# Patient Record
Sex: FEMALE | Race: BLACK OR AFRICAN AMERICAN | NOT HISPANIC OR LATINO | Employment: OTHER | URBAN - METROPOLITAN AREA
[De-identification: names, ages, dates, MRNs, and addresses within clinical notes are randomized per-mention and may not be internally consistent; named-entity substitution may affect disease eponyms.]

---

## 2017-02-07 ENCOUNTER — PATIENT MESSAGE (OUTPATIENT)
Dept: NEPHROLOGY | Facility: CLINIC | Age: 62
End: 2017-02-07

## 2017-02-16 ENCOUNTER — LAB VISIT (OUTPATIENT)
Dept: LAB | Facility: HOSPITAL | Age: 62
End: 2017-02-16
Attending: INTERNAL MEDICINE
Payer: MEDICARE

## 2017-02-16 DIAGNOSIS — Z94.0 S/P KIDNEY TRANSPLANT: ICD-10-CM

## 2017-02-16 LAB
ALBUMIN SERPL BCP-MCNC: 4 G/DL
ANION GAP SERPL CALC-SCNC: 6 MMOL/L
ANISOCYTOSIS BLD QL SMEAR: SLIGHT
BASOPHILS # BLD AUTO: 0.01 K/UL
BASOPHILS NFR BLD: 0.2 %
BUN SERPL-MCNC: 16 MG/DL
CALCIUM SERPL-MCNC: 10 MG/DL
CHLORIDE SERPL-SCNC: 104 MMOL/L
CO2 SERPL-SCNC: 22 MMOL/L
CREAT SERPL-MCNC: 1.1 MG/DL
DIFFERENTIAL METHOD: ABNORMAL
EOSINOPHIL # BLD AUTO: 0.3 K/UL
EOSINOPHIL NFR BLD: 5.1 %
ERYTHROCYTE [DISTWIDTH] IN BLOOD BY AUTOMATED COUNT: 16.1 %
EST. GFR  (AFRICAN AMERICAN): >60 ML/MIN/1.73 M^2
EST. GFR  (NON AFRICAN AMERICAN): 54.3 ML/MIN/1.73 M^2
GLUCOSE SERPL-MCNC: 87 MG/DL
HCT VFR BLD AUTO: 36.7 %
HGB BLD-MCNC: 11.9 G/DL
LYMPHOCYTES # BLD AUTO: 2 K/UL
LYMPHOCYTES NFR BLD: 33.4 %
MCH RBC QN AUTO: 24.9 PG
MCHC RBC AUTO-ENTMCNC: 32.4 %
MCV RBC AUTO: 77 FL
MONOCYTES # BLD AUTO: 0.5 K/UL
MONOCYTES NFR BLD: 8.3 %
NEUTROPHILS # BLD AUTO: 3.1 K/UL
NEUTROPHILS NFR BLD: 53 %
OVALOCYTES BLD QL SMEAR: ABNORMAL
PHOSPHATE SERPL-MCNC: 2.5 MG/DL
PLATELET # BLD AUTO: 128 K/UL
PLATELET BLD QL SMEAR: ABNORMAL
PMV BLD AUTO: ABNORMAL FL
POIKILOCYTOSIS BLD QL SMEAR: SLIGHT
POTASSIUM SERPL-SCNC: 5.9 MMOL/L
PTH-INTACT SERPL-MCNC: 142 PG/ML
RBC # BLD AUTO: 4.78 M/UL
SODIUM SERPL-SCNC: 132 MMOL/L
URATE SERPL-MCNC: 5.4 MG/DL
WBC # BLD AUTO: 5.87 K/UL

## 2017-02-16 PROCEDURE — 83970 ASSAY OF PARATHORMONE: CPT

## 2017-02-16 PROCEDURE — 85025 COMPLETE CBC W/AUTO DIFF WBC: CPT

## 2017-02-16 PROCEDURE — 80069 RENAL FUNCTION PANEL: CPT

## 2017-02-16 PROCEDURE — 84550 ASSAY OF BLOOD/URIC ACID: CPT

## 2017-02-16 PROCEDURE — 80197 ASSAY OF TACROLIMUS: CPT

## 2017-02-16 PROCEDURE — 36415 COLL VENOUS BLD VENIPUNCTURE: CPT | Mod: PO

## 2017-02-17 LAB — TACROLIMUS BLD-MCNC: 14.7 NG/ML

## 2017-02-20 ENCOUNTER — TELEPHONE (OUTPATIENT)
Dept: NEPHROLOGY | Facility: CLINIC | Age: 62
End: 2017-02-20

## 2017-02-20 DIAGNOSIS — E87.5 HYPERKALEMIA: Primary | ICD-10-CM

## 2017-02-20 DIAGNOSIS — Z94.0 S/P KIDNEY TRANSPLANT: Primary | ICD-10-CM

## 2017-02-20 NOTE — PROGRESS NOTES
Lab Results   Component Value Date    CREATININE 1.1 02/16/2017    BUN 16 02/16/2017     (L) 02/16/2017    K 5.9 (H) 02/16/2017     02/16/2017    CO2 22 (L) 02/16/2017       Discussed recent labs with the patient, Prograf level is elevated at 14, also her serum potassium is elevated at 5.9, she says she has been eating a lot of potatoes in the recent past, now she is careful with the diet, will repeat labs in 2 days including Prograf level.  She is currently on Prograf 1 mg twice a day.    Олег Joseph MD

## 2017-02-22 ENCOUNTER — OFFICE VISIT (OUTPATIENT)
Dept: NEPHROLOGY | Facility: CLINIC | Age: 62
End: 2017-02-22
Payer: COMMERCIAL

## 2017-02-22 ENCOUNTER — LAB VISIT (OUTPATIENT)
Dept: LAB | Facility: HOSPITAL | Age: 62
End: 2017-02-22
Attending: INTERNAL MEDICINE
Payer: MEDICARE

## 2017-02-22 VITALS
SYSTOLIC BLOOD PRESSURE: 114 MMHG | DIASTOLIC BLOOD PRESSURE: 60 MMHG | BODY MASS INDEX: 22.5 KG/M2 | WEIGHT: 127 LBS | HEART RATE: 80 BPM | HEIGHT: 63 IN

## 2017-02-22 DIAGNOSIS — E83.42 HYPOMAGNESEMIA: ICD-10-CM

## 2017-02-22 DIAGNOSIS — Z94.0 S/P KIDNEY TRANSPLANT: ICD-10-CM

## 2017-02-22 DIAGNOSIS — Z94.0 S/P KIDNEY TRANSPLANT: Primary | ICD-10-CM

## 2017-02-22 DIAGNOSIS — E87.5 HYPERKALEMIA: ICD-10-CM

## 2017-02-22 LAB
ALBUMIN SERPL BCP-MCNC: 4 G/DL
ANION GAP SERPL CALC-SCNC: 7 MMOL/L
BUN SERPL-MCNC: 17 MG/DL
CALCIUM SERPL-MCNC: 10.1 MG/DL
CHLORIDE SERPL-SCNC: 108 MMOL/L
CO2 SERPL-SCNC: 21 MMOL/L
CREAT SERPL-MCNC: 1 MG/DL
EST. GFR  (AFRICAN AMERICAN): >60 ML/MIN/1.73 M^2
EST. GFR  (NON AFRICAN AMERICAN): >60 ML/MIN/1.73 M^2
GLUCOSE SERPL-MCNC: 90 MG/DL
PHOSPHATE SERPL-MCNC: 3 MG/DL
POTASSIUM SERPL-SCNC: 5.1 MMOL/L
SODIUM SERPL-SCNC: 136 MMOL/L

## 2017-02-22 PROCEDURE — 99214 OFFICE O/P EST MOD 30 MIN: CPT | Mod: S$GLB,,, | Performed by: INTERNAL MEDICINE

## 2017-02-22 PROCEDURE — 1160F RVW MEDS BY RX/DR IN RCRD: CPT | Mod: S$GLB,,, | Performed by: INTERNAL MEDICINE

## 2017-02-22 PROCEDURE — 80069 RENAL FUNCTION PANEL: CPT | Mod: PO

## 2017-02-22 PROCEDURE — 99999 PR PBB SHADOW E&M-EST. PATIENT-LVL III: CPT | Mod: PBBFAC,,, | Performed by: INTERNAL MEDICINE

## 2017-02-22 PROCEDURE — 36415 COLL VENOUS BLD VENIPUNCTURE: CPT | Mod: PO

## 2017-02-22 PROCEDURE — 80197 ASSAY OF TACROLIMUS: CPT

## 2017-02-22 RX ORDER — ONDANSETRON 4 MG/1
4 TABLET, FILM COATED ORAL
COMMUNITY
Start: 2016-08-22 | End: 2017-08-22

## 2017-02-22 RX ORDER — ZOLEDRONIC ACID 4 MG/100ML
SOLUTION INTRAVENOUS
COMMUNITY
End: 2018-07-02

## 2017-02-22 RX ORDER — DOCUSATE SODIUM 100 MG/1
100 CAPSULE, LIQUID FILLED ORAL
COMMUNITY
End: 2017-04-28 | Stop reason: ALTCHOICE

## 2017-02-22 RX ORDER — NORTRIPTYLINE HYDROCHLORIDE 25 MG/1
CAPSULE ORAL
COMMUNITY
Start: 2016-11-23 | End: 2017-04-28 | Stop reason: ALTCHOICE

## 2017-02-22 RX ORDER — LANOLIN ALCOHOL/MO/W.PET/CERES
400 CREAM (GRAM) TOPICAL DAILY
Qty: 30 TABLET | Refills: 9 | COMMUNITY
Start: 2017-02-22

## 2017-02-22 NOTE — LETTER
February 22, 2017        Gabriel Wyman MD  [address] [facility] [address]             [facility] - Nephrology  [address] [facility] [address]  Phone: [phone]  Fax: [phone]   Patient: Chanel Charles   MR Number: [id number]   YOB: 1955   Date of Visit: 2/22/2017       Dear Dr. Wyman:    Thank you for referring Chanel Charles to me for evaluation. Attached you will find relevant portions of my assessment and plan of care.    If you have questions, please do not hesitate to call me. I look forward to following Chanel Charles along with you.    Sincerely,      Олег Joseph MD            CC  No Recipients    Enclosure

## 2017-02-22 NOTE — PROGRESS NOTES
Subjective:       Patient ID: Chanel Charles is a 61 y.o.   female who presents for follow-up evaluation of Living Unrelated RT ( 4/6/2015 ) , HTN, SHPT ,       ORGAN: RIGHT KIDNEY    Donor Type: living    PHS Increased Risk: no    Cold Ischemia: 58 mins    Induction Medications: campath - alemtuzumab (anti-cd52)          Gabriel Wyman MD      HPI:  Chanel Charles is a pleasant 61-year-old -American woman with history of Living unrelated ( friend ) renal transplant in 4/6/2015, excellent allograft function on Prograf 1 mg BID , CellCept 500 mg twice a day. She has history of lupus nephritis, cause of end-stage kidney disease. She was on hemodialysis for about 4-1/2 years at [facility] HD unit under [facility], recent office notes were reviewed. She follows with rheumatology Department in [place] and  endocrine department for hypercalcemia.  recent lab results were discussed, she had high serum potassium secondary to increased potatoes, low potassium diet again discussed, repeat labs show a potassium of 5.1, she has been nauseated for the last few weeks from viral  gastroenteritis.              Past Medical History   Diagnosis Date    Abnormal stress echo - with no blockage on LHC - 1/16/14 1/7/2014    Acid reflux     Anemia of chronic kidney failure     Anxiety     Arthritis     Awaiting kidney transplant 12/12/2013    CHF (congestive heart failure)     Coronary artery disease      Bare metal stent in 2007    ESRD (end stage renal disease)      Secondary to Lupus Nephritis, HD MWF    Hypercalcemia 7/15/2015    Hyperlipidemia 12/12/2013    Hyperparathyroidism 7/15/2015    Hyperparathyroidism, tertiary 8/7/2015    Immunocompromised state 5/5/2015    ITP (idiopathic thrombocytopenic purpura) 12/12/2013    Living-donor kidney transplant for SLE 4/6/15 4/6/2015     Induced with Thymoglobulin.     Lupus nephritis     Lupus nephritis 5/18/2015    Menopause  7/15/2015    Osteoporosis 12/24/2015    Pericardial effusion s/p pericardiocentesis 12/12/2013    Pleural effusion s/p Thoracentesis and VATS 12/12/2013    Prophylactic immunotherapy 6/29/2015    S/p Bilateral Nephrectomy (Severe proteinuria) - 2012 3/19/2015    Secondary hyperparathyroidism, renal     Shingles 4/2012    SLE (systemic lupus erythematosus) 1997         Current Outpatient Prescriptions on File Prior to Visit   Medication Sig Dispense Refill    alprazolam (XANAX) 0.5 MG tablet Take 0.5 mg by mouth. 1 Tablet Oral Every day.  or as needed.      aspirin (ECOTRIN) 81 MG EC tablet Take 81 mg by mouth once daily.       atorvastatin (LIPITOR) 80 MG tablet Take 80 mg by mouth every evening. 1 Tablet Oral Every day      benzonatate (TESSALON) 200 MG capsule Take 200 mg by mouth once daily.      BIOTIN ORAL Take by mouth.      BUDESONIDE/FORMOTEROL FUMARATE (SYMBICORT INHL) Inhale into the lungs.      eszopiclone (LUNESTA) 2 MG Tab Take 2 mg by mouth. 1 Tablet Oral As directed.  1 tablet 1 time as needed at bedtime.      famotidine (PEPCID) 20 MG tablet Take 1 tablet (20 mg total) by mouth every evening. 30 tablet 6    fluticasone (FLONASE) 50 mcg/actuation nasal spray 1 spray by Each Nare route once daily.  0    hydroxychloroquine (PLAQUENIL) 200 mg tablet Take 1 tablet (200 mg total) by mouth once daily. 90 tablet 2    loratadine (CLARITIN) 10 mg tablet Take 1 tablet (10 mg total) by mouth daily as needed for Allergies.  0    magnesium chloride (MAG DELAY) 64 mg TbSR Take 1 tablet (64 mg total) by mouth once daily. 30 tablet 6    multivitamin (THERAGRAN) tablet Take 1 tablet by mouth once daily.      mycophenolate (CELLCEPT) 250 mg Cap Take 2 capsules (500 mg total) by mouth 2 (two) times daily. 120 capsule 11    tacrolimus (PROGRAF) 1 MG Cap Take 1 capsule (1 mg total) by mouth every 12 (twelve) hours. Take 1 mg in the AM and 1 mg in the PM. Kidney Transplant on 4/6/15. 60 capsule 11     valacyclovir (VALTREX) 1000 MG tablet Take 1 tablet by mouth once daily.       No current facility-administered medications on file prior to visit.        SH : She is a retired teacher, principal. She is not a smoker or alcohol drinker. She has a son who is 32 years old. She is currently living at home.       FH : Several family members with hypertension. Lupus in a distant uncle               Review of Systems  :      Constitutional: Negative for activity change, appetite change, fatigue and fever.   HENT:  Negative for congestion, facial swelling, sore throat, trouble swallowing and voice change.   Eyes: Negative for redness and visual disturbance.   Respiratory: Negative for apnea, cough, chest tightness, shortness of breath and wheezing.   Cardiovascular: Negative for chest pain, palpitations and leg swelling.   Gastrointestinal: Negative for abdominal distention, abdominal pain, blood in stool, constipation, diarrhea, nausea and vomiting.   Genitourinary: Negative for decreased urine volume, difficulty urinating, dysuria, flank pain, frequency, hematuria, pelvic pain and urgency.   Musculoskeletal: Positive for arthralgias. Negative for back pain, gait problem and joint swelling.   Skin: Negative for color change and rash.   Neurological: Negative for dizziness, syncope, weakness and headaches.   Hematological: Does not bruise/bleed easily.   Psychiatric/Behavioral: Negative for agitation, behavioral problems and confusion. The patient is not nervous/anxious.          Objective:         Vitals:    02/22/17 1134   BP: 114/60   Pulse: 80       Respiratory rate 20, afebrile, weight 126 pounds, previous weight was 130 pounds    Physical Exam  :      Constitutional: She is oriented to person, place, and time. She appears well-developed and well-nourished. No distress.    HENT: Head: Normocephalic and atraumatic. Mouth/Throat: Oropharynx is clear and moist. No oropharyngeal exudate.    Eyes: Conjunctivae and EOM  are normal. Pupils are equal, round, and reactive to light. No scleral icterus.    Neck: Normal range of motion. Neck supple. No JVD present. Carotid bruit is not present. No tracheal deviation present. No thyroid mass and no thyromegaly present.    Cardiovascular: Normal rate, regular rhythm, normal heart sounds and intact distal pulses. Exam reveals no gallop and no friction rub.    No murmur heard.    Pulmonary/Chest: Effort normal and breath sounds normal. No respiratory distress. She has no wheezes. She has no rales. She exhibits no tenderness.    Abdominal: Soft. Bowel sounds are normal. She exhibits no distension, no abdominal bruit, no ascites and no mass. There is no hepatosplenomegaly. There is no allgraft ( RLQ ) tenderness. There is no rebound, no guarding and no CVA tenderness.   Musculoskeletal: Normal range of motion. She exhibits no edema or tenderness.   Lymphadenopathy: She has no cervical adenopathy.   Neurological: She is alert and oriented to person, place, and time. No cranial nerve deficit. She exhibits normal muscle tone. Coordination normal.    Skin: Skin is warm and intact. No rash noted. No erythema. No pallor.   Psychiatric: She has a normal mood and affect. Her behavior is normal. Judgment normal.        Labs:      Lab Results   Component Value Date    CREATININE 1.0 02/22/2017    BUN 17 02/22/2017     02/22/2017    K 5.1 02/22/2017     02/22/2017    CO2 21 (L) 02/22/2017       Lab Results   Component Value Date    WBC 5.87 02/16/2017    HGB 11.9 (L) 02/16/2017    HCT 36.7 (L) 02/16/2017    MCV 77 (L) 02/16/2017     (L) 02/16/2017       Lab Results   Component Value Date    .0 (H) 02/16/2017    CALCIUM 10.1 02/22/2017    PHOS 3.0 02/22/2017       Lab Results   Component Value Date    ALBUMIN 4.0 02/22/2017       Lab Results   Component Value Date    URICACID 5.4 02/16/2017       Impression and Plan : 61-year-old -American woman seen in office today in  follow-up for following medical problems ,       1. Status post Living Unrelated kidney transplant ( 4/6/2015 ) : Stable renal function with a serum creatinine of 1 mg/dL.        Serum Prograf levels is 14, repeat pending ,  ( range 4-7 ) , She is currently on Prograf  1 mg twice daily.  CellCept 500 mg twice a day.       2. Hypertension - blood pressure is controlled,      3. Secondary hyperparathyroidism - follow PTH ,       4. Anemia of chronic kidney disease - most recent hemoglobin is about 11.1 g. will continue to monitor.        5. Recent urinalysis shows normal range proteinuria.        6. Lupus nephritis - currently under remission. On Plaquenil, sees Dr Jj in [place] ,       7. Lytes -  on Mag supplements , check magnesium in 2 weeks, changing from mag delay to  Mag-Ox 400 once a day.    Ca acceptable at 10 , follow       8. She is euvolemic on exam.       9. Leukopenia : resolved, she is following with hematologist Karsten ()        10. Met acidosis : bicarb level stable       I'll see her in follow-up in about 4 months. More than 25 minutes of face-to-face time discussing labs and plan of care.       Sincerely,        Олег Joseph MD

## 2017-02-22 NOTE — MR AVS SNAPSHOT
[facility] Nephrology  [address] [facility] [place] [address]  Phone: [phone]  Fax: [phone]                  Chanel Charles   2017 11:30 AM   Office Visit    Description:  Female : 1955   Provider:  Олег Joseph MD   Department:  [facility] - Nephrology           Reason for Visit     S/P kidney transplant           Diagnoses this Visit        Comments    S/P kidney transplant    -  Primary     Hypomagnesemia                To Do List           Future Appointments        Provider Department Dept Phone    3/8/2017 10:55 AM LABORATORY, [facility] [phone]    4/3/2017 8:35 AM LABORATORY, [facility] [phone]    4/3/2017 8:40 AM SPECIMEN, [facility] [phone]    2017 11:00 AM Lexii Nation MD [facility] - Rheumatology [phone]    2017 10:20 AM LABORATORY, [facility] [phone]      Goals (5 Years of Data)     None      PURCHASE these Medications (No prescription required)        Start End    magnesium oxide (MAG-OX) 400 mg tablet 2017     Sig - Route: Take 1 tablet (400 mg total) by mouth once daily. - Oral    Class: OTC      [facility] On Call     [facility] On Call Nurse Care Line -  Assistance  Registered nurses in the [facility] On Call Center provide clinical advisement, health education, appointment booking, and other advisory services.  Call for this free service at [phone].             Medications           Message regarding Medications     Verify the changes and/or additions to your medication regime listed below are the same as discussed with your clinician today.  If any of these changes or additions are incorrect, please notify your healthcare provider.        START taking these NEW medications        Refills    magnesium oxide (MAG-OX) 400 mg tablet 9    Sig: Take 1 tablet (400 mg total) by mouth once daily.    Class: OTC    Route: Oral       STOP taking these medications     magnesium chloride (MAG DELAY) 64 mg TbSR Take 1 tablet (64 mg total) by mouth once daily.           Verify that the below list of medications is an accurate representation of the medications you are currently taking.  If none reported, the list may be blank. If incorrect, please contact your healthcare provider. Carry this list with you in case of emergency.           Current Medications     alprazolam (XANAX) 0.5 MG tablet Take 0.5 mg by mouth. 1 Tablet Oral Every day.  or as needed.    aspirin (ECOTRIN) 81 MG EC tablet Take 81 mg by mouth once daily.     atorvastatin (LIPITOR) 80 MG tablet Take 80 mg by mouth every evening. 1 Tablet Oral Every day    benzonatate (TESSALON) 200 MG capsule Take 200 mg by mouth once daily.    BIOTIN ORAL Take by mouth.    BUDESONIDE/FORMOTEROL FUMARATE (SYMBICORT INHL) Inhale into the lungs.    docusate sodium (COLACE) 100 MG capsule Take 100 mg by mouth.    eszopiclone (LUNESTA) 2 MG Tab Take 2 mg by mouth. 1 Tablet Oral As directed.  1 tablet 1 time as needed at bedtime.    famotidine (PEPCID) 20 MG tablet Take 1 tablet (20 mg total) by mouth every evening.    fluticasone (FLONASE) 50 mcg/actuation nasal spray 1 spray by Each Nare route once daily.    hydroxychloroquine (PLAQUENIL) 200 mg tablet Take 1 tablet (200 mg total) by mouth once daily.    loratadine (CLARITIN) 10 mg tablet Take 1 tablet (10 mg total) by mouth daily as needed for Allergies.    multivitamin (THERAGRAN) tablet Take 1 tablet by mouth once daily.    mycophenolate (CELLCEPT) 250 mg Cap Take 2 capsules (500 mg total) by mouth 2 (two) times daily.    nortriptyline (PAMELOR) 25 MG capsule     ondansetron (ZOFRAN) 4 MG tablet Take 4 mg by mouth.    tacrolimus (PROGRAF) 1 MG Cap Take 1 capsule (1 mg total) by mouth every 12 (twelve) hours. Take 1 mg in the AM and 1 mg in the PM. Kidney Transplant on 4/6/15.    valacyclovir (VALTREX) 1000 MG tablet Take 1 tablet by mouth once  "daily.    zoledronic acid-mannitol-water (RECLAST) 4 mg/100 mL Soln Inject into the vein.    magnesium oxide (MAG-OX) 400 mg tablet Take 1 tablet (400 mg total) by mouth once daily.           Clinical Reference Information           Your Vitals Were     BP Pulse Height Weight BMI    114/60 (BP Location: Right arm, Patient Position: Sitting, BP Method: Manual) 80 5' 3" (1.6 m) 57.6 kg (126 lb 15.8 oz) 22.49 kg/m2      Blood Pressure          Most Recent Value    BP  114/60      Allergies as of 2/22/2017     Heparin Analogues    Escitalopram Oxalate    Gabapentin      Immunizations Administered on Date of Encounter - 2/22/2017     None      Orders Placed During Today's Visit     Future Labs/Procedures Expected by Expires    CBC auto differential  2/22/2017 4/23/2018    Magnesium  2/22/2017 4/23/2018    Magnesium  2/22/2017 4/23/2018    PTH, intact  2/22/2017 4/23/2018    Renal function panel  2/22/2017 4/23/2018    Tacrolimus level  2/22/2017 4/23/2018    Uric acid  2/22/2017 4/23/2018    Urinalysis  2/22/2017 2/22/2018    Vitamin D  2/22/2017 4/23/2018      Maintenance Dialysis History     Start End Type Comments Center    11/15/2010 4/6/2015 [facility]            Current Dialysis Center Information     No center information to display.            Transplant Information        Txp Date Organ Coordinator Care Team    4/6/2015 Kidney [facility] RN Referring Physician:  Shukri Reeves MD   Current Nephrologist:  Олег Joseph MD   Surgeon:  Eamon Pina MD   Assisting Surgeon:  Rancho Taylor MD         Instructions    Avoid NSAID pain medications such as advil, aleve, motrin, ibuprofen, naprosyn, meloxicam, diclofenac, mobic.                Language Assistance Services     ATTENTION: Language assistance services are available, free of charge. Please call [phone].      ATENCIÓN: Si habla español, tiene a jc disposición servicios gratuitos de asistencia " lingüística. Otis al [phone].     ELBERT Ý: N?u b?n nói Ti?ng Vi?t, có các d?ch v? h? tr? ngôn ng? mi?n phí dành cho b?n. G?i s? [phone].         [facility] - [facility]ph[facility] complies with applicable Federal civil rights laws and does not discriminate on the basis of race, color, national origin, age, disability, or sex.

## 2017-02-23 LAB — TACROLIMUS BLD-MCNC: 11.7 NG/ML

## 2017-03-08 ENCOUNTER — LAB VISIT (OUTPATIENT)
Dept: LAB | Facility: HOSPITAL | Age: 62
End: 2017-03-08
Attending: INTERNAL MEDICINE
Payer: COMMERCIAL

## 2017-03-08 DIAGNOSIS — E83.42 HYPOMAGNESEMIA: ICD-10-CM

## 2017-03-08 LAB — MAGNESIUM SERPL-MCNC: 1.4 MG/DL

## 2017-03-08 PROCEDURE — 83735 ASSAY OF MAGNESIUM: CPT

## 2017-03-08 PROCEDURE — 36415 COLL VENOUS BLD VENIPUNCTURE: CPT | Mod: PO

## 2017-03-27 DIAGNOSIS — Z94.0 S/P KIDNEY TRANSPLANT: Primary | ICD-10-CM

## 2017-03-30 RX ORDER — MYCOPHENOLATE MOFETIL 250 MG/1
CAPSULE ORAL
Qty: 120 CAPSULE | Refills: 11 | Status: SHIPPED | OUTPATIENT
Start: 2017-03-30 | End: 2018-03-28 | Stop reason: SDUPTHER

## 2017-04-03 ENCOUNTER — LAB VISIT (OUTPATIENT)
Dept: LAB | Facility: HOSPITAL | Age: 62
End: 2017-04-03
Attending: INTERNAL MEDICINE
Payer: COMMERCIAL

## 2017-04-03 DIAGNOSIS — M32.14 LUPUS NEPHRITIS: ICD-10-CM

## 2017-04-03 DIAGNOSIS — M32.9 SLE (SYSTEMIC LUPUS ERYTHEMATOSUS): ICD-10-CM

## 2017-04-03 DIAGNOSIS — Z94.0 KIDNEY REPLACED BY TRANSPLANT: ICD-10-CM

## 2017-04-03 DIAGNOSIS — Z79.899 ENCOUNTER FOR LONG-TERM (CURRENT) USE OF MEDICATIONS: ICD-10-CM

## 2017-04-03 LAB
ALBUMIN SERPL BCP-MCNC: 3.9 G/DL
ALP SERPL-CCNC: 75 U/L
ALP SERPL-CCNC: 75 U/L
ALT SERPL W/O P-5'-P-CCNC: 21 U/L
ALT SERPL W/O P-5'-P-CCNC: 21 U/L
ANION GAP SERPL CALC-SCNC: 6 MMOL/L
ANION GAP SERPL CALC-SCNC: 6 MMOL/L
AST SERPL-CCNC: 28 U/L
AST SERPL-CCNC: 28 U/L
BASOPHILS # BLD AUTO: 0.03 K/UL
BASOPHILS NFR BLD: 0.6 %
BILIRUB DIRECT SERPL-MCNC: 0.5 MG/DL
BILIRUB SERPL-MCNC: 1 MG/DL
BILIRUB SERPL-MCNC: 1 MG/DL
BUN SERPL-MCNC: 17 MG/DL
BUN SERPL-MCNC: 17 MG/DL
C3 SERPL-MCNC: 125 MG/DL
C4 SERPL-MCNC: 22 MG/DL
CALCIUM SERPL-MCNC: 10.2 MG/DL
CALCIUM SERPL-MCNC: 10.2 MG/DL
CHLORIDE SERPL-SCNC: 109 MMOL/L
CHLORIDE SERPL-SCNC: 109 MMOL/L
CO2 SERPL-SCNC: 26 MMOL/L
CO2 SERPL-SCNC: 26 MMOL/L
CREAT SERPL-MCNC: 0.9 MG/DL
CREAT SERPL-MCNC: 0.9 MG/DL
DIFFERENTIAL METHOD: ABNORMAL
EOSINOPHIL # BLD AUTO: 0.3 K/UL
EOSINOPHIL NFR BLD: 5.6 %
ERYTHROCYTE [DISTWIDTH] IN BLOOD BY AUTOMATED COUNT: 17.3 %
EST. GFR  (AFRICAN AMERICAN): >60 ML/MIN/1.73 M^2
EST. GFR  (AFRICAN AMERICAN): >60 ML/MIN/1.73 M^2
EST. GFR  (NON AFRICAN AMERICAN): >60 ML/MIN/1.73 M^2
EST. GFR  (NON AFRICAN AMERICAN): >60 ML/MIN/1.73 M^2
GLUCOSE SERPL-MCNC: 87 MG/DL
GLUCOSE SERPL-MCNC: 87 MG/DL
HCT VFR BLD AUTO: 33.4 %
HGB BLD-MCNC: 11.1 G/DL
LYMPHOCYTES # BLD AUTO: 1.5 K/UL
LYMPHOCYTES NFR BLD: 28 %
MAGNESIUM SERPL-MCNC: 1.5 MG/DL
MCH RBC QN AUTO: 24.9 PG
MCHC RBC AUTO-ENTMCNC: 33.2 %
MCV RBC AUTO: 75 FL
MONOCYTES # BLD AUTO: 0.6 K/UL
MONOCYTES NFR BLD: 12.4 %
NEUTROPHILS # BLD AUTO: 2.7 K/UL
NEUTROPHILS NFR BLD: 53.4 %
PHOSPHATE SERPL-MCNC: 2.7 MG/DL
PLATELET # BLD AUTO: 152 K/UL
PMV BLD AUTO: ABNORMAL FL
POTASSIUM SERPL-SCNC: 4.8 MMOL/L
POTASSIUM SERPL-SCNC: 4.8 MMOL/L
PROT SERPL-MCNC: 7.2 G/DL
PROT SERPL-MCNC: 7.2 G/DL
RBC # BLD AUTO: 4.45 M/UL
SODIUM SERPL-SCNC: 141 MMOL/L
SODIUM SERPL-SCNC: 141 MMOL/L
WBC # BLD AUTO: 5.17 K/UL

## 2017-04-03 PROCEDURE — 80053 COMPREHEN METABOLIC PANEL: CPT

## 2017-04-03 PROCEDURE — 80076 HEPATIC FUNCTION PANEL: CPT

## 2017-04-03 PROCEDURE — 84100 ASSAY OF PHOSPHORUS: CPT

## 2017-04-03 PROCEDURE — 86160 COMPLEMENT ANTIGEN: CPT

## 2017-04-03 PROCEDURE — 86225 DNA ANTIBODY NATIVE: CPT

## 2017-04-03 PROCEDURE — 85025 COMPLETE CBC W/AUTO DIFF WBC: CPT

## 2017-04-03 PROCEDURE — 86160 COMPLEMENT ANTIGEN: CPT | Mod: 59

## 2017-04-03 PROCEDURE — 87799 DETECT AGENT NOS DNA QUANT: CPT

## 2017-04-03 PROCEDURE — 80197 ASSAY OF TACROLIMUS: CPT

## 2017-04-03 PROCEDURE — 83735 ASSAY OF MAGNESIUM: CPT

## 2017-04-04 ENCOUNTER — PATIENT MESSAGE (OUTPATIENT)
Dept: RHEUMATOLOGY | Facility: CLINIC | Age: 62
End: 2017-04-04

## 2017-04-04 LAB
DSDNA AB SER-ACNC: NORMAL [IU]/ML
TACROLIMUS BLD-MCNC: 5.7 NG/ML

## 2017-04-06 ENCOUNTER — OFFICE VISIT (OUTPATIENT)
Dept: RHEUMATOLOGY | Facility: CLINIC | Age: 62
End: 2017-04-06
Payer: MEDICARE

## 2017-04-06 ENCOUNTER — TELEPHONE (OUTPATIENT)
Dept: TRANSPLANT | Facility: CLINIC | Age: 62
End: 2017-04-06

## 2017-04-06 ENCOUNTER — TELEPHONE (OUTPATIENT)
Dept: ENDOCRINOLOGY | Facility: CLINIC | Age: 62
End: 2017-04-06

## 2017-04-06 VITALS
HEIGHT: 63 IN | TEMPERATURE: 98 F | WEIGHT: 126.5 LBS | SYSTOLIC BLOOD PRESSURE: 101 MMHG | DIASTOLIC BLOOD PRESSURE: 61 MMHG | HEART RATE: 78 BPM | BODY MASS INDEX: 22.41 KG/M2

## 2017-04-06 DIAGNOSIS — Z94.0 IMMUNOSUPPRESSIVE MANAGEMENT ENCOUNTER FOLLOWING KIDNEY TRANSPLANT: ICD-10-CM

## 2017-04-06 DIAGNOSIS — D84.9 IMMUNOSUPPRESSION: ICD-10-CM

## 2017-04-06 DIAGNOSIS — M32.9 SYSTEMIC LUPUS ERYTHEMATOSUS, UNSPECIFIED SLE TYPE, UNSPECIFIED ORGAN INVOLVEMENT STATUS: Primary | ICD-10-CM

## 2017-04-06 DIAGNOSIS — Z79.899 IMMUNOSUPPRESSIVE MANAGEMENT ENCOUNTER FOLLOWING KIDNEY TRANSPLANT: ICD-10-CM

## 2017-04-06 LAB
BK VIRUS DNA PCR, QUANT, BLOOD: <250 COPIES/ML
BK VIRUS DNA, BLOOD: NOT DETECTED
LOG BKV COPIES/ML: <2.4 LOG (10) COPIES/ML

## 2017-04-06 PROCEDURE — 99999 PR PBB SHADOW E&M-EST. PATIENT-LVL III: CPT | Mod: PBBFAC,,, | Performed by: INTERNAL MEDICINE

## 2017-04-06 PROCEDURE — 99214 OFFICE O/P EST MOD 30 MIN: CPT | Mod: S$PBB,,, | Performed by: INTERNAL MEDICINE

## 2017-04-06 PROCEDURE — 99213 OFFICE O/P EST LOW 20 MIN: CPT | Mod: PBBFAC | Performed by: INTERNAL MEDICINE

## 2017-04-06 ASSESSMENT — ROUTINE ASSESSMENT OF PATIENT INDEX DATA (RAPID3)
MDHAQ FUNCTION SCORE: 0
AM STIFFNESS SCORE: 0, NO
FATIGUE SCORE: 0
TOTAL RAPID3 SCORE: .17
PSYCHOLOGICAL DISTRESS SCORE: 0
PATIENT GLOBAL ASSESSMENT SCORE: 0
PAIN SCORE: .5

## 2017-04-06 NOTE — TELEPHONE ENCOUNTER
----- Message from Madhuri Castanon PA-C sent at 4/6/2017  1:47 PM CDT -----      ----- Message -----     From: Lexii Nation MD     Sent: 4/6/2017  11:54 AM       To: Madhuri Castanon PA-C    Ms. Kilgore asked when and if she needed Reclast again.  It was done 1/2016.

## 2017-04-06 NOTE — MR AVS SNAPSHOT
[facility] - Rheumatology  [address]  [facility] [address]  Phone: [phone]  Fax: [phone]                  Chanel Charles   2017 11:00 AM   Office Visit    Description:  Female : 1955   Provider:  Lexii Nation MD   Department:  [facility] - Rheumatology           Reason for Visit     Lupus           Diagnoses this Visit        Comments    Systemic lupus erythematosus, unspecified SLE type, unspecified organ involvement status    -  Primary     Immunosuppression         Immunosuppressive management encounter following kidney transplant                To Do List           Future Appointments        Provider Department Dept Phone    2017 11:00 AM Ruslan Landaverde MD [facility] Transplant [phone]    2017 10:20 AM LAB[facility]ATORY, [facility] [phone]    2017 10:30 AM SPECIMEN, [facility] [phone]    2017 11:30 AM Олег Joseph MD [facility] Nephrology [phone]      Goals (5 Years of Data)     None      Follow-Up and Disposition     Return in about 4 months (around 2017).    Follow-up and Disposition History      [facility] On Call     [facility] On Call Nurse Care Line -  Assistance  Unless otherwise directed by your provider, please contact [facility], our nurse care line that is available for  assistance.     Registered nurses in the [facility] Center provide: appointment scheduling, clinical advisement, health education, and other advisory services.  Call: [phone] (toll free)               Medications           Message regarding Medications     Verify the changes and/or additions to your medication regime listed below are the same as discussed with your clinician today.  If any of these changes or additions are incorrect, please notify your healthcare provider.             Verify that the below list of medications is an accurate representation of the medications you  are currently taking.  If none reported, the list may be blank. If incorrect, please contact your healthcare provider. Carry this list with you in case of emergency.           Current Medications     alprazolam (XANAX) 0.5 MG tablet Take 0.5 mg by mouth. 1 Tablet Oral Every day.  or as needed.    aspirin (ECOTRIN) 81 MG EC tablet Take 81 mg by mouth once daily.     atorvastatin (LIPITOR) 80 MG tablet Take 80 mg by mouth every evening. 1 Tablet Oral Every day    benzonatate (TESSALON) 200 MG capsule Take 200 mg by mouth once daily.    BIOTIN ORAL Take by mouth.    BUDESONIDE/FORMOTEROL FUMARATE (SYMBICORT INHL) Inhale into the lungs.    docusate sodium (COLACE) 100 MG capsule Take 100 mg by mouth.    eszopiclone (LUNESTA) 2 MG Tab Take 2 mg by mouth. 1 Tablet Oral As directed.  1 tablet 1 time as needed at bedtime.    famotidine (PEPCID) 20 MG tablet Take 1 tablet (20 mg total) by mouth every evening.    fluticasone (FLONASE) 50 mcg/actuation nasal spray 1 spray by Each Nare route once daily.    hydroxychloroquine (PLAQUENIL) 200 mg tablet Take 1 tablet (200 mg total) by mouth once daily.    loratadine (CLARITIN) 10 mg tablet Take 1 tablet (10 mg total) by mouth daily as needed for Allergies.    magnesium oxide (MAG-OX) 400 mg tablet Take 1 tablet (400 mg total) by mouth once daily.    multivitamin (THERAGRAN) tablet Take 1 tablet by mouth once daily.    mycophenolate (CELLCEPT) 250 mg Cap TAKE 2 CAPSULES BY MOUTH TWO TIMES A DAY    nortriptyline (PAMELOR) 25 MG capsule     ondansetron (ZOFRAN) 4 MG tablet Take 4 mg by mouth.    tacrolimus (PROGRAF) 1 MG Cap Take 1 capsule (1 mg total) by mouth every 12 (twelve) hours. Take 1 mg in the AM and 1 mg in the PM. Kidney Transplant on 4/6/15.    valacyclovir (VALTREX) 1000 MG tablet Take 1 tablet by mouth once daily.    zoledronic acid-mannitol-water (RECLAST) 4 mg/100 mL Soln Inject into the vein.           Clinical Reference Information           Your Vitals Were     BP  "Pulse Temp Height Weight BMI    101/61 (BP Location: Right arm, Patient Position: Sitting, BP Method: Automatic) 78 98.4 °F (36.9 °C) (Oral) 5' 3" (1.6 m) 57.4 kg (126 lb 8 oz) 22.41 kg/m2      Blood Pressure          Most Recent Value    BP  101/61      Allergies as of 4/6/2017     Heparin Analogues    Escitalopram Oxalate    Gabapentin      Immunizations Administered on Date of Encounter - 4/6/2017     None      Maintenance Dialysis History     Start End Type Comments Center    11/15/2010 4/6/2015 Hemo  [facility]            Current Dialysis Center Information     No center information to display.            Transplant Information        Txp Date Organ Coordinator Care Team    4/6/2015 Kidney [facility] RN Referring Physician:  Shukri Reeves MD   Current Nephrologist:  Олег Joseph MD   Surgeon:  Eamon Pina MD   Assisting Surgeon:  Rancho Taylor MD         Language Assistance Services     ATTENTION: Language assistance services are available, free of charge. Please call [phone].      ATENCIÓN: Si habla español, tiene a jc disposición servicios gratuitos de asistencia lingüística. Llame al [phone].     CHÚ Ý: N?u b?n nói Ti?ng Vi?t, có các d?ch v? h? tr? ngôn ng? mi?n phí dành cho b?n. G?i s? [phone].         [facility] complies with applicable Federal civil rights laws and does not discriminate on the basis of race, color, national origin, age, disability, or sex.        "

## 2017-04-06 NOTE — PROGRESS NOTES
Subjective:       Patient ID: Chanel Charles is a 61 y.o. female.    Chief Complaint: Lupus      HPI:  Chanel Charles is a 61 y.o. female with SLE and renal failure as well an extensive history of recurrent pleural effusions and pericardial effusion treated with recurrent thoracentesis (x3) and pericardiocentesis. She is s/p kidney transplant 4/6/2015. Patient in the past had 3-4 paracentesis for accumulation of abdominal fluid (last time was December 2011). She was hospitalized 4/2012 for acute altered mental status, slurred speech concerning for CVA. Dr. Ribera (rheum) was consulted and he did not think it was her lupus. It was felt that her AMS was caused by gabapentin and/or valtrex as those were new medications. After these medications were stopped she returned to her baseline function. She has history of a lung nodule and eventual TB results were negative. Patient had VATS procedure 3/2011 which showed a granuloma thought to be due histoplamosis but work up was negative. Patient had kidneys removed 4/2011 to help decrease protein loss from the body.    She was given Rituxan (4 infusions total) and later Nplate for thrombocytopenia by her hematologist and is back on Nplate.   Hematologist is Dr. Shelley () and she will see him next week. She has been off Nplate since transplant.       Colonoscopy 8/19/14 showed benign polyps, diverticulosis, internal and external hemrrhoids  Mammogram 6/24/14 was negative.  Pap smear 6/30/14 negative for lesion or malignancy     Sinus headaches and sinusitis not improved with antibiotics 9/2016. Had MRI showed spot on brain. Had lumbar puncture.  Still needs additional MRI. First treated with fioricet then nortriptyline. Still off since not having headaches.   Doing well.        Review of Systems   Constitutional: Negative for fatigue.   HENT: Negative for congestion, dental problem and drooling.    Eyes: Negative.    Respiratory: Negative.   "  Cardiovascular: Negative.    Gastrointestinal: Negative.    Endocrine: Negative.    Genitourinary: Negative.    Musculoskeletal: Negative.    Skin: Negative.    Allergic/Immunologic: Negative.    Neurological: Positive for headaches.   Hematological: Negative.    Psychiatric/Behavioral: Negative.          Objective:   /61 (BP Location: Right arm, Patient Position: Sitting, BP Method: Automatic)  Pulse 78  Temp 98.4 °F (36.9 °C) (Oral)   Ht 5' 3" (1.6 m)  Wt 57.4 kg (126 lb 8 oz)  BMI 22.41 kg/m2     Physical Exam   Constitutional: She is oriented to person, place, and time and well-developed, well-nourished, and in no distress.   HENT:   Head: Normocephalic and atraumatic.   Eyes: Conjunctivae and EOM are normal.   Neck: Neck supple.   Cardiovascular: Normal rate, regular rhythm and normal heart sounds.    Pulmonary/Chest: Effort normal and breath sounds normal.   Abdominal: Soft. Bowel sounds are normal.   Neurological: She is alert and oriented to person, place, and time. Gait normal.   Skin: Skin is warm and dry.     Psychiatric: Mood and affect normal.   Musculoskeletal: She exhibits no edema, tenderness or deformity.           LABS    Component      Latest Ref Rng & Units 4/3/2017 4/3/2017 4/3/2017           8:03 AM  8:03 AM  8:03 AM   WBC      3.90 - 12.70 K/uL   5.17   RBC      4.00 - 5.40 M/uL   4.45   Hemoglobin      12.0 - 16.0 g/dL   11.1 (L)   Hematocrit      37.0 - 48.5 %   33.4 (L)   MCV      82 - 98 fL   75 (L)   MCH      27.0 - 31.0 pg   24.9 (L)   MCHC      32.0 - 36.0 %   33.2   RDW      11.5 - 14.5 %   17.3 (H)   Platelets      150 - 350 K/uL   152   MPV      9.2 - 12.9 fL   SEE COMMENT   Gran #      1.8 - 7.7 K/uL   2.7   Lymph #      1.0 - 4.8 K/uL   1.5   Mono #      0.3 - 1.0 K/uL   0.6   Eos #      0.0 - 0.5 K/uL   0.3   Baso #      0.00 - 0.20 K/uL   0.03   Gran%      38.0 - 73.0 %   53.4   Lymph%      18.0 - 48.0 %   28.0   Mono%      4.0 - 15.0 %   12.4   Eosinophil%      0.0 " - 8.0 %   5.6   Basophil%      0.0 - 1.9 %   0.6   Differential Method         Automated   Sodium      136 - 145 mmol/L  141 141   Potassium      3.5 - 5.1 mmol/L  4.8 4.8   Chloride      95 - 110 mmol/L  109 109   CO2      23 - 29 mmol/L  26 26   Glucose      70 - 110 mg/dL  87 87   BUN, Bld      8 - 23 mg/dL  17 17   Creatinine      0.5 - 1.4 mg/dL  0.9 0.9   Calcium      8.7 - 10.5 mg/dL  10.2 10.2   Total Protein      6.0 - 8.4 g/dL 7.2  7.2   Albumin      3.5 - 5.2 g/dL 3.9 3.9 3.9   Total Bilirubin      0.1 - 1.0 mg/dL 1.0  1.0   Alkaline Phosphatase      55 - 135 U/L 75  75   AST      10 - 40 U/L 28  28   ALT      10 - 44 U/L 21  21   Anion Gap      8 - 16 mmol/L  6 (L) 6 (L)   eGFR if African American      >60 mL/min/1.73 m:2  >60.0 >60.0   eGFR if non African American      >60 mL/min/1.73 m:2  >60.0 >60.0   Phosphorus      2.7 - 4.5 mg/dL  2.7    Bilirubin, Direct      0.1 - 0.3 mg/dL 0.5 (H)     Complement (C-4)      11 - 44 mg/dL   22   Complement (C-3)      50 - 180 mg/dL   125   ds DNA Ab      Negative 1:10   Negative 1:10   Tacrolimus Lvl      5.0 - 15.0 ng/mL   5.7   Magnesium      1.6 - 2.6 mg/dL   1.5 (L)     Component      Latest Ref Rng & Units 4/3/2017           7:56 AM   Specimen UA       Urine, Clean Catch   Color, UA      Yellow, Straw, Ela Yellow   Appearance, UA      Clear Clear   pH, UA      5.0 - 8.0 6.0   Specific Gravity, UA      1.005 - 1.030 1.010   Protein, UA      Negative Negative   Glucose, UA      Negative Negative   Ketones, UA      Negative Negative   Bilirubin (UA)      Negative Negative   Occult Blood UA      Negative Negative   Nitrite, UA      Negative Negative   Leukocytes, UA      Negative 2+ (A)   Bilirubin, Direct      0.1 - 0.3 mg/dL    Protein, Urine Random      0 - 15 mg/dL <7   Creatinine, Random Ur      15.0 - 325.0 mg/dL 50.0   Prot/Creat Ratio, Ur      0.00 - 0.20 Unable to calculate   WBC, UA      0 - 5 /hpf 3   Squam Epithel, UA      /hpf 2   Microscopic  Comment       SEE COMMENT        Assessment:       1. SLE with +TANA hx, arthritis, alopecia, and glomerular nephritis now s/p bilateral nephrectomy on dialysis and s/p renal transplant (4/6/2015).   No evidence of active lupus currently. Still doing well. Still dealing with lesion MRI  2. Glomerulonephritis. S/p transplant 4/6/2015.  3. CAD  4. Fatigue  5. Long term Plaquenil use. Off CellCept since 9/2010  6. Serositis  7. Diastolic heart failure. Now resolved.  8. Thrombocytopenia. Now improved.   9. Elevation in homocysteine levels in March 2012 and started on Folbic.   10. Recurrent falls. No recent falls  11. Hypercalcemia. Being managed endocrine  12. Osteoporosis. Endocrine gave Reclast 1/2016  13. 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  Being evaluated by neurology   Plan:       1. Continue Plaquenil 200mg daily.  2. Now off Folbic per transplant  3. Patient to contact office if feels she is having a flare and to send info on neurology evaluation  4. Off Nplate still. Follows hematology.   5. Continue 2 old goats. Continue Voltaren gel for hand pains.  6. Vaccination (pneumonia) per transplant team and primary doctor.     Had flu vaccination and cholesterol checked with primary doctor  7. Forward results from neurology work up.     RTO in 4 months/prn

## 2017-04-06 NOTE — TELEPHONE ENCOUNTER
Notified that it's been over a year since her last visit so needs an appt to come in to discuss if Reclast is still the best option for her. Scheduled for July 20th at 9:00am and mailed to her

## 2017-04-17 ENCOUNTER — PATIENT MESSAGE (OUTPATIENT)
Dept: RHEUMATOLOGY | Facility: CLINIC | Age: 62
End: 2017-04-17

## 2017-04-28 ENCOUNTER — OFFICE VISIT (OUTPATIENT)
Dept: TRANSPLANT | Facility: CLINIC | Age: 62
End: 2017-04-28
Payer: MEDICARE

## 2017-04-28 VITALS
WEIGHT: 127.88 LBS | OXYGEN SATURATION: 100 % | DIASTOLIC BLOOD PRESSURE: 51 MMHG | HEART RATE: 62 BPM | TEMPERATURE: 98 F | SYSTOLIC BLOOD PRESSURE: 111 MMHG | HEIGHT: 63 IN | BODY MASS INDEX: 22.66 KG/M2

## 2017-04-28 DIAGNOSIS — Z79.899 IMMUNOSUPPRESSIVE MANAGEMENT ENCOUNTER FOLLOWING KIDNEY TRANSPLANT: ICD-10-CM

## 2017-04-28 DIAGNOSIS — D84.9 IMMUNOCOMPROMISED STATE: ICD-10-CM

## 2017-04-28 DIAGNOSIS — M32.0 DRUG-INDUCED SYSTEMIC LUPUS ERYTHEMATOSUS, UNSPECIFIED ORGAN INVOLVEMENT STATUS: ICD-10-CM

## 2017-04-28 DIAGNOSIS — M32.14 LUPUS NEPHRITIS: ICD-10-CM

## 2017-04-28 DIAGNOSIS — Z94.0 IMMUNOSUPPRESSIVE MANAGEMENT ENCOUNTER FOLLOWING KIDNEY TRANSPLANT: ICD-10-CM

## 2017-04-28 DIAGNOSIS — N18.2 CKD (CHRONIC KIDNEY DISEASE) STAGE 2, GFR 60-89 ML/MIN: Primary | Chronic | ICD-10-CM

## 2017-04-28 PROCEDURE — 99999 PR PBB SHADOW E&M-EST. PATIENT-LVL IV: CPT | Mod: PBBFAC,,, | Performed by: INTERNAL MEDICINE

## 2017-04-28 PROCEDURE — 99214 OFFICE O/P EST MOD 30 MIN: CPT | Mod: PBBFAC | Performed by: INTERNAL MEDICINE

## 2017-04-28 PROCEDURE — 99215 OFFICE O/P EST HI 40 MIN: CPT | Mod: S$PBB,,, | Performed by: INTERNAL MEDICINE

## 2017-04-28 NOTE — LETTER
April 28, 2017        Олег Joseph  [address] [place] [address]  Phone: [phone]  Fax: [phone]             [facility]  [address]  [facility] [address]  Phone: [phone]   Patient: Chanel Charles   MR Number: [id number]   YOB: 1955   Date of Visit: 4/28/2017       Dear Dr. Олег Joseph    Thank you for referring Chanel Charles to me for evaluation. Attached you will find relevant portions of my assessment and plan of care.    If you have questions, please do not hesitate to call me. I look forward to following Chanel Charles along with you.    Sincerely,    Ruslan Landaverde MD    Enclosure    If you would like to receive this communication electronically, please contact [email or web address] or [phone] to request Attributor Link access.    Attributor Link is a tool which provides read-only access to select patient information with whom you have a relationship. Its easy to use and provides real time access to review your patients record including encounter summaries, notes, results, and demographic information.    If you feel you have received this communication in error or would no longer like to receive these types of communications, please e-mail [email or web address]

## 2017-04-28 NOTE — PROGRESS NOTES
"   {Kid/Panc/KP:99730} Post-Transplant Assessment    Referring Physician: Shukri Reeves  Current Nephrologist: Олег Joseph    ORGAN: RIGHT KIDNEY  Donor Type: living  PHS Increased Risk: no  Cold Ischemia: 58 mins  Induction Medications: campath - alemtuzumab (anti-cd52)    Subjective:     CC:  Reassessment of renal allograft function and management of chronic immunosuppression.    HPI:  Ms. Charles is a 62 y.o. year old Black or  female who received a living {organ:96647} transplant on 4/6/15.  She has CKD {txp CKD stage:58099} and her baseline creatinine is between ***. She takes {immunosuppression:66659} for maintenance immunosuppression. She denies any recent hospitalizations or ER visits since her previous clinic visit.    Review of Systems    Objective:     Blood pressure (!) 111/51, pulse 62, temperature 98.1 °F (36.7 °C), temperature source Oral, height 5' 3" (1.6 m), weight 58 kg (127 lb 13.9 oz), SpO2 100 %.body mass index is 22.65 kg/(m^2).    Physical Exam    Labs:  Lab Results   Component Value Date    WBC 5.17 04/03/2017    HGB 11.1 (L) 04/03/2017    HCT 33.4 (L) 04/03/2017     04/03/2017     04/03/2017    K 4.8 04/03/2017    K 4.8 04/03/2017     04/03/2017     04/03/2017    CO2 26 04/03/2017    CO2 26 04/03/2017    BUN 17 04/03/2017    BUN 17 04/03/2017    CREATININE 0.9 04/03/2017    CREATININE 0.9 04/03/2017    EGFRNONAA >60.0 04/03/2017    EGFRNONAA >60.0 04/03/2017    CALCIUM 10.2 04/03/2017    CALCIUM 10.2 04/03/2017    PHOS 2.7 04/03/2017    MG 1.5 (L) 04/03/2017    ALBUMIN 3.9 04/03/2017    ALBUMIN 3.9 04/03/2017    ALBUMIN 3.9 04/03/2017    AST 28 04/03/2017    AST 28 04/03/2017    ALT 21 04/03/2017    ALT 21 04/03/2017    UTPCR Unable to calculate 04/03/2017    .0 (H) 02/16/2017    TACROLIMUS 5.7 04/03/2017       No results found for: EXTANC, EXTWBC, EXTSEGS, EXTPLATELETS, EXTHEMOGLOBI, EXTHEMATOCRI, EXTCREATININ, EXTSODIUM, " EXTPOTASSIUM, EXTBUN, EXTCO2, EXTCALCIUM, EXTPHOSPHORU, EXTGLUCOSE, EXTALBUMIN, EXTAST, EXTALT, EXTBILITOTAL, EXTLIPASE, EXTAMYLASE    No results found for: EXTCYCLOSLVL, EXTSIROLIMUS, EXTTACROLVL, EXTPROTCRE, EXTPTHINTACT, EXTPROTEINUA, EXTWBCUA, EXTRBCUA    Labs were reviewed with the patient.    Assessment:     1. CKD (chronic kidney disease) stage 2, GFR 60-89 ml/min    2. Lupus nephritis    3. Drug-induced systemic lupus erythematosus, unspecified organ involvement status    4. Immunosuppressive management encounter following kidney transplant    5. Immunocompromised state        Plan:     Follow-up:   Clinic: return to transplant clinic weekly for the first month after transplant; every 2 weeks during months 2-3; then at 6-, 9-, 12-, 18-, 24-, and 36- months post-transplant to reassess for complications from immunosuppression toxicity and monitor for rejection.  Annually thereafter.    Labs: since patient remains at high risk for rejection and drug-related complications that warrant close monitoring, labs will be ordered as follows: continue twice weekly CBC, renal panel, and drug level for first month; then same labs once weekly through 3rd month post-transplant.  Urine for UA and protein/creatinine ratio monthly.  Urine BK - PCR at 1-, 3-, 6-, 9-, 12-, 18-, 24-, and 36- months post-transplant.  Hepatic panel at 1-, 2-, 3-, 6-, 9-, 12-, 18-, 24-, and 36- months post-transplant.    Ruslan Landaverde MD       Education:   Material provided to the patient.  Patient reminded to call with any health changes since these can be early signs of significant complications.  Also, I advised the patient to be sure any new medications or changes of old medications are discussed with either a pharmacist or physician knowledgeable with transplant to avoid rejection/drug toxicity related to significant drug interactions.    UNOS Patient Status  {UNOS Patient Status:29761}

## 2017-04-28 NOTE — PROGRESS NOTES
Kidney Post-Transplant Assessment    Referring Physician: Shukri Reeves  Current Nephrologist: Олег Joseph    ORGAN: RIGHT KIDNEY  Donor Type: living  PHS Increased Risk: no  Cold Ischemia: 58 mins  Induction Medications: campath - alemtuzumab (anti-cd52)    Subjective:     CC:  Reassessment of renal allograft function and management of chronic immunosuppression.    HPI:  Ms. Charles is a 62 y.o. year old Black or  female who received a living kidney transplant on 4/6/15.  She has CKD stage 2 - GFR 60-89 and her baseline creatinine is between 0.9 to 1.1. She takes mycophenolate mofetil and tacrolimus for maintenance immunosuppression. She denies any recent hospitalizations or ER visits since her previous clinic visit.    She feels great today    She is under the care of a Neurologist due to headache. She had an non contrast MRI and a LP. The MRI was concerning for a possible retro-orbital mass, the patient is scheduled for a contrast MRI. She also had a LP which was negative for Fungal, bacterial infections, negative for MS    Otherwise she feels great. Headache is actually improved    No other neurologic deficit. No imbalance No nausea or vomit    She is here with her friend    She is following with Rheumatology as well    No clinical evidence of active Lupus    Good appetite, very active       Review of Systems     Skin: no skin rash  CNS; no headaches, blurred vision, seizure, or syncope  ENT: No JVD,  Adenopathies,  nasal congestion. No oral lesions  Cardiac: No chest pain, dyspnea, claudication, edema or palpitations  Respiratory: No SOB, cough, hemoptysis   Gastro-intestinal: No diarrhea, constipation, abdominal pain, nausea, vomit. No ascitis  Genitourinary: no hematuria, dysuria, frequency, frequency  Musculoskeletal: joint pain, arthritis or vasculitic changes  Psych: alert awake, oriented, No cranial nerves deficit.      Objective:     Blood pressure (!) 111/51, pulse 62,  "temperature 98.1 °F (36.7 °C), temperature source Oral, height 5' 3" (1.6 m), weight 58 kg (127 lb 13.9 oz), SpO2 100 %.body mass index is 22.65 kg/(m^2).    Physical Exam     Head: normocephalic  Neck: No JVD, cervical axillary, or femoral adenopathies  Heart: no murmurs, Normal s1 and s2, No gallops, no rubs, No murmurs  Lungs; CTA, good respiratory effort, no crackles  Abdomen: soft, non tender, no splenomegaly or hepatomegaly, no massess, no bruits  Extremities: No edema, skin rash, joint pain  SNC: awake, alert oriented. Cranial nerves are intact, no focalized, sensitivity and strength preserved      Labs:  Lab Results   Component Value Date    WBC 5.17 04/03/2017    HGB 11.1 (L) 04/03/2017    HCT 33.4 (L) 04/03/2017     04/03/2017     04/03/2017    K 4.8 04/03/2017    K 4.8 04/03/2017     04/03/2017     04/03/2017    CO2 26 04/03/2017    CO2 26 04/03/2017    BUN 17 04/03/2017    BUN 17 04/03/2017    CREATININE 0.9 04/03/2017    CREATININE 0.9 04/03/2017    EGFRNONAA >60.0 04/03/2017    EGFRNONAA >60.0 04/03/2017    CALCIUM 10.2 04/03/2017    CALCIUM 10.2 04/03/2017    PHOS 2.7 04/03/2017    MG 1.5 (L) 04/03/2017    ALBUMIN 3.9 04/03/2017    ALBUMIN 3.9 04/03/2017    ALBUMIN 3.9 04/03/2017    AST 28 04/03/2017    AST 28 04/03/2017    ALT 21 04/03/2017    ALT 21 04/03/2017    UTPCR Unable to calculate 04/03/2017    .0 (H) 02/16/2017    TACROLIMUS 5.7 04/03/2017       No results found for: EXTANC, EXTWBC, EXTSEGS, EXTPLATELETS, EXTHEMOGLOBI, EXTHEMATOCRI, EXTCREATININ, EXTSODIUM, EXTPOTASSIUM, EXTBUN, EXTCO2, EXTCALCIUM, EXTPHOSPHORU, EXTGLUCOSE, EXTALBUMIN, EXTAST, EXTALT, EXTBILITOTAL, EXTLIPASE, EXTAMYLASE    No results found for: EXTCYCLOSLVL, EXTSIROLIMUS, EXTTACROLVL, EXTPROTCRE, EXTPTHINTACT, EXTPROTEINUA, EXTWBCUA, EXTRBCUA    Labs were reviewed with the patient.    Assessment:     1. CKD (chronic kidney disease) stage 2, GFR 60-89 ml/min    2. Lupus nephritis    3. " Drug-induced systemic lupus erythematosus, unspecified organ involvement status    4. Immunosuppressive management encounter following kidney transplant    5. Immunocompromised state        Plan:       Will scan the records she brought to our system  No change with IS medications   Continue follow up with her Nephrologist and Rheumatologist  I reviewed the records she brought  There is a concern for a retro orbital mass that will be clarified with a Contrast MRI  Lp negative for infections and also negatiove for MS  Education provided    We spoke for 30 minutes    1. CKD stage: stage 2 with stable, no change indicated Education provided in appropriate fluid intake, potassium intake. Continue with oral hydration    2. Immunosuppression: same dose of prograf and MMF Will closely monitor for toxicities, education provided about adherence to medicines and need to communicate any side effct to the transplant nurse or physician    3. Allograft Function:excellent No change indicated   Lab Results   Component Value Date    CREATININE 0.9 04/03/2017    CREATININE 0.9 04/03/2017       4. Hypertension management: well controlled Continue with home blood pressure monitoring, low salt and healthy life discussed with the patient    5. Metabolic Bone Disease/Secondary Hyperparathyroidism: calcium and phosphorus as per our center protocol. Will monitor PTH, Vit D level, calcium      Lab Results   Component Value Date    .0 (H) 02/16/2017    CALCIUM 10.2 04/03/2017    CALCIUM 10.2 04/03/2017    PHOS 2.7 04/03/2017       6. Electrolytes: reviewed with the patient, essentially within the normal range no need for acute changes today, will monitor as per our center guidelines     Lab Results   Component Value Date     04/03/2017     04/03/2017    K 4.8 04/03/2017    K 4.8 04/03/2017     04/03/2017     04/03/2017    CO2 26 04/03/2017    CO2 26 04/03/2017       7. Anemia: will continue monitoring as per our  center guidelines. No indication for acute intervention today     Lab Results   Component Value Date    WBC 5.17 04/03/2017    HGB 11.1 (L) 04/03/2017    HCT 33.4 (L) 04/03/2017    MCV 75 (L) 04/03/2017     04/03/2017       8.Proteinuria: will continue with pr/cr ratio as per our center guidelines  Lab Results   Component Value Date    PROTEINURINE <7 04/03/2017    CREATRANDUR 50.0 04/03/2017    UTPCR Unable to calculate 04/03/2017        9. BK virus infection screening: will continue with urine or blood PCR as per our guidelines to prevent BK virus viremia and allograft dysfunction  Lab Results   Component Value Date    BKVIRUSDNAUR Not detected 01/13/2016    BKQUANTURINE <250 01/13/2016    BKVIRUSLOG <2.40 01/13/2016    BKVIRUSPCRQB <250 04/03/2017         10. Weight education: provided during the clinic visit   Body mass index is 22.65 kg/(m^2).       11.Patient safety education regarding immunosuppression including prophylaxis posttransplant for CMV, PCP : Education provided about vaccination and prevention of infections    12.  Cytopenias: no significant cytopenias will monitor as per our guidelines. Medicine list reviewed including potential causes of drug-induced cytopenias     Lab Results   Component Value Date    WBC 5.17 04/03/2017    HGB 11.1 (L) 04/03/2017    HCT 33.4 (L) 04/03/2017    MCV 75 (L) 04/03/2017     04/03/2017       I spoke with the patient for 30 minutes. More than half dedicated to counseling and education. All questions answered          \    Follow-up:   Clinic: return to transplant clinic weekly for the first month after transplant; every 2 weeks during months 2-3; then at 6-, 9-, 12-, 18-, 24-, and 36- months post-transplant to reassess for complications from immunosuppression toxicity and monitor for rejection.  Annually thereafter.    Labs: since patient remains at high risk for rejection and drug-related complications that warrant close monitoring, labs will be ordered  as follows: continue twice weekly CBC, renal panel, and drug level for first month; then same labs once weekly through 3rd month post-transplant.  Urine for UA and protein/creatinine ratio monthly.  Urine BK - PCR at 1-, 3-, 6-, 9-, 12-, 18-, 24-, and 36- months post-transplant.  Hepatic panel at 1-, 2-, 3-, 6-, 9-, 12-, 18-, 24-, and 36- months post-transplant.    Ruslan Landaverde MD       Education:   Material provided to the patient.  Patient reminded to call with any health changes since these can be early signs of significant complications.  Also, I advised the patient to be sure any new medications or changes of old medications are discussed with either a pharmacist or physician knowledgeable with transplant to avoid rejection/drug toxicity related to significant drug interactions.    UNOS Patient Status  Functional Status: 100% - Normal, no complaints, no evidence of disease  Physical Capacity: No Limitations

## 2017-04-28 NOTE — MR AVS SNAPSHOT
[facility]  [address]  [facility] [address]  Phone: [phone]                  Chanel Charles   2017 11:00 AM   Office Visit    Description:  Female : 1955   Provider:  Ruslan Landaverde MD   Department:  [facility]           Reason for Visit     Kidney Transplant Reassessment           Diagnoses this Visit        Comments    CKD (chronic kidney disease) stage 2, GFR 60-89 ml/min    -  Primary     Lupus nephritis         Drug-induced systemic lupus erythematosus, unspecified organ involvement status         Immunosuppressive management encounter following kidney transplant         Immunocompromised state                To Do List           Future Appointments        Provider Department Dept Phone    2017 9:00 AM Lexii Nation MD [facility] - Rheumatology [phone]    2017 10:20 AM [facility]ATORY, [facility] [phone]    2017 10:30 AM SPECIMEN, [facility] [phone]    2017 11:30 AM Олег Joseph MD [facility] Nephrology [phone]    2017 9:00 AM Caro Quintero MD [facility] - Endo/Diab/Metab [phone]      Goals (5 Years of Data)     None      [facility]     [facility] Care [facility] - / Assistance  Unless otherwise directed by your provider, please contact [facility], our nurse care line that is available for  assistance.     Registered nurses in the [facility] provide: appointment scheduling, clinical advisement, health education, and other advisory services.  Call: [phone] (toll free)               Medications           Message regarding Medications     Verify the changes and/or additions to your medication regime listed below are the same as discussed with your clinician today.  If any of these changes or additions are incorrect, please notify your healthcare provider.        STOP taking these medications     benzonatate  (TESSALON) 200 MG capsule Take 200 mg by mouth once daily.    docusate sodium (COLACE) 100 MG capsule Take 100 mg by mouth.    nortriptyline (PAMELOR) 25 MG capsule            Verify that the below list of medications is an accurate representation of the medications you are currently taking.  If none reported, the list may be blank. If incorrect, please contact your healthcare provider. Carry this list with you in case of emergency.           Current Medications     alprazolam (XANAX) 0.5 MG tablet Take 0.5 mg by mouth. 1 Tablet Oral Every day.  or as needed.    aspirin (ECOTRIN) 81 MG EC tablet Take 81 mg by mouth once daily.     atorvastatin (LIPITOR) 80 MG tablet Take 80 mg by mouth every evening. 1 Tablet Oral Every day    BIOTIN ORAL Take by mouth.    BUDESONIDE/FORMOTEROL FUMARATE (SYMBICORT INHL) Inhale into the lungs.    eszopiclone (LUNESTA) 2 MG Tab Take 2 mg by mouth. 1 Tablet Oral As directed.  1 tablet 1 time as needed at bedtime.    famotidine (PEPCID) 20 MG tablet Take 1 tablet (20 mg total) by mouth every evening.    fluticasone (FLONASE) 50 mcg/actuation nasal spray 1 spray by Each Nare route once daily.    hydroxychloroquine (PLAQUENIL) 200 mg tablet Take 1 tablet (200 mg total) by mouth once daily.    loratadine (CLARITIN) 10 mg tablet Take 1 tablet (10 mg total) by mouth daily as needed for Allergies.    magnesium oxide (MAG-OX) 400 mg tablet Take 1 tablet (400 mg total) by mouth once daily.    multivitamin (THERAGRAN) tablet Take 1 tablet by mouth once daily.    mycophenolate (CELLCEPT) 250 mg Cap TAKE 2 CAPSULES BY MOUTH TWO TIMES A DAY    ondansetron (ZOFRAN) 4 MG tablet Take 4 mg by mouth.    tacrolimus (PROGRAF) 1 MG Cap Take 1 capsule (1 mg total) by mouth every 12 (twelve) hours. Take 1 mg in the AM and 1 mg in the PM. Kidney Transplant on 4/6/15.    valacyclovir (VALTREX) 1000 MG tablet Take 1 tablet by mouth once daily.    zoledronic acid-mannitol-water (RECLAST) 4 mg/100 mL Soln Inject  "into the vein.           Clinical Reference Information           Your Vitals Were     BP Pulse Temp Height Weight SpO2    111/51 (BP Location: Right arm, Patient Position: Sitting, BP Method: Automatic) 62 98.1 °F (36.7 °C) (Oral) 5' 3" (1.6 m) 58 kg (127 lb 13.9 oz) 100%    BMI                22.65 kg/m2          Blood Pressure          Most Recent Value    BP  (!)  111/51      Allergies as of 4/28/2017     Heparin Analogues    Escitalopram Oxalate    Gabapentin      Immunizations Administered on Date of Encounter - 4/28/2017     None      Maintenance Dialysis History     Start End Type Comments Center    11/15/2010 4/6/2015 Hemo  [facility]            Current Dialysis Center Information     No center information to display.            Transplant Information        Txp Date Organ Coordinator Care Team    4/6/2015 Kidney Elizabeth Arteaga RN Referring Physician:  Shukri Reeves MD   Current Nephrologist:  Олег Joseph MD   Surgeon:  Eamon Pina MD   Assisting Surgeon:  Rancho Taylor MD         Language Assistance Services     ATTENTION: Language assistance services are available, free of charge. Please call [phone].      ATENCIÓN: Si habla español, tiene a jc disposición servicios gratuitos de asistencia lingüística. Llame al [phone].     CHÚ Ý: N?u b?n nói Ti?ng Vi?t, có các d?ch v? h? tr? ngôn ng? mi?n phí dành cho b?n. G?i s? [phone].         [facility] complies with applicable Federal civil rights laws and does not discriminate on the basis of race, color, national origin, age, disability, or sex.        "

## 2017-05-17 NOTE — TELEPHONE ENCOUNTER
Please verify correct immunosuppression dose on this pharmacy request (see attached from pharmacy)...      Erx request from pharmacy does not match prescription currently on chart.  Please verify with patient and update.

## 2017-05-19 ENCOUNTER — OFFICE VISIT (OUTPATIENT)
Dept: RHEUMATOLOGY | Facility: CLINIC | Age: 62
End: 2017-05-19
Payer: MEDICARE

## 2017-05-19 VITALS
HEIGHT: 63 IN | HEART RATE: 68 BPM | DIASTOLIC BLOOD PRESSURE: 61 MMHG | SYSTOLIC BLOOD PRESSURE: 106 MMHG | TEMPERATURE: 99 F | BODY MASS INDEX: 22.8 KG/M2 | WEIGHT: 128.69 LBS

## 2017-05-19 DIAGNOSIS — M65.342 TRIGGER RING FINGER OF LEFT HAND: ICD-10-CM

## 2017-05-19 DIAGNOSIS — M65.331 TRIGGER MIDDLE FINGER OF RIGHT HAND: ICD-10-CM

## 2017-05-19 DIAGNOSIS — M32.0 DRUG-INDUCED SYSTEMIC LUPUS ERYTHEMATOSUS, UNSPECIFIED ORGAN INVOLVEMENT STATUS: ICD-10-CM

## 2017-05-19 DIAGNOSIS — D84.9 IMMUNOSUPPRESSION: Primary | ICD-10-CM

## 2017-05-19 PROCEDURE — 99999 PR PBB SHADOW E&M-EST. PATIENT-LVL III: CPT | Mod: PBBFAC,,, | Performed by: INTERNAL MEDICINE

## 2017-05-19 PROCEDURE — 99213 OFFICE O/P EST LOW 20 MIN: CPT | Mod: 25,S$PBB,, | Performed by: INTERNAL MEDICINE

## 2017-05-19 PROCEDURE — 1160F RVW MEDS BY RX/DR IN RCRD: CPT | Mod: ,,, | Performed by: INTERNAL MEDICINE

## 2017-05-19 PROCEDURE — 96372 THER/PROPH/DIAG INJ SC/IM: CPT | Mod: PBBFAC | Performed by: INTERNAL MEDICINE

## 2017-05-19 PROCEDURE — 99213 OFFICE O/P EST LOW 20 MIN: CPT | Mod: PBBFAC,25 | Performed by: INTERNAL MEDICINE

## 2017-05-19 RX ORDER — METHYLPREDNISOLONE ACETATE 80 MG/ML
20 INJECTION, SUSPENSION INTRA-ARTICULAR; INTRALESIONAL; INTRAMUSCULAR; SOFT TISSUE
Status: COMPLETED | OUTPATIENT
Start: 2017-05-19 | End: 2017-05-19

## 2017-05-19 RX ADMIN — METHYLPREDNISOLONE ACETATE 20 MG: 80 INJECTION, SUSPENSION INTRA-ARTICULAR; INTRALESIONAL; INTRAMUSCULAR; SOFT TISSUE at 10:05

## 2017-05-19 NOTE — MR AVS SNAPSHOT
[facility] - Rheumatology  [address]  [facility] [address]  Phone: [phone]  Fax: [phone]                  Chanel Charles   2017 9:00 AM   Office Visit    Description:  Female : 1955   Provider:  Lexii Nation MD   Department:  [facility]           Reason for Visit     Lupus           Diagnoses this Visit        Comments    Immunosuppression    -  Primary     Drug-induced systemic lupus erythematosus, unspecified organ involvement status         Trigger middle finger of right hand         Trigger ring finger of left hand                To Do List           Future Appointments        Provider Department Dept Phone    2017 10:20 AM LABORATORY, [facility] [phone]    2017 10:30 AM SPECIMEN, [facility] [phone]    2017 11:30 AM Олег Joseph MD [facility] Nephrology [phone]    2017 9:00 AM Caro Quintero MD [facility] - Endo/Diab/Metab [phone]    2017 10:00 AM Lexii Nation MD [facility] Rheumatology [phone]      Goals (5 Years of Data)     None      Follow-Up and Disposition     Return if symptoms worsen or fail to improve.      [facility]     [facility] Care [facility] - 24/ Assistance  Unless otherwise directed by your provider, please contact [facility], our nurse care line that is available for / assistance.     Registered nurses in the [facility] provide: appointment scheduling, clinical advisement, health education, and other advisory services.  Call: [phone] (toll free)               Medications           Message regarding Medications     Verify the changes and/or additions to your medication regime listed below are the same as discussed with your clinician today.  If any of these changes or additions are incorrect, please notify your healthcare provider.        These medications were administered today         Dose Freq    methylPREDNISolone acetate injection 20 mg 20 mg Clinic/HOD 1 time    Si.25 mLs (20 mg total) by Intra-Lesional route one time.    Class: Normal    Route: Intra-Lesional           Verify that the below list of medications is an accurate representation of the medications you are currently taking.  If none reported, the list may be blank. If incorrect, please contact your healthcare provider. Carry this list with you in case of emergency.           Current Medications     alprazolam (XANAX) 0.5 MG tablet Take 0.5 mg by mouth. 1 Tablet Oral Every day.  or as needed.    aspirin (ECOTRIN) 81 MG EC tablet Take 81 mg by mouth once daily.     atorvastatin (LIPITOR) 80 MG tablet Take 80 mg by mouth every evening. 1 Tablet Oral Every day    BIOTIN ORAL Take by mouth.    eszopiclone (LUNESTA) 2 MG Tab Take 2 mg by mouth. 1 Tablet Oral As directed.  1 tablet 1 time as needed at bedtime.    famotidine (PEPCID) 20 MG tablet Take 1 tablet (20 mg total) by mouth every evening.    hydroxychloroquine (PLAQUENIL) 200 mg tablet Take 1 tablet (200 mg total) by mouth once daily.    magnesium oxide (MAG-OX) 400 mg tablet Take 1 tablet (400 mg total) by mouth once daily.    multivitamin (THERAGRAN) tablet Take 1 tablet by mouth once daily.    mycophenolate (CELLCEPT) 250 mg Cap TAKE 2 CAPSULES BY MOUTH TWO TIMES A DAY    tacrolimus (PROGRAF) 1 MG Cap Take 1 capsule (1 mg total) by mouth every 12 (twelve) hours. Take 1 mg in the AM and 1 mg in the PM. Kidney Transplant on 4/6/15.    valacyclovir (VALTREX) 1000 MG tablet Take 1 tablet by mouth once daily.    zoledronic acid-mannitol-water (RECLAST) 4 mg/100 mL Soln Inject into the vein.    BUDESONIDE/FORMOTEROL FUMARATE (SYMBICORT INHL) Inhale into the lungs.    fluticasone (FLONASE) 50 mcg/actuation nasal spray 1 spray by Each Nare route once daily.    loratadine (CLARITIN) 10 mg tablet Take 1 tablet (10 mg total) by mouth daily as needed for Allergies.    ondansetron  "(ZOFRAN) 4 MG tablet Take 4 mg by mouth.           Clinical Reference Information           Your Vitals Were     BP Pulse Temp Height Weight BMI    106/61 (BP Location: Left arm, Patient Position: Sitting, BP Method: Automatic) 68 98.5 °F (36.9 °C) (Oral) 5' 3" (1.6 m) 58.4 kg (128 lb 11.2 oz) 22.8 kg/m2      Blood Pressure          Most Recent Value    BP  106/61      Allergies as of 5/19/2017     Heparin Analogues    Escitalopram Oxalate    Gabapentin      Immunizations Administered on Date of Encounter - 5/19/2017     None      Administrations This Visit     methylPREDNISolone acetate injection 20 mg     Admin Date Action Dose Route Administered By             05/19/2017 Given 20 mg Intra-Lesional Lexii Nation MD                      Maintenance Dialysis History     Start End Type Comments Center    11/15/2010 4/6/2015 Hemo  [facility]            Current Dialysis Center Information     No center information to display.            Transplant Information        Txp Date Organ Coordinator Care Team    4/6/2015 Kidney Elizabeth Arteaga RN Referring Physician:  Shukri Reeves MD   Current Nephrologist:  Олег Joseph MD   Surgeon:  Eamon Pina MD   Assisting Surgeon:  Rancho Taylor MD         Language Assistance Services     ATTENTION: Language assistance services are available, free of charge. Please call [phone].      ATENCIÓN: Si habla español, tiene a jc disposición servicios gratuitos de asistencia lingüística. Llame al [phone].     CHÚ Ý: N?u b?n nói Ti?ng Vi?t, có các d?ch v? h? tr? ngôn ng? mi?n phí dành cho b?n. G?i s? [phone].         [facility] complies with applicable Federal civil rights laws and does not discriminate on the basis of race, color, national origin, age, disability, or sex.        "

## 2017-05-19 NOTE — PROGRESS NOTES
Subjective:       Patient ID: Chanel Charles is a 62 y.o. female.    Chief Complaint: Lupus      HPI:  Chanel Charles is a 62 y.o. female with SLE and renal failure as well an extensive history of recurrent pleural effusions and pericardial effusion treated with recurrent thoracentesis (x3) and pericardiocentesis. She is s/p kidney transplant 4/6/2015. Patient in the past had 3-4 paracentesis for accumulation of abdominal fluid (last time was December 2011). She was hospitalized 4/2012 for acute altered mental status, slurred speech concerning for CVA. Dr. Ribera (rheum) was consulted and he did not think it was her lupus. It was felt that her AMS was caused by gabapentin and/or valtrex as those were new medications. After these medications were stopped she returned to her baseline function. She has history of a lung nodule and eventual TB results were negative. Patient had VATS procedure 3/2011 which showed a granuloma thought to be due histoplamosis but work up was negative. Patient had kidneys removed 4/2011 to help decrease protein loss from the body.    She was given Rituxan (4 infusions total) and later Nplate for thrombocytopenia by her hematologist and is back on Nplate.   Hematologist is Dr. Shelley () and platelets were 150s or so.  She has been off Nplate since transplant.       Colonoscopy 8/19/14 showed benign polyps, diverticulosis, internal and external hemrrhoids  Mammogram 6/24/14 was negative.  Pap smear 6/30/14 negative for lesion or malignancy      Sinus headaches and sinusitis not improved with antibiotics 9/2016. Had MRI showed spot on brain. Had lumbar puncture.  Still needs additional MRI. First treated with fioricet then nortriptyline. Still off since not having headaches.     Here for injection of trigger fingers (left 4th and right 3rd finger).         Review of Systems   Constitutional: Negative for fever and unexpected weight change.   HENT: Negative for trouble  "swallowing.    Eyes: Negative for redness.   Respiratory: Negative for cough and shortness of breath.    Cardiovascular: Negative for chest pain.   Gastrointestinal: Negative for constipation and diarrhea.   Genitourinary: Negative for dysuria and genital sores.   Musculoskeletal:        Finger pain and triggering   Skin: Negative for rash.   Neurological: Negative for headaches.   Hematological: Does not bruise/bleed easily.         Objective:   /61 (BP Location: Left arm, Patient Position: Sitting, BP Method: Automatic)  Pulse 68  Temp 98.5 °F (36.9 °C) (Oral)   Ht 5' 3" (1.6 m)  Wt 58.4 kg (128 lb 11.2 oz)  BMI 22.8 kg/m2     Physical Exam   Constitutional: She is oriented to person, place, and time and well-developed, well-nourished, and in no distress.   HENT:   Head: Normocephalic and atraumatic.   Eyes: Conjunctivae and EOM are normal.   Neck: Neck supple.   Neurological: She is alert and oriented to person, place, and time. Gait normal.   Skin: Skin is warm and dry.     Psychiatric: Mood and affect normal.   Musculoskeletal:   Tender flexor tendon nodule right 3rd and left 4th           LABS    Component      Latest Ref Rng & Units 4/3/2017 4/3/2017 4/3/2017           8:03 AM  8:03 AM  8:03 AM   WBC      3.90 - 12.70 K/uL   5.17   RBC      4.00 - 5.40 M/uL   4.45   Hemoglobin      12.0 - 16.0 g/dL   11.1 (L)   Hematocrit      37.0 - 48.5 %   33.4 (L)   MCV      82 - 98 fL   75 (L)   MCH      27.0 - 31.0 pg   24.9 (L)   MCHC      32.0 - 36.0 %   33.2   RDW      11.5 - 14.5 %   17.3 (H)   Platelets      150 - 350 K/uL   152   MPV      9.2 - 12.9 fL   SEE COMMENT   Gran #      1.8 - 7.7 K/uL   2.7   Lymph #      1.0 - 4.8 K/uL   1.5   Mono #      0.3 - 1.0 K/uL   0.6   Eos #      0.0 - 0.5 K/uL   0.3   Baso #      0.00 - 0.20 K/uL   0.03   Gran%      38.0 - 73.0 %   53.4   Lymph%      18.0 - 48.0 %   28.0   Mono%      4.0 - 15.0 %   12.4   Eosinophil%      0.0 - 8.0 %   5.6   Basophil%      0.0 - 1.9 " %   0.6   Differential Method         Automated   Sodium      136 - 145 mmol/L  141 141   Potassium      3.5 - 5.1 mmol/L  4.8 4.8   Chloride      95 - 110 mmol/L  109 109   CO2      23 - 29 mmol/L  26 26   Glucose      70 - 110 mg/dL  87 87   BUN, Bld      8 - 23 mg/dL  17 17   Creatinine      0.5 - 1.4 mg/dL  0.9 0.9   Calcium      8.7 - 10.5 mg/dL  10.2 10.2   Total Protein      6.0 - 8.4 g/dL 7.2  7.2   Albumin      3.5 - 5.2 g/dL 3.9 3.9 3.9   Total Bilirubin      0.1 - 1.0 mg/dL 1.0  1.0   Alkaline Phosphatase      55 - 135 U/L 75  75   AST      10 - 40 U/L 28  28   ALT      10 - 44 U/L 21  21   Anion Gap      8 - 16 mmol/L  6 (L) 6 (L)   eGFR if African American      >60 mL/min/1.73 m:2  >60.0 >60.0   eGFR if non African American      >60 mL/min/1.73 m:2  >60.0 >60.0   Phosphorus      2.7 - 4.5 mg/dL  2.7    Bilirubin, Direct      0.1 - 0.3 mg/dL 0.5 (H)     BK Virus DNA, Blood      Not detected   Not detected   BK Virus DNA PCR, Quant, Blood      <250 Copies/mL   <250   Log BKV Copies/mL      <2.40 Log (10) Copies/mL   <2.40   Complement (C-4)      11 - 44 mg/dL   22   Complement (C-3)      50 - 180 mg/dL   125   ds DNA Ab      Negative 1:10   Negative 1:10   Tacrolimus Lvl      5.0 - 15.0 ng/mL   5.7   Magnesium      1.6 - 2.6 mg/dL   1.5 (L)     Component      Latest Ref Rng & Units 4/3/2017           7:56 AM   Specimen UA       Urine, Clean Catch   Color, UA      Yellow, Straw, Ela Yellow   Appearance, UA      Clear Clear   pH, UA      5.0 - 8.0 6.0   Specific Gravity, UA      1.005 - 1.030 1.010   Protein, UA      Negative Negative   Glucose, UA      Negative Negative   Ketones, UA      Negative Negative   Bilirubin (UA)      Negative Negative   Occult Blood UA      Negative Negative   Nitrite, UA      Negative Negative   Leukocytes, UA      Negative 2+ (A)   Protein, Urine Random      0 - 15 mg/dL <7   Creatinine, Random Ur      15.0 - 325.0 mg/dL 50.0   Prot/Creat Ratio, Ur      0.00 - 0.20 Unable to  calculate   WBC, UA      0 - 5 /hpf 3   Squam Epithel, UA      /hpf 2   Microscopic Comment       SEE COMMENT        Assessment:       1. SLE with +TANA hx, arthritis, alopecia, and glomerular nephritis now s/p bilateral nephrectomy on dialysis and s/p renal transplant (4/6/2015).   No evidence of active lupus currently. Still doing well. Still dealing with lesion MRI  2. Glomerulonephritis. S/p transplant 4/6/2015.  3. CAD  4. Fatigue  5. Long term Plaquenil use. Off CellCept since 9/2010  6. Serositis  7. Diastolic heart failure. Now resolved.  8. Thrombocytopenia. Now improved.   9. Elevation in homocysteine levels in March 2012 and started on Folbic.   10. Recurrent falls. No recent falls  11. Hypercalcemia. Being managed endocrine  12. Osteoporosis. Endocrine gave Reclast 1/2016  13. 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 Being evaluated by neurology   14. Flexor tendon nodules both hands  Plan:       1. Continue Plaquenil 200mg daily.  2. Now off Folbic per transplant  3. Patient to contact office if feels she is having a flare and to send info on neurology evaluation  4. Off Nplate still. Follows hematology.   5. Continue 2 old goats. Continue Voltaren gel for hand pains.  6. Vaccination (pneumonia) per transplant team and primary doctor.     Had flu vaccination and cholesterol checked with primary doctor  Procedure Note   I  have explained the risks, benefits, and alternatives of aspiration of the joint.  The patient voices understanding and questions have been answered.  The patient agrees to proceed.    The left 4th flexor tendon nodule and right 3rd flexor tendon nodule  was prepped with 2% chlorhexidine gluconate and 70% isopropyl alcohol (ChloraPrep).  The area was numbed with topical refrigerant.  A 25 g needle was introduced into the space and no fluid was aspirated.  10 mg Depomedrol injected into each flexor tendon nodule.  Hemostasis obtained.  The patient tolerated the procedure well.          RTO in 4  months/prn

## 2017-05-22 ENCOUNTER — PATIENT MESSAGE (OUTPATIENT)
Dept: TRANSPLANT | Facility: CLINIC | Age: 62
End: 2017-05-22

## 2017-05-23 RX ORDER — TACROLIMUS 1 MG/1
1 CAPSULE ORAL EVERY 12 HOURS
Qty: 60 CAPSULE | Refills: 11 | Status: SHIPPED | OUTPATIENT
Start: 2017-05-23 | End: 2017-09-27 | Stop reason: SDUPTHER

## 2017-06-16 DIAGNOSIS — K21.9 GASTROESOPHAGEAL REFLUX DISEASE, ESOPHAGITIS PRESENCE NOT SPECIFIED: ICD-10-CM

## 2017-06-16 RX ORDER — FAMOTIDINE 20 MG/1
20 TABLET, FILM COATED ORAL NIGHTLY
Qty: 30 TABLET | Refills: 11 | Status: SHIPPED | OUTPATIENT
Start: 2017-06-16

## 2017-06-19 ENCOUNTER — LAB VISIT (OUTPATIENT)
Dept: LAB | Facility: HOSPITAL | Age: 62
End: 2017-06-19
Attending: INTERNAL MEDICINE
Payer: COMMERCIAL

## 2017-06-19 DIAGNOSIS — Z94.0 S/P KIDNEY TRANSPLANT: ICD-10-CM

## 2017-06-19 DIAGNOSIS — E83.42 HYPOMAGNESEMIA: ICD-10-CM

## 2017-06-19 LAB
25(OH)D3+25(OH)D2 SERPL-MCNC: 43 NG/ML
ALBUMIN SERPL BCP-MCNC: 3.9 G/DL
ALBUMIN SERPL BCP-MCNC: 3.9 G/DL
ANION GAP SERPL CALC-SCNC: 8 MMOL/L
ANION GAP SERPL CALC-SCNC: 8 MMOL/L
ANISOCYTOSIS BLD QL SMEAR: SLIGHT
BASOPHILS # BLD AUTO: 0.02 K/UL
BASOPHILS NFR BLD: 0.3 %
BUN SERPL-MCNC: 22 MG/DL
BUN SERPL-MCNC: 22 MG/DL
CALCIUM SERPL-MCNC: 10.4 MG/DL
CALCIUM SERPL-MCNC: 10.4 MG/DL
CHLORIDE SERPL-SCNC: 107 MMOL/L
CHLORIDE SERPL-SCNC: 107 MMOL/L
CO2 SERPL-SCNC: 23 MMOL/L
CO2 SERPL-SCNC: 23 MMOL/L
CREAT SERPL-MCNC: 1 MG/DL
CREAT SERPL-MCNC: 1 MG/DL
DIFFERENTIAL METHOD: ABNORMAL
EOSINOPHIL # BLD AUTO: 0.3 K/UL
EOSINOPHIL NFR BLD: 4.3 %
ERYTHROCYTE [DISTWIDTH] IN BLOOD BY AUTOMATED COUNT: 16.3 %
EST. GFR  (AFRICAN AMERICAN): >60 ML/MIN/1.73 M^2
EST. GFR  (AFRICAN AMERICAN): >60 ML/MIN/1.73 M^2
EST. GFR  (NON AFRICAN AMERICAN): >60 ML/MIN/1.73 M^2
EST. GFR  (NON AFRICAN AMERICAN): >60 ML/MIN/1.73 M^2
GLUCOSE SERPL-MCNC: 85 MG/DL
GLUCOSE SERPL-MCNC: 85 MG/DL
HCT VFR BLD AUTO: 34.7 %
HGB BLD-MCNC: 11.3 G/DL
HYPOCHROMIA BLD QL SMEAR: ABNORMAL
LYMPHOCYTES # BLD AUTO: 1.3 K/UL
LYMPHOCYTES NFR BLD: 21.8 %
MAGNESIUM SERPL-MCNC: 1.7 MG/DL
MCH RBC QN AUTO: 24.9 PG
MCHC RBC AUTO-ENTMCNC: 32.6 %
MCV RBC AUTO: 77 FL
MONOCYTES # BLD AUTO: 0.7 K/UL
MONOCYTES NFR BLD: 11.6 %
NEUTROPHILS # BLD AUTO: 3.6 K/UL
NEUTROPHILS NFR BLD: 62 %
OVALOCYTES BLD QL SMEAR: ABNORMAL
PHOSPHATE SERPL-MCNC: 3.2 MG/DL
PHOSPHATE SERPL-MCNC: 3.2 MG/DL
PLATELET # BLD AUTO: 130 K/UL
PLATELET BLD QL SMEAR: ABNORMAL
PMV BLD AUTO: ABNORMAL FL
POIKILOCYTOSIS BLD QL SMEAR: SLIGHT
POTASSIUM SERPL-SCNC: 5 MMOL/L
POTASSIUM SERPL-SCNC: 5 MMOL/L
PTH-INTACT SERPL-MCNC: 116 PG/ML
RBC # BLD AUTO: 4.53 M/UL
SODIUM SERPL-SCNC: 138 MMOL/L
SODIUM SERPL-SCNC: 138 MMOL/L
URATE SERPL-MCNC: 5.9 MG/DL
WBC # BLD AUTO: 5.78 K/UL

## 2017-06-19 PROCEDURE — 82306 VITAMIN D 25 HYDROXY: CPT

## 2017-06-19 PROCEDURE — 84550 ASSAY OF BLOOD/URIC ACID: CPT

## 2017-06-19 PROCEDURE — 83735 ASSAY OF MAGNESIUM: CPT

## 2017-06-19 PROCEDURE — 36415 COLL VENOUS BLD VENIPUNCTURE: CPT | Mod: PO

## 2017-06-19 PROCEDURE — 80069 RENAL FUNCTION PANEL: CPT

## 2017-06-19 PROCEDURE — 83970 ASSAY OF PARATHORMONE: CPT

## 2017-06-19 PROCEDURE — 85025 COMPLETE CBC W/AUTO DIFF WBC: CPT

## 2017-06-19 PROCEDURE — 80197 ASSAY OF TACROLIMUS: CPT

## 2017-06-20 LAB — TACROLIMUS BLD-MCNC: 3.7 NG/ML

## 2017-06-26 ENCOUNTER — OFFICE VISIT (OUTPATIENT)
Dept: NEPHROLOGY | Facility: CLINIC | Age: 62
End: 2017-06-26
Payer: COMMERCIAL

## 2017-06-26 VITALS
BODY MASS INDEX: 22.97 KG/M2 | SYSTOLIC BLOOD PRESSURE: 118 MMHG | HEIGHT: 63 IN | WEIGHT: 129.63 LBS | DIASTOLIC BLOOD PRESSURE: 50 MMHG | HEART RATE: 84 BPM

## 2017-06-26 DIAGNOSIS — Z94.0 S/P KIDNEY TRANSPLANT: Primary | ICD-10-CM

## 2017-06-26 PROCEDURE — 99999 PR PBB SHADOW E&M-EST. PATIENT-LVL III: CPT | Mod: PBBFAC,,, | Performed by: INTERNAL MEDICINE

## 2017-06-26 PROCEDURE — 99214 OFFICE O/P EST MOD 30 MIN: CPT | Mod: S$GLB,,, | Performed by: INTERNAL MEDICINE

## 2017-06-26 NOTE — PROGRESS NOTES
Subjective:       Patient ID: Chanel Charles is a 62 y.o.   female who presents for follow-up evaluation of Living Unrelated RT ( 4/6/2015 ) , HTN, SHPT ,       ORGAN: RIGHT KIDNEY    Donor Type: living    PHS Increased Risk: no    Cold Ischemia: 58 mins    Induction Medications: campath - alemtuzumab (anti-cd52)          HPI  : Chanel Charles is a pleasant 62-year-old -American woman with history of Living unrelated ( friend ) renal transplant in 4/6/2015, excellent allograft function on Prograf 1 mg BID , CellCept 500 mg twice a day. She has history of lupus nephritis, cause of end-stage kidney disease. She was on hemodialysis for about 4-1/2 years at [facility] HD unit [facility], recent office notes were reviewed. She follows with rheumatology Department in [place] and  endocrine department for hypercalcemia.  recent lab results were discussed, serum calcium is slightly elevated at 10.4.         Past Medical History:   Diagnosis Date    Abnormal stress echo - with no blockage on [facility] - 1/16/14 1/7/2014    Acid reflux     Anemia of chronic kidney failure     Anxiety     Arthritis     Awaiting kidney transplant 12/12/2013    CHF (congestive heart failure)     Coronary artery disease     Bare metal stent in 2007    ESRD (end stage renal disease)     Secondary to Lupus Nephritis, HD MWF    Hypercalcemia 7/15/2015    Hyperlipidemia 12/12/2013    Hyperparathyroidism 7/15/2015    Hyperparathyroidism, tertiary 8/7/2015    Immunocompromised state 5/5/2015    ITP (idiopathic thrombocytopenic purpura) 12/12/2013    Living-donor kidney transplant for SLE 4/6/15 4/6/2015    Induced with Thymoglobulin.     Lupus nephritis     Lupus nephritis 5/18/2015    Menopause 7/15/2015    Osteoporosis 12/24/2015    Pericardial effusion s/p pericardiocentesis 12/12/2013    Pleural effusion s/p Thoracentesis and VATS 12/12/2013    Prophylactic immunotherapy 6/29/2015     S/p Bilateral Nephrectomy (Severe proteinuria) - 2012 3/19/2015    Secondary hyperparathyroidism, renal     Shingles 4/2012    SLE (systemic lupus erythematosus) 1997       Current Outpatient Prescriptions on File Prior to Visit   Medication Sig Dispense Refill    alprazolam (XANAX) 0.5 MG tablet Take 0.5 mg by mouth. 1 Tablet Oral Every day.  or as needed.      aspirin (ECOTRIN) 81 MG EC tablet Take 81 mg by mouth once daily.       atorvastatin (LIPITOR) 80 MG tablet Take 80 mg by mouth every evening. 1 Tablet Oral Every day      BIOTIN ORAL Take by mouth.      BUDESONIDE/FORMOTEROL FUMARATE (SYMBICORT INHL) Inhale into the lungs.      eszopiclone (LUNESTA) 2 MG Tab Take 2 mg by mouth. 1 Tablet Oral As directed.  1 tablet 1 time as needed at bedtime.      famotidine (PEPCID) 20 MG tablet Take 1 tablet (20 mg total) by mouth every evening. 30 tablet 11    fluticasone (FLONASE) 50 mcg/actuation nasal spray 1 spray by Each Nare route once daily.  0    hydroxychloroquine (PLAQUENIL) 200 mg tablet Take 1 tablet (200 mg total) by mouth once daily. 90 tablet 2    loratadine (CLARITIN) 10 mg tablet Take 1 tablet (10 mg total) by mouth daily as needed for Allergies.  0    magnesium oxide (MAG-OX) 400 mg tablet Take 1 tablet (400 mg total) by mouth once daily. (Patient taking differently: Take 400 mg by mouth 2 (two) times daily. ) 30 tablet 9    multivitamin (THERAGRAN) tablet Take 1 tablet by mouth once daily.      mycophenolate (CELLCEPT) 250 mg Cap TAKE 2 CAPSULES BY MOUTH TWO TIMES A  capsule 11    ondansetron (ZOFRAN) 4 MG tablet Take 4 mg by mouth.      tacrolimus (PROGRAF) 1 MG Cap Take 1 capsule (1 mg total) by mouth every 12 (twelve) hours. Z94.0 Kidney Transplant. 60 capsule 11    valacyclovir (VALTREX) 1000 MG tablet Take 1 tablet by mouth once daily.      zoledronic acid-mannitol-water (RECLAST) 4 mg/100 mL Soln Inject into the vein.       No current facility-administered medications  on file prior to visit.        SH : She is a retired teacher, principal. She is not a smoker or alcohol drinker. She has a son who is 32 years old. She is currently living at home.       FH : Several family members with hypertension. Lupus in a distant uncle         Review of Systems  :    Constitutional: Negative for activity change, appetite change, fatigue and fever.   HENT:  Negative for congestion, facial swelling, sore throat, trouble swallowing and voice change.   Eyes: Negative for redness and visual disturbance.   Respiratory: Negative for apnea, cough, chest tightness, shortness of breath and wheezing.   Cardiovascular: Negative for chest pain, palpitations and leg swelling.   Gastrointestinal: Negative for abdominal distention, abdominal pain, blood in stool, constipation, diarrhea, nausea and vomiting.   Genitourinary: Negative for decreased urine volume, difficulty urinating, dysuria, flank pain, frequency, hematuria, pelvic pain and urgency.   Musculoskeletal: Positive for arthralgias. Negative for back pain, gait problem and joint swelling.   Skin: Negative for color change and rash.   Neurological: Negative for dizziness, syncope, weakness and headaches.   Hematological: Does not bruise/bleed easily.   Psychiatric/Behavioral: Negative for agitation, behavioral problems and confusion. The patient is not nervous/anxious          Objective:         Vitals:    06/26/17 1150   BP: (!) 118/50   Pulse: 84       Respiratory rate 20, afebrile, weight 129 pounds, stable    Physical Exam  :      Constitutional: She is oriented to person, place, and time. She appears well-developed and well-nourished. No distress.    HENT: Head: Normocephalic and atraumatic. Mouth/Throat: Oropharynx is clear and moist. No oropharyngeal exudate.    Eyes: Conjunctivae and EOM are normal. Pupils are equal, round, and reactive to light. No scleral icterus.    Neck: Normal range of motion. Neck supple. No JVD present. Carotid bruit  is not present. No tracheal deviation present. No thyroid mass and no thyromegaly present.    Cardiovascular: Normal rate, regular rhythm, normal heart sounds and intact distal pulses. Exam reveals no gallop and no friction rub.    No murmur heard.    Pulmonary/Chest: Effort normal and breath sounds normal. No respiratory distress. She has no wheezes. She has no rales. She exhibits no tenderness.    Abdominal: Soft. Bowel sounds are normal. She exhibits no distension, no abdominal bruit, no ascites and no mass. There is no hepatosplenomegaly. There is no allgraft ( RLQ ) tenderness. There is no rebound, no guarding and no CVA tenderness.   Musculoskeletal: Normal range of motion. She exhibits no edema or tenderness.   Lymphadenopathy: She has no cervical adenopathy.   Neurological: She is alert and oriented to person, place, and time. No cranial nerve deficit. She exhibits normal muscle tone. Coordination normal.    Skin: Skin is warm and intact. No rash noted. No erythema. No pallor.   Psychiatric: She has a normal mood and affect. Her behavior is normal. Judgment normal.           Labs:    Lab Results   Component Value Date    CREATININE 1.0 06/19/2017    CREATININE 1.0 06/19/2017    BUN 22 06/19/2017    BUN 22 06/19/2017     06/19/2017     06/19/2017    K 5.0 06/19/2017    K 5.0 06/19/2017     06/19/2017     06/19/2017    CO2 23 06/19/2017    CO2 23 06/19/2017       Lab Results   Component Value Date    WBC 5.78 06/19/2017    HGB 11.3 (L) 06/19/2017    HCT 34.7 (L) 06/19/2017    MCV 77 (L) 06/19/2017     (L) 06/19/2017       Lab Results   Component Value Date    .0 (H) 06/19/2017    CALCIUM 10.4 06/19/2017    CALCIUM 10.4 06/19/2017    PHOS 3.2 06/19/2017    PHOS 3.2 06/19/2017       Lab Results   Component Value Date    ALBUMIN 3.9 06/19/2017    ALBUMIN 3.9 06/19/2017       Lab Results   Component Value Date    URICACID 5.9 (H) 06/19/2017       Impression and Plan :  62-year-old -American woman seen in office today in follow-up for following medical problems ,       1. Status post Living Unrelated kidney transplant ( 4/6/2015 ) : Stable renal function with a serum creatinine of 1.0 mg/dL.        Serum Prograf levels is acceptable at 3.7   ( range 4-7 ) , She is currently on Prograf  1 mg twice daily.  CellCept 500 mg twice a day.       2. Hypertension - blood pressure is controlled,      3. hyperparathyroidism - follow PTH , mild hypercalcemia noted, ? Primary hyperpara , seeing Endocrine       4. Anemia of chronic kidney disease - most recent hemoglobin is about 11.3 g. will continue to monitor.        5. Recent urinalysis shows normal range proteinuria.        6. Lupus nephritis - currently under remission. On Plaquenil, sees Dr Jj in [place] ,       7. Lytes -  on Mag supplements ,     8. She is euvolemic on exam.       9. Leukopenia : resolved, she is following with hematologist Karsten ()        10. Met acidosis : bicarb level stable       I'll see her in follow-up in about 4 months. More than 25 minutes of face-to-face time discussing labs and plan of care.       Sincerely,        Олег Joseph MD

## 2017-07-20 ENCOUNTER — OFFICE VISIT (OUTPATIENT)
Dept: ENDOCRINOLOGY | Facility: CLINIC | Age: 62
End: 2017-07-20
Payer: MEDICARE

## 2017-07-20 VITALS
BODY MASS INDEX: 23.04 KG/M2 | WEIGHT: 130.06 LBS | DIASTOLIC BLOOD PRESSURE: 59 MMHG | SYSTOLIC BLOOD PRESSURE: 92 MMHG | HEIGHT: 63 IN | HEART RATE: 71 BPM

## 2017-07-20 DIAGNOSIS — E21.2 HYPERPARATHYROIDISM, TERTIARY: ICD-10-CM

## 2017-07-20 DIAGNOSIS — M32.9 SYSTEMIC LUPUS ERYTHEMATOSUS, UNSPECIFIED SLE TYPE, UNSPECIFIED ORGAN INVOLVEMENT STATUS: ICD-10-CM

## 2017-07-20 DIAGNOSIS — Z29.89 PROPHYLACTIC IMMUNOTHERAPY: ICD-10-CM

## 2017-07-20 DIAGNOSIS — N18.2 CKD (CHRONIC KIDNEY DISEASE) STAGE 2, GFR 60-89 ML/MIN: Chronic | ICD-10-CM

## 2017-07-20 DIAGNOSIS — M81.0 OSTEOPOROSIS, UNSPECIFIED OSTEOPOROSIS TYPE, UNSPECIFIED PATHOLOGICAL FRACTURE PRESENCE: Primary | ICD-10-CM

## 2017-07-20 PROCEDURE — 99999 PR PBB SHADOW E&M-EST. PATIENT-LVL IV: CPT | Mod: PBBFAC,GC,, | Performed by: INTERNAL MEDICINE

## 2017-07-20 PROCEDURE — 99214 OFFICE O/P EST MOD 30 MIN: CPT | Mod: PBBFAC | Performed by: INTERNAL MEDICINE

## 2017-07-20 PROCEDURE — 99214 OFFICE O/P EST MOD 30 MIN: CPT | Mod: S$PBB,GC,, | Performed by: INTERNAL MEDICINE

## 2017-07-20 RX ORDER — ZOLEDRONIC ACID 5 MG/100ML
5 INJECTION, SOLUTION INTRAVENOUS ONCE
Status: CANCELLED | OUTPATIENT
Start: 2017-07-20 | End: 2017-07-20

## 2017-07-20 RX ORDER — ACETAMINOPHEN 500 MG
500 TABLET ORAL
Status: CANCELLED | OUTPATIENT
Start: 2017-07-20

## 2017-07-20 RX ORDER — SODIUM CHLORIDE 0.9 % (FLUSH) 0.9 %
10 SYRINGE (ML) INJECTION
Status: CANCELLED | OUTPATIENT
Start: 2017-07-20

## 2017-07-20 RX ORDER — HEPARIN SODIUM (PORCINE) LOCK FLUSH IV SOLN 100 UNIT/ML 100 UNIT/ML
100 SOLUTION INTRAVENOUS
Status: CANCELLED | OUTPATIENT
Start: 2017-07-20

## 2017-07-20 NOTE — PATIENT INSTRUCTIONS
We will schedule bone density and reclast infusion   We will check 24hr urine collection for calcium     If you wish to transfer your care to [place], recommend Dr Delaney for follow-up. If not, please schedule follow up with us in 1 year.     Vitamin D- 800 iu daily

## 2017-07-20 NOTE — PROGRESS NOTES
Subjective:      Patient ID: Chanel Charles is a 62 y.o. female.    Chief Complaint:  Hyperparathyroidism      History of Present Illness  F/u visit for tertiary hyperparathyroidism and osteoporosis     S/p kidney transplant on 4/6/15 (hx of ESRD 2/2 lupus nephritis, hx dialysis 4.5 yrs prior to transplant), now with CKD Stage 2 stable, remains on HCQ, MMF and prograf.   Prior to renal tx, had 3HPT (PTH levels up to 1241), with hypercalcemia ranging 10.5-11.   Post RTx, PTH gradually declined (most recent 116), Ca normalized to ULN (10-10.5) phos ranging 2.5-3, and alk phos normalized.      BMD 7/2015 showed osteoporosis of l-spine, total hip and fem neck.   Started fosamax 08/2015, intolerant 2/2 nausea and abdominal pain.  Received iv reclast 1/2016, tolerated well.     Fracture history: none  Falls: slipped on watery floor, otherwise no falls or gait disturbance    Weight bearing exercise: walking, Hula-hooping, dancing  Height loss: denies   Vitamin D status: replete, only takes MVI (Centrum Silver).  No HCTZ.  Steroid therapy: inhaled steroids (symbicort prn)  No h/o kidney stones.  No h/o diarrhea, malabsorption, or bypass   Menopause 47, remote hx ert long ago.     Review of Systems   Constitutional: Negative for unexpected weight change.   Gastrointestinal: Negative for abdominal pain and constipation.   Endocrine: Negative for polyuria.   Skin: Negative for wound.   Psychiatric/Behavioral: Negative for sleep disturbance.       Objective:   Physical Exam   Neck: No thyromegaly present.   Cardiovascular: Normal rate.    Pulmonary/Chest: Effort normal.   Abdominal: Soft.   Musculoskeletal: She exhibits no edema.   Neurological:   Normal gait    Vitals reviewed.      Assessment:     1. Osteoporosis, unspecified osteoporosis type, unspecified pathological fracture presence    2. Hyperparathyroidism, tertiary    3. CKD (chronic kidney disease) stage 2, GFR 60-89 ml/min    4. Prophylactic immunotherapy    5.  Systemic lupus erythematosus, unspecified SLE type, unspecified organ involvement status        Plan:     Chanel was seen today for hyperparathyroidism.    Diagnoses and all orders for this visit:    Osteoporosis, unspecified osteoporosis type, unspecified pathological fracture presence  She is due for reclast dose 2/3 and for BMD.   Fall prevention   Vit D is replete   Can assess for drug holiday after 3 doses if no active bone loss, fractures, falls and no secondary causes.   -     DXA Bone Density Spine And Hip; Future  -     acetaminophen tablet 500 mg; Take 1 tablet (500 mg total) by mouth as needed (Pre-med for Reclast).  -     zoledronic acid-mannitol & water 5 mg/100 mL infusion 5 mg; Inject 100 mLs (5 mg total) into the vein once.  -     sodium chloride 0.9% flush 10 mL; Inject 10 mLs into the vein as needed for Line Care.  -     heparin lock flush (porcine) injection 100 Units; Inject 1 mL (100 Units total) into the vein as needed (for ).    Hyperparathyroidism, tertiary  Hyperparathyroid persists but PTH has significantly declined and is near normal. Most importantly, Ca has normalized and remains in ULN since the RTx.    Discussed that indications for parathyroidectomy or medical management in persistent 3HPT post RTX are: persistent hypercalcemia >1 yr post RTx, hypercalciuria/nephrocalcinosis/worsening GFR, renal phos wasting, vascular calcifications, or osteodystrophy, which she does not have.   Will continue to treat osteoporosis, will screen for nephrocalcinosis with 24hr UCa/Cr ratio (if high, check renal U/S), and continue to monitor  -     Creatinine, urine, timed; Future  -     CALCIUM, TIMED URINE; Future    CKD (chronic kidney disease) stage 2, GFR 60-89 ml/min  Per renal transplant service.     Prophylactic immunotherapy  Per renal transplant service.     Systemic lupus erythematosus, unspecified SLE type, unspecified organ involvement status  Per rheum    She would like to  transfer her care to Dr Delaney in [place] after this reclast and BMD.   rtc prn   Discussed w Dr Augirre

## 2017-08-03 ENCOUNTER — PATIENT MESSAGE (OUTPATIENT)
Dept: ENDOCRINOLOGY | Facility: CLINIC | Age: 62
End: 2017-08-03

## 2017-08-07 ENCOUNTER — APPOINTMENT (OUTPATIENT)
Dept: RADIOLOGY | Facility: CLINIC | Age: 62
End: 2017-08-07
Attending: INTERNAL MEDICINE
Payer: MEDICARE

## 2017-08-07 ENCOUNTER — PATIENT MESSAGE (OUTPATIENT)
Dept: RHEUMATOLOGY | Facility: CLINIC | Age: 62
End: 2017-08-07

## 2017-08-07 DIAGNOSIS — M81.0 OSTEOPOROSIS, UNSPECIFIED OSTEOPOROSIS TYPE, UNSPECIFIED PATHOLOGICAL FRACTURE PRESENCE: ICD-10-CM

## 2017-08-07 PROCEDURE — 77080 DXA BONE DENSITY AXIAL: CPT | Mod: TC,PO

## 2017-08-07 PROCEDURE — 77080 DXA BONE DENSITY AXIAL: CPT | Mod: 26,,, | Performed by: INTERNAL MEDICINE

## 2017-08-21 ENCOUNTER — LAB VISIT (OUTPATIENT)
Dept: LAB | Facility: HOSPITAL | Age: 62
End: 2017-08-21
Attending: INTERNAL MEDICINE
Payer: MEDICARE

## 2017-08-21 DIAGNOSIS — M32.14 LUPUS NEPHRITIS: ICD-10-CM

## 2017-08-21 DIAGNOSIS — M32.9 SLE (SYSTEMIC LUPUS ERYTHEMATOSUS): ICD-10-CM

## 2017-08-21 DIAGNOSIS — Z79.899 ENCOUNTER FOR LONG-TERM (CURRENT) USE OF MEDICATIONS: ICD-10-CM

## 2017-08-21 LAB
ALBUMIN SERPL BCP-MCNC: 3.9 G/DL
ALP SERPL-CCNC: 77 U/L
ALT SERPL W/O P-5'-P-CCNC: 23 U/L
ANION GAP SERPL CALC-SCNC: 9 MMOL/L
ANISOCYTOSIS BLD QL SMEAR: SLIGHT
AST SERPL-CCNC: 31 U/L
BASOPHILS # BLD AUTO: 0.04 K/UL
BASOPHILS NFR BLD: 0.6 %
BILIRUB SERPL-MCNC: 1 MG/DL
BUN SERPL-MCNC: 23 MG/DL
C3 SERPL-MCNC: 122 MG/DL
C4 SERPL-MCNC: 22 MG/DL
CALCIUM SERPL-MCNC: 10.5 MG/DL
CHLORIDE SERPL-SCNC: 109 MMOL/L
CO2 SERPL-SCNC: 23 MMOL/L
CREAT SERPL-MCNC: 1.2 MG/DL
DIFFERENTIAL METHOD: ABNORMAL
EOSINOPHIL # BLD AUTO: 0.3 K/UL
EOSINOPHIL NFR BLD: 5 %
ERYTHROCYTE [DISTWIDTH] IN BLOOD BY AUTOMATED COUNT: 16.6 %
EST. GFR  (AFRICAN AMERICAN): 56 ML/MIN/1.73 M^2
EST. GFR  (NON AFRICAN AMERICAN): 48.6 ML/MIN/1.73 M^2
GLUCOSE SERPL-MCNC: 89 MG/DL
HCT VFR BLD AUTO: 35.1 %
HGB BLD-MCNC: 11.4 G/DL
HYPOCHROMIA BLD QL SMEAR: ABNORMAL
LYMPHOCYTES # BLD AUTO: 1.7 K/UL
LYMPHOCYTES NFR BLD: 27.2 %
MCH RBC QN AUTO: 24.3 PG
MCHC RBC AUTO-ENTMCNC: 32.5 G/DL
MCV RBC AUTO: 75 FL
MONOCYTES # BLD AUTO: 0.8 K/UL
MONOCYTES NFR BLD: 12.5 %
NEUTROPHILS # BLD AUTO: 3.4 K/UL
NEUTROPHILS NFR BLD: 54.7 %
OVALOCYTES BLD QL SMEAR: ABNORMAL
PLATELET # BLD AUTO: 157 K/UL
PLATELET BLD QL SMEAR: ABNORMAL
PMV BLD AUTO: ABNORMAL FL
POIKILOCYTOSIS BLD QL SMEAR: SLIGHT
POTASSIUM SERPL-SCNC: 5.4 MMOL/L
PROT SERPL-MCNC: 7.5 G/DL
RBC # BLD AUTO: 4.69 M/UL
SODIUM SERPL-SCNC: 141 MMOL/L
WBC # BLD AUTO: 6.18 K/UL

## 2017-08-21 PROCEDURE — 86225 DNA ANTIBODY NATIVE: CPT

## 2017-08-21 PROCEDURE — 85025 COMPLETE CBC W/AUTO DIFF WBC: CPT

## 2017-08-21 PROCEDURE — 80053 COMPREHEN METABOLIC PANEL: CPT

## 2017-08-21 PROCEDURE — 86160 COMPLEMENT ANTIGEN: CPT | Mod: 59

## 2017-08-21 PROCEDURE — 36415 COLL VENOUS BLD VENIPUNCTURE: CPT | Mod: PO

## 2017-08-21 PROCEDURE — 86160 COMPLEMENT ANTIGEN: CPT

## 2017-08-22 ENCOUNTER — PATIENT MESSAGE (OUTPATIENT)
Dept: RHEUMATOLOGY | Facility: CLINIC | Age: 62
End: 2017-08-22

## 2017-08-22 LAB — DSDNA AB SER-ACNC: NORMAL [IU]/ML

## 2017-08-28 ENCOUNTER — PATIENT MESSAGE (OUTPATIENT)
Dept: RHEUMATOLOGY | Facility: CLINIC | Age: 62
End: 2017-08-28

## 2017-08-28 ENCOUNTER — PATIENT MESSAGE (OUTPATIENT)
Dept: ENDOCRINOLOGY | Facility: CLINIC | Age: 62
End: 2017-08-28

## 2017-09-07 ENCOUNTER — INFUSION (OUTPATIENT)
Dept: INFECTIOUS DISEASES | Facility: HOSPITAL | Age: 62
End: 2017-09-07
Attending: INTERNAL MEDICINE
Payer: MEDICARE

## 2017-09-07 VITALS
WEIGHT: 130.75 LBS | TEMPERATURE: 97 F | OXYGEN SATURATION: 100 % | RESPIRATION RATE: 15 BRPM | SYSTOLIC BLOOD PRESSURE: 98 MMHG | HEART RATE: 65 BPM | BODY MASS INDEX: 23.16 KG/M2 | DIASTOLIC BLOOD PRESSURE: 53 MMHG

## 2017-09-07 DIAGNOSIS — M81.0 OSTEOPOROSIS, UNSPECIFIED OSTEOPOROSIS TYPE, UNSPECIFIED PATHOLOGICAL FRACTURE PRESENCE: Primary | ICD-10-CM

## 2017-09-07 PROCEDURE — 25000003 PHARM REV CODE 250: Performed by: INTERNAL MEDICINE

## 2017-09-07 PROCEDURE — 96365 THER/PROPH/DIAG IV INF INIT: CPT

## 2017-09-07 PROCEDURE — 96413 CHEMO IV INFUSION 1 HR: CPT

## 2017-09-07 PROCEDURE — 63600175 PHARM REV CODE 636 W HCPCS: Performed by: INTERNAL MEDICINE

## 2017-09-07 RX ORDER — HEPARIN SODIUM (PORCINE) LOCK FLUSH IV SOLN 100 UNIT/ML 100 UNIT/ML
100 SOLUTION INTRAVENOUS
Status: CANCELLED | OUTPATIENT
Start: 2017-09-07

## 2017-09-07 RX ORDER — SODIUM CHLORIDE 0.9 % (FLUSH) 0.9 %
10 SYRINGE (ML) INJECTION
Status: CANCELLED | OUTPATIENT
Start: 2017-09-07

## 2017-09-07 RX ORDER — ZOLEDRONIC ACID 5 MG/100ML
5 INJECTION, SOLUTION INTRAVENOUS ONCE
Status: COMPLETED | OUTPATIENT
Start: 2017-09-07 | End: 2017-09-07

## 2017-09-07 RX ORDER — ACETAMINOPHEN 500 MG
500 TABLET ORAL
Status: CANCELLED | OUTPATIENT
Start: 2017-09-07

## 2017-09-07 RX ORDER — ZOLEDRONIC ACID 5 MG/100ML
5 INJECTION, SOLUTION INTRAVENOUS ONCE
Status: CANCELLED | OUTPATIENT
Start: 2017-09-07 | End: 2017-09-07

## 2017-09-07 RX ORDER — ACETAMINOPHEN 500 MG
500 TABLET ORAL
Status: DISCONTINUED | OUTPATIENT
Start: 2017-09-07 | End: 2017-09-07 | Stop reason: HOSPADM

## 2017-09-07 RX ADMIN — ZOLEDRONIC ACID 5 MG: 5 INJECTION, SOLUTION INTRAVENOUS at 12:09

## 2017-09-07 RX ADMIN — ACETAMINOPHEN 500 MG: 500 TABLET ORAL at 11:09

## 2017-09-07 NOTE — PATIENT INSTRUCTIONS
"Zoledronic Acid (Patient Education - Adult Medication)  You must carefully read the "Consumer Information Use and Disclaimer" below in order to understand and correctly use this information   Pronunciation  (sergei Mata AS id)  Brand Names: USReclast; Zometa  Brand Names: [place]Aclasta; Taro-Zoledronic Acid; Taro-Zoledronic Acid Concentrate; Zoledronic Acid Injection; Zoledronic Acid for Injection; Zoledronic Acid Z; Zometa Concentrate  What is this drug used for?   It is used to treat high calcium levels.   It is used when treating some cancers.   It is used to treat Paget's disease.   It is used to put off or treat soft, brittle bones (osteoporosis).   It may be given to you for other reasons. Talk with the doctor.    What are some things I need to know or do while I take this drug?   For all uses of this drug:   Tell all of your health care providers that you take this drug. This includes your doctors, nurses, pharmacists, and dentists.   Worsening of asthma has happened in people taking drugs like this one. Talk with the doctor.   This drug may raise the chance of a broken leg. Talk with the doctor.   Have blood work checked as you have been told by the doctor. Talk with the doctor.   Have a bone density test as you have been told by your doctor. Talk with your doctor.   Take calcium and vitamin D as you were told by your doctor.   Have a dental exam before starting this drug.   Take good care of your teeth. See a dentist often.   Do not give to a child. Talk with your doctor.   If you are 65 or older, use this drug with care. You could have more side effects.   Use birth control that you can trust to prevent pregnancy while taking this drug.   This drug may cause harm to the unborn baby if you take it while you are pregnant. If you are pregnant or you get pregnant while taking this drug, call your doctor right away.   Using this drug for bone health:   This drug works best when used with " calcium/vitamin D and weight-bearing workouts like walking or PT (physical therapy).   Follow the diet and workout plan that your doctor told you about.    What are some side effects that I need to call my doctor about right away?   WARNING/CAUTION: Even though it may be rare, some people may have very bad and sometimes deadly side effects when taking a drug. Tell your doctor or get medical help right away if you have any of the following signs or symptoms that may be related to a very bad side effect:   Signs of an allergic reaction, like rash; hives; itching; red, swollen, blistered, or peeling skin with or without fever; wheezing; tightness in the chest or throat; trouble breathing or talking; unusual hoarseness; or swelling of the mouth, face, lips, tongue, or throat.   Signs of low calcium levels like muscle cramps or spasms, numbness and tingling, or seizures.   Signs of kidney problems like unable to pass urine, change in how much urine is passed, blood in the urine, or a big weight gain.   Very bad bone, joint, or muscle pain.   Any new or strange groin, hip, or thigh pain.   Chest pain.   A heartbeat that does not feel normal.   Fast or slow heartbeat.   Change in eyesight, eye pain, or very bad eye irritation.   Mouth sores.   Trouble swallowing.   Very bad pain when swallowing.   Fever or chills.   Sore throat.   Any unexplained bruising or bleeding.   Feeling very tired or weak.   Mood changes.   Pain where the shot was given.   Redness or swelling where the shot is given.   Weakness on 1 side of the body, trouble speaking or thinking, change in balance, drooping on one side of the face, or blurred eyesight.   This drug may cause jawbone problems. The chance may be higher the longer you take this drug. The chance may be higher if you have cancer, dental problems, dentures that do not fit well, anemia, blood clotting problems, or an infection. The chance may also be higher if you are  having dental work, getting chemo or radiation, or taking other drugs that may cause jawbone problems like some steroid drugs. There are many drugs that can do this. Ask your doctor or pharmacist if you are not sure. Call your doctor right away if you have jaw swelling or pain.    What are some other side effects of this drug?   All drugs may cause side effects. However, many people have no side effects or only have minor side effects. Call your doctor or get medical help if any of these side effects or any other side effects bother you or do not go away:   For all uses of this drug:   Dizziness.   Upset stomach or throwing up.   Irritation where the shot is given.   Feeling tired or weak.   Belly pain.   Headache.   Flu-like signs.   Loose stools (diarrhea).   Muscle or joint pain.   Back pain.   Using this drug during cancer treatment:   Not able to sleep.   Not hungry.   Hard stools (constipation).   Weight loss.   Cough.   These are not all of the side effects that may occur. If you have questions about side effects, call your doctor. Call your doctor for medical advice about side effects.   You may report side effects to your national health agency.    How is this drug best taken?   Use this drug as ordered by your doctor. Read all information given to you. Follow all instructions closely.   For all uses of this drug:   It is given as an infusion into a vein over a period of time.   Drink lots of noncaffeine liquids unless told to drink less liquid by your doctor.   Using this drug for bone health:   Acetaminophen may be given to lower fever and chills.   Drink at least 2 glasses of liquids a few hours before you get this drug.    What do I do if I miss a dose?   Call your doctor to find out what to do.    How do I store and/or throw out this drug?   If you need to store this drug at home, talk with your doctor, nurse, or pharmacist about how to store it.    General drug facts   If your  symptoms or health problems do not get better or if they become worse, call your doctor.   Do not share your drugs with others and do not take anyone else's drugs.   Keep a list of all your drugs (prescription, natural products, vitamins, OTC) with you. Give this list to your doctor.   Talk with the doctor before starting any new drug, including prescription or OTC, natural products, or vitamins.   Keep all drugs in a safe place. Keep all drugs out of the reach of children and pets.   Check with your pharmacist about how to throw out unused drugs.   Some drugs may have another patient information leaflet. If you have any questions about this drug, please talk with your doctor, nurse, pharmacist, or other health care provider.   If you think there has been an overdose, call your poison control center or get medical care right away. Be ready to tell or show what was taken, how much, and when it happened.

## 2017-09-20 ENCOUNTER — LAB VISIT (OUTPATIENT)
Dept: LAB | Facility: HOSPITAL | Age: 62
End: 2017-09-20
Attending: INTERNAL MEDICINE
Payer: MEDICARE

## 2017-09-20 DIAGNOSIS — Z94.0 S/P KIDNEY TRANSPLANT: ICD-10-CM

## 2017-09-20 LAB
25(OH)D3+25(OH)D2 SERPL-MCNC: 50 NG/ML
ALBUMIN SERPL BCP-MCNC: 3.8 G/DL
ANION GAP SERPL CALC-SCNC: 11 MMOL/L
ANISOCYTOSIS BLD QL SMEAR: SLIGHT
BASOPHILS # BLD AUTO: 0.03 K/UL
BASOPHILS NFR BLD: 0.5 %
BUN SERPL-MCNC: 25 MG/DL
BURR CELLS BLD QL SMEAR: ABNORMAL
CALCIUM SERPL-MCNC: 9.9 MG/DL
CHLORIDE SERPL-SCNC: 111 MMOL/L
CO2 SERPL-SCNC: 19 MMOL/L
CREAT SERPL-MCNC: 1.2 MG/DL
DIFFERENTIAL METHOD: ABNORMAL
EOSINOPHIL # BLD AUTO: 0.3 K/UL
EOSINOPHIL NFR BLD: 4 %
ERYTHROCYTE [DISTWIDTH] IN BLOOD BY AUTOMATED COUNT: 16.9 %
EST. GFR  (AFRICAN AMERICAN): 56 ML/MIN/1.73 M^2
EST. GFR  (NON AFRICAN AMERICAN): 48.6 ML/MIN/1.73 M^2
GLUCOSE SERPL-MCNC: 81 MG/DL
HCT VFR BLD AUTO: 33.7 %
HGB BLD-MCNC: 10.7 G/DL
HYPOCHROMIA BLD QL SMEAR: ABNORMAL
LYMPHOCYTES # BLD AUTO: 1.6 K/UL
LYMPHOCYTES NFR BLD: 25.4 %
MAGNESIUM SERPL-MCNC: 2 MG/DL
MCH RBC QN AUTO: 24 PG
MCHC RBC AUTO-ENTMCNC: 31.8 G/DL
MCV RBC AUTO: 76 FL
MONOCYTES # BLD AUTO: 0.7 K/UL
MONOCYTES NFR BLD: 11.1 %
NEUTROPHILS # BLD AUTO: 3.7 K/UL
NEUTROPHILS NFR BLD: 59 %
OVALOCYTES BLD QL SMEAR: ABNORMAL
PHOSPHATE SERPL-MCNC: 2.7 MG/DL
PLATELET # BLD AUTO: 154 K/UL
PLATELET BLD QL SMEAR: ABNORMAL
PMV BLD AUTO: ABNORMAL FL
POIKILOCYTOSIS BLD QL SMEAR: SLIGHT
POTASSIUM SERPL-SCNC: 4.7 MMOL/L
PTH-INTACT SERPL-MCNC: 157 PG/ML
RBC # BLD AUTO: 4.45 M/UL
SODIUM SERPL-SCNC: 141 MMOL/L
URATE SERPL-MCNC: 5.5 MG/DL
WBC # BLD AUTO: 6.21 K/UL

## 2017-09-20 PROCEDURE — 80197 ASSAY OF TACROLIMUS: CPT

## 2017-09-20 PROCEDURE — 83735 ASSAY OF MAGNESIUM: CPT

## 2017-09-20 PROCEDURE — 82306 VITAMIN D 25 HYDROXY: CPT

## 2017-09-20 PROCEDURE — 80069 RENAL FUNCTION PANEL: CPT

## 2017-09-20 PROCEDURE — 36415 COLL VENOUS BLD VENIPUNCTURE: CPT | Mod: PO

## 2017-09-20 PROCEDURE — 85025 COMPLETE CBC W/AUTO DIFF WBC: CPT

## 2017-09-20 PROCEDURE — 84550 ASSAY OF BLOOD/URIC ACID: CPT

## 2017-09-20 PROCEDURE — 83970 ASSAY OF PARATHORMONE: CPT

## 2017-09-21 LAB — TACROLIMUS BLD-MCNC: 3.5 NG/ML

## 2017-09-27 ENCOUNTER — OFFICE VISIT (OUTPATIENT)
Dept: NEPHROLOGY | Facility: CLINIC | Age: 62
End: 2017-09-27
Payer: MEDICARE

## 2017-09-27 ENCOUNTER — LAB VISIT (OUTPATIENT)
Dept: LAB | Facility: HOSPITAL | Age: 62
End: 2017-09-27
Attending: INTERNAL MEDICINE
Payer: MEDICARE

## 2017-09-27 VITALS
HEIGHT: 63 IN | DIASTOLIC BLOOD PRESSURE: 52 MMHG | WEIGHT: 131.81 LBS | HEART RATE: 68 BPM | SYSTOLIC BLOOD PRESSURE: 92 MMHG | BODY MASS INDEX: 23.36 KG/M2

## 2017-09-27 DIAGNOSIS — M32.9 SLE (SYSTEMIC LUPUS ERYTHEMATOSUS): ICD-10-CM

## 2017-09-27 DIAGNOSIS — Z94.0 S/P KIDNEY TRANSPLANT: Primary | ICD-10-CM

## 2017-09-27 DIAGNOSIS — Z79.899 ENCOUNTER FOR LONG-TERM (CURRENT) USE OF MEDICATIONS: ICD-10-CM

## 2017-09-27 DIAGNOSIS — M32.14 LUPUS NEPHRITIS: ICD-10-CM

## 2017-09-27 LAB
ALBUMIN SERPL BCP-MCNC: 3.8 G/DL
ALP SERPL-CCNC: 74 U/L
ALT SERPL W/O P-5'-P-CCNC: 21 U/L
ANION GAP SERPL CALC-SCNC: 10 MMOL/L
ANISOCYTOSIS BLD QL SMEAR: SLIGHT
AST SERPL-CCNC: 29 U/L
BASOPHILS # BLD AUTO: 0.04 K/UL
BASOPHILS NFR BLD: 0.7 %
BILIRUB SERPL-MCNC: 0.9 MG/DL
BUN SERPL-MCNC: 30 MG/DL
C3 SERPL-MCNC: 122 MG/DL
C4 SERPL-MCNC: 22 MG/DL
CALCIUM SERPL-MCNC: 11 MG/DL
CHLORIDE SERPL-SCNC: 107 MMOL/L
CO2 SERPL-SCNC: 23 MMOL/L
CREAT SERPL-MCNC: 1.2 MG/DL
DIFFERENTIAL METHOD: ABNORMAL
EOSINOPHIL # BLD AUTO: 0.2 K/UL
EOSINOPHIL NFR BLD: 3.9 %
ERYTHROCYTE [DISTWIDTH] IN BLOOD BY AUTOMATED COUNT: 16.8 %
EST. GFR  (AFRICAN AMERICAN): 56 ML/MIN/1.73 M^2
EST. GFR  (NON AFRICAN AMERICAN): 48.6 ML/MIN/1.73 M^2
GLUCOSE SERPL-MCNC: 78 MG/DL
HCT VFR BLD AUTO: 32.6 %
HGB BLD-MCNC: 10.7 G/DL
HYPOCHROMIA BLD QL SMEAR: ABNORMAL
LYMPHOCYTES # BLD AUTO: 1.8 K/UL
LYMPHOCYTES NFR BLD: 29.9 %
MCH RBC QN AUTO: 24.8 PG
MCHC RBC AUTO-ENTMCNC: 32.8 G/DL
MCV RBC AUTO: 76 FL
MONOCYTES # BLD AUTO: 0.6 K/UL
MONOCYTES NFR BLD: 9.3 %
NEUTROPHILS # BLD AUTO: 3.3 K/UL
NEUTROPHILS NFR BLD: 56.2 %
OVALOCYTES BLD QL SMEAR: ABNORMAL
PLATELET # BLD AUTO: 178 K/UL
PLATELET BLD QL SMEAR: ABNORMAL
PMV BLD AUTO: ABNORMAL FL
POIKILOCYTOSIS BLD QL SMEAR: SLIGHT
POTASSIUM SERPL-SCNC: 5.2 MMOL/L
PROT SERPL-MCNC: 7.3 G/DL
RBC # BLD AUTO: 4.31 M/UL
SODIUM SERPL-SCNC: 140 MMOL/L
WBC # BLD AUTO: 5.89 K/UL

## 2017-09-27 PROCEDURE — 99213 OFFICE O/P EST LOW 20 MIN: CPT | Mod: PBBFAC,PO | Performed by: INTERNAL MEDICINE

## 2017-09-27 PROCEDURE — 99214 OFFICE O/P EST MOD 30 MIN: CPT | Mod: S$PBB,,, | Performed by: INTERNAL MEDICINE

## 2017-09-27 PROCEDURE — 86160 COMPLEMENT ANTIGEN: CPT | Mod: 59

## 2017-09-27 PROCEDURE — 86225 DNA ANTIBODY NATIVE: CPT

## 2017-09-27 PROCEDURE — 99999 PR PBB SHADOW E&M-EST. PATIENT-LVL III: CPT | Mod: PBBFAC,,, | Performed by: INTERNAL MEDICINE

## 2017-09-27 PROCEDURE — 85025 COMPLETE CBC W/AUTO DIFF WBC: CPT

## 2017-09-27 PROCEDURE — 80053 COMPREHEN METABOLIC PANEL: CPT

## 2017-09-27 PROCEDURE — 36415 COLL VENOUS BLD VENIPUNCTURE: CPT | Mod: PO

## 2017-09-27 PROCEDURE — 86160 COMPLEMENT ANTIGEN: CPT

## 2017-09-27 RX ORDER — TACROLIMUS 1 MG/1
1 CAPSULE ORAL EVERY 12 HOURS
Qty: 90 CAPSULE | Refills: 11 | Status: SHIPPED | OUTPATIENT
Start: 2017-09-27 | End: 2017-10-19 | Stop reason: SDUPTHER

## 2017-09-27 RX ORDER — CHOLECALCIFEROL (VITAMIN D3) 25 MCG
2000 TABLET ORAL DAILY
COMMUNITY

## 2017-09-27 NOTE — PROGRESS NOTES
Subjective:       Patient ID: Chanel Charles is a 62 y.o.   female who presents for follow-up evaluation of  Living Unrelated RT ( 4/6/2015 ) , HTN, SHPT ,       ORGAN: RIGHT KIDNEY    Donor Type: living    PHS Increased Risk: no    Cold Ischemia: 58 mins    Induction Medications: campath - alemtuzumab (anti-cd52)            HPI : Chanel Charles is a pleasant 62-year-old -American woman with history of Living unrelated ( friend ) renal transplant in 4/6/2015, excellent allograft function on Prograf 1 mg BID , CellCept 500 mg twice a day. She has history of lupus nephritis, cause of end-stage kidney disease. She was on hemodialysis for about 4-1/2 years at [facility] HD unit under [facility], recent office notes were reviewed. She follows with rheumatology Department in [place] and  endocrine department for hypercalcemia.  recent lab results were discussed,          Past Medical History:   Diagnosis Date    Abnormal stress echo - with no blockage on [facility] - 1/16/14 1/7/2014    Acid reflux     Anemia of chronic kidney failure     Anxiety     Arthritis     Awaiting kidney transplant 12/12/2013    CHF (congestive heart failure)     Coronary artery disease     Bare metal stent in 2007    ESRD (end stage renal disease)     Secondary to Lupus Nephritis, HD MWF    Hypercalcemia 7/15/2015    Hyperlipidemia 12/12/2013    Hyperparathyroidism 7/15/2015    Hyperparathyroidism, tertiary 8/7/2015    Immunocompromised state 5/5/2015    ITP (idiopathic thrombocytopenic purpura) 12/12/2013    Living-donor kidney transplant for SLE 4/6/15 4/6/2015    Induced with Thymoglobulin.     Lupus nephritis     Lupus nephritis 5/18/2015    Menopause 7/15/2015    Osteoporosis 12/24/2015    Pericardial effusion s/p pericardiocentesis 12/12/2013    Pleural effusion s/p Thoracentesis and VATS 12/12/2013    Prophylactic immunotherapy 6/29/2015    S/p Bilateral Nephrectomy (Severe  proteinuria) - 2012 3/19/2015    Secondary hyperparathyroidism, renal     Shingles 4/2012    SLE (systemic lupus erythematosus) 1997         Current Outpatient Prescriptions on File Prior to Visit   Medication Sig Dispense Refill    alprazolam (XANAX) 0.5 MG tablet Take 0.5 mg by mouth. 1 Tablet Oral Every day.  or as needed.      aspirin (ECOTRIN) 81 MG EC tablet Take 81 mg by mouth once daily.       atorvastatin (LIPITOR) 80 MG tablet Take 80 mg by mouth every evening. 1 Tablet Oral Every day      BIOTIN ORAL Take by mouth.      BUDESONIDE/FORMOTEROL FUMARATE (SYMBICORT INHL) Inhale into the lungs.      eszopiclone (LUNESTA) 2 MG Tab Take 2 mg by mouth. 1 Tablet Oral As directed.  1 tablet 1 time as needed at bedtime.      famotidine (PEPCID) 20 MG tablet Take 1 tablet (20 mg total) by mouth every evening. 30 tablet 11    fluticasone (FLONASE) 50 mcg/actuation nasal spray 1 spray by Each Nare route once daily.  0    hydroxychloroquine (PLAQUENIL) 200 mg tablet Take 1 tablet (200 mg total) by mouth once daily. 90 tablet 2    loratadine (CLARITIN) 10 mg tablet Take 1 tablet (10 mg total) by mouth daily as needed for Allergies.  0    magnesium oxide (MAG-OX) 400 mg tablet Take 1 tablet (400 mg total) by mouth once daily. (Patient taking differently: Take 400 mg by mouth 2 (two) times daily. ) 30 tablet 9    multivitamin (THERAGRAN) tablet Take 1 tablet by mouth once daily.      mycophenolate (CELLCEPT) 250 mg Cap TAKE 2 CAPSULES BY MOUTH TWO TIMES A  capsule 11    tacrolimus (PROGRAF) 1 MG Cap Take 1 capsule (1 mg total) by mouth every 12 (twelve) hours. Z94.0 Kidney Transplant. 60 capsule 11    valacyclovir (VALTREX) 1000 MG tablet Take 1 tablet by mouth as needed.       zoledronic acid-mannitol-water (RECLAST) 4 mg/100 mL Soln Inject into the vein.       No current facility-administered medications on file prior to visit.        SH : She is a retired teacher, principal. She is not a smoker  or alcohol drinker. She has a son who is 32 years old. She is currently living at home.       FH : Several family members with hypertension. Lupus in a distant uncle         Review of Systems   Constitutional: Negative for activity change, appetite change, fatigue and fever.   HENT: Positive for congestion and rhinorrhea. Negative for facial swelling, sore throat, trouble swallowing and voice change.    Eyes: Negative for redness and visual disturbance.   Respiratory: Negative for apnea, cough, chest tightness, shortness of breath and wheezing.    Cardiovascular: Negative for chest pain, palpitations and leg swelling.   Gastrointestinal: Negative for abdominal distention, abdominal pain, blood in stool, constipation, diarrhea, nausea and vomiting.   Genitourinary: Negative for decreased urine volume, difficulty urinating, dysuria, flank pain, frequency, hematuria, pelvic pain and urgency.   Musculoskeletal: Positive for arthralgias. Negative for back pain, gait problem and joint swelling.   Skin: Negative for color change and rash.   Neurological: Negative for dizziness, syncope, weakness and headaches.   Hematological: Does not bruise/bleed easily.   Psychiatric/Behavioral: Negative for agitation, behavioral problems and confusion. The patient is not nervous/anxious.      :              Objective:         Vitals:    09/27/17 1003   BP: (!) 92/52   Pulse: 68         Weight 131 lbs , stable     Physical Exam  :      Constitutional: She is oriented to person, place, and time. She appears well-developed and well-nourished. No distress.    HENT: Head: Normocephalic and atraumatic. Mouth/Throat: Oropharynx is clear and moist. No oropharyngeal exudate.    Eyes: Conjunctivae and EOM are normal. Pupils are equal, round, and reactive to light. No scleral icterus.    Neck: Normal range of motion. Neck supple. No JVD present. Carotid bruit is not present. No tracheal deviation present. No thyroid mass and no thyromegaly  present.    Cardiovascular: Normal rate, regular rhythm, normal heart sounds and intact distal pulses. Exam reveals no gallop and no friction rub.    No murmur heard.    Pulmonary/Chest: Effort normal and breath sounds normal. No respiratory distress. She has no wheezes. She has no rales. She exhibits no tenderness.    Abdominal: Soft. Bowel sounds are normal. She exhibits no distension, no abdominal bruit, no ascites and no mass. There is no hepatosplenomegaly. There is no allgraft ( RLQ ) tenderness. There is no rebound, no guarding and no CVA tenderness.   Musculoskeletal: Normal range of motion. She exhibits no edema or tenderness.   Lymphadenopathy: She has no cervical adenopathy.   Neurological: She is alert and oriented to person, place, and time. No cranial nerve deficit. She exhibits normal muscle tone. Coordination normal.    Skin: Skin is warm and intact. No rash noted. No erythema. No pallor.   Psychiatric: She has a normal mood and affect. Her behavior is normal. Judgment normal.                Labs:    Lab Results   Component Value Date    CREATININE 1.2 09/20/2017    BUN 25 (H) 09/20/2017     09/20/2017    K 4.7 09/20/2017     (H) 09/20/2017    CO2 19 (L) 09/20/2017       Lab Results   Component Value Date    WBC 6.21 09/20/2017    HGB 10.7 (L) 09/20/2017    HCT 33.7 (L) 09/20/2017    MCV 76 (L) 09/20/2017     09/20/2017       Lab Results   Component Value Date    .0 (H) 09/20/2017    CALCIUM 9.9 09/20/2017    PHOS 2.7 09/20/2017       Lab Results   Component Value Date    ALBUMIN 3.8 09/20/2017       Impression and Plan : 62-year-old -American woman seen in office today in follow-up for following medical problems ,       1. Status post Living Unrelated kidney transplant ( 4/6/2015 ) : Stable renal function with a serum creatinine of 1.2  mg/dL.        Serum Prograf levels is acceptable at 3.7   ( range 4-7 ) , She is currently on Prograf  1 mg twice daily. Will  increase Prograf to 2/1 , check Prograf level in 2 weeks,     Cont CellCept 500 mg twice a day.       2. Hypertension - blood pressure is on the low side, she is not on any blood pressure medications, patient is asymptomatic.      3. hyperparathyroidism - follow PTH , mild hypercalcemia noted, ? Primary hyperpara , seeing Endocrine       4. Anemia of chronic kidney disease - most recent hemoglobin is about 11.3 g. will continue to monitor.        5. Recent urinalysis shows normal range proteinuria.        6. Lupus nephritis - currently under remission. On Plaquenil, sees Dr Jj in [place] ,       7. Lytes -  on Mag supplements ,      8. She is euvolemic on exam.       9. Leukopenia : resolved, she is following with hematologist Karsten ()        10. Met acidosis : bicarb level dropped as she had some diarrhea ,       I'll see her in follow-up in about 4 months. More than 25 minutes of face-to-face time discussing labs and plan of care.       Sincerely,        Олег Joseph MD

## 2017-09-27 NOTE — PATIENT INSTRUCTIONS
Avoid NSAID pain medications such as advil, aleve, motrin, ibuprofen, naprosyn, meloxicam, diclofenac, mobic.     Increase prograf to 2 mg in AM and 1 mg in PM

## 2017-09-27 NOTE — LETTER
September 27, 2017        Gabriel Wyman MD  [address] [address]             [facility] - Nephrology  [address] [address]  Phone: [phone]  Fax: [phone]   Patient: Chanel Charles   MR Number: [id number]   YOB: 1955   Date of Visit: 9/27/2017       Dear Dr. Wyman:    Thank you for referring Chanel Charles to me for evaluation. Attached you will find relevant portions of my assessment and plan of care.    If you have questions, please do not hesitate to call me. I look forward to following Chanel Charles along with you.    Sincerely,      Олег Joseph MD            CC  No Recipients    Enclosure

## 2017-09-28 ENCOUNTER — PATIENT MESSAGE (OUTPATIENT)
Dept: RHEUMATOLOGY | Facility: CLINIC | Age: 62
End: 2017-09-28

## 2017-09-28 LAB — DSDNA AB SER-ACNC: NORMAL [IU]/ML

## 2017-10-11 ENCOUNTER — LAB VISIT (OUTPATIENT)
Dept: LAB | Facility: HOSPITAL | Age: 62
End: 2017-10-11
Attending: INTERNAL MEDICINE
Payer: MEDICARE

## 2017-10-11 DIAGNOSIS — Z94.0 S/P KIDNEY TRANSPLANT: ICD-10-CM

## 2017-10-11 PROCEDURE — 36415 COLL VENOUS BLD VENIPUNCTURE: CPT | Mod: PO

## 2017-10-11 PROCEDURE — 80197 ASSAY OF TACROLIMUS: CPT

## 2017-10-12 ENCOUNTER — OFFICE VISIT (OUTPATIENT)
Dept: RHEUMATOLOGY | Facility: CLINIC | Age: 62
End: 2017-10-12
Payer: MEDICARE

## 2017-10-12 VITALS
HEIGHT: 60 IN | WEIGHT: 131.69 LBS | TEMPERATURE: 99 F | HEART RATE: 69 BPM | DIASTOLIC BLOOD PRESSURE: 63 MMHG | BODY MASS INDEX: 25.85 KG/M2 | SYSTOLIC BLOOD PRESSURE: 95 MMHG

## 2017-10-12 DIAGNOSIS — M81.0 OSTEOPOROSIS, UNSPECIFIED OSTEOPOROSIS TYPE, UNSPECIFIED PATHOLOGICAL FRACTURE PRESENCE: ICD-10-CM

## 2017-10-12 DIAGNOSIS — M32.9 SYSTEMIC LUPUS ERYTHEMATOSUS, UNSPECIFIED SLE TYPE, UNSPECIFIED ORGAN INVOLVEMENT STATUS: Primary | ICD-10-CM

## 2017-10-12 DIAGNOSIS — M32.14 LUPUS NEPHRITIS: ICD-10-CM

## 2017-10-12 DIAGNOSIS — D84.9 IMMUNOSUPPRESSION: ICD-10-CM

## 2017-10-12 LAB — TACROLIMUS BLD-MCNC: 6.4 NG/ML

## 2017-10-12 PROCEDURE — 99213 OFFICE O/P EST LOW 20 MIN: CPT | Mod: PBBFAC | Performed by: INTERNAL MEDICINE

## 2017-10-12 PROCEDURE — 99999 PR PBB SHADOW E&M-EST. PATIENT-LVL III: CPT | Mod: PBBFAC,,, | Performed by: INTERNAL MEDICINE

## 2017-10-12 PROCEDURE — 99214 OFFICE O/P EST MOD 30 MIN: CPT | Mod: S$PBB,,, | Performed by: INTERNAL MEDICINE

## 2017-10-12 ASSESSMENT — ROUTINE ASSESSMENT OF PATIENT INDEX DATA (RAPID3)
FATIGUE SCORE: 1
TOTAL RAPID3 SCORE: .17
PSYCHOLOGICAL DISTRESS SCORE: 3.3
PATIENT GLOBAL ASSESSMENT SCORE: 0
AM STIFFNESS SCORE: 0, NO
PAIN SCORE: .5
MDHAQ FUNCTION SCORE: 0

## 2017-10-12 NOTE — PROGRESS NOTES
Subjective:       Patient ID: Chnael Charles is a 62 y.o. female.    Chief Complaint: Lupus      HPI:  Chanel Charles is a 62 y.o. female with SLE and renal failure as well an extensive history of recurrent pleural effusions and pericardial effusion treated with recurrent thoracentesis (x3) and pericardiocentesis. She is s/p kidney transplant 4/6/2015. Patient in the past had 3-4 paracentesis for accumulation of abdominal fluid (last time was December 2011). She was hospitalized 4/2012 for acute altered mental status, slurred speech concerning for CVA. Dr. Ribera (rheum) was consulted and he did not think it was her lupus. It was felt that her AMS was caused by gabapentin and/or valtrex as those were new medications. After these medications were stopped she returned to her baseline function. She has history of a lung nodule and eventual TB results were negative. Patient had VATS procedure 3/2011 which showed a granuloma thought to be due histoplamosis but work up was negative. Patient had kidneys removed 4/2011 to help decrease protein loss from the body.    She was given Rituxan (4 infusions total) and later Nplate for thrombocytopenia by her hematologist and is back on Nplate.   Hematologist is Dr. Shelley () and platelets were 150s or so.  She has been off Nplate since transplant.       Colonoscopy 8/19/14 showed benign polyps, diverticulosis, internal and external hemrrhoids  Mammogram 6/24/14 was negative.  Pap smear 6/30/14 negative for lesion or malignancy      Doing well.  Travelled to [place] and [place].   Here for injection of trigger fingers (left 4th and right 3rd finger).        Review of Systems   Constitutional: Positive for fatigue.   HENT: Negative.    Eyes: Negative.    Respiratory: Negative.    Cardiovascular: Negative.    Gastrointestinal: Negative.    Endocrine: Negative.    Genitourinary: Negative.    Musculoskeletal: Negative.    Skin: Negative.    Allergic/Immunologic:  Negative.    Neurological: Negative.    Hematological: Negative.    Psychiatric/Behavioral: Negative.          Objective:   BP 95/63 (BP Location: Right arm, Patient Position: Sitting, BP Method: Small (Automatic))   Pulse 69   Temp 98.9 °F (37.2 °C) (Oral)   Ht 5' (1.524 m)   Wt 59.7 kg (131 lb 11.2 oz)   BMI 25.72 kg/m²      Physical Exam   Constitutional: She is oriented to person, place, and time and well-developed, well-nourished, and in no distress.   HENT:   Head: Normocephalic and atraumatic.   Eyes: Conjunctivae and EOM are normal.   Neck: Neck supple.   Cardiovascular: Normal rate, regular rhythm and normal heart sounds.    Pulmonary/Chest: Effort normal and breath sounds normal.   Abdominal: Soft. Bowel sounds are normal.   Neurological: She is alert and oriented to person, place, and time. Gait normal.   Skin: Skin is warm and dry.     Psychiatric: Mood and affect normal.   Musculoskeletal: Normal range of motion.               LABS    Component      Latest Ref Rng & Units 10/11/2017 9/27/2017 9/20/2017   WBC      3.90 - 12.70 K/uL  5.89 6.21   RBC      4.00 - 5.40 M/uL  4.31 4.45   Hemoglobin      12.0 - 16.0 g/dL  10.7 (L) 10.7 (L)   Hematocrit      37.0 - 48.5 %  32.6 (L) 33.7 (L)   MCV      82 - 98 fL  76 (L) 76 (L)   MCH      27.0 - 31.0 pg  24.8 (L) 24.0 (L)   MCHC      32.0 - 36.0 g/dL  32.8 31.8 (L)   RDW      11.5 - 14.5 %  16.8 (H) 16.9 (H)   Platelets      150 - 350 K/uL  178 154   MPV      9.2 - 12.9 fL  SEE COMMENT SEE COMMENT   Gran #      1.8 - 7.7 K/uL  3.3 3.7   Lymph #      1.0 - 4.8 K/uL  1.8 1.6   Mono #      0.3 - 1.0 K/uL  0.6 0.7   Eos #      0.0 - 0.5 K/uL  0.2 0.3   Baso #      0.00 - 0.20 K/uL  0.04 0.03   Gran%      38.0 - 73.0 %  56.2 59.0   Lymph%      18.0 - 48.0 %  29.9 25.4   Mono%      4.0 - 15.0 %  9.3 11.1   Eosinophil%      0.0 - 8.0 %  3.9 4.0   Basophil%      0.0 - 1.9 %  0.7 0.5   Platelet Estimate        Appears normal Appears normal   Aniso        Slight  Slight   Poik        Slight Slight   Hypo        Occasional Occasional   Ovalocytes        Occasional Occasional   Otoniel Cells         Occasional   Differential Method        Automated Automated   Sodium      136 - 145 mmol/L  140 141   Potassium      3.5 - 5.1 mmol/L  5.2 (H) 4.7   Chloride      95 - 110 mmol/L  107 111 (H)   CO2      23 - 29 mmol/L  23 19 (L)   Glucose      70 - 110 mg/dL  78 81   BUN, Bld      8 - 23 mg/dL  30 (H) 25 (H)   Creatinine      0.5 - 1.4 mg/dL  1.2 1.2   Calcium      8.7 - 10.5 mg/dL  11.0 (H) 9.9   Total Protein      6.0 - 8.4 g/dL  7.3    Albumin      3.5 - 5.2 g/dL  3.8 3.8   Total Bilirubin      0.1 - 1.0 mg/dL  0.9    Alkaline Phosphatase      55 - 135 U/L  74    AST      10 - 40 U/L  29    ALT      10 - 44 U/L  21    Anion Gap      8 - 16 mmol/L  10 11   eGFR if African American      >60 mL/min/1.73 m:2  56.0 (A) 56.0 (A)   eGFR if non African American      >60 mL/min/1.73 m:2  48.6 (A) 48.6 (A)   Phosphorus      2.7 - 4.5 mg/dL   2.7   Specimen UA         Urine, Clean Catch   Color, UA      Yellow, Straw, Ela   Straw   Appearance, UA      Clear   Clear   pH, UA      5.0 - 8.0   7.0   Specific Gravity, UA      1.005 - 1.030   <=1.005 (A)   Protein, UA      Negative   Negative   Glucose, UA      Negative   Negative   Ketones, UA      Negative   Negative   Bilirubin (UA)      Negative   Negative   Occult Blood UA      Negative   Negative   Nitrite, UA      Negative   Negative   Leukocytes, UA      Negative   1+ (A)   RBC, UA      0 - 4 /hpf   2   WBC, UA      0 - 5 /hpf   6 (H)   Microscopic Comment         SEE COMMENT   PTH      9.0 - 77.0 pg/mL   157.0 (H)   Vit D, 25-Hydroxy      30 - 96 ng/mL   50   Uric Acid      2.4 - 5.7 mg/dL   5.5   Magnesium      1.6 - 2.6 mg/dL   2.0   Tacrolimus Lvl      5.0 - 15.0 ng/mL 6.4  3.5 (L)   Complement (C-4)      11 - 44 mg/dL  22    Complement (C-3)      50 - 180 mg/dL  122    ds DNA Ab      Negative 1:10  Negative 1:10         Assessment:        1. SLE with +TANA hx, arthritis, alopecia, and glomerular nephritis now s/p bilateral nephrectomy then dialysis and s/p renal transplant (4/6/2015).   No evidence of active lupus currently. Still doing well.   Alopecia  2. Glomerulonephritis. S/p transplant 4/6/2015.  3. CAD  4. Fatigue  5. Long term Plaquenil use. Off CellCept since 9/2010  6. Serositis  7. Diastolic heart failure. Now resolved.  8. Thrombocytopenia. Now improved.   9. Elevation in homocysteine levels in March 2012 and started on Folbic.   10. Recurrent falls. No recent falls  11. Hypercalcemia. Being managed endocrine  12. Osteoporosis. Endocrine gave Reclast 9/2017  13. 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 Being evaluated by neurology.  Mass no longer there.   14. Flexor tendon nodules both hands.  Improved with injection but now some symptoms  Plan:       1. Continue Plaquenil 200 mg daily.  2. Still off Folbic per transplant  3. Patient to contact office if feels she is having a flare and to send info on neurology evaluation  4. Off Nplate still. Follows hematology Dr. Shelley  5. Continue 2 old goats. Continue Voltaren gel for hand pains.  6. Vaccination (pneumonia) per transplant team and primary doctor.     Had flu vaccination and cholesterol checked with primary doctor    RTO 4 months/prn

## 2017-10-19 DIAGNOSIS — Z94.0 S/P KIDNEY TRANSPLANT: ICD-10-CM

## 2017-10-19 RX ORDER — TACROLIMUS 1 MG/1
1 CAPSULE ORAL EVERY 12 HOURS
Qty: 90 CAPSULE | Refills: 6 | Status: SHIPPED | OUTPATIENT
Start: 2017-10-19 | End: 2017-10-20 | Stop reason: SDUPTHER

## 2017-10-20 DIAGNOSIS — Z94.0 S/P KIDNEY TRANSPLANT: ICD-10-CM

## 2017-10-20 RX ORDER — TACROLIMUS 1 MG/1
CAPSULE ORAL
Qty: 90 CAPSULE | Refills: 6 | Status: SHIPPED | OUTPATIENT
Start: 2017-10-20 | End: 2018-01-30 | Stop reason: SDUPTHER

## 2017-10-20 RX ORDER — TACROLIMUS 1 MG/1
CAPSULE ORAL
Qty: 90 CAPSULE | Refills: 6 | Status: SHIPPED | OUTPATIENT
Start: 2017-10-20 | End: 2017-10-20 | Stop reason: SDUPTHER

## 2017-10-20 NOTE — TELEPHONE ENCOUNTER
----- Message from Casandra March PharmD sent at 10/20/2017 12:59 PM CDT -----  Regarding: RE: prograf dose change - new rx needed please  And there needs to be a ROUTE of administration typed in with the new instructions to be medicare compliant (ORAL).      ----- Message -----  From: Олег Joseph MD  Sent: 10/20/2017  11:12 AM  To: Casandra March PharmD, Ela Chambers LPN  Subject: RE: prograf dose change - new rx needed plea#    She has to be on Prograf 2 mg in AM and 1 mg in PM ,     Ela can you please help me with this     Dr PERRY      ----- Message -----  From: Casandra March PharmD  Sent: 10/20/2017  10:39 AM  To: Олег Joseph MD  Subject: FW: prograf dose change - new rx needed plea#    Dr. Joseph,  I'm sorry to bother you again, but the tacro prescription that we received has conflicting doses (one fixed dose and one dose that was free texted into the Sig.).   Please update Epic medcard and send new E-RX to [facility].  Thank you,  Casandra      ----- Message -----  From: Casandra March PharmD  Sent: 10/19/2017   1:42 PM  To: Олег Joseph MD  Subject: FW: prograf dose change - new rx needed plea#        ----- Message -----  From: Casandra March PharmD  Sent: 10/19/2017   1:40 PM  To: [facility]  Subject: prograf dose change - new rx needed please       Hello,  After reviewing this patient's medication profiles (in [facility] & the retail pharmacy system), it appears that the dose of Prograf has been changed recently to 2mg AM & 1mg PM.      Will you please send a new prescription for Prograf that matches the patient's current Dose and Quantity to the [facility] at [facility]?    Thank you!  Your [facility]  Team.  Casandra March PharmD [id number]  Ave Macario, CPT [id number]  Love Delacruz, CPT [id number]           Casandra March, Pharm.D., B.C.P.S.  Clinical Pharmacist, [facility].  Phone [phone]  Fax  [phone]

## 2017-10-22 ENCOUNTER — PATIENT MESSAGE (OUTPATIENT)
Dept: RHEUMATOLOGY | Facility: CLINIC | Age: 62
End: 2017-10-22

## 2017-10-23 DIAGNOSIS — M32.0 DRUG-INDUCED SYSTEMIC LUPUS ERYTHEMATOSUS, UNSPECIFIED ORGAN INVOLVEMENT STATUS: ICD-10-CM

## 2017-10-23 RX ORDER — HYDROXYCHLOROQUINE SULFATE 200 MG/1
200 TABLET, FILM COATED ORAL DAILY
Qty: 90 TABLET | Refills: 2 | Status: SHIPPED | OUTPATIENT
Start: 2017-10-23 | End: 2017-10-25 | Stop reason: SDUPTHER

## 2017-10-24 ENCOUNTER — PATIENT MESSAGE (OUTPATIENT)
Dept: RHEUMATOLOGY | Facility: CLINIC | Age: 62
End: 2017-10-24

## 2017-10-24 ENCOUNTER — LAB VISIT (OUTPATIENT)
Dept: LAB | Facility: HOSPITAL | Age: 62
End: 2017-10-24
Attending: INTERNAL MEDICINE
Payer: MEDICARE

## 2017-10-24 DIAGNOSIS — M32.9 SLE (SYSTEMIC LUPUS ERYTHEMATOSUS): ICD-10-CM

## 2017-10-24 DIAGNOSIS — M32.14 LUPUS NEPHRITIS: ICD-10-CM

## 2017-10-24 DIAGNOSIS — M32.0 DRUG-INDUCED SYSTEMIC LUPUS ERYTHEMATOSUS, UNSPECIFIED ORGAN INVOLVEMENT STATUS: ICD-10-CM

## 2017-10-24 DIAGNOSIS — Z79.899 ENCOUNTER FOR LONG-TERM (CURRENT) USE OF MEDICATIONS: ICD-10-CM

## 2017-10-24 LAB
ALBUMIN SERPL BCP-MCNC: 3.9 G/DL
ALP SERPL-CCNC: 63 U/L
ALT SERPL W/O P-5'-P-CCNC: 29 U/L
ANION GAP SERPL CALC-SCNC: 7 MMOL/L
AST SERPL-CCNC: 28 U/L
BILIRUB SERPL-MCNC: 1 MG/DL
BUN SERPL-MCNC: 25 MG/DL
CALCIUM SERPL-MCNC: 10.5 MG/DL
CHLORIDE SERPL-SCNC: 109 MMOL/L
CO2 SERPL-SCNC: 24 MMOL/L
CREAT SERPL-MCNC: 1.2 MG/DL
EST. GFR  (AFRICAN AMERICAN): 56 ML/MIN/1.73 M^2
EST. GFR  (NON AFRICAN AMERICAN): 48.6 ML/MIN/1.73 M^2
GLUCOSE SERPL-MCNC: 81 MG/DL
POTASSIUM SERPL-SCNC: 5 MMOL/L
PROT SERPL-MCNC: 7.4 G/DL
SODIUM SERPL-SCNC: 140 MMOL/L

## 2017-10-24 PROCEDURE — 36415 COLL VENOUS BLD VENIPUNCTURE: CPT | Mod: PO

## 2017-10-24 PROCEDURE — 80053 COMPREHEN METABOLIC PANEL: CPT

## 2017-10-25 ENCOUNTER — PATIENT MESSAGE (OUTPATIENT)
Dept: RHEUMATOLOGY | Facility: CLINIC | Age: 62
End: 2017-10-25

## 2017-10-25 RX ORDER — HYDROXYCHLOROQUINE SULFATE 200 MG/1
200 TABLET, FILM COATED ORAL DAILY
Qty: 90 TABLET | Refills: 2 | Status: SHIPPED | OUTPATIENT
Start: 2017-10-25 | End: 2018-08-26 | Stop reason: SDUPTHER

## 2017-11-23 NOTE — PROGRESS NOTES
Pt received Reclast infusion. Premedications of Tylenol 500 mg PO given as well. Pt tolerated the infusion well. AVS given. Pt left the unit in NAD.  
Abdominal pain

## 2017-12-03 ENCOUNTER — PATIENT MESSAGE (OUTPATIENT)
Dept: RHEUMATOLOGY | Facility: CLINIC | Age: 62
End: 2017-12-03

## 2018-01-07 DIAGNOSIS — Z94.0 KIDNEY REPLACED BY TRANSPLANT: Primary | ICD-10-CM

## 2018-01-19 ENCOUNTER — LAB VISIT (OUTPATIENT)
Dept: LAB | Facility: HOSPITAL | Age: 63
End: 2018-01-19
Attending: INTERNAL MEDICINE
Payer: MEDICARE

## 2018-01-19 DIAGNOSIS — Z94.0 S/P KIDNEY TRANSPLANT: ICD-10-CM

## 2018-01-19 LAB
ALBUMIN SERPL BCP-MCNC: 3.6 G/DL
ANION GAP SERPL CALC-SCNC: 8 MMOL/L
BASOPHILS # BLD AUTO: 0.02 K/UL
BASOPHILS NFR BLD: 0.4 %
BUN SERPL-MCNC: 23 MG/DL
CALCIUM SERPL-MCNC: 10.1 MG/DL
CHLORIDE SERPL-SCNC: 111 MMOL/L
CO2 SERPL-SCNC: 22 MMOL/L
CREAT SERPL-MCNC: 1 MG/DL
DIFFERENTIAL METHOD: ABNORMAL
EOSINOPHIL # BLD AUTO: 0.3 K/UL
EOSINOPHIL NFR BLD: 4.6 %
ERYTHROCYTE [DISTWIDTH] IN BLOOD BY AUTOMATED COUNT: 16.9 %
EST. GFR  (AFRICAN AMERICAN): >60 ML/MIN/1.73 M^2
EST. GFR  (NON AFRICAN AMERICAN): >60 ML/MIN/1.73 M^2
GLUCOSE SERPL-MCNC: 94 MG/DL
HCT VFR BLD AUTO: 31.9 %
HGB BLD-MCNC: 10 G/DL
IMM GRANULOCYTES # BLD AUTO: 0.01 K/UL
IMM GRANULOCYTES NFR BLD AUTO: 0.2 %
LYMPHOCYTES # BLD AUTO: 1.5 K/UL
LYMPHOCYTES NFR BLD: 26.8 %
MCH RBC QN AUTO: 24.2 PG
MCHC RBC AUTO-ENTMCNC: 31.3 G/DL
MCV RBC AUTO: 77 FL
MONOCYTES # BLD AUTO: 0.7 K/UL
MONOCYTES NFR BLD: 11.8 %
NEUTROPHILS # BLD AUTO: 3.2 K/UL
NEUTROPHILS NFR BLD: 56.2 %
NRBC BLD-RTO: 0 /100 WBC
PHOSPHATE SERPL-MCNC: 3.1 MG/DL
PLATELET # BLD AUTO: 123 K/UL
PMV BLD AUTO: ABNORMAL FL
POTASSIUM SERPL-SCNC: 5.5 MMOL/L
PTH-INTACT SERPL-MCNC: 154 PG/ML
RBC # BLD AUTO: 4.14 M/UL
SODIUM SERPL-SCNC: 141 MMOL/L
URATE SERPL-MCNC: 5.8 MG/DL
WBC # BLD AUTO: 5.68 K/UL

## 2018-01-19 PROCEDURE — 80069 RENAL FUNCTION PANEL: CPT

## 2018-01-19 PROCEDURE — 80197 ASSAY OF TACROLIMUS: CPT

## 2018-01-19 PROCEDURE — 36415 COLL VENOUS BLD VENIPUNCTURE: CPT | Mod: PO

## 2018-01-19 PROCEDURE — 84550 ASSAY OF BLOOD/URIC ACID: CPT

## 2018-01-19 PROCEDURE — 85025 COMPLETE CBC W/AUTO DIFF WBC: CPT

## 2018-01-19 PROCEDURE — 83970 ASSAY OF PARATHORMONE: CPT

## 2018-01-20 LAB — TACROLIMUS BLD-MCNC: 7.8 NG/ML

## 2018-01-30 ENCOUNTER — OFFICE VISIT (OUTPATIENT)
Dept: NEPHROLOGY | Facility: CLINIC | Age: 63
End: 2018-01-30
Payer: MEDICARE

## 2018-01-30 VITALS
HEIGHT: 63 IN | DIASTOLIC BLOOD PRESSURE: 52 MMHG | BODY MASS INDEX: 23.79 KG/M2 | HEART RATE: 70 BPM | SYSTOLIC BLOOD PRESSURE: 94 MMHG | WEIGHT: 134.25 LBS

## 2018-01-30 DIAGNOSIS — Z94.0 S/P KIDNEY TRANSPLANT: ICD-10-CM

## 2018-01-30 DIAGNOSIS — E55.9 VITAMIN D DEFICIENCY: Primary | ICD-10-CM

## 2018-01-30 PROCEDURE — 99213 OFFICE O/P EST LOW 20 MIN: CPT | Mod: PBBFAC,PO | Performed by: INTERNAL MEDICINE

## 2018-01-30 PROCEDURE — 99999 PR PBB SHADOW E&M-EST. PATIENT-LVL III: CPT | Mod: PBBFAC,,, | Performed by: INTERNAL MEDICINE

## 2018-01-30 PROCEDURE — 99214 OFFICE O/P EST MOD 30 MIN: CPT | Mod: S$PBB,,, | Performed by: INTERNAL MEDICINE

## 2018-01-30 RX ORDER — TACROLIMUS 1 MG/1
CAPSULE ORAL
Qty: 90 CAPSULE | Refills: 6
Start: 2018-01-30 | End: 2018-02-15 | Stop reason: SDUPTHER

## 2018-01-30 NOTE — PATIENT INSTRUCTIONS
Avoid NSAID pain medications such as advil, aleve, motrin, ibuprofen, naprosyn, meloxicam, diclofenac, mobic.       Change Prograf to 1 mg twice a day

## 2018-01-30 NOTE — PROGRESS NOTES
Subjective:       Patient ID: Chanel Charles is a 62 y.o.   female who presents for follow-up evaluation of Living Unrelated RT ( 4/6/2015 ) , HTN, SHPT ,       ORGAN: RIGHT KIDNEY    Donor Type: living    PHS Increased Risk: no    Cold Ischemia: 58 mins    Induction Medications: campath - alemtuzumab (anti-cd52)         HPI: Chanel Charles is a pleasant 62-year-old -American woman with history of Living unrelated ( friend ) renal transplant in 4/6/2015, excellent allograft function on Prograf 1 mg BID , CellCept 500 mg twice a day. She has history of lupus nephritis, cause of end-stage kidney disease. She was on hemodialysis for about 4-1/2 years at [facility] HD unit [facility], recent office notes were reviewed. She follows with rheumatology Department in [place] , in the past she was followed by endocrinology Department for osteopenia/osteoporosis, she also had hypercalcemia for which she received IV Reclast ( Zoledronic acid ),   recent lab results were discussed with the patient ,         Past Medical History:   Diagnosis Date    Abnormal stress echo - with no blockage on LHC - 1/16/14 1/7/2014    Acid reflux     Anemia of chronic kidney failure     Anxiety     Arthritis     Awaiting kidney transplant 12/12/2013    CHF (congestive heart failure)     Coronary artery disease     Bare metal stent in 2007    ESRD (end stage renal disease)     Secondary to Lupus Nephritis, HD MWF    Hypercalcemia 7/15/2015    Hyperlipidemia 12/12/2013    Hyperparathyroidism 7/15/2015    Hyperparathyroidism, tertiary 8/7/2015    Immunocompromised state 5/5/2015    ITP (idiopathic thrombocytopenic purpura) 12/12/2013    Living-donor kidney transplant for SLE 4/6/15 4/6/2015    Induced with Thymoglobulin.     Lupus nephritis     Lupus nephritis 5/18/2015    Menopause 7/15/2015    Osteoporosis 12/24/2015    Pericardial effusion s/p pericardiocentesis 12/12/2013     Pleural effusion s/p Thoracentesis and VATS 12/12/2013    Prophylactic immunotherapy 6/29/2015    S/p Bilateral Nephrectomy (Severe proteinuria) - 2012 3/19/2015    Secondary hyperparathyroidism, renal     Shingles 4/2012    SLE (systemic lupus erythematosus) 1997         Current Outpatient Prescriptions on File Prior to Visit   Medication Sig Dispense Refill    alprazolam (XANAX) 0.5 MG tablet Take 0.5 mg by mouth. 1 Tablet Oral Every day.  or as needed.      aspirin (ECOTRIN) 81 MG EC tablet Take 81 mg by mouth once daily.       atorvastatin (LIPITOR) 80 MG tablet Take 80 mg by mouth every evening. 1 Tablet Oral Every day      BIOTIN ORAL Take by mouth.      BUDESONIDE/FORMOTEROL FUMARATE (SYMBICORT INHL) Inhale into the lungs.      eszopiclone (LUNESTA) 2 MG Tab Take 2 mg by mouth. 1 Tablet Oral As directed.  1 tablet 1 time as needed at bedtime.      famotidine (PEPCID) 20 MG tablet Take 1 tablet (20 mg total) by mouth every evening. 30 tablet 11    fluticasone (FLONASE) 50 mcg/actuation nasal spray 1 spray by Each Nare route once daily.  0    hydroxychloroquine (PLAQUENIL) 200 mg tablet Take 1 tablet (200 mg total) by mouth once daily. 90 tablet 2    loratadine (CLARITIN) 10 mg tablet Take 1 tablet (10 mg total) by mouth daily as needed for Allergies.  0    magnesium oxide (MAG-OX) 400 mg tablet Take 1 tablet (400 mg total) by mouth once daily. (Patient taking differently: Take 400 mg by mouth 2 (two) times daily. ) 30 tablet 9    multivitamin (THERAGRAN) tablet Take 1 tablet by mouth once daily.      mycophenolate (CELLCEPT) 250 mg Cap TAKE 2 CAPSULES BY MOUTH TWO TIMES A  capsule 11    valacyclovir (VALTREX) 1000 MG tablet Take 1 tablet by mouth as needed.       vitamin D 1000 units Tab Take 1,000 Units by mouth once daily.      zoledronic acid-mannitol-water (RECLAST) 4 mg/100 mL Soln Inject into the vein.      [DISCONTINUED] tacrolimus (PROGRAF) 1 MG Cap Take 2 mg in AM and 1 mg  in PM; Route: Oral 90 capsule 6     No current facility-administered medications on file prior to visit.           SH : She is a retired teacher, principal. She is not a smoker or alcohol drinker. She has a son who is 32 years old. She is currently living at home.       FH : Several family members with hypertension. Lupus in a distant uncle         Review of Systems   Constitutional: Negative for activity change, appetite change, fatigue and fever.   HENT: Negative for congestion, facial swelling, sore throat, trouble swallowing and voice change.    Eyes: Negative for redness and visual disturbance.   Respiratory: Negative for apnea, cough, chest tightness, shortness of breath and wheezing.    Cardiovascular: Negative for chest pain, palpitations and leg swelling.   Gastrointestinal: Negative for abdominal distention, abdominal pain, blood in stool, constipation, diarrhea, nausea and vomiting.   Genitourinary: Negative for decreased urine volume, difficulty urinating, dysuria, flank pain, frequency, hematuria, pelvic pain and urgency.   Musculoskeletal: Positive for arthralgias. Negative for back pain, gait problem and joint swelling.   Skin: Negative for color change and rash.   Neurological: Negative for dizziness, syncope, weakness and headaches.   Hematological: Does not bruise/bleed easily.   Psychiatric/Behavioral: Negative for agitation, behavioral problems and confusion. The patient is not nervous/anxious.         Objective:         Vitals:    01/30/18 1403   BP: (!) 94/52   Pulse: 70       Weight 134 lbs , last weight 131 lbs ,       Physical Exam  :      Constitutional: She is oriented to person, place, and time. She appears well-developed and well-nourished. No distress.    HENT: Head: Normocephalic and atraumatic. Mouth/Throat: Oropharynx is clear and moist. No oropharyngeal exudate.    Eyes: Conjunctivae and EOM are normal. Pupils are equal, round, and reactive to light. No scleral icterus.    Neck:  Normal range of motion. Neck supple. No JVD present. Carotid bruit is not present. No tracheal deviation present. No thyroid mass and no thyromegaly present.    Cardiovascular: Normal rate, regular rhythm, normal heart sounds and intact distal pulses. Exam reveals no gallop and no friction rub.    No murmur heard.    Pulmonary/Chest: Effort normal and breath sounds normal. No respiratory distress. She has no wheezes. She has no rales. She exhibits no tenderness.    Abdominal: Soft. Bowel sounds are normal. She exhibits no distension, no abdominal bruit, no ascites and no mass. There is no hepatosplenomegaly. There is no allgraft ( RLQ ) tenderness. There is no rebound, no guarding and no CVA tenderness.   Musculoskeletal: Normal range of motion. She exhibits no edema or tenderness.   Lymphadenopathy: She has no cervical adenopathy.   Neurological: She is alert and oriented to person, place, and time. No cranial nerve deficit. She exhibits normal muscle tone. Coordination normal.    Skin: Skin is warm and intact. No rash noted. No erythema. No pallor.   Psychiatric: She has a normal mood and affect. Her behavior is normal. Judgment normal.           Labs:    Lab Results   Component Value Date    CREATININE 1.0 01/19/2018    BUN 23 01/19/2018     01/19/2018    K 5.5 (H) 01/19/2018     (H) 01/19/2018    CO2 22 (L) 01/19/2018       Lab Results   Component Value Date    WBC 5.68 01/19/2018    HGB 10.0 (L) 01/19/2018    HCT 31.9 (L) 01/19/2018    MCV 77 (L) 01/19/2018     (L) 01/19/2018       Lab Results   Component Value Date    .0 (H) 01/19/2018    CALCIUM 10.1 01/19/2018    PHOS 3.1 01/19/2018       Lab Results   Component Value Date    URICACID 5.8 (H) 01/19/2018     Lab Results   Component Value Date    ALBUMIN 3.6 01/19/2018       Impression and Plan : 62-year-old -American woman seen in office today in follow-up for following medical problems ,       1. Status post Living Unrelated  kidney transplant ( 4/6/2015 ) : Stable renal function with a serum creatinine of 1.0  mg/dL.        Serum Prograf levels is 7.8   ( range 4-7 ) , will reduce Prograf back to 1 mg twice daily. From  2/1 , check Prograf level in a week,       Cont CellCept 500 mg twice a day.       2. Hypertension - blood pressure is on the low side, she is not on any blood pressure medications, patient is asymptomatic.      3. Hyperparathyroidism - follow PTH , mild hypercalcemia noted, ? Tertiary  hyperpara ,  on Vit D three times a week ,       4. Anemia of chronic kidney disease - most recent hemoglobin is about 10 g. will continue to monitor.        5. Recent urinalysis shows normal range proteinuria.        6. Lupus nephritis - currently under remission. On Plaquenil, sees Dr Jj in [place] ,       7. Lytes -  on Mag supplements ,      8. Euvolemic on exam ,       9. Leukopenia : resolved, she is following with hematologist Karsten ()        10. Hyperkalemia : can be from high Prograf level, reduce Prograf from 2/1 to 1 mg twice a day, discussed low K diet ,       I'll see her in follow-up in about 4 months. More than 25 minutes of face-to-face time discussing labs and plan of care.       Sincerely,        Олег Joseph MD

## 2018-02-05 ENCOUNTER — LAB VISIT (OUTPATIENT)
Dept: LAB | Facility: HOSPITAL | Age: 63
End: 2018-02-05
Attending: INTERNAL MEDICINE
Payer: MEDICARE

## 2018-02-05 DIAGNOSIS — M32.14 LUPUS NEPHRITIS: ICD-10-CM

## 2018-02-05 DIAGNOSIS — Z94.0 S/P KIDNEY TRANSPLANT: ICD-10-CM

## 2018-02-05 DIAGNOSIS — M32.9 SLE (SYSTEMIC LUPUS ERYTHEMATOSUS): ICD-10-CM

## 2018-02-05 DIAGNOSIS — Z79.899 ENCOUNTER FOR LONG-TERM (CURRENT) USE OF MEDICATIONS: ICD-10-CM

## 2018-02-05 LAB
ALBUMIN SERPL BCP-MCNC: 3.8 G/DL
ALP SERPL-CCNC: 68 U/L
ALT SERPL W/O P-5'-P-CCNC: 26 U/L
ANION GAP SERPL CALC-SCNC: 6 MMOL/L
AST SERPL-CCNC: 29 U/L
BASOPHILS # BLD AUTO: 0.04 K/UL
BASOPHILS NFR BLD: 0.7 %
BILIRUB SERPL-MCNC: 0.8 MG/DL
BUN SERPL-MCNC: 31 MG/DL
C3 SERPL-MCNC: 115 MG/DL
C4 SERPL-MCNC: 20 MG/DL
CALCIUM SERPL-MCNC: 10.1 MG/DL
CHLORIDE SERPL-SCNC: 108 MMOL/L
CO2 SERPL-SCNC: 24 MMOL/L
CREAT SERPL-MCNC: 1.2 MG/DL
DIFFERENTIAL METHOD: ABNORMAL
EOSINOPHIL # BLD AUTO: 0.2 K/UL
EOSINOPHIL NFR BLD: 3.3 %
ERYTHROCYTE [DISTWIDTH] IN BLOOD BY AUTOMATED COUNT: 17.2 %
EST. GFR  (AFRICAN AMERICAN): 56 ML/MIN/1.73 M^2
EST. GFR  (NON AFRICAN AMERICAN): 48.6 ML/MIN/1.73 M^2
GLUCOSE SERPL-MCNC: 91 MG/DL
HCT VFR BLD AUTO: 33.4 %
HGB BLD-MCNC: 10.6 G/DL
IMM GRANULOCYTES # BLD AUTO: 0.01 K/UL
IMM GRANULOCYTES NFR BLD AUTO: 0.2 %
LYMPHOCYTES # BLD AUTO: 1.6 K/UL
LYMPHOCYTES NFR BLD: 27.4 %
MCH RBC QN AUTO: 24.3 PG
MCHC RBC AUTO-ENTMCNC: 31.7 G/DL
MCV RBC AUTO: 76 FL
MONOCYTES # BLD AUTO: 0.7 K/UL
MONOCYTES NFR BLD: 12.5 %
NEUTROPHILS # BLD AUTO: 3.2 K/UL
NEUTROPHILS NFR BLD: 55.9 %
NRBC BLD-RTO: 0 /100 WBC
PLATELET # BLD AUTO: 123 K/UL
PMV BLD AUTO: ABNORMAL FL
POTASSIUM SERPL-SCNC: 5.1 MMOL/L
PROT SERPL-MCNC: 7.1 G/DL
RBC # BLD AUTO: 4.37 M/UL
SODIUM SERPL-SCNC: 138 MMOL/L
WBC # BLD AUTO: 5.69 K/UL

## 2018-02-05 PROCEDURE — 80197 ASSAY OF TACROLIMUS: CPT

## 2018-02-05 PROCEDURE — 80053 COMPREHEN METABOLIC PANEL: CPT

## 2018-02-05 PROCEDURE — 36415 COLL VENOUS BLD VENIPUNCTURE: CPT | Mod: PO

## 2018-02-05 PROCEDURE — 86160 COMPLEMENT ANTIGEN: CPT | Mod: 59

## 2018-02-05 PROCEDURE — 86160 COMPLEMENT ANTIGEN: CPT

## 2018-02-05 PROCEDURE — 86225 DNA ANTIBODY NATIVE: CPT

## 2018-02-05 PROCEDURE — 85025 COMPLETE CBC W/AUTO DIFF WBC: CPT

## 2018-02-06 ENCOUNTER — PATIENT MESSAGE (OUTPATIENT)
Dept: RHEUMATOLOGY | Facility: CLINIC | Age: 63
End: 2018-02-06

## 2018-02-06 ENCOUNTER — PATIENT MESSAGE (OUTPATIENT)
Dept: NEPHROLOGY | Facility: CLINIC | Age: 63
End: 2018-02-06

## 2018-02-06 LAB
DSDNA AB SER-ACNC: NORMAL [IU]/ML
TACROLIMUS BLD-MCNC: 4.7 NG/ML

## 2018-02-06 NOTE — TELEPHONE ENCOUNTER
Prograf level better at 4.7    Lab Results   Component Value Date    CREATININE 1.2 02/05/2018    BUN 31 (H) 02/05/2018     02/05/2018    K 5.1 02/05/2018     02/05/2018    CO2 24 02/05/2018         Potassium 5.1 ,     Dr Joseph

## 2018-02-06 NOTE — TELEPHONE ENCOUNTER
Slight decline in GFR.  Patient is followed by nephrology we'll discuss at the patient's visit tomorrow

## 2018-02-07 ENCOUNTER — OFFICE VISIT (OUTPATIENT)
Dept: RHEUMATOLOGY | Facility: CLINIC | Age: 63
End: 2018-02-07
Payer: MEDICARE

## 2018-02-07 VITALS
SYSTOLIC BLOOD PRESSURE: 112 MMHG | TEMPERATURE: 99 F | WEIGHT: 130.19 LBS | BODY MASS INDEX: 23.07 KG/M2 | DIASTOLIC BLOOD PRESSURE: 59 MMHG | HEART RATE: 66 BPM | HEIGHT: 63 IN

## 2018-02-07 DIAGNOSIS — M65.342 TRIGGER RING FINGER OF LEFT HAND: ICD-10-CM

## 2018-02-07 DIAGNOSIS — D84.9 IMMUNOSUPPRESSION: ICD-10-CM

## 2018-02-07 DIAGNOSIS — M65.331 TRIGGER MIDDLE FINGER OF RIGHT HAND: ICD-10-CM

## 2018-02-07 DIAGNOSIS — M32.9 SYSTEMIC LUPUS ERYTHEMATOSUS, UNSPECIFIED SLE TYPE, UNSPECIFIED ORGAN INVOLVEMENT STATUS: Primary | ICD-10-CM

## 2018-02-07 PROCEDURE — 99214 OFFICE O/P EST MOD 30 MIN: CPT | Mod: S$PBB,,, | Performed by: INTERNAL MEDICINE

## 2018-02-07 PROCEDURE — 99213 OFFICE O/P EST LOW 20 MIN: CPT | Mod: PBBFAC | Performed by: INTERNAL MEDICINE

## 2018-02-07 PROCEDURE — 99999 PR PBB SHADOW E&M-EST. PATIENT-LVL III: CPT | Mod: PBBFAC,,, | Performed by: INTERNAL MEDICINE

## 2018-02-07 ASSESSMENT — SYSTEMIC LUPUS ERYTHEMATOSUS DISEASE ACTIVITY INDEX (SLEDAI): TOTAL_SCORE: 0

## 2018-02-07 ASSESSMENT — ROUTINE ASSESSMENT OF PATIENT INDEX DATA (RAPID3)
PAIN SCORE: 1.5
PATIENT GLOBAL ASSESSMENT SCORE: 0
MDHAQ FUNCTION SCORE: 0
TOTAL RAPID3 SCORE: .5
PSYCHOLOGICAL DISTRESS SCORE: 1.1
FATIGUE SCORE: 0
AM STIFFNESS SCORE: 1, YES

## 2018-02-07 NOTE — PROGRESS NOTES
Subjective:       Patient ID: Chanel Charles is a 62 y.o. female.    Chief Complaint: Lupus      HPI:  Chanel Charles is a 62 y.o. female with SLE and renal failure as well an extensive history of recurrent pleural effusions and pericardial effusion treated with recurrent thoracentesis (x3) and pericardiocentesis. She is s/p kidney transplant 4/6/2015. Patient in the past had 3-4 paracentesis for accumulation of abdominal fluid (last time was December 2011). She was hospitalized 4/2012 for acute altered mental status, slurred speech concerning for CVA. Dr. Ribera (rheum) was consulted and he did not think it was her lupus. It was felt that her AMS was caused by gabapentin and/or valtrex as those were new medications. After these medications were stopped she returned to her baseline function. She has history of a lung nodule and eventual TB results were negative. Patient had VATS procedure 3/2011 which showed a granuloma thought to be due histoplamosis but work up was negative. Patient had kidneys removed 4/2011 to help decrease protein loss from the body.    She was given Rituxan (4 infusions total) and later Nplate for thrombocytopenia by her hematologist and is back on Nplate.   Hematologist is Dr. Shelley () and platelets were 150s or so.  She has been off Nplate since transplant.       Colonoscopy 8/19/14 showed benign polyps, diverticulosis, internal and external hemrrhoids  Mammogram 6/24/14 was negative.  Pap smear 6/30/14 negative for lesion or malignancy      Doing well.  Took a trip to [facility].    Review of Systems   Constitutional: Negative for fatigue.   HENT: Negative.    Eyes: Negative.    Respiratory: Negative.    Cardiovascular: Negative.    Gastrointestinal: Negative.    Endocrine: Negative.    Genitourinary: Negative.    Musculoskeletal: Negative.    Skin: Negative.    Allergic/Immunologic: Negative.    Neurological: Negative.    Hematological: Negative.   "  Psychiatric/Behavioral: Negative.          Objective:   BP (!) 112/59 (BP Location: Right arm, Patient Position: Sitting, BP Method: Small (Automatic))   Pulse 66   Temp 98.6 °F (37 °C) (Oral)   Ht 5' 3" (1.6 m)   Wt 59.1 kg (130 lb 3.2 oz)   BMI 23.06 kg/m²      Physical Exam   Constitutional: She is oriented to person, place, and time and well-developed, well-nourished, and in no distress.   HENT:   Head: Normocephalic and atraumatic.   Eyes: Conjunctivae and EOM are normal.   Neck: Neck supple.   Cardiovascular: Normal rate, regular rhythm and normal heart sounds.    Pulmonary/Chest: Effort normal and breath sounds normal.   Abdominal: Soft. Bowel sounds are normal.   Neurological: She is alert and oriented to person, place, and time. Gait normal.   Skin: Skin is warm and dry.     Psychiatric: Mood and affect normal.   Musculoskeletal:   Triggering of left 3rd and 4th as well as right 4th finger             LABS    Component      Latest Ref Rng & Units 2/5/2018   WBC      3.90 - 12.70 K/uL 5.69   RBC      4.00 - 5.40 M/uL 4.37   Hemoglobin      12.0 - 16.0 g/dL 10.6 (L)   Hematocrit      37.0 - 48.5 % 33.4 (L)   MCV      82 - 98 fL 76 (L)   MCH      27.0 - 31.0 pg 24.3 (L)   MCHC      32.0 - 36.0 g/dL 31.7 (L)   RDW      11.5 - 14.5 % 17.2 (H)   Platelets      150 - 350 K/uL 123 (L)   MPV      9.2 - 12.9 fL SEE COMMENT   Immature Granulocytes      0.0 - 0.5 % 0.2   Gran # (ANC)      1.8 - 7.7 K/uL 3.2   Immature Grans (Abs)      0.00 - 0.04 K/uL 0.01   Lymph #      1.0 - 4.8 K/uL 1.6   Mono #      0.3 - 1.0 K/uL 0.7   Eos #      0.0 - 0.5 K/uL 0.2   Baso #      0.00 - 0.20 K/uL 0.04   nRBC      0 /100 WBC 0   Gran%      38.0 - 73.0 % 55.9   Lymph%      18.0 - 48.0 % 27.4   Mono%      4.0 - 15.0 % 12.5   Eosinophil%      0.0 - 8.0 % 3.3   Basophil%      0.0 - 1.9 % 0.7   Differential Method       Automated   Sodium      136 - 145 mmol/L 138   Potassium      3.5 - 5.1 mmol/L 5.1   Chloride      95 - 110 " mmol/L 108   CO2      23 - 29 mmol/L 24   Glucose      70 - 110 mg/dL 91   BUN, Bld      8 - 23 mg/dL 31 (H)   Creatinine      0.5 - 1.4 mg/dL 1.2   Calcium      8.7 - 10.5 mg/dL 10.1   Total Protein      6.0 - 8.4 g/dL 7.1   Albumin      3.5 - 5.2 g/dL 3.8   Total Bilirubin      0.1 - 1.0 mg/dL 0.8   Alkaline Phosphatase      55 - 135 U/L 68   AST      10 - 40 U/L 29   ALT      10 - 44 U/L 26   Anion Gap      8 - 16 mmol/L 6 (L)   eGFR if African American      >60 mL/min/1.73 m:2 56.0 (A)   eGFR if non African American      >60 mL/min/1.73 m:2 48.6 (A)   Complement (C-4)      11 - 44 mg/dL 20   Complement (C-3)      50 - 180 mg/dL 115   ds DNA Ab      Negative 1:10 Negative 1:10   Tacrolimus Lvl      5.0 - 15.0 ng/mL 4.7 (L)     Assessment:       1. SLE with +TANA hx, arthritis, alopecia, and glomerular nephritis now s/p bilateral nephrectomy then dialysis and s/p renal transplant (4/6/2015).   No evidence of active lupus currently. Still doing well.   Alopecia persistent thinning  2. Glomerulonephritis. S/p transplant 4/6/2015.  3. CAD  4. Fatigue  5. Long term Plaquenil use. Off CellCept since 9/2010  6. Serositis  7. Diastolic heart failure. Now resolved.  8. Thrombocytopenia. Now improved.   9. Elevation in homocysteine levels in March 2012 and started on Folbic.   10. Recurrent falls. No recent falls  11. Hypercalcemia. Being managed endocrine  12. Osteoporosis. Endocrine gave Reclast 9/2017  13. 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 Evaluated by neurology.  Mass no longer there.   14. Flexor tendon nodules both hands.  Improved with injection but now some symptoms  Plan:       1. Continue Plaquenil 200 mg daily.  2. Still off Folbic per transplant  3. Patient to contact office if feels she is having a flare and to send info on neurology evaluation  4. Off Nplate still. Occassionally hematology Dr. Shelley (concerned if less than 100)  5. Restart 2 old goats. Consider restarting Voltaren gel for hand pains.  Jayy  tape  6. Vaccination (pneumonia) per transplant team and primary doctor.     Had flu vaccination and cholesterol checked with primary doctor     RTO 4 months/prn

## 2018-02-08 ENCOUNTER — PATIENT MESSAGE (OUTPATIENT)
Dept: NEPHROLOGY | Facility: CLINIC | Age: 63
End: 2018-02-08

## 2018-02-15 DIAGNOSIS — Z94.0 S/P KIDNEY TRANSPLANT: ICD-10-CM

## 2018-02-15 RX ORDER — TACROLIMUS 1 MG/1
CAPSULE ORAL
Qty: 60 CAPSULE | Refills: 6 | Status: SHIPPED | OUTPATIENT
Start: 2018-02-15 | End: 2019-01-28 | Stop reason: SDUPTHER

## 2018-02-16 ENCOUNTER — TELEPHONE (OUTPATIENT)
Dept: PHARMACY | Facility: CLINIC | Age: 63
End: 2018-02-16

## 2018-02-16 RX ORDER — TACROLIMUS 1 MG/1
CAPSULE ORAL
Qty: 90 CAPSULE | Refills: 6 | Status: SHIPPED | OUTPATIENT
Start: 2018-02-16 | End: 2018-04-13 | Stop reason: SDUPTHER

## 2018-03-28 ENCOUNTER — PATIENT MESSAGE (OUTPATIENT)
Dept: RHEUMATOLOGY | Facility: CLINIC | Age: 63
End: 2018-03-28

## 2018-03-28 RX ORDER — MYCOPHENOLATE MOFETIL 250 MG/1
CAPSULE ORAL
Qty: 120 CAPSULE | Refills: 11 | Status: SHIPPED | OUTPATIENT
Start: 2018-03-28 | End: 2019-04-23 | Stop reason: SDUPTHER

## 2018-04-02 ENCOUNTER — LAB VISIT (OUTPATIENT)
Dept: LAB | Facility: HOSPITAL | Age: 63
End: 2018-04-02
Attending: INTERNAL MEDICINE
Payer: MEDICARE

## 2018-04-02 DIAGNOSIS — Z94.0 KIDNEY REPLACED BY TRANSPLANT: ICD-10-CM

## 2018-04-02 LAB
ALBUMIN SERPL BCP-MCNC: 3.9 G/DL
ALBUMIN SERPL BCP-MCNC: 3.9 G/DL
ALP SERPL-CCNC: 63 U/L
ALT SERPL W/O P-5'-P-CCNC: 23 U/L
ANION GAP SERPL CALC-SCNC: 5 MMOL/L
AST SERPL-CCNC: 27 U/L
BASOPHILS # BLD AUTO: 0.04 K/UL
BASOPHILS NFR BLD: 0.8 %
BILIRUB DIRECT SERPL-MCNC: 0.4 MG/DL
BILIRUB SERPL-MCNC: 0.8 MG/DL
BUN SERPL-MCNC: 24 MG/DL
CALCIUM SERPL-MCNC: 10.2 MG/DL
CHLORIDE SERPL-SCNC: 109 MMOL/L
CO2 SERPL-SCNC: 25 MMOL/L
CREAT SERPL-MCNC: 1.2 MG/DL
DIFFERENTIAL METHOD: ABNORMAL
EOSINOPHIL # BLD AUTO: 0.3 K/UL
EOSINOPHIL NFR BLD: 4.7 %
ERYTHROCYTE [DISTWIDTH] IN BLOOD BY AUTOMATED COUNT: 16.8 %
EST. GFR  (AFRICAN AMERICAN): 56 ML/MIN/1.73 M^2
EST. GFR  (NON AFRICAN AMERICAN): 48.6 ML/MIN/1.73 M^2
GLUCOSE SERPL-MCNC: 94 MG/DL
HCT VFR BLD AUTO: 32.8 %
HGB BLD-MCNC: 10.2 G/DL
IMM GRANULOCYTES # BLD AUTO: 0 K/UL
IMM GRANULOCYTES NFR BLD AUTO: 0 %
LYMPHOCYTES # BLD AUTO: 1.7 K/UL
LYMPHOCYTES NFR BLD: 31.4 %
MAGNESIUM SERPL-MCNC: 1.9 MG/DL
MCH RBC QN AUTO: 24.3 PG
MCHC RBC AUTO-ENTMCNC: 31.1 G/DL
MCV RBC AUTO: 78 FL
MONOCYTES # BLD AUTO: 0.7 K/UL
MONOCYTES NFR BLD: 13.2 %
NEUTROPHILS # BLD AUTO: 2.7 K/UL
NEUTROPHILS NFR BLD: 49.9 %
NRBC BLD-RTO: 0 /100 WBC
PHOSPHATE SERPL-MCNC: 3.1 MG/DL
PLATELET # BLD AUTO: 111 K/UL
PMV BLD AUTO: ABNORMAL FL
POTASSIUM SERPL-SCNC: 5.1 MMOL/L
PROT SERPL-MCNC: 7 G/DL
RBC # BLD AUTO: 4.2 M/UL
SODIUM SERPL-SCNC: 139 MMOL/L
WBC # BLD AUTO: 5.32 K/UL

## 2018-04-02 PROCEDURE — 84075 ASSAY ALKALINE PHOSPHATASE: CPT

## 2018-04-02 PROCEDURE — 83735 ASSAY OF MAGNESIUM: CPT

## 2018-04-02 PROCEDURE — 36415 COLL VENOUS BLD VENIPUNCTURE: CPT | Mod: PO

## 2018-04-02 PROCEDURE — 85025 COMPLETE CBC W/AUTO DIFF WBC: CPT

## 2018-04-02 PROCEDURE — 87799 DETECT AGENT NOS DNA QUANT: CPT

## 2018-04-02 PROCEDURE — 80069 RENAL FUNCTION PANEL: CPT

## 2018-04-02 PROCEDURE — 80197 ASSAY OF TACROLIMUS: CPT

## 2018-04-03 ENCOUNTER — PATIENT MESSAGE (OUTPATIENT)
Dept: RHEUMATOLOGY | Facility: CLINIC | Age: 63
End: 2018-04-03

## 2018-04-03 LAB — TACROLIMUS BLD-MCNC: 4.8 NG/ML

## 2018-04-04 LAB
BKV DNA SERPL NAA+PROBE-ACNC: <125 COPIES/ML
BKV DNA SERPL NAA+PROBE-LOG#: <2.1 LOG (10) COPIES/ML
BKV DNA SERPL QL NAA+PROBE: NOT DETECTED

## 2018-04-13 ENCOUNTER — OFFICE VISIT (OUTPATIENT)
Dept: TRANSPLANT | Facility: CLINIC | Age: 63
End: 2018-04-13
Payer: MEDICARE

## 2018-04-13 VITALS
TEMPERATURE: 98 F | BODY MASS INDEX: 23.36 KG/M2 | RESPIRATION RATE: 18 BRPM | DIASTOLIC BLOOD PRESSURE: 60 MMHG | SYSTOLIC BLOOD PRESSURE: 120 MMHG | WEIGHT: 131.81 LBS | HEIGHT: 63 IN | HEART RATE: 88 BPM | OXYGEN SATURATION: 100 %

## 2018-04-13 DIAGNOSIS — E21.2 HYPERPARATHYROIDISM, TERTIARY: ICD-10-CM

## 2018-04-13 DIAGNOSIS — D63.1 ANEMIA OF CHRONIC RENAL FAILURE, STAGE 3 (MODERATE): ICD-10-CM

## 2018-04-13 DIAGNOSIS — D84.9 IMMUNOCOMPROMISED STATE: ICD-10-CM

## 2018-04-13 DIAGNOSIS — D84.9 IMMUNOSUPPRESSION: ICD-10-CM

## 2018-04-13 DIAGNOSIS — Z29.89 PROPHYLACTIC IMMUNOTHERAPY: ICD-10-CM

## 2018-04-13 DIAGNOSIS — N18.30 STAGE 3 CHRONIC KIDNEY DISEASE: ICD-10-CM

## 2018-04-13 DIAGNOSIS — N18.30 ANEMIA OF CHRONIC RENAL FAILURE, STAGE 3 (MODERATE): ICD-10-CM

## 2018-04-13 DIAGNOSIS — Z87.39 HX OF LUPUS NEPHRITIS: Chronic | ICD-10-CM

## 2018-04-13 DIAGNOSIS — M32.0 DRUG-INDUCED SYSTEMIC LUPUS ERYTHEMATOSUS, UNSPECIFIED ORGAN INVOLVEMENT STATUS: ICD-10-CM

## 2018-04-13 DIAGNOSIS — Z94.0 LIVING-DONOR KIDNEY TRANSPLANT RECIPIENT: Primary | Chronic | ICD-10-CM

## 2018-04-13 DIAGNOSIS — Z94.0 IMMUNOSUPPRESSIVE MANAGEMENT ENCOUNTER FOLLOWING KIDNEY TRANSPLANT: ICD-10-CM

## 2018-04-13 DIAGNOSIS — I25.10 CORONARY ARTERY DISEASE INVOLVING NATIVE CORONARY ARTERY OF NATIVE HEART WITHOUT ANGINA PECTORIS: Chronic | ICD-10-CM

## 2018-04-13 DIAGNOSIS — Z79.899 IMMUNOSUPPRESSIVE MANAGEMENT ENCOUNTER FOLLOWING KIDNEY TRANSPLANT: ICD-10-CM

## 2018-04-13 DIAGNOSIS — D69.6 THROMBOCYTOPENIA: Chronic | ICD-10-CM

## 2018-04-13 PROCEDURE — 99215 OFFICE O/P EST HI 40 MIN: CPT | Mod: PBBFAC | Performed by: NURSE PRACTITIONER

## 2018-04-13 PROCEDURE — 99999 PR PBB SHADOW E&M-EST. PATIENT-LVL V: CPT | Mod: PBBFAC,,, | Performed by: NURSE PRACTITIONER

## 2018-04-13 PROCEDURE — 99214 OFFICE O/P EST MOD 30 MIN: CPT | Mod: S$PBB,,, | Performed by: NURSE PRACTITIONER

## 2018-04-13 NOTE — LETTER
April 13, 2018        Олег Joseph  [address] [place] [address]  Phone: [phone]  Fax: [phone]             [facility]  [address]  [facility] [address]  Phone: [phone]   Patient: Chanel Charles   MR Number: [id number]   YOB: 1955   Date of Visit: 4/13/2018       Dear Dr. Олег Joseph    Thank you for referring Chanel Charles to me for evaluation. Attached you will find relevant portions of my assessment and plan of care.    If you have questions, please do not hesitate to call me. I look forward to following Chanel Charles along with you.    Sincerely,    Amanda Colindres, NP    Enclosure    If you would like to receive this communication electronically, please contact [email or web address] or [phone] to request [email or web address] access.    [email or web address] is a tool which provides read-only access to select patient information with whom you have a relationship. Its easy to use and provides real time access to review your patients record including encounter summaries, notes, results, and demographic information.    If you feel you have received this communication in error or would no longer like to receive these types of communications, please e-mail [email or web address]

## 2018-04-13 NOTE — PROGRESS NOTES
Kidney Post-Transplant Assessment    Referring Physician: Shukri Reeves  Current Nephrologist: Олег Joseph    ORGAN: RIGHT KIDNEY  Donor Type: living  PHS Increased Risk: no  Cold Ischemia: 58 mins  Induction Medications: campath - alemtuzumab (anti-cd52)    Subjective:     CC:  Reassessment of renal allograft function and management of chronic immunosuppression.    HPI:  Ms. Charles is a 63 y.o. year old Black or  female who received a living kidney transplant on 4/6/15.  She has CKD stage 3 - GFR 30-59 and her baseline creatinine is between 1-1.3. She takes mycophenolate mofetil and tacrolimus for maintenance immunosuppression. She denies any recent hospitalizations or ER visits since her previous clinic visit.    Reports SLE is stable,. She f/u w/ her Rheumatologist and gest her eyes examined regularly.   Overall feels well. No health concerns today.   Denies chest pain, SOB, leg pain, abdominal pain or LUTs.  Is going to [facility] Tomorrow and is planning an [employer] in 6/2018      Past Medical History:   Diagnosis Date    Abnormal stress echo - with no blockage on LHC - 1/16/14 1/7/2014    Acid reflux     Anemia of chronic kidney failure     Anxiety     Arthritis     Awaiting kidney transplant 12/12/2013    CHF (congestive heart failure)     Coronary artery disease     Bare metal stent in 2007    ESRD (end stage renal disease)     Secondary to Lupus Nephritis, HD MWF    Hypercalcemia 7/15/2015    Hyperlipidemia 12/12/2013    Hyperparathyroidism 7/15/2015    Hyperparathyroidism, tertiary 8/7/2015    Immunocompromised state 5/5/2015    ITP (idiopathic thrombocytopenic purpura) 12/12/2013    Living-donor kidney transplant for SLE 4/6/15 4/6/2015    Induced with Thymoglobulin.     Lupus nephritis     Lupus nephritis 5/18/2015    Menopause 7/15/2015    Osteoporosis 12/24/2015    Pericardial effusion s/p pericardiocentesis 12/12/2013    Pleural effusion s/p  "Thoracentesis and VATS 12/12/2013    Prophylactic immunotherapy 6/29/2015    S/p Bilateral Nephrectomy (Severe proteinuria) - 2012 3/19/2015    Secondary hyperparathyroidism, renal     Shingles 4/2012    SLE (systemic lupus erythematosus) 1997       Review of Systems   Constitutional: Negative for activity change, appetite change, chills, fatigue, fever and unexpected weight change.   HENT: Negative for congestion, facial swelling, postnasal drip, rhinorrhea, sinus pressure, sore throat and trouble swallowing.    Eyes: Negative for pain, redness and visual disturbance.   Respiratory: Negative for cough, chest tightness, shortness of breath and wheezing.    Cardiovascular: Negative.  Negative for chest pain, palpitations and leg swelling.   Gastrointestinal: Negative for abdominal pain, diarrhea, nausea and vomiting.   Genitourinary: Negative for dysuria, flank pain and urgency.   Musculoskeletal: Negative for gait problem, neck pain and neck stiffness.   Skin: Negative for rash.   Allergic/Immunologic: Positive for immunocompromised state. Negative for environmental allergies and food allergies.        Plaquenil for SLE    Neurological: Negative for dizziness, weakness, light-headedness and headaches.   Psychiatric/Behavioral: Negative for agitation and confusion. The patient is not nervous/anxious.        Objective:     Blood pressure 120/60, pulse 88, temperature 98.2 °F (36.8 °C), temperature source Oral, resp. rate 18, height 5' 3" (1.6 m), weight 59.8 kg (131 lb 13.4 oz), SpO2 100 %.body mass index is 23.35 kg/m².    Physical Exam   Constitutional: She is oriented to person, place, and time. She appears well-developed and well-nourished.   HENT:   Head: Normocephalic.   Mouth/Throat: Oropharynx is clear and moist. No oropharyngeal exudate.   Eyes: Conjunctivae and EOM are normal. Pupils are equal, round, and reactive to light. No scleral icterus.   Neck: Normal range of motion. Neck supple. "   Cardiovascular: Normal rate, regular rhythm and normal heart sounds.    Pulmonary/Chest: Effort normal and breath sounds normal.   Abdominal: Soft. Normal appearance and bowel sounds are normal. She exhibits no distension and no mass. There is no splenomegaly or hepatomegaly. There is no tenderness. There is no rebound, no guarding, no CVA tenderness, no tenderness at McBurney's point and negative Vaughan's sign.       Musculoskeletal: Normal range of motion. She exhibits no edema.   Lymphadenopathy:     She has no cervical adenopathy.   Neurological: She is alert and oriented to person, place, and time. She exhibits normal muscle tone. Coordination normal.   Skin: Skin is warm and dry.   Psychiatric: She has a normal mood and affect. Her behavior is normal.   Vitals reviewed.      Labs:  Lab Results   Component Value Date    WBC 5.32 04/02/2018    HGB 10.2 (L) 04/02/2018    HCT 32.8 (L) 04/02/2018     04/02/2018    K 5.1 04/02/2018     04/02/2018    CO2 25 04/02/2018    BUN 24 (H) 04/02/2018    CREATININE 1.2 04/02/2018    EGFRNONAA 48.6 (A) 04/02/2018    CALCIUM 10.2 04/02/2018    PHOS 3.1 04/02/2018    MG 1.9 04/02/2018    ALBUMIN 3.9 04/02/2018    ALBUMIN 3.9 04/02/2018    AST 27 04/02/2018    ALT 23 04/02/2018    UTPCR Unable to calculate 04/02/2018    .0 (H) 01/19/2018    TACROLIMUS 4.8 (L) 04/02/2018       No results found for: EXTANC, EXTWBC, EXTSEGS, EXTPLATELETS, EXTHEMOGLOBI, EXTHEMATOCRI, EXTCREATININ, EXTSODIUM, EXTPOTASSIUM, EXTBUN, EXTCO2, EXTCALCIUM, EXTPHOSPHORU, EXTGLUCOSE, EXTALBUMIN, EXTAST, EXTALT, EXTBILITOTAL, EXTLIPASE, EXTAMYLASE    No results found for: EXTCYCLOSLVL, EXTSIROLIMUS, EXTTACROLVL, EXTPROTCRE, EXTPTHINTACT, EXTPROTEINUA, EXTWBCUA, EXTRBCUA    Labs were reviewed with the patient.    Assessment:     1. Living-donor kidney transplant for SLE 4/6/15    2. Immunosuppressive management encounter following kidney transplant    3. Immunosuppression    4.  Immunocompromised state    5. Stage 3 chronic kidney disease    6. Anemia of chronic renal failure, stage 3 (moderate)    7. Coronary artery disease involving native coronary artery of native heart without angina pectoris    8. Chronic thrombocytopenia    9. Hx of lupus nephritis    10. Hyperparathyroidism, tertiary    11. Drug-induced systemic lupus erythematosus, unspecified organ involvement status    12. Prophylactic immunotherapy        Plan:      Follow-up:   1. CKD stage: 3  stable    2. Immunosuppression:   Prograf trough 4.8, which is  therapeutic target 4-6. Continue  Prograf 1/1,  Mg BID . Will continue to monitor for drug toxicities    3. Allograft Function: Stable. Continue good po hydration.       Lab Results   Component Value Date    CREATININE 1.2 04/02/2018     eGFR if African American >60 mL/min/1.73 m^2 56.0         4. Hypertension management: advise low salt diet and home BP monitoring    No BP meds    5. Metabolic Bone Disease/Secondary Hyperparathyroidism:stable  Will monitor PTH, CA and Vit D/guidelines,    CA acceptable  MG 1.9 04/02/2018     Lab Results   Component Value Date    .0 (H) 01/19/2018    CALCIUM 10.2 04/02/2018    PHOS 3.1 04/02/2018   Vit D as , Mg ox 400  mg BID, Reclast IV as prescribed. -->MGMT deferred to general nephrology      6. Electrolytes:  Will monitor /guidelines  Lab Results   Component Value Date     04/02/2018    K 5.1 04/02/2018     04/02/2018    CO2 25 04/02/2018       7. Anemia: stable. No need for intervention    Will monitor /guidelines -->MGMT deferred to general nephrology  Lab Results   Component Value Date    WBC 5.32 04/02/2018    HGB 10.2 (L) 04/02/2018    HCT 32.8 (L) 04/02/2018    MCV 78 (L) 04/02/2018     (L) 04/02/2018       8.  Cytopenias: no significant cytopenias will monitor as per our guidelines. Medicine list reviewed including potential causes of drug-induced cytopenias    9.Proteinuria: continue p/c ratio as  per guidelines    UTPCR Unable to calculate 04/02/2018     10. BK virus infection screening:  will continue to monitor/ guidelines  4/2/2018 BK PCR not detected     11. Weight education: provided during the clinic visit   Body mass index is 23.35 kg/m².          12.Patient safety education regarding immunosuppression including prophylaxis posttransplant for CMV, PCP : Education provided about vaccination and prevention of infections       Follow-up:   Clinic: return to transplant clinic weekly for the first month after transplant; every 2 weeks during months 2-3; then at 6-, 9-, 12-, 18-, 24-, and 36- months post-transplant to reassess for complications from immunosuppression toxicity and monitor for rejection.  Annually thereafter.    Labs: since patient remains at high risk for rejection and drug-related complications that warrant close monitoring, labs will be ordered as follows: continue twice weekly CBC, renal panel, and drug level for first month; then same labs once weekly through 3rd month post-transplant.  Urine for UA and protein/creatinine ratio monthly.  Urine BK - PCR at 1-, 3-, 6-, 9-, 12-, 18-, 24-, and 36- months post-transplant.  Hepatic panel at 1-, 2-, 3-, 6-, 9-, 12-, 18-, 24-, and 36- months post-transplant.    Amanda Colindres NP       Education:   Material provided to the patient.  Patient reminded to call with any health changes since these can be early signs of significant complications.  Also, I advised the patient to be sure any new medications or changes of old medications are discussed with either a pharmacist or physician knowledgeable with transplant to avoid rejection/drug toxicity related to significant drug interactions.    UNOS Patient Status  Functional Status: 80% - Normal activity with effort: some symptoms of disease  Physical Capacity: No Limitations

## 2018-04-24 ENCOUNTER — PATIENT MESSAGE (OUTPATIENT)
Dept: RHEUMATOLOGY | Facility: CLINIC | Age: 63
End: 2018-04-24

## 2018-05-01 ENCOUNTER — OFFICE VISIT (OUTPATIENT)
Dept: RHEUMATOLOGY | Facility: CLINIC | Age: 63
End: 2018-05-01
Payer: COMMERCIAL

## 2018-05-01 VITALS
BODY MASS INDEX: 22.91 KG/M2 | DIASTOLIC BLOOD PRESSURE: 61 MMHG | HEIGHT: 64 IN | WEIGHT: 134.19 LBS | HEART RATE: 62 BPM | TEMPERATURE: 99 F | SYSTOLIC BLOOD PRESSURE: 123 MMHG

## 2018-05-01 DIAGNOSIS — M65.342 TRIGGER RING FINGER OF LEFT HAND: ICD-10-CM

## 2018-05-01 DIAGNOSIS — D84.9 IMMUNOSUPPRESSION: ICD-10-CM

## 2018-05-01 DIAGNOSIS — M65.331 TRIGGER MIDDLE FINGER OF RIGHT HAND: ICD-10-CM

## 2018-05-01 DIAGNOSIS — Z79.899 IMMUNOSUPPRESSIVE MANAGEMENT ENCOUNTER FOLLOWING KIDNEY TRANSPLANT: ICD-10-CM

## 2018-05-01 DIAGNOSIS — M65.341 TRIGGER FINGER, RIGHT RING FINGER: ICD-10-CM

## 2018-05-01 DIAGNOSIS — Z94.0 IMMUNOSUPPRESSIVE MANAGEMENT ENCOUNTER FOLLOWING KIDNEY TRANSPLANT: ICD-10-CM

## 2018-05-01 DIAGNOSIS — M32.14 LUPUS NEPHRITIS: Primary | ICD-10-CM

## 2018-05-01 DIAGNOSIS — M65.332 TRIGGER FINGER, LEFT MIDDLE FINGER: ICD-10-CM

## 2018-05-01 PROCEDURE — 99999 PR PBB SHADOW E&M-EST. PATIENT-LVL III: CPT | Mod: PBBFAC,,, | Performed by: INTERNAL MEDICINE

## 2018-05-01 PROCEDURE — 3074F SYST BP LT 130 MM HG: CPT | Mod: CPTII,S$GLB,, | Performed by: INTERNAL MEDICINE

## 2018-05-01 PROCEDURE — 99213 OFFICE O/P EST LOW 20 MIN: CPT | Mod: 25,S$GLB,, | Performed by: INTERNAL MEDICINE

## 2018-05-01 PROCEDURE — 3078F DIAST BP <80 MM HG: CPT | Mod: CPTII,S$GLB,, | Performed by: INTERNAL MEDICINE

## 2018-05-01 PROCEDURE — 20550 NJX 1 TENDON SHEATH/LIGAMENT: CPT | Mod: 50,S$GLB,, | Performed by: INTERNAL MEDICINE

## 2018-05-01 RX ORDER — METHYLPREDNISOLONE ACETATE 80 MG/ML
80 INJECTION, SUSPENSION INTRA-ARTICULAR; INTRALESIONAL; INTRAMUSCULAR; SOFT TISSUE
Status: COMPLETED | OUTPATIENT
Start: 2018-05-01 | End: 2018-05-01

## 2018-05-01 RX ADMIN — METHYLPREDNISOLONE ACETATE 80 MG: 80 INJECTION, SUSPENSION INTRA-ARTICULAR; INTRALESIONAL; INTRAMUSCULAR; SOFT TISSUE at 10:05

## 2018-05-01 ASSESSMENT — ROUTINE ASSESSMENT OF PATIENT INDEX DATA (RAPID3)
PSYCHOLOGICAL DISTRESS SCORE: 0
PATIENT GLOBAL ASSESSMENT SCORE: 1.5
AM STIFFNESS SCORE: 1, YES
TOTAL RAPID3 SCORE: 2.11
WHEN YOU AWAKENED IN THE MORNING OVER THE LAST WEEK, PLEASE INDICATE THE AMOUNT OF TIME IT TAKES UNTIL YOU ARE AS LIMBER AS YOU WILL BE FOR THE DAY: 20 MINUTES
PAIN SCORE: 4.5
FATIGUE SCORE: .5
MDHAQ FUNCTION SCORE: .1

## 2018-05-01 NOTE — PROGRESS NOTES
Subjective:       Patient ID: Chanel Charles is a 63 y.o. female.    Chief Complaint: Lupus      HPI:  Chanel Charles is a 63 y.o. female with SLE and renal failure as well an extensive history of recurrent pleural effusions and pericardial effusion treated with recurrent thoracentesis (x3) and pericardiocentesis. She is s/p kidney transplant 4/6/2015. Patient in the past had 3-4 paracentesis for accumulation of abdominal fluid (last time was December 2011). She was hospitalized 4/2012 for acute altered mental status, slurred speech concerning for CVA. Dr. Ribera (rheum) was consulted and he did not think it was her lupus. It was felt that her AMS was caused by gabapentin and/or valtrex as those were new medications. After these medications were stopped she returned to her baseline function. She has history of a lung nodule and eventual TB results were negative. Patient had VATS procedure 3/2011 which showed a granuloma thought to be due histoplamosis but work up was negative. Patient had kidneys removed 4/2011 to help decrease protein loss from the body.    She was given Rituxan (4 infusions total) and later Nplate for thrombocytopenia by her hematologist and is back on Nplate.   Hematologist is Dr. Shelley () and platelets were 150s or so.  She has been off Nplate since transplant.       Colonoscopy 8/19/14 showed benign polyps, diverticulosis, internal and external hemrrhoids  Mammogram 6/24/14 was negative.  Pap smear 6/30/14 negative for lesion or malignancy    Interval History:   Now with pain from trigger fingers 4-5/10 ache.  Worse trigger fingers    Review of Systems   Constitutional: Positive for fatigue.   HENT: Negative.    Eyes: Negative.    Respiratory: Negative.    Cardiovascular: Negative.    Gastrointestinal: Negative.    Endocrine: Negative.    Genitourinary: Negative.    Musculoskeletal: Positive for arthralgias.   Skin: Negative.    Allergic/Immunologic: Negative.   "  Neurological: Negative.    Hematological: Negative.    Psychiatric/Behavioral: Negative.          Objective:   /61 (BP Location: Right arm, Patient Position: Sitting, BP Method: Small (Automatic))   Pulse 62   Temp 98.6 °F (37 °C) (Oral)   Ht 5' 4" (1.626 m)   Wt 60.9 kg (134 lb 3.2 oz)   BMI 23.04 kg/m²      Physical Exam  Flexor tendon nodues painful on palpation right and left 3rd and 4th finger     Assessment:       1. SLE with +TANA hx, arthritis, alopecia, and glomerular nephritis now s/p bilateral nephrectomy then dialysis and s/p renal transplant (4/6/2015).   No evidence of active lupus currently. Still doing well.   Alopecia persistent thinning  2. Glomerulonephritis. S/p transplant 4/6/2015.  3. CAD  4. Fatigue  5. Long term Plaquenil use. Off CellCept since 9/2010  6. Serositis  7. Diastolic heart failure. Now resolved.  8. Thrombocytopenia. Now improved.   9. Elevation in homocysteine levels in March 2012 and started on Folbic.   10. Recurrent falls. No recent falls  11. Hypercalcemia. Being managed endocrine  12. Osteoporosis. Endocrine gave Reclast 9/2017  13. 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 Evaluated by neurology.  Mass no longer there.   14. Flexor tendon nodules both hands.  Improved with injection but now some symptoms  Plan:       1. Continue Plaquenil 200 mg daily.  2. Still off Folbic per transplant  3. Patient to contact office if feels she is having a flare and to send info on neurology evaluation  4. Off Nplate still. Occassionally hematology Dr. Shelley (concerned if less than 100)  5. Restart 2 old goats. Consider restarting Voltaren gel for hand pains.  Jayy tape  6. Vaccination (pneumonia) per transplant team and primary doctor.     Had flu vaccination and cholesterol checked with primary doctor     Procedure Note   I  have explained the risks, benefits, and alternatives of aspiration of the joint.  The patient voices understanding and questions have been answered.  The " patient agrees to proceed.    The left and right 3rd and 4th flexor tendon nodules   was prepped with 2% chlorhexidine gluconate and 70% isopropyl alcohol (ChloraPrep).  The area was numbed with topical refrigerant.  A 25 g needle was introduced into the space and no fluid was aspirated.  10 mg Depomedrol injected into each flexor tendon nodule.  Hemostasis obtained.  The patient tolerated the procedure well.    RTO 4 months/prn

## 2018-05-22 ENCOUNTER — LAB VISIT (OUTPATIENT)
Dept: LAB | Facility: HOSPITAL | Age: 63
End: 2018-05-22
Attending: INTERNAL MEDICINE
Payer: COMMERCIAL

## 2018-05-22 DIAGNOSIS — E55.9 VITAMIN D DEFICIENCY: ICD-10-CM

## 2018-05-22 DIAGNOSIS — Z94.0 S/P KIDNEY TRANSPLANT: ICD-10-CM

## 2018-05-22 LAB
25(OH)D3+25(OH)D2 SERPL-MCNC: 46 NG/ML
ALBUMIN SERPL BCP-MCNC: 3.9 G/DL
ANION GAP SERPL CALC-SCNC: 10 MMOL/L
BASOPHILS # BLD AUTO: 0.02 K/UL
BASOPHILS NFR BLD: 0.3 %
BUN SERPL-MCNC: 24 MG/DL
CALCIUM SERPL-MCNC: 10.1 MG/DL
CHLORIDE SERPL-SCNC: 107 MMOL/L
CO2 SERPL-SCNC: 22 MMOL/L
CREAT SERPL-MCNC: 1.1 MG/DL
DIFFERENTIAL METHOD: ABNORMAL
EOSINOPHIL # BLD AUTO: 0.2 K/UL
EOSINOPHIL NFR BLD: 3.5 %
ERYTHROCYTE [DISTWIDTH] IN BLOOD BY AUTOMATED COUNT: 17.1 %
EST. GFR  (AFRICAN AMERICAN): >60 ML/MIN/1.73 M^2
EST. GFR  (NON AFRICAN AMERICAN): 53.6 ML/MIN/1.73 M^2
GLUCOSE SERPL-MCNC: 80 MG/DL
HCT VFR BLD AUTO: 35.2 %
HGB BLD-MCNC: 11 G/DL
IMM GRANULOCYTES # BLD AUTO: 0.01 K/UL
IMM GRANULOCYTES NFR BLD AUTO: 0.2 %
LYMPHOCYTES # BLD AUTO: 1.7 K/UL
LYMPHOCYTES NFR BLD: 29.9 %
MCH RBC QN AUTO: 24.3 PG
MCHC RBC AUTO-ENTMCNC: 31.3 G/DL
MCV RBC AUTO: 78 FL
MONOCYTES # BLD AUTO: 0.6 K/UL
MONOCYTES NFR BLD: 9.7 %
NEUTROPHILS # BLD AUTO: 3.3 K/UL
NEUTROPHILS NFR BLD: 56.4 %
NRBC BLD-RTO: 0 /100 WBC
PHOSPHATE SERPL-MCNC: 2.9 MG/DL
PLATELET # BLD AUTO: 106 K/UL
PMV BLD AUTO: ABNORMAL FL
POTASSIUM SERPL-SCNC: 4.6 MMOL/L
PTH-INTACT SERPL-MCNC: 95 PG/ML
RBC # BLD AUTO: 4.52 M/UL
SODIUM SERPL-SCNC: 139 MMOL/L
WBC # BLD AUTO: 5.76 K/UL

## 2018-05-22 PROCEDURE — 80069 RENAL FUNCTION PANEL: CPT

## 2018-05-22 PROCEDURE — 83970 ASSAY OF PARATHORMONE: CPT

## 2018-05-22 PROCEDURE — 80197 ASSAY OF TACROLIMUS: CPT

## 2018-05-22 PROCEDURE — 85025 COMPLETE CBC W/AUTO DIFF WBC: CPT

## 2018-05-22 PROCEDURE — 82306 VITAMIN D 25 HYDROXY: CPT

## 2018-05-22 PROCEDURE — 36415 COLL VENOUS BLD VENIPUNCTURE: CPT | Mod: PO

## 2018-05-23 LAB — TACROLIMUS BLD-MCNC: 4.4 NG/ML

## 2018-05-29 ENCOUNTER — OFFICE VISIT (OUTPATIENT)
Dept: NEPHROLOGY | Facility: CLINIC | Age: 63
End: 2018-05-29
Payer: COMMERCIAL

## 2018-05-29 VITALS
HEART RATE: 80 BPM | BODY MASS INDEX: 22.96 KG/M2 | SYSTOLIC BLOOD PRESSURE: 110 MMHG | DIASTOLIC BLOOD PRESSURE: 58 MMHG | HEIGHT: 64 IN | WEIGHT: 134.5 LBS

## 2018-05-29 DIAGNOSIS — Z94.0 S/P KIDNEY TRANSPLANT: Primary | ICD-10-CM

## 2018-05-29 PROCEDURE — 99214 OFFICE O/P EST MOD 30 MIN: CPT | Mod: S$GLB,,, | Performed by: INTERNAL MEDICINE

## 2018-05-29 PROCEDURE — 3008F BODY MASS INDEX DOCD: CPT | Mod: CPTII,S$GLB,, | Performed by: INTERNAL MEDICINE

## 2018-05-29 PROCEDURE — 99999 PR PBB SHADOW E&M-EST. PATIENT-LVL III: CPT | Mod: PBBFAC,,, | Performed by: INTERNAL MEDICINE

## 2018-05-29 PROCEDURE — 3078F DIAST BP <80 MM HG: CPT | Mod: CPTII,S$GLB,, | Performed by: INTERNAL MEDICINE

## 2018-05-29 PROCEDURE — 3074F SYST BP LT 130 MM HG: CPT | Mod: CPTII,S$GLB,, | Performed by: INTERNAL MEDICINE

## 2018-05-29 NOTE — LETTER
May 29, 2018        Gabriel Wyman MD  [address] [facility] [address]             [facility] - Nephrology  [address] [facility] [address]  Phone: [phone]  Fax: [phone]   Patient: Chanel Charles   MR Number: [id number]   YOB: 1955   Date of Visit: 5/29/2018       Dear Dr. Wyman:    Thank you for referring Chanel Charles to me for evaluation. Attached you will find relevant portions of my assessment and plan of care.    If you have questions, please do not hesitate to call me. I look forward to following Chanel Charles along with you.    Sincerely,      Олег Joseph MD

## 2018-05-29 NOTE — PROGRESS NOTES
Subjective:       Patient ID: Chanel Charles is a 63 y.o.   female who presents for follow-up evaluation of Living Unrelated RT ( 4/6/2015 ) , HTN, SHPT ,       ORGAN: RIGHT KIDNEY    Donor Type: living    PHS Increased Risk: no    Cold Ischemia: 58 mins    Induction Medications: campath - alemtuzumab (anti-cd52)          Gabriel Wyman MD      HPI  : Chanel Charles is a pleasant 63-year-old -American woman with history of Living unrelated ( friend ) renal transplant in 4/6/2015, excellent allograft function on Prograf 1 mg BID , CellCept 500 mg twice a day. She has history of lupus nephritis, cause of end-stage kidney disease. She was on hemodialysis for about 4-1/2 years at [facility], recent office notes were reviewed. She follows with rheumatology Department in [place] , in the past she was followed by endocrinology Department for osteopenia/osteoporosis, she also had hypercalcemia for which she received IV Reclast ( Zoledronic acid ),   recent lab results were discussed with the patient , stable allograft fn ,        Past Medical History:   Diagnosis Date    Abnormal stress echo - with no blockage on LHC - 1/16/14 1/7/2014    Acid reflux     Anemia of chronic kidney failure     Anxiety     Arthritis     Awaiting kidney transplant 12/12/2013    CHF (congestive heart failure)     Coronary artery disease     Bare metal stent in 2007    ESRD (end stage renal disease)     Secondary to Lupus Nephritis, HD MWF    Hypercalcemia 7/15/2015    Hyperlipidemia 12/12/2013    Hyperparathyroidism 7/15/2015    Hyperparathyroidism, tertiary 8/7/2015    Immunocompromised state 5/5/2015    ITP (idiopathic thrombocytopenic purpura) 12/12/2013    Living-donor kidney transplant for SLE 4/6/15 4/6/2015    Induced with Thymoglobulin.     Lupus nephritis     Lupus nephritis 5/18/2015    Menopause 7/15/2015    Osteoporosis 12/24/2015    Pericardial  effusion s/p pericardiocentesis 12/12/2013    Pleural effusion s/p Thoracentesis and VATS 12/12/2013    Prophylactic immunotherapy 6/29/2015    S/p Bilateral Nephrectomy (Severe proteinuria) - 2012 3/19/2015    Secondary hyperparathyroidism, renal     Shingles 4/2012    SLE (systemic lupus erythematosus) 1997         Current Outpatient Prescriptions on File Prior to Visit   Medication Sig Dispense Refill    aspirin (ECOTRIN) 81 MG EC tablet Take 81 mg by mouth once daily.       atorvastatin (LIPITOR) 80 MG tablet Take 80 mg by mouth every evening. 1 Tablet Oral Every day      BIOTIN ORAL Take 1,000 mg by mouth Daily.       famotidine (PEPCID) 20 MG tablet Take 1 tablet (20 mg total) by mouth every evening. 30 tablet 11    hydroxychloroquine (PLAQUENIL) 200 mg tablet Take 1 tablet (200 mg total) by mouth once daily. 90 tablet 2    magnesium oxide (MAG-OX) 400 mg tablet Take 1 tablet (400 mg total) by mouth once daily. (Patient taking differently: Take 400 mg by mouth once daily. ) 30 tablet 9    multivitamin (THERAGRAN) tablet Take 1 tablet by mouth once daily.      mycophenolate (CELLCEPT) 250 mg Cap TAKE TWO CAPSULES BY MOUTH TWICE A  capsule 11    tacrolimus (PROGRAF) 1 MG Cap Take 1 mg in AM and 1 mg in PM; Route: Oral 60 capsule 6    tacrolimus (PROGRAF) 1 MG Cap Take 2 capsules by mouth every morning and 1 capsule every evening 90 capsule 6    vitamin D 1000 units Tab Take 1,000 Units by mouth every other day.       zoledronic acid-mannitol-water (RECLAST) 4 mg/100 mL Soln Inject into the vein.      alprazolam (XANAX) 0.5 MG tablet Take 0.5 mg by mouth. 1 Tablet Oral Every day.  or as needed.      eszopiclone (LUNESTA) 2 MG Tab Take 2 mg by mouth. 1 Tablet Oral As directed.  1 tablet 1 time as needed at bedtime.      fluticasone (FLONASE) 50 mcg/actuation nasal spray 1 spray by Each Nare route once daily.  0    loratadine (CLARITIN) 10 mg tablet Take 1 tablet (10 mg total) by mouth  daily as needed for Allergies.  0    valacyclovir (VALTREX) 1000 MG tablet Take 1 tablet by mouth as needed.        No current facility-administered medications on file prior to visit.      SH : She is a retired teacher, principal. She is not a smoker or alcohol drinker. She has a son who is 32 years old. She is currently living at home.       FH : Several family members with hypertension. Lupus in a distant uncle        Review of Systems  :      Constitutional: Negative for activity change, appetite change, fatigue and fever.   HENT: Negative for congestion, facial swelling, sore throat, trouble swallowing and voice change.    Eyes: Negative for redness and visual disturbance.   Respiratory: Negative for apnea, cough, chest tightness, shortness of breath and wheezing.    Cardiovascular: Negative for chest pain, palpitations and leg swelling.   Gastrointestinal: Negative for abdominal distention, abdominal pain, blood in stool, constipation, diarrhea, nausea and vomiting.   Genitourinary: Negative for decreased urine volume, difficulty urinating, dysuria, flank pain, frequency, hematuria, pelvic pain and urgency.   Musculoskeletal: Positive for arthralgias. Negative for back pain, gait problem and joint swelling.   Skin: Negative for color change and rash.   Neurological: Negative for dizziness, syncope, weakness and headaches.   Hematological: Does not bruise/bleed easily.   Psychiatric/Behavioral: Negative for agitation, behavioral problems and confusion. The patient is not nervous/anxious.          Objective:           Vitals:    05/29/18 1356   BP: (!) 110/58   Pulse: 80       Weight 134 lbs , stable     Physical Exam  :         Constitutional: She is oriented to person, place, and time. She appears well-developed and well-nourished. No distress.    HENT: Head: Normocephalic and atraumatic. Mouth/Throat: Oropharynx is clear and moist. No oropharyngeal exudate.    Eyes: Conjunctivae and EOM are normal. Pupils  are equal, round, and reactive to light. No scleral icterus.    Neck: Normal range of motion. Neck supple. No JVD present. Carotid bruit is not present. No tracheal deviation present. No thyroid mass and no thyromegaly present.    Cardiovascular: Normal rate, regular rhythm, normal heart sounds and intact distal pulses. Exam reveals no gallop and no friction rub.    No murmur heard.    Pulmonary/Chest: Effort normal and breath sounds normal. No respiratory distress. She has no wheezes. She has no rales. She exhibits no tenderness.    Abdominal: Soft. Bowel sounds are normal. She exhibits no distension, no abdominal bruit, no ascites and no mass. There is no hepatosplenomegaly. There is no allgraft ( RLQ ) tenderness. There is no rebound, no guarding and no CVA tenderness.   Musculoskeletal: Normal range of motion. She exhibits no edema or tenderness.   Lymphadenopathy: She has no cervical adenopathy.   Neurological: She is alert and oriented to person, place, and time. No cranial nerve deficit. She exhibits normal muscle tone. Coordination normal.    Skin: Skin is warm and intact. No rash noted. No erythema. No pallor.   Psychiatric: She has a normal mood and affect. Her behavior is normal. Judgment normal.               Labs:    Lab Results   Component Value Date    CREATININE 1.1 05/22/2018    BUN 24 (H) 05/22/2018     05/22/2018    K 4.6 05/22/2018     05/22/2018    CO2 22 (L) 05/22/2018       Lab Results   Component Value Date    WBC 5.76 05/22/2018    HGB 11.0 (L) 05/22/2018    HCT 35.2 (L) 05/22/2018    MCV 78 (L) 05/22/2018     (L) 05/22/2018         Lab Results   Component Value Date    PTH 95.0 (H) 05/22/2018    CALCIUM 10.1 05/22/2018    PHOS 2.9 05/22/2018       Lab Results   Component Value Date    ALBUMIN 3.9 05/22/2018       Lab Results   Component Value Date    URICACID 5.8 (H) 01/19/2018     Prograf level 4.4     Impression and Plan : 62-year-old -American woman seen in  office today in follow-up for following medical problems ,       1. Status post Living Unrelated kidney transplant ( 4/6/2015 ) : Stable renal function with a serum creatinine of 1.1  mg/dL.        Serum Prograf levels is 4.4  ( range 4-7 ) , Prograf  1 mg twice daily.  Cont CellCept 500 mg twice a day.       2. Hypertension - blood pressure is on the low side, she is not on any blood pressure medications, patient is asymptomatic.      3. Hyperparathyroidism - follow PTH , mild hypercalcemia noted, ? Tertiary  hyperpara ,  on Vit D three times a week ,       4. Anemia of chronic kidney disease - most recent hemoglobin is about 11 g. will continue to monitor.        5. Recent urinalysis shows normal range proteinuria.        6. Lupus nephritis - currently under remission. On Plaquenil, sees Dr Jj in [place] ,       7. Lytes -  on Mag supplements ,      8. Euvolemic on exam ,       9. Leukopenia : resolved, she is following with hematologist Karsten ()        10. Hyperkalemia : can be from high Prograf level,  discussed low K diet , K better ,     11. Ok for Shingrix vaccine ( Non live vaccine )     12. Thrombocytopenia : can be from pepcid, , advised to hold and see,       follow-up in about 4 months. More than 25 minutes of face-to-face time discussing labs and plan of care.       Sincerely,        Олег Joseph MD

## 2018-06-21 ENCOUNTER — OFFICE VISIT (OUTPATIENT)
Dept: RHEUMATOLOGY | Facility: CLINIC | Age: 63
End: 2018-06-21
Payer: COMMERCIAL

## 2018-06-21 ENCOUNTER — LAB VISIT (OUTPATIENT)
Dept: LAB | Facility: HOSPITAL | Age: 63
End: 2018-06-21
Attending: INTERNAL MEDICINE
Payer: COMMERCIAL

## 2018-06-21 VITALS
SYSTOLIC BLOOD PRESSURE: 109 MMHG | HEART RATE: 61 BPM | DIASTOLIC BLOOD PRESSURE: 54 MMHG | WEIGHT: 132 LBS | BODY MASS INDEX: 23.39 KG/M2 | HEIGHT: 63 IN

## 2018-06-21 DIAGNOSIS — M32.8 OTHER FORMS OF SYSTEMIC LUPUS ERYTHEMATOSUS, UNSPECIFIED ORGAN INVOLVEMENT STATUS: ICD-10-CM

## 2018-06-21 DIAGNOSIS — Z79.899 ENCOUNTER FOR LONG-TERM (CURRENT) USE OF MEDICATIONS: ICD-10-CM

## 2018-06-21 DIAGNOSIS — M32.9 SLE (SYSTEMIC LUPUS ERYTHEMATOSUS): ICD-10-CM

## 2018-06-21 DIAGNOSIS — D84.9 IMMUNOSUPPRESSION: Primary | ICD-10-CM

## 2018-06-21 DIAGNOSIS — D69.6 THROMBOCYTOPENIA: Chronic | ICD-10-CM

## 2018-06-21 DIAGNOSIS — M32.14 LUPUS NEPHRITIS: ICD-10-CM

## 2018-06-21 LAB
ALBUMIN SERPL BCP-MCNC: 4 G/DL
ALP SERPL-CCNC: 69 U/L
ALT SERPL W/O P-5'-P-CCNC: 32 U/L
ANION GAP SERPL CALC-SCNC: 6 MMOL/L
AST SERPL-CCNC: 25 U/L
BASOPHILS # BLD AUTO: 0.03 K/UL
BASOPHILS NFR BLD: 0.6 %
BILIRUB SERPL-MCNC: 0.9 MG/DL
BUN SERPL-MCNC: 18 MG/DL
C3 SERPL-MCNC: 116 MG/DL
C4 SERPL-MCNC: 19 MG/DL
CALCIUM SERPL-MCNC: 10.6 MG/DL
CHLORIDE SERPL-SCNC: 106 MMOL/L
CO2 SERPL-SCNC: 26 MMOL/L
CREAT SERPL-MCNC: 1.1 MG/DL
DIFFERENTIAL METHOD: ABNORMAL
EOSINOPHIL # BLD AUTO: 0.1 K/UL
EOSINOPHIL NFR BLD: 2.6 %
ERYTHROCYTE [DISTWIDTH] IN BLOOD BY AUTOMATED COUNT: 17 %
EST. GFR  (AFRICAN AMERICAN): >60 ML/MIN/1.73 M^2
EST. GFR  (NON AFRICAN AMERICAN): 53.6 ML/MIN/1.73 M^2
GLUCOSE SERPL-MCNC: 82 MG/DL
HCT VFR BLD AUTO: 33.9 %
HGB BLD-MCNC: 10.8 G/DL
IMM GRANULOCYTES # BLD AUTO: 0.02 K/UL
IMM GRANULOCYTES NFR BLD AUTO: 0.4 %
LYMPHOCYTES # BLD AUTO: 2 K/UL
LYMPHOCYTES NFR BLD: 36.6 %
MCH RBC QN AUTO: 24.8 PG
MCHC RBC AUTO-ENTMCNC: 31.9 G/DL
MCV RBC AUTO: 78 FL
MONOCYTES # BLD AUTO: 0.6 K/UL
MONOCYTES NFR BLD: 11.1 %
NEUTROPHILS # BLD AUTO: 2.6 K/UL
NEUTROPHILS NFR BLD: 48.7 %
NRBC BLD-RTO: 0 /100 WBC
PLATELET # BLD AUTO: 128 K/UL
PMV BLD AUTO: 12.2 FL
POTASSIUM SERPL-SCNC: 4.5 MMOL/L
PROT SERPL-MCNC: 7.3 G/DL
RBC # BLD AUTO: 4.36 M/UL
SODIUM SERPL-SCNC: 138 MMOL/L
WBC # BLD AUTO: 5.41 K/UL

## 2018-06-21 PROCEDURE — 86160 COMPLEMENT ANTIGEN: CPT | Mod: 59

## 2018-06-21 PROCEDURE — 85025 COMPLETE CBC W/AUTO DIFF WBC: CPT

## 2018-06-21 PROCEDURE — 99214 OFFICE O/P EST MOD 30 MIN: CPT | Mod: S$GLB,,, | Performed by: INTERNAL MEDICINE

## 2018-06-21 PROCEDURE — 86160 COMPLEMENT ANTIGEN: CPT

## 2018-06-21 PROCEDURE — 3074F SYST BP LT 130 MM HG: CPT | Mod: CPTII,S$GLB,, | Performed by: INTERNAL MEDICINE

## 2018-06-21 PROCEDURE — 86225 DNA ANTIBODY NATIVE: CPT

## 2018-06-21 PROCEDURE — 80053 COMPREHEN METABOLIC PANEL: CPT

## 2018-06-21 PROCEDURE — 3078F DIAST BP <80 MM HG: CPT | Mod: CPTII,S$GLB,, | Performed by: INTERNAL MEDICINE

## 2018-06-21 PROCEDURE — 99999 PR PBB SHADOW E&M-EST. PATIENT-LVL II: CPT | Mod: PBBFAC,,, | Performed by: INTERNAL MEDICINE

## 2018-06-21 PROCEDURE — 3008F BODY MASS INDEX DOCD: CPT | Mod: CPTII,S$GLB,, | Performed by: INTERNAL MEDICINE

## 2018-06-21 PROCEDURE — 36415 COLL VENOUS BLD VENIPUNCTURE: CPT

## 2018-06-21 ASSESSMENT — ROUTINE ASSESSMENT OF PATIENT INDEX DATA (RAPID3)
FATIGUE SCORE: 1
MDHAQ FUNCTION SCORE: 0
PSYCHOLOGICAL DISTRESS SCORE: 2.2
PATIENT GLOBAL ASSESSMENT SCORE: 0
TOTAL RAPID3 SCORE: .17
AM STIFFNESS SCORE: 0, NO
PAIN SCORE: .5

## 2018-06-21 NOTE — PROGRESS NOTES
Subjective:       Patient ID: Chanel Charles is a 63 y.o. female.    Chief Complaint: Lupus.      HPI:  Chanel Charles is a 63 y.o. female with SLE and renal failure as well an extensive history of recurrent pleural effusions and pericardial effusion treated with recurrent thoracentesis (x3) and pericardiocentesis. She is s/p kidney transplant 4/6/2015. Patient in the past had 3-4 paracentesis for accumulation of abdominal fluid (last time was December 2011). She was hospitalized 4/2012 for acute altered mental status, slurred speech concerning for CVA. Dr. Ribera (rheum) was consulted and he did not think it was her lupus. It was felt that her AMS was caused by gabapentin and/or valtrex as those were new medications. After these medications were stopped she returned to her baseline function. She has history of a lung nodule and eventual TB results were negative. Patient had VATS procedure 3/2011 which showed a granuloma thought to be due histoplamosis but work up was negative. Patient had kidneys removed 4/2011 to help decrease protein loss from the body.    She was given Rituxan (4 infusions total) and later Nplate for thrombocytopenia by her hematologist and is back on Nplate.   Hematologist is Dr. Shelley () and platelets were 150s or so.  She has been off Nplate since transplant.       Colonoscopy 8/19/14 showed benign polyps, diverticulosis, internal and external hemrrhoids  Mammogram 6/24/14 was negative.  Pap smear 6/30/14 negative for lesion or malignancy      Doing well.  Trigger finger injections helped.      Review of Systems   Constitutional: Positive for fatigue.   HENT: Negative.    Eyes: Negative.    Respiratory: Negative.    Cardiovascular: Negative.    Gastrointestinal: Negative.    Endocrine: Negative.    Genitourinary: Negative.    Musculoskeletal: Positive for arthralgias (thumbs ache at times; improve movement  ).   Skin: Negative.    Allergic/Immunologic: Negative.   "  Neurological: Negative.    Hematological: Negative.    Psychiatric/Behavioral: Negative.          Objective:   BP (!) 109/54 (BP Location: Right arm, Patient Position: Sitting, BP Method: Medium (Automatic))   Pulse 61   Ht 5' 3" (1.6 m)   Wt 59.9 kg (132 lb)   BMI 23.38 kg/m²      Physical Exam   Constitutional: She is oriented to person, place, and time and well-developed, well-nourished, and in no distress.   HENT:   Head: Normocephalic and atraumatic.   Eyes: Conjunctivae and EOM are normal.   Neck: Neck supple.   Cardiovascular: Normal rate and normal heart sounds.    Pulmonary/Chest: Effort normal and breath sounds normal.   Abdominal: Soft. Bowel sounds are normal.   Neurological: She is alert and oriented to person, place, and time. Gait normal.   Skin: Skin is warm and dry.     Psychiatric: Mood and affect normal.   Musculoskeletal: Normal range of motion. She exhibits no edema, tenderness or deformity.      LABS    Component      Latest Ref Rng & Units 5/22/2018 2/5/2018   WBC      3.90 - 12.70 K/uL 5.76 5.69   RBC      4.00 - 5.40 M/uL 4.52 4.37   Hemoglobin      12.0 - 16.0 g/dL 11.0 (L) 10.6 (L)   Hematocrit      37.0 - 48.5 % 35.2 (L) 33.4 (L)   MCV      82 - 98 fL 78 (L) 76 (L)   MCH      27.0 - 31.0 pg 24.3 (L) 24.3 (L)   MCHC      32.0 - 36.0 g/dL 31.3 (L) 31.7 (L)   RDW      11.5 - 14.5 % 17.1 (H) 17.2 (H)   Platelets      150 - 350 K/uL 106 (L) 123 (L)   MPV      9.2 - 12.9 fL SEE COMMENT SEE COMMENT   Immature Granulocytes      0.0 - 0.5 % 0.2 0.2   Gran # (ANC)      1.8 - 7.7 K/uL 3.3 3.2   Immature Grans (Abs)      0.00 - 0.04 K/uL 0.01 0.01   Lymph #      1.0 - 4.8 K/uL 1.7 1.6   Mono #      0.3 - 1.0 K/uL 0.6 0.7   Eos #      0.0 - 0.5 K/uL 0.2 0.2   Baso #      0.00 - 0.20 K/uL 0.02 0.04   nRBC      0 /100 WBC 0 0   Gran%      38.0 - 73.0 % 56.4 55.9   Lymph%      18.0 - 48.0 % 29.9 27.4   Mono%      4.0 - 15.0 % 9.7 12.5   Eosinophil%      0.0 - 8.0 % 3.5 3.3   Basophil%      0.0 - " 1.9 % 0.3 0.7   Differential Method       Automated Automated   Sodium      136 - 145 mmol/L 139 138   Potassium      3.5 - 5.1 mmol/L 4.6 5.1   Chloride      95 - 110 mmol/L 107 108   CO2      23 - 29 mmol/L 22 (L) 24   Glucose      70 - 110 mg/dL 80 91   BUN, Bld      8 - 23 mg/dL 24 (H) 31 (H)   Creatinine      0.5 - 1.4 mg/dL 1.1 1.2   Calcium      8.7 - 10.5 mg/dL 10.1 10.1   Total Protein      6.0 - 8.4 g/dL  7.1   Albumin      3.5 - 5.2 g/dL 3.9 3.8   Total Bilirubin      0.1 - 1.0 mg/dL  0.8   Alkaline Phosphatase      55 - 135 U/L  68   AST      10 - 40 U/L  29   ALT      10 - 44 U/L  26   Anion Gap      8 - 16 mmol/L 10 6 (L)   eGFR if African American      >60 mL/min/1.73 m:2 >60.0 56.0 (A)   eGFR if non African American      >60 mL/min/1.73 m:2 53.6 (A) 48.6 (A)   Phosphorus      2.7 - 4.5 mg/dL 2.9    Specimen UA       Urine, Clean Catch    Color, UA      Yellow, Straw, Ela Yellow    Appearance, UA      Clear Clear    pH, UA      5.0 - 8.0 6.0    Specific Gravity, UA      1.005 - 1.030 1.010    Protein, UA      Negative Negative    Glucose, UA      Negative Negative    Ketones, UA      Negative Negative    Bilirubin (UA)      Negative Negative    Occult Blood UA      Negative Negative    Nitrite, UA      Negative Negative    Leukocytes, UA      Negative Negative    Complement (C-4)      11 - 44 mg/dL  20   Complement (C-3)      50 - 180 mg/dL  115   ds DNA Ab      Negative 1:10  Negative 1:10   Vit D, 25-Hydroxy      30 - 96 ng/mL 46    PTH      9.0 - 77.0 pg/mL 95.0 (H)    Tacrolimus Lvl      5.0 - 15.0 ng/mL 4.4 (L)           Assessment:       1. SLE with +TANA hx, arthritis, alopecia, and glomerular nephritis now s/p bilateral nephrectomy then dialysis and s/p renal transplant (4/6/2015).   No evidence of active lupus currently. Still doing well.   Alopecia  2. Glomerulonephritis. S/p transplant 4/6/2015.  3. CAD  4. Fatigue  5. Long term Plaquenil use. Off CellCept since 9/2010  6. Serositis  7.  Diastolic heart failure. Now resolved.  8. Thrombocytopenia. Now decreased.   9. Elevation in homocysteine levels in March 2012 and started on Folbic.   10. Recurrent falls. No recent falls  11. Hypercalcemia. Being managed endocrine  12. Osteoporosis. Endocrine gave Reclast 9/2017  13. 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 Being evaluated by neurology.  Mass no longer there.   14. Flexor tendon nodules both hands.  Improved with injection but now some symptoms  Plan:       1. Continue Plaquenil 200 mg daily.  2. Still off Folbic per transplant  3. Patient to contact office if feels she is having a flare and to send info on neurology evaluation  4. Off Nplate still. Follows hematology Dr. Shelley  5. Continue 2 old goats. Continue Voltaren gel for hand pains.  6. Vaccination (pneumonia, [place]) per transplant team and primary doctor.   7. Patient to see dermatology in her area for alopecia on top of scalp.      RTO 4 months/prn

## 2018-06-22 ENCOUNTER — PATIENT MESSAGE (OUTPATIENT)
Dept: RHEUMATOLOGY | Facility: CLINIC | Age: 63
End: 2018-06-22

## 2018-06-22 LAB — DSDNA AB SER-ACNC: NORMAL [IU]/ML

## 2018-07-02 ENCOUNTER — OFFICE VISIT (OUTPATIENT)
Dept: ENDOCRINOLOGY | Facility: CLINIC | Age: 63
End: 2018-07-02
Payer: COMMERCIAL

## 2018-07-02 VITALS
HEART RATE: 73 BPM | HEIGHT: 63 IN | BODY MASS INDEX: 23.91 KG/M2 | DIASTOLIC BLOOD PRESSURE: 68 MMHG | TEMPERATURE: 99 F | SYSTOLIC BLOOD PRESSURE: 112 MMHG | WEIGHT: 134.94 LBS

## 2018-07-02 DIAGNOSIS — N18.2 CKD (CHRONIC KIDNEY DISEASE) STAGE 2, GFR 60-89 ML/MIN: ICD-10-CM

## 2018-07-02 DIAGNOSIS — E21.2 HYPERPARATHYROIDISM, TERTIARY: ICD-10-CM

## 2018-07-02 DIAGNOSIS — M81.0 OSTEOPOROSIS, UNSPECIFIED OSTEOPOROSIS TYPE, UNSPECIFIED PATHOLOGICAL FRACTURE PRESENCE: Primary | ICD-10-CM

## 2018-07-02 DIAGNOSIS — M81.0 AGE-RELATED OSTEOPOROSIS WITHOUT CURRENT PATHOLOGICAL FRACTURE: ICD-10-CM

## 2018-07-02 PROCEDURE — 3078F DIAST BP <80 MM HG: CPT | Mod: CPTII,S$GLB,, | Performed by: INTERNAL MEDICINE

## 2018-07-02 PROCEDURE — 99214 OFFICE O/P EST MOD 30 MIN: CPT | Mod: S$GLB,,, | Performed by: INTERNAL MEDICINE

## 2018-07-02 PROCEDURE — 3008F BODY MASS INDEX DOCD: CPT | Mod: CPTII,S$GLB,, | Performed by: INTERNAL MEDICINE

## 2018-07-02 PROCEDURE — 99999 PR PBB SHADOW E&M-EST. PATIENT-LVL IV: CPT | Mod: PBBFAC,,, | Performed by: INTERNAL MEDICINE

## 2018-07-02 PROCEDURE — 3074F SYST BP LT 130 MM HG: CPT | Mod: CPTII,S$GLB,, | Performed by: INTERNAL MEDICINE

## 2018-07-02 RX ORDER — ACETAMINOPHEN 500 MG
500 TABLET ORAL
Status: DISCONTINUED | OUTPATIENT
Start: 2018-07-02 | End: 2018-07-02

## 2018-07-02 RX ORDER — SODIUM CHLORIDE 0.9 % (FLUSH) 0.9 %
10 SYRINGE (ML) INJECTION
Status: CANCELLED | OUTPATIENT
Start: 2018-07-02

## 2018-07-02 RX ORDER — ZOLEDRONIC ACID 5 MG/100ML
5 INJECTION, SOLUTION INTRAVENOUS ONCE
Status: DISCONTINUED | OUTPATIENT
Start: 2018-07-02 | End: 2018-07-02

## 2018-07-02 RX ORDER — HEPARIN SODIUM (PORCINE) LOCK FLUSH IV SOLN 100 UNIT/ML 100 UNIT/ML
100 SOLUTION INTRAVENOUS
Status: CANCELLED | OUTPATIENT
Start: 2018-07-02

## 2018-07-02 RX ORDER — ZOLEDRONIC ACID 5 MG/100ML
5 INJECTION, SOLUTION INTRAVENOUS
Status: CANCELLED | OUTPATIENT
Start: 2018-09-07

## 2018-07-02 RX ORDER — ACETAMINOPHEN 500 MG
500 TABLET ORAL
Status: CANCELLED | OUTPATIENT
Start: 2018-07-02

## 2018-07-02 RX ORDER — SODIUM CHLORIDE 0.9 % (FLUSH) 0.9 %
10 SYRINGE (ML) INJECTION
Status: CANCELLED | OUTPATIENT
Start: 2018-09-07

## 2018-07-02 RX ORDER — HEPARIN 100 UNIT/ML
500 SYRINGE INTRAVENOUS
Status: CANCELLED | OUTPATIENT
Start: 2018-09-07

## 2018-07-02 RX ORDER — ZOLEDRONIC ACID 5 MG/100ML
5 INJECTION, SOLUTION INTRAVENOUS ONCE
Status: CANCELLED | OUTPATIENT
Start: 2018-07-02 | End: 2018-07-02

## 2018-07-02 RX ORDER — ACETAMINOPHEN 500 MG
500 TABLET ORAL
Status: CANCELLED | OUTPATIENT
Start: 2018-09-07

## 2018-07-02 NOTE — PROGRESS NOTES
""This note will be shared with the patient"Subjective:       Patient ID: Chanel Charles is a 63 y.o. female.  Patient is new to me    Records were reviewed      Chief Complaint: Hyperparathyroidism and Osteoporosis      Consultation requested by No ref. provider found    HPI     Patient is here to establish care with new endocrinologist, formally seen by Dr. 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   Prior to renal tx, had tertiary HPT (PTH levels up to 1241), with hypercalcemia ranging 10.5-11.   Post renal transplant, PTH gradually declined and (most recent 95), Ca normalized to ULN (10-10.5) phos ranging 2.5-3, and alk phos normalized.  However recent calcium only slightly high at 10.6 but she thinks is related to her being on a cruise where she had a lot of items that she normally would not eat.     BMD 7/2015 showed osteoporosis of l-spine, total hip and fem neck and she had a repeat August 2017 that showed improvement as it showed osteopenia.   Started fosamax 08/2015, intolerant due to nausea and abdominal pain.  Received IV reclast 1/2016, tolerated well and had her 2nd dose September 2017.      Fracture history: none  Falls: none  Weight bearing exercise: walking, dancing  Height loss:  None  Vitamin D status: replete, only takes MVI (Centrum Silver) and also she is taking vitamin-D supplement 1000 IU only every other day.  No HCTZ.  Steroid therapy: inhaled steroids (symbicort prn)  No h/o kidney stones.  No h/o diarrhea, malabsorption, or bypass   Menopause 47, remote hx ert long ago.      She is followed by Dr. Jj, rheumatologist and Dr. Joseph nephrologist    Her 24 hr urine calcium last year in 2017 was not elevated  I have reviewed the past medical, family and social " history    Review of Systems   Constitutional: Negative for appetite change, fatigue, fever and unexpected weight change.   HENT: Negative for sore throat and trouble swallowing.    Eyes: Negative for visual disturbance.   Respiratory: Negative for shortness of breath and wheezing.    Cardiovascular: Negative for chest pain, palpitations and leg swelling.   Gastrointestinal: Negative for diarrhea, nausea and vomiting.   Endocrine: Negative for cold intolerance, heat intolerance, polydipsia, polyphagia and polyuria.   Genitourinary: Negative for difficulty urinating, dysuria and menstrual problem.   Musculoskeletal: Positive for arthralgias. Negative for joint swelling.        Hands hurt   Skin: Negative for rash.   Neurological: Negative for dizziness, weakness, numbness and headaches.   Psychiatric/Behavioral: Negative for confusion, dysphoric mood and sleep disturbance.       Objective:      Physical Exam   Constitutional: She is oriented to person, place, and time. She appears well-developed and well-nourished. No distress.   HENT:   Head: Normocephalic and atraumatic.   Right Ear: External ear normal.   Left Ear: External ear normal.   Nose: Nose normal.   Mouth/Throat: Oropharynx is clear and moist. No oropharyngeal exudate.   Eyes: Conjunctivae and EOM are normal. Pupils are equal, round, and reactive to light. No scleral icterus.   Neck: No JVD present. No tracheal deviation present. No thyromegaly present.   Cardiovascular: Normal rate, regular rhythm, normal heart sounds and intact distal pulses.  Exam reveals no gallop and no friction rub.    No murmur heard.  Pulmonary/Chest: Effort normal and breath sounds normal. No respiratory distress. She has no wheezes. She has no rales.   Abdominal: Soft. Bowel sounds are normal. She exhibits no distension and no mass. There is no tenderness. There is no rebound and no guarding. No hernia.   Musculoskeletal: She exhibits no edema or deformity.   Lymphadenopathy:      She has no cervical adenopathy.   Neurological: She is alert and oriented to person, place, and time. She has normal reflexes. No cranial nerve deficit.   Skin: Skin is warm. No rash noted. No erythema.   Psychiatric: She has a normal mood and affect. Her behavior is normal.   Vitals reviewed.        Lab Review:   Lab Visit on 06/21/2018   Component Date Value    Sodium 06/21/2018 138     Potassium 06/21/2018 4.5     Chloride 06/21/2018 106     CO2 06/21/2018 26     Glucose 06/21/2018 82     BUN, Bld 06/21/2018 18     Creatinine 06/21/2018 1.1     Calcium 06/21/2018 10.6*    Total Protein 06/21/2018 7.3     Albumin 06/21/2018 4.0     Total Bilirubin 06/21/2018 0.9     Alkaline Phosphatase 06/21/2018 69     AST 06/21/2018 25     ALT 06/21/2018 32     Anion Gap 06/21/2018 6*    eGFR if African American 06/21/2018 >60.0     eGFR if non African Amer* 06/21/2018 53.6*    WBC 06/21/2018 5.41     RBC 06/21/2018 4.36     Hemoglobin 06/21/2018 10.8*    Hematocrit 06/21/2018 33.9*    MCV 06/21/2018 78*    MCH 06/21/2018 24.8*    MCHC 06/21/2018 31.9*    RDW 06/21/2018 17.0*    Platelets 06/21/2018 128*    MPV 06/21/2018 12.2     Immature Granulocytes 06/21/2018 0.4     Gran # (ANC) 06/21/2018 2.6     Immature Grans (Abs) 06/21/2018 0.02     Lymph # 06/21/2018 2.0     Mono # 06/21/2018 0.6     Eos # 06/21/2018 0.1     Baso # 06/21/2018 0.03     nRBC 06/21/2018 0     Gran% 06/21/2018 48.7     Lymph% 06/21/2018 36.6     Mono% 06/21/2018 11.1     Eosinophil% 06/21/2018 2.6     Basophil% 06/21/2018 0.6     Differential Method 06/21/2018 Automated     Complement (C-4) 06/21/2018 19     Complement (C-3) 06/21/2018 116     ds DNA Ab 06/21/2018 Negative 1:10    Lab Visit on 06/21/2018   Component Date Value    Specimen UA 06/21/2018 Urine, Unspecified     Color, UA 06/21/2018 Straw     Appearance, UA 06/21/2018 Clear     pH, UA 06/21/2018 5.0     Specific Gravity, UA 06/21/2018 1.005      Protein, UA 06/21/2018 Negative     Glucose, UA 06/21/2018 Negative     Ketones, UA 06/21/2018 Negative     Bilirubin (UA) 06/21/2018 Negative     Occult Blood UA 06/21/2018 Negative     Nitrite, UA 06/21/2018 Negative     Urobilinogen, UA 06/21/2018 Negative     Leukocytes, UA 06/21/2018 Negative     Protein, Urine Random 06/21/2018 <7     Creatinine, Random Ur 06/21/2018 20.0     Prot/Creat Ratio, Ur 06/21/2018 Unable to calculate    Lab Visit on 05/22/2018   Component Date Value    Specimen UA 05/22/2018 Urine, Clean Catch     Color, UA 05/22/2018 Yellow     Appearance, UA 05/22/2018 Clear     pH, UA 05/22/2018 6.0     Specific Gravity, UA 05/22/2018 1.010     Protein, UA 05/22/2018 Negative     Glucose, UA 05/22/2018 Negative     Ketones, UA 05/22/2018 Negative     Bilirubin (UA) 05/22/2018 Negative     Occult Blood UA 05/22/2018 Negative     Nitrite, UA 05/22/2018 Negative     Leukocytes, UA 05/22/2018 Negative    Lab Visit on 05/22/2018   Component Date Value    Glucose 05/22/2018 80     Sodium 05/22/2018 139     Potassium 05/22/2018 4.6     Chloride 05/22/2018 107     CO2 05/22/2018 22*    BUN, Bld 05/22/2018 24*    Calcium 05/22/2018 10.1     Creatinine 05/22/2018 1.1     Albumin 05/22/2018 3.9     Phosphorus 05/22/2018 2.9     eGFR if African American 05/22/2018 >60.0     eGFR if non African Amer* 05/22/2018 53.6*    Anion Gap 05/22/2018 10     WBC 05/22/2018 5.76     RBC 05/22/2018 4.52     Hemoglobin 05/22/2018 11.0*    Hematocrit 05/22/2018 35.2*    MCV 05/22/2018 78*    MCH 05/22/2018 24.3*    MCHC 05/22/2018 31.3*    RDW 05/22/2018 17.1*    Platelets 05/22/2018 106*    MPV 05/22/2018 SEE COMMENT     Immature Granulocytes 05/22/2018 0.2     Gran # (ANC) 05/22/2018 3.3     Immature Grans (Abs) 05/22/2018 0.01     Lymph # 05/22/2018 1.7     Mono # 05/22/2018 0.6     Eos # 05/22/2018 0.2     Baso # 05/22/2018 0.02     nRBC 05/22/2018 0     Gran%  05/22/2018 56.4     Lymph% 05/22/2018 29.9     Mono% 05/22/2018 9.7     Eosinophil% 05/22/2018 3.5     Basophil% 05/22/2018 0.3     Differential Method 05/22/2018 Automated     Vit D, 25-Hydroxy 05/22/2018 46     PTH, Intact 05/22/2018 95.0*    Tacrolimus Lvl 05/22/2018 4.4*   Lab Visit on 04/02/2018   Component Date Value    Protein, Urine Random 04/02/2018 <7     Creatinine, Random Ur 04/02/2018 44.0     Prot/Creat Ratio, Ur 04/02/2018 Unable to calculate     Specimen UA 04/02/2018 Urine, Clean Catch     Color, UA 04/02/2018 Yellow     Appearance, UA 04/02/2018 Clear     pH, UA 04/02/2018 7.0     Specific Gravity, UA 04/02/2018 1.010     Protein, UA 04/02/2018 Negative     Glucose, UA 04/02/2018 Negative     Ketones, UA 04/02/2018 Negative     Bilirubin (UA) 04/02/2018 Negative     Occult Blood UA 04/02/2018 Negative     Nitrite, UA 04/02/2018 Negative     Leukocytes, UA 04/02/2018 Negative    Lab Visit on 04/02/2018   Component Date Value    Tacrolimus Lvl 04/02/2018 4.8*    WBC 04/02/2018 5.32     RBC 04/02/2018 4.20     Hemoglobin 04/02/2018 10.2*    Hematocrit 04/02/2018 32.8*    MCV 04/02/2018 78*    MCH 04/02/2018 24.3*    MCHC 04/02/2018 31.1*    RDW 04/02/2018 16.8*    Platelets 04/02/2018 111*    MPV 04/02/2018 SEE COMMENT     Immature Granulocytes 04/02/2018 0.0     Gran # (ANC) 04/02/2018 2.7     Immature Grans (Abs) 04/02/2018 0.00     Lymph # 04/02/2018 1.7     Mono # 04/02/2018 0.7     Eos # 04/02/2018 0.3     Baso # 04/02/2018 0.04     nRBC 04/02/2018 0     Gran% 04/02/2018 49.9     Lymph% 04/02/2018 31.4     Mono% 04/02/2018 13.2     Eosinophil% 04/02/2018 4.7     Basophil% 04/02/2018 0.8     Differential Method 04/02/2018 Automated     Magnesium 04/02/2018 1.9     Glucose 04/02/2018 94     Sodium 04/02/2018 139     Potassium 04/02/2018 5.1     Chloride 04/02/2018 109     CO2 04/02/2018 25     BUN, Bld 04/02/2018 24*    Calcium 04/02/2018 10.2      Creatinine 04/02/2018 1.2     Albumin 04/02/2018 3.9     Phosphorus 04/02/2018 3.1     eGFR if  04/02/2018 56.0*    eGFR if non African Amer* 04/02/2018 48.6*    Anion Gap 04/02/2018 5*    Total Protein 04/02/2018 7.0     Albumin 04/02/2018 3.9     Total Bilirubin 04/02/2018 0.8     Bilirubin, Direct 04/02/2018 0.4*    AST 04/02/2018 27     ALT 04/02/2018 23     Alkaline Phosphatase 04/02/2018 63     BK Virus DNA, Blood 04/02/2018 Not detected     BK Virus DNA PCR, Quant,* 04/02/2018 <125     Log BKV Copies/mL 04/02/2018 <2.10    Lab Visit on 02/05/2018   Component Date Value    Sodium 02/05/2018 138     Potassium 02/05/2018 5.1     Chloride 02/05/2018 108     CO2 02/05/2018 24     Glucose 02/05/2018 91     BUN, Bld 02/05/2018 31*    Creatinine 02/05/2018 1.2     Calcium 02/05/2018 10.1     Total Protein 02/05/2018 7.1     Albumin 02/05/2018 3.8     Total Bilirubin 02/05/2018 0.8     Alkaline Phosphatase 02/05/2018 68     AST 02/05/2018 29     ALT 02/05/2018 26     Anion Gap 02/05/2018 6*    eGFR if  02/05/2018 56.0*    eGFR if non African Amer* 02/05/2018 48.6*    WBC 02/05/2018 5.69     RBC 02/05/2018 4.37     Hemoglobin 02/05/2018 10.6*    Hematocrit 02/05/2018 33.4*    MCV 02/05/2018 76*    MCH 02/05/2018 24.3*    MCHC 02/05/2018 31.7*    RDW 02/05/2018 17.2*    Platelets 02/05/2018 123*    MPV 02/05/2018 SEE COMMENT     Immature Granulocytes 02/05/2018 0.2     Gran # (ANC) 02/05/2018 3.2     Immature Grans (Abs) 02/05/2018 0.01     Lymph # 02/05/2018 1.6     Mono # 02/05/2018 0.7     Eos # 02/05/2018 0.2     Baso # 02/05/2018 0.04     nRBC 02/05/2018 0     Gran% 02/05/2018 55.9     Lymph% 02/05/2018 27.4     Mono% 02/05/2018 12.5     Eosinophil% 02/05/2018 3.3     Basophil% 02/05/2018 0.7     Differential Method 02/05/2018 Automated     Complement (C-4) 02/05/2018 20     Complement (C-3) 02/05/2018 115     ds DNA Ab  02/05/2018 Negative 1:10     Tacrolimus Lvl 02/05/2018 4.7*   Lab Visit on 01/19/2018   Component Date Value    WBC 01/19/2018 5.68     RBC 01/19/2018 4.14     Hemoglobin 01/19/2018 10.0*    Hematocrit 01/19/2018 31.9*    MCV 01/19/2018 77*    MCH 01/19/2018 24.2*    MCHC 01/19/2018 31.3*    RDW 01/19/2018 16.9*    Platelets 01/19/2018 123*    MPV 01/19/2018 SEE COMMENT     Immature Granulocytes 01/19/2018 0.2     Gran # (ANC) 01/19/2018 3.2     Immature Grans (Abs) 01/19/2018 0.01     Lymph # 01/19/2018 1.5     Mono # 01/19/2018 0.7     Eos # 01/19/2018 0.3     Baso # 01/19/2018 0.02     nRBC 01/19/2018 0     Gran% 01/19/2018 56.2     Lymph% 01/19/2018 26.8     Mono% 01/19/2018 11.8     Eosinophil% 01/19/2018 4.6     Basophil% 01/19/2018 0.4     Differential Method 01/19/2018 Automated     Glucose 01/19/2018 94     Sodium 01/19/2018 141     Potassium 01/19/2018 5.5*    Chloride 01/19/2018 111*    CO2 01/19/2018 22*    BUN, Bld 01/19/2018 23     Calcium 01/19/2018 10.1     Creatinine 01/19/2018 1.0     Albumin 01/19/2018 3.6     Phosphorus 01/19/2018 3.1     eGFR if African American 01/19/2018 >60.0     eGFR if non  Amer* 01/19/2018 >60.0     Anion Gap 01/19/2018 8     PTH, Intact 01/19/2018 154.0*    Uric Acid 01/19/2018 5.8*    Tacrolimus Lvl 01/19/2018 7.8    Lab Visit on 01/19/2018   Component Date Value    Specimen UA 01/19/2018 Urine, Clean Catch     Color, UA 01/19/2018 Straw     Appearance, UA 01/19/2018 Clear     pH, UA 01/19/2018 6.0     Specific Gravity, UA 01/19/2018 1.010     Protein, UA 01/19/2018 Negative     Glucose, UA 01/19/2018 Negative     Ketones, UA 01/19/2018 Negative     Bilirubin (UA) 01/19/2018 Negative     Occult Blood UA 01/19/2018 Negative     Nitrite, UA 01/19/2018 Negative     Leukocytes, UA 01/19/2018 Negative     Protein, Urine Random 01/19/2018 <7     Creatinine, Random Ur 01/19/2018 36.0     Prot/Creat Ratio, Ur  01/19/2018 Unable to calculate        Assessment:     1. Osteoporosis, unspecified osteoporosis type, unspecified pathological fracture presence     2. Hyperparathyroidism, tertiary     3. CKD (chronic kidney disease) stage 2, GFR 60-89 ml/min      although patient has persistent elevation of PTH it has significantly improved post transplant and her kidney function is stable.  Her vitamin D levels are adequate.  Can consider Calcitriol for further improvement of PTH.  For now recommend observation and monitoring PTH, calcium, vitamin-D and phosphorus levels along with her renal function and would consider parathyroidectomy if she had worsening hypercalcemia, and other complications related to hyperparathyroidism such as kidney stones or significant hypercalciuria.    I will arrange for her to get her next Reclast infusion with Rheumatology in September 2018, consider drug holiday after the 3rd infusion next year depending on bone densities    Her next bone density is not due until August 2019  Plan:   Osteoporosis, unspecified osteoporosis type, unspecified pathological fracture presence    Hyperparathyroidism, tertiary    CKD (chronic kidney disease) stage 2, GFR 60-89 ml/min          Follow-up in about 6 months (around 1/2/2019).

## 2018-07-10 DIAGNOSIS — M81.0 OSTEOPOROSIS, UNSPECIFIED OSTEOPOROSIS TYPE, UNSPECIFIED PATHOLOGICAL FRACTURE PRESENCE: Primary | ICD-10-CM

## 2018-08-26 DIAGNOSIS — M32.0 DRUG-INDUCED SYSTEMIC LUPUS ERYTHEMATOSUS, UNSPECIFIED ORGAN INVOLVEMENT STATUS: ICD-10-CM

## 2018-08-27 RX ORDER — HYDROXYCHLOROQUINE SULFATE 200 MG/1
TABLET, FILM COATED ORAL
Qty: 90 TABLET | Refills: 2 | Status: SHIPPED | OUTPATIENT
Start: 2018-08-27 | End: 2019-06-12 | Stop reason: SDUPTHER

## 2018-09-12 ENCOUNTER — LAB VISIT (OUTPATIENT)
Dept: LAB | Facility: HOSPITAL | Age: 63
End: 2018-09-12
Attending: INTERNAL MEDICINE
Payer: COMMERCIAL

## 2018-09-12 ENCOUNTER — INFUSION (OUTPATIENT)
Dept: RHEUMATOLOGY | Facility: HOSPITAL | Age: 63
End: 2018-09-12
Attending: INTERNAL MEDICINE
Payer: COMMERCIAL

## 2018-09-12 VITALS
SYSTOLIC BLOOD PRESSURE: 94 MMHG | DIASTOLIC BLOOD PRESSURE: 55 MMHG | HEART RATE: 68 BPM | OXYGEN SATURATION: 99 % | TEMPERATURE: 98 F | RESPIRATION RATE: 16 BRPM

## 2018-09-12 DIAGNOSIS — M81.0 OSTEOPOROSIS, UNSPECIFIED OSTEOPOROSIS TYPE, UNSPECIFIED PATHOLOGICAL FRACTURE PRESENCE: ICD-10-CM

## 2018-09-12 DIAGNOSIS — M81.0 AGE-RELATED OSTEOPOROSIS WITHOUT CURRENT PATHOLOGICAL FRACTURE: Primary | ICD-10-CM

## 2018-09-12 LAB
ANION GAP SERPL CALC-SCNC: 8 MMOL/L
BUN SERPL-MCNC: 19 MG/DL
CALCIUM SERPL-MCNC: 10.3 MG/DL
CHLORIDE SERPL-SCNC: 106 MMOL/L
CO2 SERPL-SCNC: 24 MMOL/L
CREAT SERPL-MCNC: 1.1 MG/DL
EST. GFR  (AFRICAN AMERICAN): >60 ML/MIN/1.73 M^2
EST. GFR  (NON AFRICAN AMERICAN): 54 ML/MIN/1.73 M^2
GLUCOSE SERPL-MCNC: 97 MG/DL
POTASSIUM SERPL-SCNC: 4.4 MMOL/L
SODIUM SERPL-SCNC: 138 MMOL/L

## 2018-09-12 PROCEDURE — 63600175 PHARM REV CODE 636 W HCPCS: Mod: PO | Performed by: INTERNAL MEDICINE

## 2018-09-12 PROCEDURE — 36415 COLL VENOUS BLD VENIPUNCTURE: CPT | Mod: PO

## 2018-09-12 PROCEDURE — A4216 STERILE WATER/SALINE, 10 ML: HCPCS | Mod: PO | Performed by: INTERNAL MEDICINE

## 2018-09-12 PROCEDURE — 96365 THER/PROPH/DIAG IV INF INIT: CPT | Mod: PO

## 2018-09-12 PROCEDURE — 25000003 PHARM REV CODE 250: Mod: PO | Performed by: INTERNAL MEDICINE

## 2018-09-12 PROCEDURE — 80048 BASIC METABOLIC PNL TOTAL CA: CPT | Mod: PO

## 2018-09-12 RX ORDER — HEPARIN 100 UNIT/ML
500 SYRINGE INTRAVENOUS
Status: CANCELLED | OUTPATIENT
Start: 2018-09-12

## 2018-09-12 RX ORDER — ZOLEDRONIC ACID 5 MG/100ML
5 INJECTION, SOLUTION INTRAVENOUS
Status: COMPLETED | OUTPATIENT
Start: 2018-09-12 | End: 2018-09-12

## 2018-09-12 RX ORDER — SODIUM CHLORIDE 0.9 % (FLUSH) 0.9 %
10 SYRINGE (ML) INJECTION
Status: CANCELLED | OUTPATIENT
Start: 2018-09-12

## 2018-09-12 RX ORDER — ACETAMINOPHEN 500 MG
500 TABLET ORAL
Status: CANCELLED | OUTPATIENT
Start: 2018-09-12

## 2018-09-12 RX ORDER — SODIUM CHLORIDE 0.9 % (FLUSH) 0.9 %
10 SYRINGE (ML) INJECTION
Status: DISCONTINUED | OUTPATIENT
Start: 2018-09-12 | End: 2018-09-12 | Stop reason: HOSPADM

## 2018-09-12 RX ORDER — ACETAMINOPHEN 500 MG
500 TABLET ORAL
Status: DISCONTINUED | OUTPATIENT
Start: 2018-09-12 | End: 2018-09-12 | Stop reason: HOSPADM

## 2018-09-12 RX ORDER — ZOLEDRONIC ACID 5 MG/100ML
5 INJECTION, SOLUTION INTRAVENOUS
Status: CANCELLED | OUTPATIENT
Start: 2018-09-12

## 2018-09-12 RX ADMIN — SODIUM CHLORIDE, PRESERVATIVE FREE 10 ML: 5 INJECTION INTRAVENOUS at 11:09

## 2018-09-12 RX ADMIN — ZOLEDRONIC ACID 5 MG: 5 INJECTION, SOLUTION INTRAVENOUS at 11:09

## 2018-09-12 RX ADMIN — ACETAMINOPHEN 500 MG: 500 TABLET ORAL at 11:09

## 2018-09-12 NOTE — PLAN OF CARE
Problem: Patient Care Overview  Goal: Plan of Care Review  Outcome: Ongoing (interventions implemented as appropriate)  Pt. Said she is feeling ok today

## 2018-09-12 NOTE — PATIENT INSTRUCTIONS
FALL PREVENTION   Falls often occur due to slipping, tripping or losing your balance. Here are ways to reduce your risk of falling again.   Was there anything that caused your fall that can be fixed, removed or replaced?   Make your home safe by keeping walkways clear of objects you may trip over.   Use non-slip pads under rugs.   Do not walk in poorly lit areas.   Do not stand on chairs or wobbly ladders.   Use caution when reaching overhead or looking upward. This position can cause a loss of balance.   Be sure your shoes fit properly, have non-slip bottoms and are in good condition.   Be cautious when going up and down stairs, curbs, and when walking on uneven sidewalks.   If your balance is poor, consider using a cane or walker.   If your fall was related to alcohol use, stop or limit alcohol intake.   If your fall was related to use of sleeping medicines, talk to your doctor about this. You may need to reduce your dosage at bedtime if you awaken during the night to go to the bathroom.   To reduce the need for nighttime bathroom trips:   Avoid drinking fluids for several hours before going to bed   Empty your bladder before going to bed   Men can keep a urinal at the bedside   © 9803-0583 [facility], [address] [place] [address], [place] [address]. All rights reserved. This information is not intended as a substitute for professional medical care. Always follow your healthcare professional's instructions.

## 2018-09-12 NOTE — PROGRESS NOTES
Infusion# 3  No recent or upcoming dental work  Recent labs? 9/12/18      Premeds? Tylenol 500 mg PO    Reclast 5 mg administered IV at a 30 minute rate per orders; see MAR & Flow Sheet for more  details. Tolerated well without adverse events

## 2018-09-19 ENCOUNTER — OFFICE VISIT (OUTPATIENT)
Dept: NEPHROLOGY | Facility: CLINIC | Age: 63
End: 2018-09-19
Payer: COMMERCIAL

## 2018-09-19 VITALS
HEART RATE: 80 BPM | DIASTOLIC BLOOD PRESSURE: 62 MMHG | SYSTOLIC BLOOD PRESSURE: 100 MMHG | WEIGHT: 133.38 LBS | HEIGHT: 63 IN | BODY MASS INDEX: 23.63 KG/M2

## 2018-09-19 DIAGNOSIS — Z94.0 S/P KIDNEY TRANSPLANT: Primary | ICD-10-CM

## 2018-09-19 PROCEDURE — 3008F BODY MASS INDEX DOCD: CPT | Mod: CPTII,S$GLB,, | Performed by: INTERNAL MEDICINE

## 2018-09-19 PROCEDURE — 99214 OFFICE O/P EST MOD 30 MIN: CPT | Mod: S$GLB,,, | Performed by: INTERNAL MEDICINE

## 2018-09-19 PROCEDURE — 3078F DIAST BP <80 MM HG: CPT | Mod: CPTII,S$GLB,, | Performed by: INTERNAL MEDICINE

## 2018-09-19 PROCEDURE — 3074F SYST BP LT 130 MM HG: CPT | Mod: CPTII,S$GLB,, | Performed by: INTERNAL MEDICINE

## 2018-09-19 PROCEDURE — 99999 PR PBB SHADOW E&M-EST. PATIENT-LVL III: CPT | Mod: PBBFAC,,, | Performed by: INTERNAL MEDICINE

## 2018-09-19 NOTE — LETTER
September 19, 2018        Gabriel Wyman MD  [address] [facility] [address]             [facility] - Nephrology  [address] [facility] [address]  Phone: [phone]  Fax: [phone]   Patient: Chanel Charles   MR Number: [id number]   YOB: 1955   Date of Visit: 9/19/2018       Dear Dr. Wyman:    Thank you for referring Chanel Charles to me for evaluation. Attached you will find relevant portions of my assessment and plan of care.    If you have questions, please do not hesitate to call me. I look forward to following Chanel Charles along with you.    Sincerely,      Олег Joseph MD            CC  No Recipients    Enclosure

## 2018-09-19 NOTE — PROGRESS NOTES
Subjective:       Patient ID: Chanel Charles is a 63 y.o.   female who presents for follow-up evaluation of Living Unrelated RT ( 4/6/2015 ) , HTN, SHPT ,       ORGAN: RIGHT KIDNEY    Donor Type: living    PHS Increased Risk: no    Cold Ischemia: 58 mins    Induction Medications: campath - alemtuzumab (anti-cd52)          Gabriel Wyman MD      HPI : Chanel Charles is a pleasant 63-year-old -American woman with history of Living unrelated ( friend ) renal transplant in 4/6/2015, excellent allograft function on Prograf 1 mg BID , CellCept 500 mg twice a day. She has history of lupus nephritis, cause of end-stage kidney disease. She was on hemodialysis for about 4-1/2 years at [facility] HD unit under [facility], recent office notes were reviewed. She follows with rheumatology Department in [place] , in the past she was followed by endocrinology Department for osteopenia/osteoporosis, she also had hypercalcemia for which she received IV Reclast ( Zoledronic acid ),   recent lab results were discussed with the patient , stable allograft fn ,        Past Medical History:   Diagnosis Date    Abnormal stress echo - with no blockage on LHC - 1/16/14 1/7/2014    Acid reflux     Anemia of chronic kidney failure     Anxiety     Arthritis     Awaiting kidney transplant 12/12/2013    CHF (congestive heart failure)     Coronary artery disease     Bare metal stent in 2007    ESRD (end stage renal disease)     Secondary to Lupus Nephritis, HD MWF    Hypercalcemia 7/15/2015    Hyperlipidemia 12/12/2013    Hyperparathyroidism 7/15/2015    Hyperparathyroidism, tertiary 8/7/2015    Immunocompromised state 5/5/2015    ITP (idiopathic thrombocytopenic purpura) 12/12/2013    Living-donor kidney transplant for SLE 4/6/15 4/6/2015    Induced with Thymoglobulin.     Lupus nephritis     Lupus nephritis 5/18/2015    Menopause 7/15/2015    Osteoporosis 12/24/2015    Pericardial  effusion s/p pericardiocentesis 12/12/2013    Pleural effusion s/p Thoracentesis and VATS 12/12/2013    Prophylactic immunotherapy 6/29/2015    S/p Bilateral Nephrectomy (Severe proteinuria) - 2012 3/19/2015    Secondary hyperparathyroidism, renal     Shingles 4/2012    SLE (systemic lupus erythematosus) 1997       Current Outpatient Medications on File Prior to Visit   Medication Sig Dispense Refill    alprazolam (XANAX) 0.5 MG tablet Take 0.5 mg by mouth. 1 Tablet Oral Every day.  or as needed.      aspirin (ECOTRIN) 81 MG EC tablet Take 81 mg by mouth once daily.       atorvastatin (LIPITOR) 80 MG tablet Take 80 mg by mouth every evening. 1 Tablet Oral Every day      BIOTIN ORAL Take 1,000 mg by mouth Daily.       eszopiclone (LUNESTA) 2 MG Tab Take 2 mg by mouth. 1 Tablet Oral As directed.  1 tablet 1 time as needed at bedtime.      famotidine (PEPCID) 20 MG tablet Take 1 tablet (20 mg total) by mouth every evening. (Patient taking differently: Take 20 mg by mouth 2 (two) times daily as needed. ) 30 tablet 11    fluticasone (FLONASE) 50 mcg/actuation nasal spray 1 spray by Each Nare route once daily. (Patient taking differently: 1 spray by Each Nare route as needed. )  0    hydroxychloroquine (PLAQUENIL) 200 mg tablet TAKE 1 TABLET DAILY 90 tablet 2    loratadine (CLARITIN) 10 mg tablet Take 1 tablet (10 mg total) by mouth daily as needed for Allergies.  0    magnesium oxide (MAG-OX) 400 mg tablet Take 1 tablet (400 mg total) by mouth once daily. (Patient taking differently: Take 400 mg by mouth once daily. ) 30 tablet 9    multivitamin (THERAGRAN) tablet Take 1 tablet by mouth once daily.      mycophenolate (CELLCEPT) 250 mg Cap TAKE TWO CAPSULES BY MOUTH TWICE A  capsule 11    tacrolimus (PROGRAF) 1 MG Cap Take 1 mg in AM and 1 mg in PM; Route: Oral 60 capsule 6    valacyclovir (VALTREX) 1000 MG tablet Take 1 tablet by mouth as needed.       vitamin D 1000 units Tab Take 1,000 Units  by mouth every Mon, Wed, Fri.      [DISCONTINUED] tacrolimus (PROGRAF) 1 MG Cap Take 2 capsules by mouth every morning and 1 capsule every evening 90 capsule 6     No current facility-administered medications on file prior to visit.        SH : She is a retired teacher, principal. She is not a smoker or alcohol drinker. She has a son who is 32 years old. She is currently living at home.       FH : Several family members with hypertension. Lupus in a distant uncle          Review of Systems  :      Constitutional: Negative for activity change, appetite change, fatigue and fever.   HENT: Negative for congestion, facial swelling, sore throat, trouble swallowing and voice change.    Eyes: Negative for redness and visual disturbance.   Respiratory: Negative for apnea, cough, chest tightness, shortness of breath and wheezing.    Cardiovascular: Negative for chest pain, palpitations and leg swelling.   Gastrointestinal: Negative for abdominal distention, abdominal pain, blood in stool, constipation, diarrhea, nausea and vomiting.   Genitourinary: Negative for decreased urine volume, difficulty urinating, dysuria, flank pain, frequency, hematuria, pelvic pain and urgency.   Musculoskeletal: Positive for arthralgias. Negative for back pain, gait problem and joint swelling.   Skin: Negative for color change and rash.   Neurological: Negative for dizziness, syncope, weakness and headaches.   Hematological: Does not bruise/bleed easily.   Psychiatric/Behavioral: Negative for agitation, behavioral problems and confusion. The patient is not nervous/anxious.        Objective:           Vitals:    09/19/18 0844   BP: 100/62   Pulse: 80       Weight 133 lbs, stable     Physical Exam  :      Constitutional: She is oriented to person, place, and time. She appears well-developed and well-nourished. No distress.    HENT: Head: Normocephalic and atraumatic. Mouth/Throat: Oropharynx is clear and moist. No oropharyngeal exudate.    Eyes:  Conjunctivae and EOM are normal. Pupils are equal, round, and reactive to light. No scleral icterus.    Neck: Normal range of motion. Neck supple. No JVD present. Carotid bruit is not present. No tracheal deviation present. No thyroid mass and no thyromegaly present.    Cardiovascular: Normal rate, regular rhythm, normal heart sounds and intact distal pulses. Exam reveals no gallop and no friction rub.    No murmur heard.    Pulmonary/Chest: Effort normal and breath sounds normal. No respiratory distress. She has no wheezes. She has no rales. She exhibits no tenderness.    Abdominal: Soft. Bowel sounds are normal. She exhibits no distension, no abdominal bruit, no ascites and no mass. There is no hepatosplenomegaly. There is no allgraft ( RLQ ) tenderness. There is no rebound, no guarding and no CVA tenderness.   Musculoskeletal: Normal range of motion. She exhibits no edema or tenderness. LUE AVF with a good thrill,   Lymphadenopathy: She has no cervical adenopathy.   Neurological: She is alert and oriented to person, place, and time. No cranial nerve deficit. She exhibits normal muscle tone. Coordination normal.    Skin: Skin is warm and intact. No rash noted. No erythema. No pallor.   Psychiatric: She has a normal mood and affect. Her behavior is normal. Judgment normal.           Labs:    Lab Results   Component Value Date    CREATININE 1.1 09/12/2018    BUN 19 09/12/2018     09/12/2018    K 4.4 09/12/2018     09/12/2018    CO2 24 09/12/2018       Lab Results   Component Value Date    WBC 4.70 09/12/2018    HGB 10.5 (L) 09/12/2018    HCT 33.9 (L) 09/12/2018    MCV 80 (L) 09/12/2018     (L) 09/12/2018       Lab Results   Component Value Date    .0 (H) 09/12/2018    CALCIUM 10.3 09/12/2018    PHOS 2.9 05/22/2018       Lab Results   Component Value Date    ALBUMIN 4.0 06/21/2018       Lab Results   Component Value Date    URICACID 6.2 (H) 09/12/2018       Prograf level 5.4      Impression  and Plan : 63-year-old -American woman seen in office today in follow-up for following medical problems ,       1. Status post Living Unrelated kidney transplant ( 4/6/2015 ) : Stable renal function with a serum creatinine of 1.1  mg/dL.        Serum Prograf levels is  5.4  ( range 4-7 ) , Prograf  1 mg twice daily.  Cont CellCept 500 mg twice a day.       2. Hypertension - blood pressure is controlled,  she is not on any blood pressure medications, patient is asymptomatic.      3. Hyperparathyroidism - follow PTH , mild hypercalcemia noted, ? Tertiary  hyperpara ,  on Vit D three times a week ,       4. Anemia of chronic kidney disease - most recent hemoglobin is 10.5 g. will continue to monitor.        5. Recent urinalysis shows normal range proteinuria.        6. Lupus nephritis - currently under remission. On Plaquenil, sees Dr Jj in [place] ,       7. Lytes -  on Mag supplements ,      8. Euvolemic on exam ,       9. Leukopenia : resolved, she is following with hematologist Karsten ()        10. Hyperkalemia : can be from high Prograf level,  discussed low K diet , K better ,      11.  Thrombocytopenia : can be from pepcid,  advised to hold and see,       follow-up in about 4 months. More than 25 minutes of face-to-face time discussing labs and plan of care.       Sincerely,        Олег Joseph MD

## 2018-10-03 DIAGNOSIS — M81.0 OSTEOPOROSIS, UNSPECIFIED OSTEOPOROSIS TYPE, UNSPECIFIED PATHOLOGICAL FRACTURE PRESENCE: Primary | ICD-10-CM

## 2018-10-16 ENCOUNTER — OFFICE VISIT (OUTPATIENT)
Dept: RHEUMATOLOGY | Facility: CLINIC | Age: 63
End: 2018-10-16
Payer: COMMERCIAL

## 2018-10-16 VITALS
BODY MASS INDEX: 22.35 KG/M2 | HEIGHT: 63 IN | WEIGHT: 126.13 LBS | SYSTOLIC BLOOD PRESSURE: 96 MMHG | HEART RATE: 80 BPM | DIASTOLIC BLOOD PRESSURE: 54 MMHG

## 2018-10-16 DIAGNOSIS — D84.9 IMMUNOSUPPRESSION: ICD-10-CM

## 2018-10-16 DIAGNOSIS — M65.332 TRIGGER FINGER, LEFT MIDDLE FINGER: ICD-10-CM

## 2018-10-16 DIAGNOSIS — D69.6 THROMBOCYTOPENIA: Chronic | ICD-10-CM

## 2018-10-16 DIAGNOSIS — M32.8 OTHER FORMS OF SYSTEMIC LUPUS ERYTHEMATOSUS, UNSPECIFIED ORGAN INVOLVEMENT STATUS: Primary | ICD-10-CM

## 2018-10-16 DIAGNOSIS — M81.0 OSTEOPOROSIS, UNSPECIFIED OSTEOPOROSIS TYPE, UNSPECIFIED PATHOLOGICAL FRACTURE PRESENCE: ICD-10-CM

## 2018-10-16 DIAGNOSIS — M65.342 TRIGGER RING FINGER OF LEFT HAND: ICD-10-CM

## 2018-10-16 PROCEDURE — 99999 PR PBB SHADOW E&M-EST. PATIENT-LVL III: CPT | Mod: PBBFAC,,, | Performed by: INTERNAL MEDICINE

## 2018-10-16 PROCEDURE — 3008F BODY MASS INDEX DOCD: CPT | Mod: CPTII,S$GLB,, | Performed by: INTERNAL MEDICINE

## 2018-10-16 PROCEDURE — 99214 OFFICE O/P EST MOD 30 MIN: CPT | Mod: S$GLB,,, | Performed by: INTERNAL MEDICINE

## 2018-10-16 PROCEDURE — 3074F SYST BP LT 130 MM HG: CPT | Mod: CPTII,S$GLB,, | Performed by: INTERNAL MEDICINE

## 2018-10-16 PROCEDURE — 3078F DIAST BP <80 MM HG: CPT | Mod: CPTII,S$GLB,, | Performed by: INTERNAL MEDICINE

## 2018-10-16 ASSESSMENT — ROUTINE ASSESSMENT OF PATIENT INDEX DATA (RAPID3)
PSYCHOLOGICAL DISTRESS SCORE: 1.1
TOTAL RAPID3 SCORE: .94
AM STIFFNESS SCORE: 0, NO
WHEN YOU AWAKENED IN THE MORNING OVER THE LAST WEEK, PLEASE INDICATE THE AMOUNT OF TIME IT TAKES UNTIL YOU ARE AS LIMBER AS YOU WILL BE FOR THE DAY: 0.5
MDHAQ FUNCTION SCORE: .1
PAIN SCORE: 2
PATIENT GLOBAL ASSESSMENT SCORE: .5

## 2018-10-16 NOTE — PROGRESS NOTES
Subjective:       Patient ID: Chanel Charles is a 63 y.o. female.    Chief Complaint: Hand pain with trigger finger    HPI:  Chanel Charles is a 63 y.o. female with SLE and renal failure as well an extensive history of recurrent pleural effusions and pericardial effusion treated with recurrent thoracentesis (x3) and pericardiocentesis. She is s/p kidney transplant 4/6/2015. Patient in the past had 3-4 paracentesis for accumulation of abdominal fluid (last time was December 2011). She was hospitalized 4/2012 for acute altered mental status, slurred speech concerning for CVA. Dr. Ribera (rheum) was consulted and he did not think it was her lupus. It was felt that her AMS was caused by gabapentin and/or valtrex as those were new medications. After these medications were stopped she returned to her baseline function. She has history of a lung nodule and eventual TB results were negative. Patient had VATS procedure 3/2011 which showed a granuloma thought to be due histoplamosis but work up was negative. Patient had kidneys removed 4/2011 to help decrease protein loss from the body.    She was given Rituxan (4 infusions total) and later Nplate for thrombocytopenia by her hematologist and is back on Nplate.   Hematologist is Dr. Shelley () and platelets were 150s or so.  She has been off Nplate since transplant.       Colonoscopy 8/19/14 showed benign polyps, diverticulosis, internal and external hemrrhoids  Mammogram 6/24/14 was negative.  Pap smear 6/30/14 negative for lesion or malignancy          Interval History:    Trigger finger injection helped right hand some but left hand did not improve.  Would like to see hand surgery.     Review of Systems   Constitutional: Positive for fever. Negative for unexpected weight change.   HENT: Negative for trouble swallowing.    Eyes: Negative for redness.   Respiratory: Positive for cough. Negative for shortness of breath.    Cardiovascular: Negative for  "chest pain.   Gastrointestinal: Negative for constipation and diarrhea.   Endocrine: Negative.    Genitourinary: Negative for dysuria and genital sores.   Musculoskeletal: Negative.    Skin: Negative for rash.   Allergic/Immunologic: Negative.    Neurological: Positive for headaches.   Hematological: Does not bruise/bleed easily.   Psychiatric/Behavioral: Negative.          Objective:   BP (!) 96/54   Pulse 80   Ht 5' 3" (1.6 m)   Wt 57.2 kg (126 lb 1.7 oz)   BMI 22.34 kg/m²      Physical Exam   Constitutional: She is oriented to person, place, and time and well-developed, well-nourished, and in no distress.   HENT:   Head: Normocephalic and atraumatic.   Eyes: Conjunctivae and EOM are normal.   Neck: Neck supple.   Cardiovascular: Normal rate and regular rhythm.    Pulmonary/Chest: Effort normal and breath sounds normal.   Abdominal: Soft. Bowel sounds are normal.   Neurological: She is alert and oriented to person, place, and time. Gait normal.   Skin: Skin is warm and dry.     Psychiatric: Mood and affect normal.   Musculoskeletal: She exhibits tenderness (right 3rd flexor tendon nodule and left 3rd and 4th flexor tendon nodule).   Squaring of 1st CMC       LABS    Component      Latest Ref Rng & Units 9/12/2018 9/12/2018 9/12/2018          10:18 AM 10:18 AM 10:14 AM   WBC      3.90 - 12.70 K/uL  4.70    RBC      4.00 - 5.40 M/uL  4.24    Hemoglobin      12.0 - 16.0 g/dL  10.5 (L)    Hematocrit      37.0 - 48.5 %  33.9 (L)    MCV      82 - 98 fL  80 (L)    MCH      27.0 - 31.0 pg  24.8 (L)    MCHC      32.0 - 36.0 g/dL  31.0 (L)    RDW      11.5 - 14.5 %  17.4 (H)    Platelets      150 - 350 K/uL  129 (L)    MPV      9.2 - 12.9 fL  SEE COMMENT    Immature Granulocytes      0.0 - 0.5 %  0.0    Gran # (ANC)      1.8 - 7.7 K/uL  2.2    Immature Grans (Abs)      0.00 - 0.04 K/uL  0.00    Lymph #      1.0 - 4.8 K/uL  1.7    Mono #      0.3 - 1.0 K/uL  0.6    Eos #      0.0 - 0.5 K/uL  0.2    Baso #      0.00 - " 0.20 K/uL  0.04    nRBC      0 /100 WBC  0    Gran%      38.0 - 73.0 %  45.6    Lymph%      18.0 - 48.0 %  36.2    Mono%      4.0 - 15.0 %  12.6    Eosinophil%      0.0 - 8.0 %  4.7    Basophil%      0.0 - 1.9 %  0.9    Differential Method        Automated    Specimen UA            Color, UA      Yellow, Straw, Ela      Appearance, UA      Clear      pH, UA      5.0 - 8.0      Specific Gravity, UA      1.005 - 1.030      Protein, UA      Negative      Glucose, UA      Negative      Ketones, UA      Negative      Bilirubin (UA)      Negative      Occult Blood UA      Negative      Nitrite, UA      Negative      Leukocytes, UA      Negative      Sodium      136 - 145 mmol/L   138   Potassium      3.5 - 5.1 mmol/L   4.4   Chloride      95 - 110 mmol/L   106   CO2      23 - 29 mmol/L   24   Glucose      70 - 110 mg/dL   97   BUN, Bld      8 - 23 mg/dL   19   Creatinine      0.5 - 1.4 mg/dL   1.1   Calcium      8.7 - 10.5 mg/dL   10.3   Anion Gap      8 - 16 mmol/L   8   eGFR if African American      >60 mL/min/1.73 m:2   >60   eGFR if non African American      >60 mL/min/1.73 m:2   54 (A)   Magnesium      1.6 - 2.6 mg/dL 1.8 1.8    Tacrolimus Lvl      5.0 - 15.0 ng/mL  5.4    Uric Acid      2.4 - 5.7 mg/dL  6.2 (H)    Vit D, 25-Hydroxy      30 - 96 ng/mL  49    PTH      9.0 - 77.0 pg/mL  118.0 (H)      Component      Latest Ref Rng & Units 9/12/2018           9:58 AM   WBC      3.90 - 12.70 K/uL    RBC      4.00 - 5.40 M/uL    Hemoglobin      12.0 - 16.0 g/dL    Hematocrit      37.0 - 48.5 %    MCV      82 - 98 fL    MCH      27.0 - 31.0 pg    MCHC      32.0 - 36.0 g/dL    RDW      11.5 - 14.5 %    Platelets      150 - 350 K/uL    MPV      9.2 - 12.9 fL    Immature Granulocytes      0.0 - 0.5 %    Gran # (ANC)      1.8 - 7.7 K/uL    Immature Grans (Abs)      0.00 - 0.04 K/uL    Lymph #      1.0 - 4.8 K/uL    Mono #      0.3 - 1.0 K/uL    Eos #      0.0 - 0.5 K/uL    Baso #      0.00 - 0.20 K/uL    nRBC      0 /100 WBC     Gran%      38.0 - 73.0 %    Lymph%      18.0 - 48.0 %    Mono%      4.0 - 15.0 %    Eosinophil%      0.0 - 8.0 %    Basophil%      0.0 - 1.9 %    Differential Method          Specimen UA       Urine, Clean Catch   Color, UA      Yellow, Straw, Ela Yellow   Appearance, UA      Clear Clear   pH, UA      5.0 - 8.0 6.0   Specific Gravity, UA      1.005 - 1.030 <=1.005 (A)   Protein, UA      Negative Negative   Glucose, UA      Negative Negative   Ketones, UA      Negative Negative   Bilirubin (UA)      Negative Negative   Occult Blood UA      Negative Negative   Nitrite, UA      Negative Negative   Leukocytes, UA      Negative Negative   Sodium      136 - 145 mmol/L    Potassium      3.5 - 5.1 mmol/L    Chloride      95 - 110 mmol/L    CO2      23 - 29 mmol/L    Glucose      70 - 110 mg/dL    BUN, Bld      8 - 23 mg/dL    Creatinine      0.5 - 1.4 mg/dL    Calcium      8.7 - 10.5 mg/dL    Anion Gap      8 - 16 mmol/L    eGFR if African American      >60 mL/min/1.73 m:2    eGFR if non African American      >60 mL/min/1.73 m:2    Magnesium      1.6 - 2.6 mg/dL    Tacrolimus Lvl      5.0 - 15.0 ng/mL    Uric Acid      2.4 - 5.7 mg/dL    Vit D, 25-Hydroxy      30 - 96 ng/mL    PTH      9.0 - 77.0 pg/mL           Assessment:       1. SLE with +TANA hx, arthritis, alopecia, and glomerular nephritis now s/p bilateral nephrectomy then dialysis and s/p renal transplant (4/6/2015).   No evidence of active lupus currently. Still doing well.   Alopecia  2. Glomerulonephritis. S/p transplant 4/6/2015.  3. CAD  4. Fatigue  5. Long term Plaquenil use. Off CellCept since 9/2010  6. Serositis  7. Diastolic heart failure. Now resolved.  8. Thrombocytopenia. Now decreased.   9. Elevation in homocysteine levels in March 2012 and started on Folbic.   10. Recurrent falls. No recent falls  11. Hypercalcemia. Being managed endocrine  12. Osteoporosis. Endocrine gave Reclast 9/2018  13. 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 Being evaluated by  neurology.  Mass no longer there.   14. Flexor tendon nodules both hands.   Plan:       1. Continue Plaquenil 200 mg daily.  2. Still off Folbic per transplant  3. Patient to contact office if feels she is having a flare and to send info on neurology evaluation  4. Off Nplate still. Follows hematology Dr. Shelley  5. Continue 2 old goats. Continue Voltaren gel for hand pains.  6. Vaccination (pneumonia, [place]) per transplant team and primary doctor.   She plans on getting flu vaccination once sinus issues resolve.  7. Patient to see dermatology in her area for alopecia on top of scalp.   8. Consult ortho regarding trigger fingers  9. Labs 4 months      RTO 4 months/prn

## 2018-10-16 NOTE — PROGRESS NOTES
Rapid3 Question Responses and Scores 10/16/2018   MDHAQ Score 0.1   Psychologic Score 1.1   Pain Score 2   When you awakened in the morning OVER THE LAST WEEK, did you feel stiff? No   If Yes, please indicate the number of hours until you are as limber as you will be for the day -   Fatigue Score 0.5   Global Health Score 0.5   RAPID3 Score 0.94       Answers for HPI/ROS submitted by the patient on 10/16/2018   fever: Yes  eye redness: No  headaches: Yes  shortness of breath: No  chest pain: No  trouble swallowing: No  diarrhea: No  constipation: No  unexpected weight change: No  genital sore: No  dysuria: No  During the last 3 days, have you had a skin rash?: No  Bruises or bleeds easily: No  cough: Yes

## 2018-10-19 DIAGNOSIS — M79.642 PAIN OF LEFT HAND: Primary | ICD-10-CM

## 2018-10-23 ENCOUNTER — HOSPITAL ENCOUNTER (OUTPATIENT)
Dept: RADIOLOGY | Facility: HOSPITAL | Age: 63
Discharge: HOME | End: 2018-10-23
Attending: PHYSICIAN ASSISTANT
Payer: COMMERCIAL

## 2018-10-23 ENCOUNTER — OFFICE VISIT (OUTPATIENT)
Dept: ORTHOPEDICS | Facility: CLINIC | Age: 63
End: 2018-10-23
Payer: COMMERCIAL

## 2018-10-23 VITALS
HEIGHT: 63 IN | DIASTOLIC BLOOD PRESSURE: 61 MMHG | WEIGHT: 126.13 LBS | SYSTOLIC BLOOD PRESSURE: 103 MMHG | HEART RATE: 75 BPM | BODY MASS INDEX: 22.35 KG/M2

## 2018-10-23 DIAGNOSIS — M79.642 PAIN OF LEFT HAND: ICD-10-CM

## 2018-10-23 DIAGNOSIS — M65.342 TRIGGER RING FINGER OF LEFT HAND: ICD-10-CM

## 2018-10-23 DIAGNOSIS — M65.332 TRIGGER FINGER, LEFT MIDDLE FINGER: ICD-10-CM

## 2018-10-23 PROCEDURE — 99204 OFFICE O/P NEW MOD 45 MIN: CPT | Mod: 25,S$GLB,, | Performed by: PHYSICIAN ASSISTANT

## 2018-10-23 PROCEDURE — 3074F SYST BP LT 130 MM HG: CPT | Mod: CPTII,S$GLB,, | Performed by: PHYSICIAN ASSISTANT

## 2018-10-23 PROCEDURE — 99999 PR PBB SHADOW E&M-EST. PATIENT-LVL IV: CPT | Mod: PBBFAC,,, | Performed by: PHYSICIAN ASSISTANT

## 2018-10-23 PROCEDURE — 3008F BODY MASS INDEX DOCD: CPT | Mod: CPTII,S$GLB,, | Performed by: PHYSICIAN ASSISTANT

## 2018-10-23 PROCEDURE — 20550 NJX 1 TENDON SHEATH/LIGAMENT: CPT | Mod: 51,F3,S$GLB, | Performed by: PHYSICIAN ASSISTANT

## 2018-10-23 PROCEDURE — 3078F DIAST BP <80 MM HG: CPT | Mod: CPTII,S$GLB,, | Performed by: PHYSICIAN ASSISTANT

## 2018-10-23 PROCEDURE — 73130 X-RAY EXAM OF HAND: CPT | Mod: TC,FY,PO,LT

## 2018-10-23 PROCEDURE — 73130 X-RAY EXAM OF HAND: CPT | Mod: 26,LT,, | Performed by: RADIOLOGY

## 2018-10-23 RX ORDER — METHYLPREDNISOLONE ACETATE 80 MG/ML
80 INJECTION, SUSPENSION INTRA-ARTICULAR; INTRALESIONAL; INTRAMUSCULAR; SOFT TISSUE
Status: COMPLETED | OUTPATIENT
Start: 2018-10-23 | End: 2018-10-23

## 2018-10-23 RX ADMIN — METHYLPREDNISOLONE ACETATE 80 MG: 80 INJECTION, SUSPENSION INTRA-ARTICULAR; INTRALESIONAL; INTRAMUSCULAR; SOFT TISSUE at 12:10

## 2018-10-23 NOTE — PATIENT INSTRUCTIONS
What is Trigger Finger?  Trigger finger is an inflammation of tissue inside your finger or thumb. It is also called tenosynovitis (ten-oh-sin-oh-VY-tis). Tendons (cordlike fibers that attach muscle to bone and allow you to bend the joints) become swollen. So does the synovium (a slick membrane that allows the tendons to move easily). This makes it difficult to straighten the finger or thumb.    Causes  Repeated use of a tool with strong gripping, such as a drill or wrench, can irritate and inflame the tendons and the synovium. It is also more common in certain medical conditions such as rheumatoid arthritis, gout, and diabetes. But often the cause of trigger finger is unknown.  Inside your finger  Tendons connect muscles in your forearm to the bones in your fingers. The tendons in each finger are surrounded by a protective tendon sheath. This sheath is lined with synovium, which produces a fluid that allows the tendons to slide easily when you bend and straighten the finger. If a tendon is irritated, it becomes inflamed.  When a tendon is inflamed  When a tendon is inflamed, it causes the lining of the tendon sheath to swell and thicken. Or the tendon itself may thicken. Then the sheath pinches the tendon, and the tendon can no longer slide easily inside the sheath. When you straighten your finger, the tendon sticks or locks as it tries to squeeze back through the sheath.    Symptoms  The first sign of trigger finger may be pain where the finger or thumb joins the palm. You may also notice some swelling. As the tendon becomes more inflamed, the finger may start to catch when you try to straighten it. When the locked tendon releases, the finger jumps, as if you were releasing the trigger of a gun. This further irritates the tendon, and may set up a cycle of catching and swelling.   Date Last Reviewed: 9/28/2015  © 7718-9255 [employer]. [address] [place] [address], [place] [address]. All rights reserved.  This information is not intended as a substitute for professional medical care. Always follow your healthcare professional's instructions.

## 2018-10-23 NOTE — LETTER
October 24, 2018      Lexii Nation MD  [address]  [facility] [address]           [facility]  [address] [facility] [address] [place] [address]  Phone: [phone]  Fax: [phone]          Patient: Chanel Charles   MR Number: [id number]   YOB: 1955   Date of Visit: 10/23/2018       Dear Dr. Lexii Nation:    Thank you for referring Chanel Charles to me for evaluation. Attached you will find relevant portions of my assessment and plan of care.    If you have questions, please do not hesitate to call me. I look forward to following Chanel Charles along with you.    Sincerely,    Francisco Wolf PA-C    Enclosure  CC:  No Recipients    If you would like to receive this communication electronically, please contact [email or web address] or [phone] to request more information on [email or web address] Link access.    For providers and/or their staff who would like to refer a patient to [facility], please contact us through our one-stop-shop provider referral line, [facility], at [phone].    If you feel you have received this communication in error or would no longer like to receive these types of communications, please e-mail [email or web address]

## 2018-10-23 NOTE — PROGRESS NOTES
CC: 64 y/o female left fingers locking    HPI:  Her left ring and middle finger has been locking for several months.  She underwent a steroid injection several months ago with minimal relief.  Had a symptoms return to last 2-3 months.  Her  is getting weaker when she tries to grab anything her fingers lock.  Pain level today is a 2 on a 10 scale.    PMH:    Past Medical History:   Diagnosis Date    Abnormal stress echo - with no blockage on LHC - 14    Acid reflux     Anemia of chronic kidney failure     Anxiety     Arthritis     Awaiting kidney transplant 2013    CHF (congestive heart failure)     Coronary artery disease     Bare metal stent in     ESRD (end stage renal disease)     Secondary to Lupus Nephritis, HD MWF    Hypercalcemia 7/15/2015    Hyperlipidemia 2013    Hyperparathyroidism 7/15/2015    Hyperparathyroidism, tertiary 2015    Immunocompromised state 2015    ITP (idiopathic thrombocytopenic purpura) 2013    Living-donor kidney transplant for SLE 4/6/15 2015    Induced with Thymoglobulin.     Lupus nephritis     Lupus nephritis 2015    Menopause 7/15/2015    Osteoporosis 2015    Pericardial effusion s/p pericardiocentesis 2013    Pleural effusion s/p Thoracentesis and VATS 2013    Prophylactic immunotherapy 2015    S/p Bilateral Nephrectomy (Severe proteinuria) - 2012 3/19/2015    Secondary hyperparathyroidism, renal     Shingles 2012    SLE (systemic lupus erythematosus)        PSH:    Past Surgical History:   Procedure Laterality Date     SECTION      etopic pregnancy       NEPHRECTOMY      Bilateral 2/2 proteinuria    PARACENTESIS      PERICARDIOCENTESIS      SINUS SURGERY  May 2013    THORACENTESIS      TRANSPLANT-KIDNEY N/A 2015    Performed by Eamon Pina MD at [facility] OR [address] [place] [address]       Family Hx:    Family History   Problem Relation Age of  Onset    Osteoarthritis Mother     Coronary artery disease Father 55         from MI 72 (and had 2 MIs in his 50s-60s)    Rheum arthritis Paternal Uncle     Coronary artery disease Maternal Uncle 48         from MI at 62    Lupus Neg Hx     Psoriasis Neg Hx     Kidney disease Neg Hx        Allergy:    Review of patient's allergies indicates:   Allergen Reactions    Heparin analogues Other (See Comments)     Low platelets    Escitalopram oxalate      Felt anxious    Gabapentin      Other reaction(s): Hallucinations       Medication:    Current Outpatient Medications:     alprazolam (XANAX) 0.5 MG tablet, Take 0.5 mg by mouth. 1 Tablet Oral Every day.  or as needed., Disp: , Rfl:     aspirin (ECOTRIN) 81 MG EC tablet, Take 81 mg by mouth once daily. , Disp: , Rfl:     atorvastatin (LIPITOR) 80 MG tablet, Take 80 mg by mouth every evening. 1 Tablet Oral Every day, Disp: , Rfl:     BIOTIN ORAL, Take 1,000 mg by mouth Daily. , Disp: , Rfl:     eszopiclone (LUNESTA) 2 MG Tab, Take 2 mg by mouth. 1 Tablet Oral As directed.  1 tablet 1 time as needed at bedtime., Disp: , Rfl:     famotidine (PEPCID) 20 MG tablet, Take 1 tablet (20 mg total) by mouth every evening. (Patient taking differently: Take 20 mg by mouth 2 (two) times daily as needed. ), Disp: 30 tablet, Rfl: 11    fluticasone (FLONASE) 50 mcg/actuation nasal spray, 1 spray by Each Nare route once daily. (Patient taking differently: 1 spray by Each Nare route as needed. ), Disp: , Rfl: 0    hydroxychloroquine (PLAQUENIL) 200 mg tablet, TAKE 1 TABLET DAILY, Disp: 90 tablet, Rfl: 2    loratadine (CLARITIN) 10 mg tablet, Take 1 tablet (10 mg total) by mouth daily as needed for Allergies., Disp: , Rfl: 0    magnesium oxide (MAG-OX) 400 mg tablet, Take 1 tablet (400 mg total) by mouth once daily. (Patient taking differently: Take 400 mg by mouth once daily. ), Disp: 30 tablet, Rfl: 9    multivitamin (THERAGRAN) tablet, Take 1 tablet by mouth  "once daily., Disp: , Rfl:     mycophenolate (CELLCEPT) 250 mg Cap, TAKE TWO CAPSULES BY MOUTH TWICE A DAY, Disp: 120 capsule, Rfl: 11    tacrolimus (PROGRAF) 1 MG Cap, Take 1 mg in AM and 1 mg in PM; Route: Oral, Disp: 60 capsule, Rfl: 6    tacrolimus (PROGRAF) 1 MG Cap, TAKE 1 CAPSULE BY MOUTH EVERY MORNING AND 1 CAPSULE EVERY EVENING, Disp: 180 capsule, Rfl: 3    valacyclovir (VALTREX) 1000 MG tablet, Take 1 tablet by mouth as needed. , Disp: , Rfl:     vitamin D 1000 units Tab, Take 1,000 Units by mouth every Mon, Wed, Fri., Disp: , Rfl:     Social History:    Social History     Socioeconomic History    Marital status:      Spouse name: Not on file    Number of children: Not on file    Years of education: Not on file    Highest education level: Not on file   Social Needs    Financial resource strain: Not on file    Food insecurity - worry: Not on file    Food insecurity - inability: Not on file    Transportation needs - medical: Not on file    Transportation needs - non-medical: Not on file   Occupational History    Not on file   Tobacco Use    Smoking status: Former Smoker     Last attempt to quit: 2004     Years since quittin.8    Smokeless tobacco: Never Used   Substance and Sexual Activity    Alcohol use: Yes     Comment: 1 glass of wine per month or less.    Drug use: No    Sexual activity: Not on file   Other Topics Concern    Not on file   Social History Narrative    Wears a seatbelt.       Vitals:   /61 (BP Location: Right arm, Patient Position: Sitting, BP Method: Medium (Automatic))   Pulse 75   Ht 5' 3" (1.6 m)   Wt 57.2 kg (126 lb 1.7 oz)   BMI 22.34 kg/m²      ROS:  GENERAL: No fever, chills, fatigability or weight loss.  SKIN: No rashes, itching or changes in color or texture of skin.  HEAD: No headaches or recent head trauma.  EYES: Visual acuity fine. No photophobia, ocular pain or diplopia.  EARS: Denies ear pain, discharge or vertigo.  NOSE: No loss " of smell, no epistaxis or postnasal drip.  MOUTH & THROAT: No hoarseness or change in voice. No excessive gum bleeding.  NODES: Denies swollen glands.  CHEST: Denies GERMAN, cyanosis, wheezing, cough and sputum production.  CARDIOVASCULAR: Denies chest pain, PND, orthopnea or reduced exercise tolerance.  ABDOMEN: Appetite fine. No weight loss. Denies diarrhea, abdominal pain, hematemesis or blood in stool.  URINARY: No flank pain, dysuria or hematuria.  PERIPHERAL VASCULAR: No claudication or cyanosis.  NEUROLOGIC: No history of seizures, paralysis, alteration of gait or coordination.  MUSCULOSKELETAL: See HPI    PE:  APPEARANCE: Well nourished, well developed, in no acute distress.   HEAD: Normocephalic, atraumatic.  NEUROLOGIC: Cranial Nerves: II-XII grossly intact, also see MUSCULOSKELETAL    MUSCULOSKELETAL: Left hand, 2 plus distal pulses, light touch intact Left  hand and fingers, positive locking at the A1 pulley on the middle and the ring finger.  All digits are warm. No erythema, no warmth, no drainage, No swelling, no significant tenderness.    Assessment:  The patient stands his trigger finger.  She elects to proceed with a steroid injection this point in lieu of surgery.           Diagnosis:              1.  Left hand trigger finger              2.  Left hand arthritis     Diagnostic Studies  MRI-No    X-Ray-Yes agree to findings of  Advanced degenerative changes of the 1st CMC joint present.  No acute osseous or soft tissue abnormality  EMG/NCV-No  Arthrogram-No        Plan:                                                 1. PT-no                                                 2.OT-no                                          3.NSAID-no                                        4. Narcotics-no                                     5. Wound care-N/A                                 6. Rest-no                                           7. Surgery-no                                         8. JB Hose-no                                     9. Anticoagulation therapy-no               10. Elevation-no                                     11. Crutches-no                                    12. Walker-no             13. Cane no                        14. Referral-no                                     15.Injection-yes      Injection:  Left hand middle and ring finger trigger finger injection  The patient was placed in the sitting position with the left hand near the end of the bed facing me.  The left and was placed over a nonsterile pad.The left hand was prepped, sterily, with Alcohol and Betadine.  A  Refrigerant and coolant was used to locally anesthetize the skin over the A1 pulley of the left middle and ring finger.  A one and a half inch, 27 gauge needle attached to A 3 cc syringe was placed into the A1 pulley of the left middle and ring finger. A negative pressure was placed on the needle to ensure the aspiration was placed into the A1 pulley of the left middle and ring finger and not into a blood vessel.  2 cc of a 1%  Lidocaine was mixed with 0.5 cc of a 80 mg solution of Depo-Medrol injected. The patient tolerated the left hand trigger finger injection well.  A Bandage was placed over the injection site.                          16. Splint   /    Cast   /   Cast Shoe-No              17. RICE            18. Follow up-  when ready to proceed with surgery.

## 2018-12-12 ENCOUNTER — LAB VISIT (OUTPATIENT)
Dept: LAB | Facility: HOSPITAL | Age: 63
End: 2018-12-12
Attending: INTERNAL MEDICINE
Payer: COMMERCIAL

## 2018-12-12 DIAGNOSIS — M32.8 OTHER FORMS OF SYSTEMIC LUPUS ERYTHEMATOSUS, UNSPECIFIED ORGAN INVOLVEMENT STATUS: ICD-10-CM

## 2018-12-12 DIAGNOSIS — D84.9 IMMUNOSUPPRESSION: ICD-10-CM

## 2018-12-12 DIAGNOSIS — D69.6 THROMBOCYTOPENIA: Chronic | ICD-10-CM

## 2018-12-12 LAB
ALBUMIN SERPL BCP-MCNC: 4.1 G/DL
ALP SERPL-CCNC: 49 U/L
ALT SERPL W/O P-5'-P-CCNC: 20 U/L
ANION GAP SERPL CALC-SCNC: 8 MMOL/L
AST SERPL-CCNC: 26 U/L
BASOPHILS # BLD AUTO: 0.04 K/UL
BASOPHILS NFR BLD: 0.7 %
BILIRUB SERPL-MCNC: 0.7 MG/DL
BUN SERPL-MCNC: 33 MG/DL
C3 SERPL-MCNC: 113 MG/DL
C4 SERPL-MCNC: 18 MG/DL
CALCIUM SERPL-MCNC: 10.4 MG/DL
CHLORIDE SERPL-SCNC: 107 MMOL/L
CO2 SERPL-SCNC: 23 MMOL/L
CREAT SERPL-MCNC: 1.1 MG/DL
DIFFERENTIAL METHOD: ABNORMAL
EOSINOPHIL # BLD AUTO: 0.2 K/UL
EOSINOPHIL NFR BLD: 3.3 %
ERYTHROCYTE [DISTWIDTH] IN BLOOD BY AUTOMATED COUNT: 17.2 %
ERYTHROCYTE [SEDIMENTATION RATE] IN BLOOD BY WESTERGREN METHOD: 10 MM/HR
EST. GFR  (AFRICAN AMERICAN): >60 ML/MIN/1.73 M^2
EST. GFR  (NON AFRICAN AMERICAN): 53.6 ML/MIN/1.73 M^2
GLUCOSE SERPL-MCNC: 81 MG/DL
HCT VFR BLD AUTO: 34.5 %
HGB BLD-MCNC: 10.5 G/DL
IMM GRANULOCYTES # BLD AUTO: 0.01 K/UL
IMM GRANULOCYTES NFR BLD AUTO: 0.2 %
LYMPHOCYTES # BLD AUTO: 1.6 K/UL
LYMPHOCYTES NFR BLD: 26.5 %
MCH RBC QN AUTO: 24 PG
MCHC RBC AUTO-ENTMCNC: 30.4 G/DL
MCV RBC AUTO: 79 FL
MONOCYTES # BLD AUTO: 0.6 K/UL
MONOCYTES NFR BLD: 10.4 %
NEUTROPHILS # BLD AUTO: 3.6 K/UL
NEUTROPHILS NFR BLD: 58.9 %
NRBC BLD-RTO: 0 /100 WBC
PLATELET # BLD AUTO: 134 K/UL
PMV BLD AUTO: 13.1 FL
POTASSIUM SERPL-SCNC: 4.7 MMOL/L
PROT SERPL-MCNC: 7.4 G/DL
RBC # BLD AUTO: 4.37 M/UL
SODIUM SERPL-SCNC: 138 MMOL/L
WBC # BLD AUTO: 6.15 K/UL

## 2018-12-12 PROCEDURE — 36415 COLL VENOUS BLD VENIPUNCTURE: CPT | Mod: PO

## 2018-12-12 PROCEDURE — 86160 COMPLEMENT ANTIGEN: CPT

## 2018-12-12 PROCEDURE — 85025 COMPLETE CBC W/AUTO DIFF WBC: CPT

## 2018-12-12 PROCEDURE — 86160 COMPLEMENT ANTIGEN: CPT | Mod: 59

## 2018-12-12 PROCEDURE — 86225 DNA ANTIBODY NATIVE: CPT

## 2018-12-12 PROCEDURE — 85651 RBC SED RATE NONAUTOMATED: CPT | Mod: PO

## 2018-12-12 PROCEDURE — 80053 COMPREHEN METABOLIC PANEL: CPT

## 2018-12-13 ENCOUNTER — PATIENT MESSAGE (OUTPATIENT)
Dept: RHEUMATOLOGY | Facility: HOSPITAL | Age: 63
End: 2018-12-13

## 2018-12-13 LAB — DSDNA AB SER-ACNC: NORMAL [IU]/ML

## 2019-01-16 ENCOUNTER — OFFICE VISIT (OUTPATIENT)
Dept: ENDOCRINOLOGY | Facility: CLINIC | Age: 64
End: 2019-01-16
Payer: COMMERCIAL

## 2019-01-16 VITALS
HEIGHT: 63 IN | BODY MASS INDEX: 23.94 KG/M2 | DIASTOLIC BLOOD PRESSURE: 72 MMHG | TEMPERATURE: 98 F | WEIGHT: 135.13 LBS | SYSTOLIC BLOOD PRESSURE: 120 MMHG | HEART RATE: 68 BPM

## 2019-01-16 DIAGNOSIS — M32.9 SYSTEMIC LUPUS ERYTHEMATOSUS, UNSPECIFIED SLE TYPE, UNSPECIFIED ORGAN INVOLVEMENT STATUS: ICD-10-CM

## 2019-01-16 DIAGNOSIS — N18.2 CKD (CHRONIC KIDNEY DISEASE) STAGE 2, GFR 60-89 ML/MIN: ICD-10-CM

## 2019-01-16 DIAGNOSIS — E21.2 HYPERPARATHYROIDISM, TERTIARY: ICD-10-CM

## 2019-01-16 DIAGNOSIS — M85.80 OSTEOPENIA, UNSPECIFIED LOCATION: Primary | ICD-10-CM

## 2019-01-16 PROCEDURE — 3078F PR MOST RECENT DIASTOLIC BLOOD PRESSURE < 80 MM HG: ICD-10-PCS | Mod: CPTII,S$GLB,, | Performed by: INTERNAL MEDICINE

## 2019-01-16 PROCEDURE — 3074F SYST BP LT 130 MM HG: CPT | Mod: CPTII,S$GLB,, | Performed by: INTERNAL MEDICINE

## 2019-01-16 PROCEDURE — 3078F DIAST BP <80 MM HG: CPT | Mod: CPTII,S$GLB,, | Performed by: INTERNAL MEDICINE

## 2019-01-16 PROCEDURE — 3008F PR BODY MASS INDEX (BMI) DOCUMENTED: ICD-10-PCS | Mod: CPTII,S$GLB,, | Performed by: INTERNAL MEDICINE

## 2019-01-16 PROCEDURE — 99214 PR OFFICE/OUTPT VISIT, EST, LEVL IV, 30-39 MIN: ICD-10-PCS | Mod: S$GLB,,, | Performed by: INTERNAL MEDICINE

## 2019-01-16 PROCEDURE — 3008F BODY MASS INDEX DOCD: CPT | Mod: CPTII,S$GLB,, | Performed by: INTERNAL MEDICINE

## 2019-01-16 PROCEDURE — 3074F PR MOST RECENT SYSTOLIC BLOOD PRESSURE < 130 MM HG: ICD-10-PCS | Mod: CPTII,S$GLB,, | Performed by: INTERNAL MEDICINE

## 2019-01-16 PROCEDURE — 99999 PR PBB SHADOW E&M-EST. PATIENT-LVL III: ICD-10-PCS | Mod: PBBFAC,,, | Performed by: INTERNAL MEDICINE

## 2019-01-16 PROCEDURE — 99214 OFFICE O/P EST MOD 30 MIN: CPT | Mod: S$GLB,,, | Performed by: INTERNAL MEDICINE

## 2019-01-16 PROCEDURE — 99999 PR PBB SHADOW E&M-EST. PATIENT-LVL III: CPT | Mod: PBBFAC,,, | Performed by: INTERNAL MEDICINE

## 2019-01-16 NOTE — PROGRESS NOTES
Patient ID: Chanel Charles is a 63 y.o. female.  Patient is here for follow up        Chief Complaint: Osteoporosis      HPI    Consultation requested by No ref. provider found    HPI     Patient is here to establish care with new endocrinologist, formally seen by Dr. Aguirre but lives near [place]  Has tertiary hyperparathyroidism and osteoporosis      S/p kidney transplant on 4/6/15 (hx of ESRD due to lupus nephritis, hx dialysis 4.5 yrs prior to transplant), now with CKD Stage 2 stable, remains on Plaquenil, celceptt and prograf.   Prior to renal tx, had tertiary HPT (PTH levels up to 1241), with hypercalcemia ranging 10.5-11.   Post renal transplant, PTH gradually declined and (most recent 95), Ca normalized to ULN (10-10.5) phos ranging 2.5-3, and alk phos normalized.  However recent calcium only slightly high at 10.6 but she thinks is related to her being on a cruise where she had a lot of items that she normally would not eat.     BMD 7/2015 showed osteoporosis of l-spine, total hip and fem neck and she had a repeat August 2017 that showed improvement as it showed osteopenia.   Started fosamax 08/2015, intolerant due to nausea and abdominal pain.  Received IV reclast 1/2016, tolerated well and had her 2nd dose September 2017.      Fracture history: none  Falls: none  Weight bearing exercise: walking, dancing and lately she started home aerobic exercise via  Height loss:  None  Vitamin D status: replete, only takes MVI (Centrum Silver) and also she is taking vitamin-D supplement 1000 IU only every other day or 3 days a week  No HCTZ.  Steroid therapy: inhaled steroids (symbicort prn)  No h/o kidney stones.  No h/o diarrhea, malabsorption, or bypass   Menopause 47, remote hx ert long ago.      She is followed by Dr. Jj, rheumatologist and Dr. Joseph nephrologist    Her 24 hr urine calcium last year in 2017 was not elevated    I have reviewed the past medical, family and social history    Review of  Systems   Constitutional: Negative for appetite change, fatigue, fever and unexpected weight change.   HENT: Negative for sore throat and trouble swallowing.    Eyes: Negative for visual disturbance.   Respiratory: Negative for shortness of breath and wheezing.    Cardiovascular: Negative for chest pain, palpitations and leg swelling.   Gastrointestinal: Negative for diarrhea, nausea and vomiting.   Endocrine: Negative for cold intolerance, heat intolerance, polydipsia, polyphagia and polyuria.   Genitourinary: Negative for difficulty urinating, dysuria and menstrual problem.   Musculoskeletal: Negative for arthralgias and joint swelling.   Skin: Negative for rash.   Neurological: Negative for dizziness, weakness, numbness and headaches.   Psychiatric/Behavioral: Negative for confusion, dysphoric mood and sleep disturbance.       Objective:      Physical Exam   Constitutional: She appears well-developed and well-nourished. No distress.   HENT:   Head: Normocephalic and atraumatic.   Eyes: Conjunctivae are normal.   Neck: No JVD present. No tracheal deviation present. No thyromegaly present.   Cardiovascular: Normal rate, regular rhythm, normal heart sounds and intact distal pulses. Exam reveals no gallop and no friction rub.   No murmur heard.  Pulmonary/Chest: Effort normal and breath sounds normal. No stridor. No respiratory distress. She has no wheezes. She has no rales. She exhibits no tenderness.   Musculoskeletal: She exhibits no edema or deformity.   Lymphadenopathy:     She has no cervical adenopathy.   Neurological: She is alert.   Skin: She is not diaphoretic.   Vitals reviewed.        Lab Review:   Lab Visit on 12/12/2018   Component Date Value    Complement (C-3) 12/12/2018 113     Complement (C-4) 12/12/2018 18     ds DNA Ab 12/12/2018 Negative 1:10     WBC 12/12/2018 6.15     RBC 12/12/2018 4.37     Hemoglobin 12/12/2018 10.5*    Hematocrit 12/12/2018 34.5*    MCV 12/12/2018 79*    MCH  12/12/2018 24.0*    MCHC 12/12/2018 30.4*    RDW 12/12/2018 17.2*    Platelets 12/12/2018 134*    MPV 12/12/2018 13.1*    Immature Granulocytes 12/12/2018 0.2     Gran # (ANC) 12/12/2018 3.6     Immature Grans (Abs) 12/12/2018 0.01     Lymph # 12/12/2018 1.6     Mono # 12/12/2018 0.6     Eos # 12/12/2018 0.2     Baso # 12/12/2018 0.04     nRBC 12/12/2018 0     Gran% 12/12/2018 58.9     Lymph% 12/12/2018 26.5     Mono% 12/12/2018 10.4     Eosinophil% 12/12/2018 3.3     Basophil% 12/12/2018 0.7     Differential Method 12/12/2018 Automated     Sodium 12/12/2018 138     Potassium 12/12/2018 4.7     Chloride 12/12/2018 107     CO2 12/12/2018 23     Glucose 12/12/2018 81     BUN, Bld 12/12/2018 33*    Creatinine 12/12/2018 1.1     Calcium 12/12/2018 10.4     Total Protein 12/12/2018 7.4     Albumin 12/12/2018 4.1     Total Bilirubin 12/12/2018 0.7     Alkaline Phosphatase 12/12/2018 49*    AST 12/12/2018 26     ALT 12/12/2018 20     Anion Gap 12/12/2018 8     eGFR if African American 12/12/2018 >60.0     eGFR if non  Amer* 12/12/2018 53.6*    Sed Rate 12/12/2018 10    Lab Visit on 12/12/2018   Component Date Value    Specimen UA 12/12/2018 Urine, Clean Catch     Color, UA 12/12/2018 Yellow     Appearance, UA 12/12/2018 Clear     pH, UA 12/12/2018 6.0     Specific Gravity, UA 12/12/2018 1.010     Protein, UA 12/12/2018 Negative     Glucose, UA 12/12/2018 Negative     Ketones, UA 12/12/2018 Negative     Bilirubin (UA) 12/12/2018 Negative     Occult Blood UA 12/12/2018 Negative     Nitrite, UA 12/12/2018 Negative     Leukocytes, UA 12/12/2018 1+*    Protein, Urine Random 12/12/2018 <7     Creatinine, Random Ur 12/12/2018 56.0     Prot/Creat Ratio, Ur 12/12/2018 Unable to calculate     WBC, UA 12/12/2018 6*    Microscopic Comment 12/12/2018 SEE COMMENT    Lab Visit on 09/12/2018   Component Date Value    Magnesium 09/12/2018 1.8     Tacrolimus Lvl 09/12/2018 5.4      Uric Acid 09/12/2018 6.2*    Vit D, 25-Hydroxy 09/12/2018 49     PTH, Intact 09/12/2018 118.0*    Magnesium 09/12/2018 1.8     WBC 09/12/2018 4.70     RBC 09/12/2018 4.24     Hemoglobin 09/12/2018 10.5*    Hematocrit 09/12/2018 33.9*    MCV 09/12/2018 80*    MCH 09/12/2018 24.8*    MCHC 09/12/2018 31.0*    RDW 09/12/2018 17.4*    Platelets 09/12/2018 129*    MPV 09/12/2018 SEE COMMENT     Immature Granulocytes 09/12/2018 0.0     Gran # (ANC) 09/12/2018 2.2     Immature Grans (Abs) 09/12/2018 0.00     Lymph # 09/12/2018 1.7     Mono # 09/12/2018 0.6     Eos # 09/12/2018 0.2     Baso # 09/12/2018 0.04     nRBC 09/12/2018 0     Gran% 09/12/2018 45.6     Lymph% 09/12/2018 36.2     Mono% 09/12/2018 12.6     Eosinophil% 09/12/2018 4.7     Basophil% 09/12/2018 0.9     Differential Method 09/12/2018 Automated    Lab Visit on 09/12/2018   Component Date Value    Sodium 09/12/2018 138     Potassium 09/12/2018 4.4     Chloride 09/12/2018 106     CO2 09/12/2018 24     Glucose 09/12/2018 97     BUN, Bld 09/12/2018 19     Creatinine 09/12/2018 1.1     Calcium 09/12/2018 10.3     Anion Gap 09/12/2018 8     eGFR if African American 09/12/2018 >60     eGFR if non African Amer* 09/12/2018 54*   Lab Visit on 09/12/2018   Component Date Value    Specimen UA 09/12/2018 Urine, Clean Catch     Color, UA 09/12/2018 Yellow     Appearance, UA 09/12/2018 Clear     pH, UA 09/12/2018 6.0     Specific Gravity, UA 09/12/2018 <=1.005*    Protein, UA 09/12/2018 Negative     Glucose, UA 09/12/2018 Negative     Ketones, UA 09/12/2018 Negative     Bilirubin (UA) 09/12/2018 Negative     Occult Blood UA 09/12/2018 Negative     Nitrite, UA 09/12/2018 Negative     Leukocytes, UA 09/12/2018 Negative        Assessment:     1. Osteopenia, unspecified location  DXA Bone Density Spine And Hip    Vitamin D    Alkaline phosphatase   2. Hyperparathyroidism, tertiary  DXA Bone Density Spine And Hip    Vitamin D     Alkaline phosphatase   3. CKD (chronic kidney disease) stage 2, GFR 60-89 ml/min  DXA Bone Density Spine And Hip    Vitamin D    Alkaline phosphatase   4. Systemic lupus erythematosus, unspecified SLE type, unspecified organ involvement status      all of the above her stable.  Her bone density showed improvement last time.  Continue her current regimen.  I will add the labs below to her upcoming lab appointment and she will follow-up with me in August after her bone density.  Will decide at that time whether she could have a drug holiday or if to proceed with her IV Reclast  Plan:   Osteopenia, unspecified location  -     DXA Bone Density Spine And Hip; Future; Expected date: 08/01/2019  -     Vitamin D; Future; Expected date: 01/16/2019  -     Alkaline phosphatase; Future; Expected date: 01/16/2019    Hyperparathyroidism, tertiary  -     DXA Bone Density Spine And Hip; Future; Expected date: 08/01/2019  -     Vitamin D; Future; Expected date: 01/16/2019  -     Alkaline phosphatase; Future; Expected date: 01/16/2019    CKD (chronic kidney disease) stage 2, GFR 60-89 ml/min  -     DXA Bone Density Spine And Hip; Future; Expected date: 08/01/2019  -     Vitamin D; Future; Expected date: 01/16/2019  -     Alkaline phosphatase; Future; Expected date: 01/16/2019    Systemic lupus erythematosus, unspecified SLE type, unspecified organ involvement status          No Follow-up on file.    Labs prior to appointment? yes     Disclaimer:  This note may have been partially prepared using voice recognition software and  it may have not been extensively proofed, as such there could be errors within the text such as sound alike errors.

## 2019-01-22 ENCOUNTER — LAB VISIT (OUTPATIENT)
Dept: LAB | Facility: HOSPITAL | Age: 64
End: 2019-01-22
Attending: INTERNAL MEDICINE
Payer: COMMERCIAL

## 2019-01-22 DIAGNOSIS — Z94.0 S/P KIDNEY TRANSPLANT: ICD-10-CM

## 2019-01-22 DIAGNOSIS — M81.0 OSTEOPOROSIS, UNSPECIFIED OSTEOPOROSIS TYPE, UNSPECIFIED PATHOLOGICAL FRACTURE PRESENCE: ICD-10-CM

## 2019-01-22 DIAGNOSIS — M85.80 OSTEOPENIA, UNSPECIFIED LOCATION: ICD-10-CM

## 2019-01-22 DIAGNOSIS — E21.2 HYPERPARATHYROIDISM, TERTIARY: ICD-10-CM

## 2019-01-22 DIAGNOSIS — N18.2 CKD (CHRONIC KIDNEY DISEASE) STAGE 2, GFR 60-89 ML/MIN: ICD-10-CM

## 2019-01-22 LAB
25(OH)D3+25(OH)D2 SERPL-MCNC: 46 NG/ML
ALBUMIN SERPL BCP-MCNC: 3.9 G/DL
ALP SERPL-CCNC: 52 U/L
ANION GAP SERPL CALC-SCNC: 5 MMOL/L
ANION GAP SERPL CALC-SCNC: 5 MMOL/L
BASOPHILS # BLD AUTO: 0.04 K/UL
BASOPHILS NFR BLD: 0.8 %
BUN SERPL-MCNC: 20 MG/DL
BUN SERPL-MCNC: 20 MG/DL
CALCIUM SERPL-MCNC: 10.4 MG/DL
CALCIUM SERPL-MCNC: 10.4 MG/DL
CHLORIDE SERPL-SCNC: 105 MMOL/L
CHLORIDE SERPL-SCNC: 105 MMOL/L
CO2 SERPL-SCNC: 25 MMOL/L
CO2 SERPL-SCNC: 25 MMOL/L
CREAT SERPL-MCNC: 1 MG/DL
CREAT SERPL-MCNC: 1 MG/DL
DIFFERENTIAL METHOD: ABNORMAL
EOSINOPHIL # BLD AUTO: 0.2 K/UL
EOSINOPHIL NFR BLD: 4.2 %
ERYTHROCYTE [DISTWIDTH] IN BLOOD BY AUTOMATED COUNT: 17.2 %
EST. GFR  (AFRICAN AMERICAN): >60 ML/MIN/1.73 M^2
EST. GFR  (AFRICAN AMERICAN): >60 ML/MIN/1.73 M^2
EST. GFR  (NON AFRICAN AMERICAN): >60 ML/MIN/1.73 M^2
EST. GFR  (NON AFRICAN AMERICAN): >60 ML/MIN/1.73 M^2
GLUCOSE SERPL-MCNC: 88 MG/DL
GLUCOSE SERPL-MCNC: 88 MG/DL
HCT VFR BLD AUTO: 33.8 %
HGB BLD-MCNC: 10.6 G/DL
IMM GRANULOCYTES # BLD AUTO: 0.01 K/UL
IMM GRANULOCYTES NFR BLD AUTO: 0.2 %
LYMPHOCYTES # BLD AUTO: 1.6 K/UL
LYMPHOCYTES NFR BLD: 30.4 %
MAGNESIUM SERPL-MCNC: 1.7 MG/DL
MCH RBC QN AUTO: 24.9 PG
MCHC RBC AUTO-ENTMCNC: 31.4 G/DL
MCV RBC AUTO: 79 FL
MONOCYTES # BLD AUTO: 0.7 K/UL
MONOCYTES NFR BLD: 12.8 %
NEUTROPHILS # BLD AUTO: 2.7 K/UL
NEUTROPHILS NFR BLD: 51.6 %
NRBC BLD-RTO: 0 /100 WBC
PHOSPHATE SERPL-MCNC: 3.2 MG/DL
PLATELET # BLD AUTO: 108 K/UL
PMV BLD AUTO: ABNORMAL FL
POTASSIUM SERPL-SCNC: 4.8 MMOL/L
POTASSIUM SERPL-SCNC: 4.8 MMOL/L
PTH-INTACT SERPL-MCNC: 121 PG/ML
RBC # BLD AUTO: 4.26 M/UL
SODIUM SERPL-SCNC: 135 MMOL/L
SODIUM SERPL-SCNC: 135 MMOL/L
WBC # BLD AUTO: 5.23 K/UL

## 2019-01-22 PROCEDURE — 80197 ASSAY OF TACROLIMUS: CPT

## 2019-01-22 PROCEDURE — 82306 VITAMIN D 25 HYDROXY: CPT

## 2019-01-22 PROCEDURE — 85025 COMPLETE CBC W/AUTO DIFF WBC: CPT

## 2019-01-22 PROCEDURE — 36415 COLL VENOUS BLD VENIPUNCTURE: CPT

## 2019-01-22 PROCEDURE — 83735 ASSAY OF MAGNESIUM: CPT

## 2019-01-22 PROCEDURE — 80069 RENAL FUNCTION PANEL: CPT

## 2019-01-22 PROCEDURE — 83970 ASSAY OF PARATHORMONE: CPT

## 2019-01-22 PROCEDURE — 84075 ASSAY ALKALINE PHOSPHATASE: CPT

## 2019-01-23 ENCOUNTER — PATIENT MESSAGE (OUTPATIENT)
Dept: ENDOCRINOLOGY | Facility: CLINIC | Age: 64
End: 2019-01-23

## 2019-01-23 LAB — TACROLIMUS BLD-MCNC: 3.8 NG/ML

## 2019-01-28 ENCOUNTER — OFFICE VISIT (OUTPATIENT)
Dept: NEPHROLOGY | Facility: CLINIC | Age: 64
End: 2019-01-28
Payer: COMMERCIAL

## 2019-01-28 VITALS
DIASTOLIC BLOOD PRESSURE: 58 MMHG | BODY MASS INDEX: 23.51 KG/M2 | HEART RATE: 88 BPM | WEIGHT: 132.69 LBS | SYSTOLIC BLOOD PRESSURE: 100 MMHG | HEIGHT: 63 IN

## 2019-01-28 DIAGNOSIS — Z94.0 S/P KIDNEY TRANSPLANT: Primary | ICD-10-CM

## 2019-01-28 PROCEDURE — 3008F PR BODY MASS INDEX (BMI) DOCUMENTED: ICD-10-PCS | Mod: CPTII,S$GLB,, | Performed by: INTERNAL MEDICINE

## 2019-01-28 PROCEDURE — 3074F PR MOST RECENT SYSTOLIC BLOOD PRESSURE < 130 MM HG: ICD-10-PCS | Mod: CPTII,S$GLB,, | Performed by: INTERNAL MEDICINE

## 2019-01-28 PROCEDURE — 3074F SYST BP LT 130 MM HG: CPT | Mod: CPTII,S$GLB,, | Performed by: INTERNAL MEDICINE

## 2019-01-28 PROCEDURE — 99214 OFFICE O/P EST MOD 30 MIN: CPT | Mod: S$GLB,,, | Performed by: INTERNAL MEDICINE

## 2019-01-28 PROCEDURE — 3078F PR MOST RECENT DIASTOLIC BLOOD PRESSURE < 80 MM HG: ICD-10-PCS | Mod: CPTII,S$GLB,, | Performed by: INTERNAL MEDICINE

## 2019-01-28 PROCEDURE — 99999 PR PBB SHADOW E&M-EST. PATIENT-LVL III: ICD-10-PCS | Mod: PBBFAC,,, | Performed by: INTERNAL MEDICINE

## 2019-01-28 PROCEDURE — 99214 PR OFFICE/OUTPT VISIT, EST, LEVL IV, 30-39 MIN: ICD-10-PCS | Mod: S$GLB,,, | Performed by: INTERNAL MEDICINE

## 2019-01-28 PROCEDURE — 99999 PR PBB SHADOW E&M-EST. PATIENT-LVL III: CPT | Mod: PBBFAC,,, | Performed by: INTERNAL MEDICINE

## 2019-01-28 PROCEDURE — 3008F BODY MASS INDEX DOCD: CPT | Mod: CPTII,S$GLB,, | Performed by: INTERNAL MEDICINE

## 2019-01-28 PROCEDURE — 3078F DIAST BP <80 MM HG: CPT | Mod: CPTII,S$GLB,, | Performed by: INTERNAL MEDICINE

## 2019-01-28 NOTE — PROGRESS NOTES
Subjective:       Patient ID: Chanel Charles is a 63 y.o.   female who presents for follow-up evaluation of Living Unrelated RT ( 4/6/2015 ) , HTN, SHPT ,       ORGAN: RIGHT KIDNEY    Donor Type: living    PHS Increased Risk: no    Cold Ischemia: 58 mins    Induction Medications: campath - alemtuzumab (anti-cd52)          Gabriel Wyman MD      HPI : Chanel Charles is a pleasant 63-year-old -American woman with history of Living unrelated ( friend ) renal transplant in 4/6/2015, excellent allograft function on Prograf 1 mg BID , CellCept 500 mg twice a day. She has history of lupus nephritis, cause of end-stage kidney disease. She was on hemodialysis for about 4-1/2 years at [facility], recent office notes were reviewed. She follows with rheumatology Department in [place] , in the past she was followed by endocrinology Department for osteopenia/osteoporosis, she also had hypercalcemia for which she received IV Reclast ( Zoledronic acid ),   recent lab results were discussed with the patient , stable allograft fn ,        Past Medical History:   Diagnosis Date    Abnormal stress echo - with no blockage on LHC - 1/16/14 1/7/2014    Acid reflux     Anemia of chronic kidney failure     Anxiety     Arthritis     Awaiting kidney transplant 12/12/2013    CHF (congestive heart failure)     Coronary artery disease     Bare metal stent in 2007    ESRD (end stage renal disease)     Secondary to Lupus Nephritis, HD MWF    Hypercalcemia 7/15/2015    Hyperlipidemia 12/12/2013    Hyperparathyroidism 7/15/2015    Hyperparathyroidism, tertiary 8/7/2015    Immunocompromised state 5/5/2015    ITP (idiopathic thrombocytopenic purpura) 12/12/2013    Living-donor kidney transplant for SLE 4/6/15 4/6/2015    Induced with Thymoglobulin.     Lupus nephritis     Lupus nephritis 5/18/2015    Menopause 7/15/2015    Osteoporosis 12/24/2015    Pericardial  effusion s/p pericardiocentesis 12/12/2013    Pleural effusion s/p Thoracentesis and VATS 12/12/2013    Prophylactic immunotherapy 6/29/2015    S/p Bilateral Nephrectomy (Severe proteinuria) - 2012 3/19/2015    Secondary hyperparathyroidism, renal     Shingles 4/2012    SLE (systemic lupus erythematosus) 1997       Current Outpatient Medications on File Prior to Visit   Medication Sig Dispense Refill    alprazolam (XANAX) 0.5 MG tablet Take 0.5 mg by mouth. 1 Tablet Oral Every day.  or as needed.      aspirin (ECOTRIN) 81 MG EC tablet Take 81 mg by mouth once daily.       atorvastatin (LIPITOR) 80 MG tablet Take 80 mg by mouth every evening. 1 Tablet Oral Every day      BIOTIN ORAL Take 1,000 mg by mouth Daily.       eszopiclone (LUNESTA) 2 MG Tab Take 2 mg by mouth. 1 Tablet Oral As directed.  1 tablet 1 time as needed at bedtime.      famotidine (PEPCID) 20 MG tablet Take 1 tablet (20 mg total) by mouth every evening. (Patient taking differently: Take 20 mg by mouth 2 (two) times daily as needed. ) 30 tablet 11    fluticasone (FLONASE) 50 mcg/actuation nasal spray 1 spray by Each Nare route once daily. (Patient taking differently: 1 spray by Each Nare route as needed. )  0    hydroxychloroquine (PLAQUENIL) 200 mg tablet TAKE 1 TABLET DAILY 90 tablet 2    loratadine (CLARITIN) 10 mg tablet Take 1 tablet (10 mg total) by mouth daily as needed for Allergies.  0    magnesium oxide (MAG-OX) 400 mg tablet Take 1 tablet (400 mg total) by mouth once daily. (Patient taking differently: Take 400 mg by mouth once daily. ) 30 tablet 9    multivitamin (THERAGRAN) tablet Take 1 tablet by mouth once daily.      mycophenolate (CELLCEPT) 250 mg Cap TAKE TWO CAPSULES BY MOUTH TWICE A  capsule 11    tacrolimus (PROGRAF) 1 MG Cap TAKE 1 CAPSULE BY MOUTH EVERY MORNING AND 1 CAPSULE EVERY EVENING 180 capsule 3    valacyclovir (VALTREX) 1000 MG tablet Take 1 tablet by mouth as needed.       vitamin D 1000  units Tab Take 1,000 Units by mouth every Mon, Wed, Fri.      [DISCONTINUED] tacrolimus (PROGRAF) 1 MG Cap Take 1 mg in AM and 1 mg in PM; Route: Oral 60 capsule 6    [DISCONTINUED] BIOTIN ORAL Take by mouth.       No current facility-administered medications on file prior to visit.      SH : She is a retired teacher, principal. She is not a smoker or alcohol drinker. She has a son who is 32 years old. She is currently living at home.       FH : Several family members with hypertension. Lupus in a distant uncle        Review of Systems  :      Constitutional: Negative for activity change, appetite change, fatigue and fever.   HENT: Negative for congestion, facial swelling, sore throat, trouble swallowing and voice change.    Eyes: Negative for redness and visual disturbance.   Respiratory: Negative for apnea, cough, chest tightness, shortness of breath and wheezing.    Cardiovascular: Negative for chest pain, palpitations and leg swelling.   Gastrointestinal: Negative for abdominal distention, abdominal pain, blood in stool, constipation, diarrhea, nausea and vomiting.   Genitourinary: Negative for decreased urine volume, difficulty urinating, dysuria, flank pain, frequency, hematuria, pelvic pain and urgency.   Musculoskeletal: Positive for arthralgias. Negative for back pain, gait problem and joint swelling.   Skin: Negative for color change and rash.   Neurological: Negative for dizziness, syncope, weakness and headaches.   Hematological: Does not bruise/bleed easily.   Psychiatric/Behavioral: Negative for agitation, behavioral problems and confusion. The patient is not nervous/anxious.          Objective:           Vitals:    01/28/19 1600   BP: (!) 100/58   Pulse: 88       WEIGHT 132 LB, STABLE    Physical Exam  :    Constitutional: She is oriented to person, place, and time. She appears well-developed and well-nourished. No distress.    HENT: Head: Normocephalic and atraumatic. Mouth/Throat: Oropharynx is  clear and moist. No oropharyngeal exudate.    Eyes: Conjunctivae and EOM are normal. Pupils are equal, round, and reactive to light. No scleral icterus.    Neck: Normal range of motion. Neck supple. No JVD present. Carotid bruit is not present. No tracheal deviation present. No thyroid mass and no thyromegaly present.    Cardiovascular: Normal rate, regular rhythm, normal heart sounds and intact distal pulses. Exam reveals no gallop and no friction rub.    No murmur heard.    Pulmonary/Chest: Effort normal and breath sounds normal. No respiratory distress. She has no wheezes. She has no rales. She exhibits no tenderness.    Abdominal: Soft. Bowel sounds are normal. She exhibits no distension, no abdominal bruit, no ascites and no mass. There is no hepatosplenomegaly. There is no allgraft ( RLQ ) tenderness. There is no rebound, no guarding and no CVA tenderness.   Musculoskeletal: Normal range of motion. She exhibits no edema or tenderness. LUE AVF with a good thrill,   Lymphadenopathy: She has no cervical adenopathy.   Neurological: She is alert and oriented to person, place, and time. No cranial nerve deficit. She exhibits normal muscle tone. Coordination normal.    Skin: Skin is warm and intact. No rash noted. No erythema. No pallor.   Psychiatric: She has a normal mood and affect. Her behavior is normal. Judgment normal.               Labs:    Lab Results   Component Value Date    CREATININE 1.0 01/22/2019    CREATININE 1.0 01/22/2019    BUN 20 01/22/2019    BUN 20 01/22/2019     (L) 01/22/2019     (L) 01/22/2019    K 4.8 01/22/2019    K 4.8 01/22/2019     01/22/2019     01/22/2019    CO2 25 01/22/2019    CO2 25 01/22/2019       Lab Results   Component Value Date    WBC 5.23 01/22/2019    HGB 10.6 (L) 01/22/2019    HCT 33.8 (L) 01/22/2019    MCV 79 (L) 01/22/2019     (L) 01/22/2019       Lab Results   Component Value Date    .0 (H) 01/22/2019    CALCIUM 10.4 01/22/2019     CALCIUM 10.4 01/22/2019    PHOS 3.2 01/22/2019       Lab Results   Component Value Date    ALBUMIN 3.9 01/22/2019       Lab Results   Component Value Date    URICACID 6.2 (H) 09/12/2018       Prograf level 3.8       Impression and Plan : 63-year-old -American woman seen in office today in follow-up for following medical problems ,       1. Status post Living Unrelated kidney transplant ( 4/6/2015 ) : Stable renal function with a serum creatinine of 1 mg/dL.        Serum Prograf levels is 3.8  ( range 4-7 ) , Prograf  1 mg twice daily.  Cont CellCept 500 mg twice a day.     Prograf level is slightly low, will repeat,         2. Hypertension - blood pressure is controlled,  she is not on any blood pressure medications, patient is asymptomatic.      3. Hyperparathyroidism - follow PTH , mild hypercalcemia noted, ? Tertiary  hyperpara ,  will lower Vit D dose to 2000 units weekly ,       4. Anemia of chronic kidney disease - most recent hemoglobin is 10.6 g. will continue to monitor.        5. Recent urinalysis shows normal range proteinuria.        6. Lupus nephritis - currently under remission. On Plaquenil, sees Dr Jj in [place] ,       7. Lytes -  on Mag supplements ,      8. Euvolemic on exam ,       9. Leukopenia : resolved, she is following with hematologist Karsten ()        10. Hyperkalemia : improved,       11.  Thrombocytopenia : mild, monitor,       follow-up in about 6 months.  She has an appointment with transplant team in 3 months,    More than 25 minutes of face-to-face time discussing labs and plan of care.       Sincerely,        Олег Joseph MD

## 2019-01-28 NOTE — LETTER
January 28, 2019        Gabriel Wyman MD  [address] [facility] [address]             [facility] - Nephrology  [address] [facility] [address]  Phone: [phone]  Fax: [phone]   Patient: Chanel Charles   MR Number: [id number]   YOB: 1955   Date of Visit: 1/28/2019       Dear Dr. Wyman:    Thank you for referring Chanel Charles to me for evaluation. Attached you will find relevant portions of my assessment and plan of care.    If you have questions, please do not hesitate to call me. I look forward to following Chanel Charles along with you.    Sincerely,      Олег Joseph MD

## 2019-01-28 NOTE — PATIENT INSTRUCTIONS
Avoid NSAID pain medications such as advil, aleve, motrin, ibuprofen, naprosyn, meloxicam, diclofenac, mobic.       Reduce Vitamin d to 2000 units once a week

## 2019-02-05 ENCOUNTER — LAB VISIT (OUTPATIENT)
Dept: LAB | Facility: HOSPITAL | Age: 64
End: 2019-02-05
Attending: INTERNAL MEDICINE
Payer: COMMERCIAL

## 2019-02-05 DIAGNOSIS — Z94.0 S/P KIDNEY TRANSPLANT: ICD-10-CM

## 2019-02-05 PROCEDURE — 80197 ASSAY OF TACROLIMUS: CPT

## 2019-02-05 PROCEDURE — 36415 COLL VENOUS BLD VENIPUNCTURE: CPT

## 2019-02-06 DIAGNOSIS — Z94.0 S/P KIDNEY TRANSPLANT: Primary | ICD-10-CM

## 2019-02-06 LAB — TACROLIMUS BLD-MCNC: 3.6 NG/ML

## 2019-02-06 RX ORDER — TACROLIMUS 1 MG/1
CAPSULE ORAL
Qty: 270 CAPSULE | Refills: 3
Start: 2019-02-06 | End: 2019-02-21 | Stop reason: SDUPTHER

## 2019-02-06 NOTE — PROGRESS NOTES
Discussed repeat Prograf level, was 3.6, target is 4-7, increased Prograf from 1 mg twice a day to 2 mg in a.m. and 1 mg in p.m., recheck in a week,    Dr Joseph

## 2019-02-12 ENCOUNTER — OFFICE VISIT (OUTPATIENT)
Dept: RHEUMATOLOGY | Facility: CLINIC | Age: 64
End: 2019-02-12
Payer: COMMERCIAL

## 2019-02-12 VITALS
HEART RATE: 71 BPM | BODY MASS INDEX: 24.18 KG/M2 | WEIGHT: 136.44 LBS | DIASTOLIC BLOOD PRESSURE: 72 MMHG | HEIGHT: 63 IN | SYSTOLIC BLOOD PRESSURE: 134 MMHG

## 2019-02-12 DIAGNOSIS — M65.342 TRIGGER RING FINGER OF LEFT HAND: ICD-10-CM

## 2019-02-12 DIAGNOSIS — M65.341 TRIGGER FINGER, RIGHT RING FINGER: ICD-10-CM

## 2019-02-12 DIAGNOSIS — M32.14 LUPUS NEPHRITIS: ICD-10-CM

## 2019-02-12 DIAGNOSIS — M65.332 TRIGGER FINGER, LEFT MIDDLE FINGER: ICD-10-CM

## 2019-02-12 DIAGNOSIS — D84.9 IMMUNOSUPPRESSION: ICD-10-CM

## 2019-02-12 DIAGNOSIS — M32.8 OTHER FORMS OF SYSTEMIC LUPUS ERYTHEMATOSUS, UNSPECIFIED ORGAN INVOLVEMENT STATUS: Primary | ICD-10-CM

## 2019-02-12 DIAGNOSIS — M65.331 TRIGGER MIDDLE FINGER OF RIGHT HAND: ICD-10-CM

## 2019-02-12 DIAGNOSIS — Z94.0 LIVING-DONOR KIDNEY TRANSPLANT RECIPIENT: Chronic | ICD-10-CM

## 2019-02-12 PROCEDURE — 3078F DIAST BP <80 MM HG: CPT | Mod: CPTII,S$GLB,, | Performed by: INTERNAL MEDICINE

## 2019-02-12 PROCEDURE — 99999 PR PBB SHADOW E&M-EST. PATIENT-LVL III: ICD-10-PCS | Mod: PBBFAC,,, | Performed by: INTERNAL MEDICINE

## 2019-02-12 PROCEDURE — 3075F PR MOST RECENT SYSTOLIC BLOOD PRESS GE 130-139MM HG: ICD-10-PCS | Mod: CPTII,S$GLB,, | Performed by: INTERNAL MEDICINE

## 2019-02-12 PROCEDURE — 3075F SYST BP GE 130 - 139MM HG: CPT | Mod: CPTII,S$GLB,, | Performed by: INTERNAL MEDICINE

## 2019-02-12 PROCEDURE — 99999 PR PBB SHADOW E&M-EST. PATIENT-LVL III: CPT | Mod: PBBFAC,,, | Performed by: INTERNAL MEDICINE

## 2019-02-12 PROCEDURE — 3078F PR MOST RECENT DIASTOLIC BLOOD PRESSURE < 80 MM HG: ICD-10-PCS | Mod: CPTII,S$GLB,, | Performed by: INTERNAL MEDICINE

## 2019-02-12 PROCEDURE — 99214 OFFICE O/P EST MOD 30 MIN: CPT | Mod: S$GLB,,, | Performed by: INTERNAL MEDICINE

## 2019-02-12 PROCEDURE — 3008F PR BODY MASS INDEX (BMI) DOCUMENTED: ICD-10-PCS | Mod: CPTII,S$GLB,, | Performed by: INTERNAL MEDICINE

## 2019-02-12 PROCEDURE — 99214 PR OFFICE/OUTPT VISIT, EST, LEVL IV, 30-39 MIN: ICD-10-PCS | Mod: S$GLB,,, | Performed by: INTERNAL MEDICINE

## 2019-02-12 PROCEDURE — 3008F BODY MASS INDEX DOCD: CPT | Mod: CPTII,S$GLB,, | Performed by: INTERNAL MEDICINE

## 2019-02-12 NOTE — PROGRESS NOTES
Subjective:       Patient ID: Chanel Charles is a 63 y.o. female.    Chief Complaint: Hand pain with trigger finger    HPI:  Chanel Charles is a 63 y.o. female with SLE and renal failure as well an extensive history of recurrent pleural effusions and pericardial effusion treated with recurrent thoracentesis (x3) and pericardiocentesis. She is s/p kidney transplant 4/6/2015. Patient in the past had 3-4 paracentesis for accumulation of abdominal fluid (last time was December 2011). She was hospitalized 4/2012 for acute altered mental status, slurred speech concerning for CVA. Dr. Ribera (rheum) was consulted and he did not think it was her lupus. It was felt that her AMS was caused by gabapentin and/or valtrex as those were new medications. After these medications were stopped she returned to her baseline function. She has history of a lung nodule and eventual TB results were negative. Patient had VATS procedure 3/2011 which showed a granuloma thought to be due histoplamosis but work up was negative. Patient had kidneys removed 4/2011 to help decrease protein loss from the body.    She was given Rituxan (4 infusions total) and later Nplate for thrombocytopenia by her hematologist and is back on Nplate.   Hematologist is Dr. Shelley () and platelets were 150s or so.  She has been off Nplate since transplant.       Colonoscopy 8/19/14 showed benign polyps, diverticulosis, internal and external hemrrhoids  Mammogram 6/24/14 was negative.  Pap smear 6/30/14 negative for lesion or malignancy          Interval History:    Here for follow up.  Denies any issues.  Trigger finger injection from ortho helped but now with symptoms.  Exercising regularly.      Review of Systems   Constitutional: Positive for fatigue. Negative for fever and unexpected weight change.   HENT: Negative for trouble swallowing.    Eyes: Negative for redness.   Respiratory: Positive for cough. Negative for shortness of breath.   "  Cardiovascular: Negative for chest pain.   Gastrointestinal: Negative for constipation and diarrhea.   Endocrine: Negative.    Genitourinary: Negative for dysuria and genital sores.   Musculoskeletal: Negative.    Skin: Negative for rash.   Allergic/Immunologic: Negative.    Neurological: Negative for headaches.   Hematological: Does not bruise/bleed easily.   Psychiatric/Behavioral: Negative.          Objective:   /72   Pulse 71   Ht 5' 3" (1.6 m)   Wt 61.9 kg (136 lb 7.4 oz)   BMI 24.17 kg/m²      Physical Exam   Constitutional: She is oriented to person, place, and time and well-developed, well-nourished, and in no distress.   HENT:   Head: Normocephalic and atraumatic.   Eyes: Conjunctivae and EOM are normal.   Neck: Neck supple.   Cardiovascular: Normal rate and regular rhythm.    Pulmonary/Chest: Effort normal and breath sounds normal.   Abdominal: Soft. Bowel sounds are normal.   Neurological: She is alert and oriented to person, place, and time. Gait normal.   Skin: Skin is warm and dry.     Psychiatric: Mood and affect normal.   Musculoskeletal: She exhibits tenderness (right 3rd flexor tendon nodule and left 3rd and 4th flexor tendon nodule).   Squaring of 1st CMC bilaterally       LABS    Component      Latest Ref Rng & Units 1/22/2019 1/22/2019 1/22/2019           8:33 AM  8:33 AM  8:06 AM   WBC      3.90 - 12.70 K/uL  5.23    RBC      4.00 - 5.40 M/uL  4.26    Hemoglobin      12.0 - 16.0 g/dL  10.6 (L)    Hematocrit      37.0 - 48.5 %  33.8 (L)    MCV      82 - 98 fL  79 (L)    MCH      27.0 - 31.0 pg  24.9 (L)    MCHC      32.0 - 36.0 g/dL  31.4 (L)    RDW      11.5 - 14.5 %  17.2 (H)    Platelets      150 - 350 K/uL  108 (L)    MPV      9.2 - 12.9 fL  SEE COMMENT    Immature Granulocytes      0.0 - 0.5 %  0.2    Gran # (ANC)      1.8 - 7.7 K/uL  2.7    Immature Grans (Abs)      0.00 - 0.04 K/uL  0.01    Lymph #      1.0 - 4.8 K/uL  1.6    Mono #      0.3 - 1.0 K/uL  0.7    Eos #      0.0 - " 0.5 K/uL  0.2    Baso #      0.00 - 0.20 K/uL  0.04    nRBC      0 /100 WBC  0    Gran%      38.0 - 73.0 %  51.6    Lymph%      18.0 - 48.0 %  30.4    Mono%      4.0 - 15.0 %  12.8    Eosinophil%      0.0 - 8.0 %  4.2    Basophil%      0.0 - 1.9 %  0.8    Differential Method        Automated    Sodium      136 - 145 mmol/L 135 (L) 135 (L)    Potassium      3.5 - 5.1 mmol/L 4.8 4.8    Chloride      95 - 110 mmol/L 105 105    CO2      23 - 29 mmol/L 25 25    Glucose      70 - 110 mg/dL 88 88    BUN, Bld      8 - 23 mg/dL 20 20    Creatinine      0.5 - 1.4 mg/dL 1.0 1.0    Calcium      8.7 - 10.5 mg/dL 10.4 10.4    Total Protein      6.0 - 8.4 g/dL      Albumin      3.5 - 5.2 g/dL  3.9    Total Bilirubin      0.1 - 1.0 mg/dL      Alkaline Phosphatase      55 - 135 U/L  52 (L)    AST      10 - 40 U/L      ALT      10 - 44 U/L      Anion Gap      8 - 16 mmol/L 5 (L) 5 (L)    eGFR if African American      >60 mL/min/1.73 m:2 >60.0 >60.0    eGFR if non African American      >60 mL/min/1.73 m:2 >60.0 >60.0    Phosphorus      2.7 - 4.5 mg/dL  3.2    Specimen UA         Urine, Clean Catch   Color, UA      Yellow, Straw, Ela   Yellow   Appearance, UA      Clear   Clear   pH, UA      5.0 - 8.0   6.0   Specific Gravity, UA      1.005 - 1.030   <=1.005   Protein, UA      Negative   Negative   Glucose, UA      Negative   Negative   Ketones, UA      Negative   Negative   Bilirubin (UA)      Negative   Negative   Occult Blood UA      Negative   Negative   Nitrite, UA      Negative   Negative   Leukocytes, UA      Negative   2+ (A)   Protein, Urine Random      0 - 15 mg/dL      Creatinine, Random Ur      15.0 - 325.0 mg/dL      Prot/Creat Ratio, Ur      0.00 - 0.20      WBC, UA      0 - 5 /hpf   6 (H)   WBC Clumps, UA      None-Rare   Few (A)   Microscopic Comment         SEE COMMENT   Complement (C-3)      50 - 180 mg/dL      Complement (C-4)      11 - 44 mg/dL      ds DNA Ab      Negative 1:10      Sed Rate      0 - 20 mm/Hr       PTH      9.0 - 77.0 pg/mL  121.0 (H)    Tacrolimus Lvl      5.0 - 15.0 ng/mL  3.8 (L)    Magnesium      1.6 - 2.6 mg/dL  1.7    Vit D, 25-Hydroxy      30 - 96 ng/mL  46      Component      Latest Ref Rng & Units 12/12/2018             WBC      3.90 - 12.70 K/uL 6.15   RBC      4.00 - 5.40 M/uL 4.37   Hemoglobin      12.0 - 16.0 g/dL 10.5 (L)   Hematocrit      37.0 - 48.5 % 34.5 (L)   MCV      82 - 98 fL 79 (L)   MCH      27.0 - 31.0 pg 24.0 (L)   MCHC      32.0 - 36.0 g/dL 30.4 (L)   RDW      11.5 - 14.5 % 17.2 (H)   Platelets      150 - 350 K/uL 134 (L)   MPV      9.2 - 12.9 fL 13.1 (H)   Immature Granulocytes      0.0 - 0.5 % 0.2   Gran # (ANC)      1.8 - 7.7 K/uL 3.6   Immature Grans (Abs)      0.00 - 0.04 K/uL 0.01   Lymph #      1.0 - 4.8 K/uL 1.6   Mono #      0.3 - 1.0 K/uL 0.6   Eos #      0.0 - 0.5 K/uL 0.2   Baso #      0.00 - 0.20 K/uL 0.04   nRBC      0 /100 WBC 0   Gran%      38.0 - 73.0 % 58.9   Lymph%      18.0 - 48.0 % 26.5   Mono%      4.0 - 15.0 % 10.4   Eosinophil%      0.0 - 8.0 % 3.3   Basophil%      0.0 - 1.9 % 0.7   Differential Method       Automated   Sodium      136 - 145 mmol/L 138   Potassium      3.5 - 5.1 mmol/L 4.7   Chloride      95 - 110 mmol/L 107   CO2      23 - 29 mmol/L 23   Glucose      70 - 110 mg/dL 81   BUN, Bld      8 - 23 mg/dL 33 (H)   Creatinine      0.5 - 1.4 mg/dL 1.1   Calcium      8.7 - 10.5 mg/dL 10.4   Total Protein      6.0 - 8.4 g/dL 7.4   Albumin      3.5 - 5.2 g/dL 4.1   Total Bilirubin      0.1 - 1.0 mg/dL 0.7   Alkaline Phosphatase      55 - 135 U/L 49 (L)   AST      10 - 40 U/L 26   ALT      10 - 44 U/L 20   Anion Gap      8 - 16 mmol/L 8   eGFR if African American      >60 mL/min/1.73 m:2 >60.0   eGFR if non African American      >60 mL/min/1.73 m:2 53.6 (A)   Phosphorus      2.7 - 4.5 mg/dL    Specimen UA       Urine, Clean Catch   Color, UA      Yellow, Straw, Ela Yellow   Appearance, UA      Clear Clear   pH, UA      5.0 - 8.0 6.0   Specific Gravity,  UA      1.005 - 1.030 1.010   Protein, UA      Negative Negative   Glucose, UA      Negative Negative   Ketones, UA      Negative Negative   Bilirubin (UA)      Negative Negative   Occult Blood UA      Negative Negative   Nitrite, UA      Negative Negative   Leukocytes, UA      Negative 1+ (A)   Protein, Urine Random      0 - 15 mg/dL <7   Creatinine, Random Ur      15.0 - 325.0 mg/dL 56.0   Prot/Creat Ratio, Ur      0.00 - 0.20 Unable to calculate   WBC, UA      0 - 5 /hpf 6 (H)   Microscopic Comment       SEE COMMENT   Complement (C-3)      50 - 180 mg/dL 113   Complement (C-4)      11 - 44 mg/dL 18   ds DNA Ab      Negative 1:10 Negative 1:10   Sed Rate      0 - 20 mm/Hr 10        Assessment:       1. SLE with +TANA hx, arthritis, alopecia, and glomerular nephritis now s/p bilateral nephrectomy then dialysis and s/p renal transplant (4/6/2015).   No evidence of active lupus currently. Still doing well.   2. Glomerulonephritis. S/p transplant 4/6/2015.  3. CAD  4. Fatigue  5. Long term Plaquenil use. Off CellCept since 9/2010  6. Serositis  7. Diastolic heart failure. Now resolved.  8. Thrombocytopenia. Now decreased.   9. Elevation in homocysteine levels in March 2012 and started on Folbic.   10. Recurrent falls. No recent falls  11. Hypercalcemia. Being managed endocrine  12. Osteoporosis. Endocrine gave Reclast 9/2018  13. 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 Being evaluated by neurology.  Mass no longer there.   14. Flexor tendon nodules both hands.   Plan:       1. Continue Plaquenil 200 mg daily.  2. Still off Folbic per transplant  3. Patient to contact office if feels she is having a lupus flare  4. Off Nplate still. Follows hematology Dr. Shelley  5. Continue 2 old goats. Continue Voltaren gel for hand pains.  6. Vaccination (pneumonia, [place]) per transplant team and primary doctor.   7. Follow with dermatology in her area for alopecia on top of scalp.   8. Follow up with ortho regarding trigger fingers  9.  Labs     RTO 4 months/prn

## 2019-02-13 ENCOUNTER — LAB VISIT (OUTPATIENT)
Dept: LAB | Facility: HOSPITAL | Age: 64
End: 2019-02-13
Attending: INTERNAL MEDICINE
Payer: COMMERCIAL

## 2019-02-13 DIAGNOSIS — D84.9 IMMUNOSUPPRESSION: ICD-10-CM

## 2019-02-13 DIAGNOSIS — Z94.0 S/P KIDNEY TRANSPLANT: ICD-10-CM

## 2019-02-13 DIAGNOSIS — M32.8 OTHER FORMS OF SYSTEMIC LUPUS ERYTHEMATOSUS, UNSPECIFIED ORGAN INVOLVEMENT STATUS: ICD-10-CM

## 2019-02-13 LAB
ALBUMIN SERPL BCP-MCNC: 4 G/DL
ALP SERPL-CCNC: 62 U/L
ALT SERPL W/O P-5'-P-CCNC: 25 U/L
ANION GAP SERPL CALC-SCNC: 10 MMOL/L
AST SERPL-CCNC: 29 U/L
BASOPHILS # BLD AUTO: 0.03 K/UL
BASOPHILS NFR BLD: 0.6 %
BILIRUB SERPL-MCNC: 0.7 MG/DL
BUN SERPL-MCNC: 27 MG/DL
C3 SERPL-MCNC: 135 MG/DL
C4 SERPL-MCNC: 24 MG/DL
CALCIUM SERPL-MCNC: 10.2 MG/DL
CHLORIDE SERPL-SCNC: 108 MMOL/L
CO2 SERPL-SCNC: 23 MMOL/L
CREAT SERPL-MCNC: 1.1 MG/DL
CRP SERPL-MCNC: 0.9 MG/L
DIFFERENTIAL METHOD: ABNORMAL
EOSINOPHIL # BLD AUTO: 0.2 K/UL
EOSINOPHIL NFR BLD: 4.3 %
ERYTHROCYTE [DISTWIDTH] IN BLOOD BY AUTOMATED COUNT: 16.9 %
ERYTHROCYTE [SEDIMENTATION RATE] IN BLOOD BY WESTERGREN METHOD: 14 MM/HR
EST. GFR  (AFRICAN AMERICAN): >60 ML/MIN/1.73 M^2
EST. GFR  (NON AFRICAN AMERICAN): 53.6 ML/MIN/1.73 M^2
GLUCOSE SERPL-MCNC: 42 MG/DL
HCT VFR BLD AUTO: 34.6 %
HGB BLD-MCNC: 11 G/DL
IMM GRANULOCYTES # BLD AUTO: 0.01 K/UL
IMM GRANULOCYTES NFR BLD AUTO: 0.2 %
LYMPHOCYTES # BLD AUTO: 2 K/UL
LYMPHOCYTES NFR BLD: 38.3 %
MCH RBC QN AUTO: 24.8 PG
MCHC RBC AUTO-ENTMCNC: 31.8 G/DL
MCV RBC AUTO: 78 FL
MONOCYTES # BLD AUTO: 0.7 K/UL
MONOCYTES NFR BLD: 12.8 %
NEUTROPHILS # BLD AUTO: 2.3 K/UL
NEUTROPHILS NFR BLD: 43.8 %
NRBC BLD-RTO: 0 /100 WBC
PLATELET # BLD AUTO: 130 K/UL
PMV BLD AUTO: 13.7 FL
POTASSIUM SERPL-SCNC: 4.3 MMOL/L
PROT SERPL-MCNC: 7.2 G/DL
RBC # BLD AUTO: 4.44 M/UL
SODIUM SERPL-SCNC: 141 MMOL/L
WBC # BLD AUTO: 5.15 K/UL

## 2019-02-13 PROCEDURE — 86140 C-REACTIVE PROTEIN: CPT

## 2019-02-13 PROCEDURE — 80197 ASSAY OF TACROLIMUS: CPT

## 2019-02-13 PROCEDURE — 86160 COMPLEMENT ANTIGEN: CPT

## 2019-02-13 PROCEDURE — 80053 COMPREHEN METABOLIC PANEL: CPT

## 2019-02-13 PROCEDURE — 86160 COMPLEMENT ANTIGEN: CPT | Mod: 59

## 2019-02-13 PROCEDURE — 85025 COMPLETE CBC W/AUTO DIFF WBC: CPT

## 2019-02-13 PROCEDURE — 36415 COLL VENOUS BLD VENIPUNCTURE: CPT

## 2019-02-13 PROCEDURE — 85652 RBC SED RATE AUTOMATED: CPT

## 2019-02-13 PROCEDURE — 86225 DNA ANTIBODY NATIVE: CPT

## 2019-02-14 ENCOUNTER — PATIENT MESSAGE (OUTPATIENT)
Dept: NEPHROLOGY | Facility: HOSPITAL | Age: 64
End: 2019-02-14

## 2019-02-14 ENCOUNTER — PATIENT MESSAGE (OUTPATIENT)
Dept: RHEUMATOLOGY | Facility: CLINIC | Age: 64
End: 2019-02-14

## 2019-02-14 LAB
DSDNA AB SER-ACNC: NORMAL [IU]/ML
TACROLIMUS BLD-MCNC: 7.6 NG/ML

## 2019-02-18 ENCOUNTER — PATIENT MESSAGE (OUTPATIENT)
Dept: RHEUMATOLOGY | Facility: CLINIC | Age: 64
End: 2019-02-18

## 2019-02-21 DIAGNOSIS — Z94.0 S/P KIDNEY TRANSPLANT: ICD-10-CM

## 2019-02-21 RX ORDER — TACROLIMUS 1 MG/1
CAPSULE ORAL
Qty: 270 CAPSULE | Refills: 3 | Status: SHIPPED | OUTPATIENT
Start: 2019-02-21 | End: 2019-02-21 | Stop reason: SDUPTHER

## 2019-02-21 RX ORDER — TACROLIMUS 1 MG/1
CAPSULE ORAL
Qty: 270 CAPSULE | Refills: 3 | Status: SHIPPED | OUTPATIENT
Start: 2019-02-21 | End: 2020-02-19 | Stop reason: SDUPTHER

## 2019-02-21 NOTE — TELEPHONE ENCOUNTER
----- Message from Gabriel Mora, PharmD sent at 2/21/2019  9:26 AM CST -----  Hello,    Pt is looking for her new rx for prograf 1 mg to be sent to [facility] at [facility].    Thanks,    Gabriel

## 2019-04-02 ENCOUNTER — LAB VISIT (OUTPATIENT)
Dept: LAB | Facility: HOSPITAL | Age: 64
End: 2019-04-02
Attending: INTERNAL MEDICINE
Payer: COMMERCIAL

## 2019-04-02 DIAGNOSIS — Z94.0 KIDNEY REPLACED BY TRANSPLANT: ICD-10-CM

## 2019-04-02 LAB
ALBUMIN SERPL BCP-MCNC: 3.9 G/DL (ref 3.5–5.2)
ALBUMIN SERPL BCP-MCNC: 3.9 G/DL (ref 3.5–5.2)
ALP SERPL-CCNC: 61 U/L (ref 55–135)
ALT SERPL W/O P-5'-P-CCNC: 20 U/L (ref 10–44)
ANION GAP SERPL CALC-SCNC: 8 MMOL/L (ref 8–16)
AST SERPL-CCNC: 27 U/L (ref 10–40)
BASOPHILS # BLD AUTO: 0.04 K/UL (ref 0–0.2)
BASOPHILS NFR BLD: 0.8 % (ref 0–1.9)
BILIRUB DIRECT SERPL-MCNC: 0.5 MG/DL (ref 0.1–0.3)
BILIRUB SERPL-MCNC: 1 MG/DL (ref 0.1–1)
BUN SERPL-MCNC: 22 MG/DL (ref 8–23)
CALCIUM SERPL-MCNC: 10.1 MG/DL (ref 8.7–10.5)
CHLORIDE SERPL-SCNC: 106 MMOL/L (ref 95–110)
CO2 SERPL-SCNC: 22 MMOL/L (ref 23–29)
CREAT SERPL-MCNC: 1.1 MG/DL (ref 0.5–1.4)
DIFFERENTIAL METHOD: ABNORMAL
EOSINOPHIL # BLD AUTO: 0.1 K/UL (ref 0–0.5)
EOSINOPHIL NFR BLD: 2.8 % (ref 0–8)
ERYTHROCYTE [DISTWIDTH] IN BLOOD BY AUTOMATED COUNT: 17.1 % (ref 11.5–14.5)
EST. GFR  (AFRICAN AMERICAN): >60 ML/MIN/1.73 M^2
EST. GFR  (NON AFRICAN AMERICAN): 53.6 ML/MIN/1.73 M^2
GLUCOSE SERPL-MCNC: 86 MG/DL (ref 70–110)
HCT VFR BLD AUTO: 33.4 % (ref 37–48.5)
HGB BLD-MCNC: 10.4 G/DL (ref 12–16)
IMM GRANULOCYTES # BLD AUTO: 0.01 K/UL (ref 0–0.04)
IMM GRANULOCYTES NFR BLD AUTO: 0.2 % (ref 0–0.5)
LYMPHOCYTES # BLD AUTO: 1.5 K/UL (ref 1–4.8)
LYMPHOCYTES NFR BLD: 30.2 % (ref 18–48)
MAGNESIUM SERPL-MCNC: 1.8 MG/DL (ref 1.6–2.6)
MCH RBC QN AUTO: 24.6 PG (ref 27–31)
MCHC RBC AUTO-ENTMCNC: 31.1 G/DL (ref 32–36)
MCV RBC AUTO: 79 FL (ref 82–98)
MONOCYTES # BLD AUTO: 0.6 K/UL (ref 0.3–1)
MONOCYTES NFR BLD: 12.5 % (ref 4–15)
NEUTROPHILS # BLD AUTO: 2.7 K/UL (ref 1.8–7.7)
NEUTROPHILS NFR BLD: 53.5 % (ref 38–73)
NRBC BLD-RTO: 0 /100 WBC
PHOSPHATE SERPL-MCNC: 3.4 MG/DL (ref 2.7–4.5)
PLATELET # BLD AUTO: 158 K/UL (ref 150–350)
PMV BLD AUTO: 13 FL (ref 9.2–12.9)
POTASSIUM SERPL-SCNC: 5 MMOL/L (ref 3.5–5.1)
PROT SERPL-MCNC: 6.8 G/DL (ref 6–8.4)
RBC # BLD AUTO: 4.22 M/UL (ref 4–5.4)
SODIUM SERPL-SCNC: 136 MMOL/L (ref 136–145)
WBC # BLD AUTO: 5.03 K/UL (ref 3.9–12.7)

## 2019-04-02 PROCEDURE — 85025 COMPLETE CBC W/AUTO DIFF WBC: CPT

## 2019-04-02 PROCEDURE — 84075 ASSAY ALKALINE PHOSPHATASE: CPT

## 2019-04-02 PROCEDURE — 80069 RENAL FUNCTION PANEL: CPT

## 2019-04-02 PROCEDURE — 87799 DETECT AGENT NOS DNA QUANT: CPT

## 2019-04-02 PROCEDURE — 80197 ASSAY OF TACROLIMUS: CPT

## 2019-04-02 PROCEDURE — 83735 ASSAY OF MAGNESIUM: CPT

## 2019-04-03 LAB — TACROLIMUS BLD-MCNC: 6.3 NG/ML (ref 5–15)

## 2019-04-11 ENCOUNTER — TELEPHONE (OUTPATIENT)
Dept: TRANSPLANT | Facility: CLINIC | Age: 64
End: 2019-04-11

## 2019-04-11 NOTE — TELEPHONE ENCOUNTER
Annual Transplant follow up assessment    Call placed to annual post transplant patient for health assessment and evaluation of need for annual transplant clinic visit.      -Are you following with a General Nephrologist? If so, who, and when was your last visit?  Patient reports that she currently sees Dr. Joseph regularly.     -Have you had any hospitalizations since your last visit?  Denies and recent hospitalizations.    -What is your current dose of Immunos? Who prescribed your last refill?  Immunos refilled by Dr. Joseph. Tacrolimus 2/1; MMF  500mg bid; Prednisone 5mg/d    -Do you have any new health problems?  Denies any problems    -Have you been told you have any form of Cancer?  Denies Cancer    -Have you had any recurrent infections?  Denies infections.

## 2019-04-22 RX ORDER — MYCOPHENOLATE MOFETIL 250 MG/1
CAPSULE ORAL
Qty: 120 CAPSULE | Refills: 11 | Status: CANCELLED | OUTPATIENT
Start: 2019-04-22

## 2019-04-23 ENCOUNTER — TELEPHONE (OUTPATIENT)
Dept: NEPHROLOGY | Facility: CLINIC | Age: 64
End: 2019-04-23

## 2019-04-23 DIAGNOSIS — Z94.0 S/P KIDNEY TRANSPLANT: Primary | ICD-10-CM

## 2019-04-23 RX ORDER — MYCOPHENOLATE MOFETIL 250 MG/1
CAPSULE ORAL
Qty: 120 CAPSULE | Refills: 11 | OUTPATIENT
Start: 2019-04-23 | End: 2019-04-23 | Stop reason: SDUPTHER

## 2019-04-23 RX ORDER — MYCOPHENOLATE MOFETIL 250 MG/1
CAPSULE ORAL
Qty: 120 CAPSULE | Refills: 11 | Status: SHIPPED | OUTPATIENT
Start: 2019-04-23 | End: 2020-05-15

## 2019-04-23 NOTE — TELEPHONE ENCOUNTER
----- Message from Gabriel Mora, PharmD sent at 2019  2:16 PM CDT -----  Hello,    Pt's rx for cellcept has . Could you please send a new rx for cellcept to [facility] at the [place], because pt is leaving for vacation tomorrow. Ext. [phone]    Thanks,    Gabriel

## 2019-05-09 ENCOUNTER — PATIENT MESSAGE (OUTPATIENT)
Dept: RHEUMATOLOGY | Facility: CLINIC | Age: 64
End: 2019-05-09

## 2019-05-10 ENCOUNTER — PATIENT MESSAGE (OUTPATIENT)
Dept: RHEUMATOLOGY | Facility: CLINIC | Age: 64
End: 2019-05-10

## 2019-05-13 ENCOUNTER — PATIENT MESSAGE (OUTPATIENT)
Dept: RHEUMATOLOGY | Facility: CLINIC | Age: 64
End: 2019-05-13

## 2019-05-26 ENCOUNTER — PATIENT MESSAGE (OUTPATIENT)
Dept: NEPHROLOGY | Facility: CLINIC | Age: 64
End: 2019-05-26

## 2019-05-27 NOTE — TELEPHONE ENCOUNTER
Hi Ms Charles,    Please discuss with Dr Wyman, [facility]     Dr Joseph

## 2019-06-10 ENCOUNTER — OFFICE VISIT (OUTPATIENT)
Dept: ORTHOPEDICS | Facility: CLINIC | Age: 64
End: 2019-06-10
Payer: COMMERCIAL

## 2019-06-10 ENCOUNTER — PATIENT MESSAGE (OUTPATIENT)
Dept: ORTHOPEDICS | Facility: CLINIC | Age: 64
End: 2019-06-10

## 2019-06-10 ENCOUNTER — HOSPITAL ENCOUNTER (OUTPATIENT)
Dept: RADIOLOGY | Facility: HOSPITAL | Age: 64
Discharge: HOME | End: 2019-06-10
Attending: ORTHOPAEDIC SURGERY
Payer: COMMERCIAL

## 2019-06-10 VITALS
SYSTOLIC BLOOD PRESSURE: 131 MMHG | DIASTOLIC BLOOD PRESSURE: 68 MMHG | HEART RATE: 70 BPM | BODY MASS INDEX: 24.1 KG/M2 | HEIGHT: 63 IN | WEIGHT: 136 LBS

## 2019-06-10 DIAGNOSIS — M79.641 BILATERAL HAND PAIN: ICD-10-CM

## 2019-06-10 DIAGNOSIS — M79.641 BILATERAL HAND PAIN: Primary | ICD-10-CM

## 2019-06-10 DIAGNOSIS — M65.30 TRIGGER FINGER OF RIGHT HAND, UNSPECIFIED FINGER: ICD-10-CM

## 2019-06-10 DIAGNOSIS — M79.642 BILATERAL HAND PAIN: ICD-10-CM

## 2019-06-10 DIAGNOSIS — M79.642 BILATERAL HAND PAIN: Primary | ICD-10-CM

## 2019-06-10 DIAGNOSIS — M65.342 TRIGGER RING FINGER OF LEFT HAND: Primary | ICD-10-CM

## 2019-06-10 PROCEDURE — 3075F PR MOST RECENT SYSTOLIC BLOOD PRESS GE 130-139MM HG: ICD-10-PCS | Mod: CPTII,S$GLB,, | Performed by: ORTHOPAEDIC SURGERY

## 2019-06-10 PROCEDURE — 99999 PR PBB SHADOW E&M-EST. PATIENT-LVL III: ICD-10-PCS | Mod: PBBFAC,,, | Performed by: ORTHOPAEDIC SURGERY

## 2019-06-10 PROCEDURE — 99999 PR PBB SHADOW E&M-EST. PATIENT-LVL III: CPT | Mod: PBBFAC,,, | Performed by: ORTHOPAEDIC SURGERY

## 2019-06-10 PROCEDURE — 3078F DIAST BP <80 MM HG: CPT | Mod: CPTII,S$GLB,, | Performed by: ORTHOPAEDIC SURGERY

## 2019-06-10 PROCEDURE — 99214 PR OFFICE/OUTPT VISIT, EST, LEVL IV, 30-39 MIN: ICD-10-PCS | Mod: S$GLB,,, | Performed by: ORTHOPAEDIC SURGERY

## 2019-06-10 PROCEDURE — 3008F BODY MASS INDEX DOCD: CPT | Mod: CPTII,S$GLB,, | Performed by: ORTHOPAEDIC SURGERY

## 2019-06-10 PROCEDURE — 3078F PR MOST RECENT DIASTOLIC BLOOD PRESSURE < 80 MM HG: ICD-10-PCS | Mod: CPTII,S$GLB,, | Performed by: ORTHOPAEDIC SURGERY

## 2019-06-10 PROCEDURE — 99214 OFFICE O/P EST MOD 30 MIN: CPT | Mod: S$GLB,,, | Performed by: ORTHOPAEDIC SURGERY

## 2019-06-10 PROCEDURE — 3075F SYST BP GE 130 - 139MM HG: CPT | Mod: CPTII,S$GLB,, | Performed by: ORTHOPAEDIC SURGERY

## 2019-06-10 PROCEDURE — 73130 X-RAY EXAM OF HAND: CPT | Mod: 50,TC

## 2019-06-10 PROCEDURE — 73130 XR HAND COMPLETE 3 VIEWS BILATERAL: ICD-10-PCS | Mod: 26,,, | Performed by: RADIOLOGY

## 2019-06-10 PROCEDURE — 3008F PR BODY MASS INDEX (BMI) DOCUMENTED: ICD-10-PCS | Mod: CPTII,S$GLB,, | Performed by: ORTHOPAEDIC SURGERY

## 2019-06-10 PROCEDURE — 73130 X-RAY EXAM OF HAND: CPT | Mod: 26,,, | Performed by: RADIOLOGY

## 2019-06-10 NOTE — PROGRESS NOTES
Subjective:     Patient ID: Chanel Charles is a 64 y.o. female.    Chief Complaint: Pain of the Right Hand and Pain of the Left Hand    Hand Pain    The pain is present in the left hand and right hand. This is a recurrent problem. The current episode started more than 1 year ago. The problem occurs intermittently. The problem has been gradually worsening. The quality of the pain is described as aching. The pain is at a severity of 2/10. Pertinent negatives include no fever or numbness. She has tried injection treatment and OTC ointments for the symptoms. Physical therapy was effective.      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     Lupus nephritis     Lupus nephritis 2015    Menopause 7/15/2015    Osteoporosis 2015    Pericardial effusion s/p pericardiocentesis 2013    Pleural effusion s/p Thoracentesis and VATS 2013    Prophylactic immunotherapy 2015    S/p Bilateral Nephrectomy (Severe proteinuria) - 2012 3/19/2015    Secondary hyperparathyroidism, renal     Shingles 2012    SLE (systemic lupus erythematosus)      Past Surgical History:   Procedure Laterality Date     SECTION      etopic pregnancy       NEPHRECTOMY      Bilateral 2/2 proteinuria    PARACENTESIS       PERICARDIOCENTESIS      SINUS SURGERY  May 2013    THORACENTESIS  2011    TRANSPLANT-KIDNEY N/A 2015    Performed by Eamon Pina MD at [facility] OR [address] [place] [address]     Family History   Problem Relation Age of Onset    Osteoarthritis Mother     Coronary artery disease Father 55         from MI 72 (and had 2 MIs in his 50s-60s)    Rheum arthritis Paternal Uncle     Coronary artery disease Maternal Uncle 48         from MI at 62    Lupus Neg Hx     Psoriasis Neg Hx     Kidney disease Neg Hx      Social History     Socioeconomic History    Marital status:      Spouse name: Not on file    Number of children: Not on file    Years of education: Not on file    Highest education level: Not on file   Occupational History    Not on file   Social Needs    Financial resource strain: Not on file    Food insecurity:     Worry: Not on file     Inability: Not on file    Transportation needs:     Medical: Not on file     Non-medical: Not on file   Tobacco Use    Smoking status: Former Smoker     Last attempt to quit: 2004     Years since quitting: 15.4    Smokeless tobacco: Never Used   Substance and Sexual Activity    Alcohol use: Yes     Comment: 1 glass of wine per month or less.    Drug use: No    Sexual activity: Not on file   Lifestyle    Physical activity:     Days per week: Not on file     Minutes per session: Not on file    Stress: Not on file   Relationships    Social connections:     Talks on phone: Not on file     Gets together: Not on file     Attends Mandaen service: Not on file     Active member of club or organization: Not on file     Attends meetings of clubs or organizations: Not on file     Relationship status: Not on file   Other Topics Concern    Not on file   Social History Narrative    Wears a seatbelt.     Medication List with Changes/Refills   Current Medications    ALPRAZOLAM (XANAX) 0.5 MG TABLET    Take 0.5 mg by mouth. 1 Tablet Oral Every day.  or as needed.     ASPIRIN (ECOTRIN) 81 MG EC TABLET    Take 81 mg by mouth once daily.     ATORVASTATIN (LIPITOR) 80 MG TABLET    Take 80 mg by mouth every evening. 1 Tablet Oral Every day    BIOTIN ORAL    Take 1,000 mg by mouth Daily.     ESZOPICLONE (LUNESTA) 2 MG TAB    Take 2 mg by mouth. 1 Tablet Oral As directed.  1 tablet 1 time as needed at bedtime.    FAMOTIDINE (PEPCID) 20 MG TABLET    Take 1 tablet (20 mg total) by mouth every evening.    FLUTICASONE (FLONASE) 50 MCG/ACTUATION NASAL SPRAY    1 spray by Each Nare route once daily.    HYDROXYCHLOROQUINE (PLAQUENIL) 200 MG TABLET    TAKE 1 TABLET DAILY    LORATADINE (CLARITIN) 10 MG TABLET    Take 1 tablet (10 mg total) by mouth daily as needed for Allergies.    MAGNESIUM OXIDE (MAG-OX) 400 MG TABLET    Take 1 tablet (400 mg total) by mouth once daily.    MULTIVITAMIN (THERAGRAN) TABLET    Take 1 tablet by mouth once daily.    MYCOPHENOLATE (CELLCEPT) 250 MG CAP    TAKE TWO CAPSULES BY MOUTH TWICE A DAY    TACROLIMUS (PROGRAF) 1 MG CAP    Take 2 capsules (2 mg) by mouth every morning and 1 capsule (1 mg )by mouth every evening    VALACYCLOVIR (VALTREX) 1000 MG TABLET    Take 1 tablet by mouth as needed.     VITAMIN D 1000 UNITS TAB    Take 2,000 Units by mouth every Monday.      Review of patient's allergies indicates:   Allergen Reactions    Heparin analogues Other (See Comments)     Low platelets    Escitalopram oxalate      Felt anxious    Gabapentin      Other reaction(s): Hallucinations     Review of Systems   Constitution: Negative for fever and night sweats.   HENT: Negative for hearing loss.    Eyes: Negative for blurred vision and visual disturbance.   Cardiovascular: Negative for chest pain and leg swelling.   Respiratory: Negative for shortness of breath.    Endocrine: Negative for polyuria.   Hematologic/Lymphatic: Negative for bleeding problem.   Skin: Negative for rash.   Musculoskeletal: Positive for joint pain. Negative for back pain, joint swelling,  muscle cramps and muscle weakness.   Gastrointestinal: Negative for melena.   Genitourinary: Negative for hematuria.   Neurological: Negative for loss of balance, numbness and paresthesias.   Psychiatric/Behavioral: Negative for altered mental status.       Objective:   Body mass index is 24.09 kg/m².  Vitals:    06/10/19 1359   BP: 131/68   Pulse: 70           General    Vitals reviewed.  Constitutional: She is oriented to person, place, and time. She appears well-developed and well-nourished. No distress.   HENT:   Right Ear: External ear normal.   Left Ear: External ear normal.   Nose: Nose normal.   Eyes: Pupils are equal, round, and reactive to light. Right eye exhibits no discharge. Left eye exhibits no discharge.   Neck: Normal range of motion.   Pulmonary/Chest: Effort normal. No respiratory distress.   Abdominal: Soft.   Neurological: She is alert and oriented to person, place, and time. No cranial nerve deficit. She exhibits normal muscle tone. Coordination normal.   Psychiatric: She has a normal mood and affect. Her behavior is normal. Judgment and thought content normal.             Right Hand/Wrist Exam     Inspection   Scars: Wrist - absent   Effusion: Wrist - absent   Bruising: Wrist - absent   Deformity: Wrist - deformity     Range of Motion     Wrist   Extension:  50 normal   Flexion:  70 normal   Abduction: 20 normal  Adduction: 35 normal    Tests   Phalens Sign: negative  Tinel's sign (median nerve): negative  Finkelstein's test: negative  Carpal Tunnel Compression Test: negative  Cubital Tunnel Compression Test: negative      Other     Neuorologic Exam    Median Distribution: normal  Ulnar Distribution: normal  Radial Distribution: normal      Left Hand/Wrist Exam     Inspection   Scars: Wrist - absent   Effusion: Wrist - absent   Bruising: Wrist - absent   Deformity: Wrist - absent     Range of Motion     Wrist   Extension:  50 normal   Flexion:  70 normal   Abduction: 20 normal  Adduction: 35  normal    Tests   Phalens Sign: negative  Tinel's sign (median nerve): negative  Finkelstein's test: negative  Carpal Tunnel Compression Test: negative  Cubital Tunnel Compression Test: negative      Other     Sensory Exam  Median Distribution: normal  Ulnar Distribution: normal  Radial Distribution: normal      Right Elbow Exam     Inspection   Scars: absent  Effusion: absent  Bruising: absent  Deformity: absent  Atrophy: absent    Range of Motion   Extension:  0 normal   Flexion:  130 normal   Pronation: normal   Supination:  80 normal     Tests   Varus: negative  Valgus: negative  Tinel's sign (cubital tunnel): negative  Tennis Elbow: negative  Golfer's Elbow: negative  Radial Capitellar Grind: negative    Other   Sensation: normal    Comments:  Triggering 2 left and right middle and ring fingers.  Tenderness palpation over A1 pulley is at these levels, worst over left middle finger      Left Elbow Exam     Inspection   Scars: absent  Effusion: absent  Bruising: absent  Deformity: absent  Atrophy: absent    Range of Motion   Extension:  0 normal   Flexion:  130 normal   Pronation: normal   Supination:  80 normal     Tests   Varus: negative  Tinel's sign (cubital tunnel): negative  Tennis Elbow: negative  Golfer's Elbow: negative  Radial Capitellar Grind: negative    Other   Sensation: normal        Muscle Strength   Right Upper Extremity   Wrist extension: 5/5/5   Wrist flexion: 5/5/5   : 5/5/5   Pinch Mechanism: 5/5  Elbow Pronation:  5/5   Elbow Supination:  5/5   Elbow Extension: 5/5  Elbow Flexion: 5/5  Intrinsics: 5/5  EPL (Extensor Pollicis Longus): 5/5  Left Upper Extremity  Wrist extension: 5/5/5   Wrist flexion: 5/5/5   :  5/5/5   Pinch Mechanism: 5/5  Elbow Pronation:  5/5   Elbow Supination:  5/5   Elbow Extension: 5/5  Elbow Flexion: 5/5  Intrinsics: 5/5  EPL (Extensor Pollicis Longus): 5/5    Vascular Exam     Right Pulses      Radial:                    2+      Left Pulses      Radial:                     2+      Capillary Refill  Right Hand: normal capillary refill  Left Hand: normal capillary refill    Edema  Right Forearm: absent  Left Forearm: absent      Imaging reviewed and interpreted today by me X-Ray Hand 3 View Bilateral  Narrative: EXAMINATION:  XR HAND COMPLETE 3 VIEWS BILATERAL    CLINICAL HISTORY:  . Pain in right hand    TECHNIQUE:  AP, lateral, and oblique views of both hands were performed.    COMPARISON:  10/23/2018    FINDINGS:  Right hand: There is no radiographic evidence of acute osseous, articular, or soft tissue abnormality.  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  No erosive changes demonstrated    Left hand: There is no radiographic evidence of acute osseous, articular, or soft tissue abnormality.  Severe degenerative findings again noted at the 1st CMC joint.  No erosive changes demonstrated.  No appreciable change from prior  Impression: Degenerative changes as above.  No acute findings.    Electronically signed by: Carlos A Quiroga MD  Date:    06/10/2019  Time:    14:07      Assessment:     Encounter Diagnoses   Name Primary?    Trigger ring finger of left hand Yes    Trigger finger of right hand, unspecified finger         Plan:     We reviewed with Chanel today, the pathology and natural history of her diagnosis. We had an extensive discussion as to the conservative treatment and management of their condition. We also discussed the variety of treatment options to include medication, physical therapy, diagnostic testing as well as other treatments.The decision was made to go forward with:    -night splinting  -Medrol Dosepak  -can follow up for 6 weeks with PA, if no improvement can have injections.  Not interested in any surgery at this time.    -PT/OT                  Disclaimer: This note was prepared using a voice recognition system and is likely to have sound alike errors within the text.

## 2019-06-11 DIAGNOSIS — M65.342 TRIGGER RING FINGER OF LEFT HAND: Primary | ICD-10-CM

## 2019-06-11 RX ORDER — METHYLPREDNISOLONE 4 MG/1
TABLET ORAL
Qty: 1 PACKAGE | Refills: 0 | Status: SHIPPED | OUTPATIENT
Start: 2019-06-11 | End: 2019-06-21

## 2019-06-11 RX ORDER — METHYLPREDNISOLONE 4 MG/1
TABLET ORAL
Qty: 1 PACKAGE | Refills: 0 | Status: CANCELLED | OUTPATIENT
Start: 2019-06-11 | End: 2019-07-02

## 2019-06-12 ENCOUNTER — OFFICE VISIT (OUTPATIENT)
Dept: RHEUMATOLOGY | Facility: CLINIC | Age: 64
End: 2019-06-12
Payer: COMMERCIAL

## 2019-06-12 VITALS
BODY MASS INDEX: 23.91 KG/M2 | SYSTOLIC BLOOD PRESSURE: 151 MMHG | HEART RATE: 73 BPM | WEIGHT: 134.94 LBS | HEIGHT: 63 IN | DIASTOLIC BLOOD PRESSURE: 78 MMHG

## 2019-06-12 DIAGNOSIS — M32.0 DRUG-INDUCED SYSTEMIC LUPUS ERYTHEMATOSUS, UNSPECIFIED ORGAN INVOLVEMENT STATUS: ICD-10-CM

## 2019-06-12 DIAGNOSIS — M65.342 TRIGGER RING FINGER OF LEFT HAND: ICD-10-CM

## 2019-06-12 DIAGNOSIS — D84.9 IMMUNOSUPPRESSION: ICD-10-CM

## 2019-06-12 DIAGNOSIS — M65.341 TRIGGER FINGER, RIGHT RING FINGER: ICD-10-CM

## 2019-06-12 DIAGNOSIS — M65.332 TRIGGER FINGER, LEFT MIDDLE FINGER: ICD-10-CM

## 2019-06-12 DIAGNOSIS — M32.8 OTHER FORMS OF SYSTEMIC LUPUS ERYTHEMATOSUS, UNSPECIFIED ORGAN INVOLVEMENT STATUS: Primary | ICD-10-CM

## 2019-06-12 DIAGNOSIS — M65.331 TRIGGER MIDDLE FINGER OF RIGHT HAND: ICD-10-CM

## 2019-06-12 PROCEDURE — 3077F SYST BP >= 140 MM HG: CPT | Mod: CPTII,S$GLB,, | Performed by: INTERNAL MEDICINE

## 2019-06-12 PROCEDURE — 3008F PR BODY MASS INDEX (BMI) DOCUMENTED: ICD-10-PCS | Mod: CPTII,S$GLB,, | Performed by: INTERNAL MEDICINE

## 2019-06-12 PROCEDURE — 3078F DIAST BP <80 MM HG: CPT | Mod: CPTII,S$GLB,, | Performed by: INTERNAL MEDICINE

## 2019-06-12 PROCEDURE — 3077F PR MOST RECENT SYSTOLIC BLOOD PRESSURE >= 140 MM HG: ICD-10-PCS | Mod: CPTII,S$GLB,, | Performed by: INTERNAL MEDICINE

## 2019-06-12 PROCEDURE — 99214 OFFICE O/P EST MOD 30 MIN: CPT | Mod: S$GLB,,, | Performed by: INTERNAL MEDICINE

## 2019-06-12 PROCEDURE — 99999 PR PBB SHADOW E&M-EST. PATIENT-LVL III: ICD-10-PCS | Mod: PBBFAC,,, | Performed by: INTERNAL MEDICINE

## 2019-06-12 PROCEDURE — 99999 PR PBB SHADOW E&M-EST. PATIENT-LVL III: CPT | Mod: PBBFAC,,, | Performed by: INTERNAL MEDICINE

## 2019-06-12 PROCEDURE — 99214 PR OFFICE/OUTPT VISIT, EST, LEVL IV, 30-39 MIN: ICD-10-PCS | Mod: S$GLB,,, | Performed by: INTERNAL MEDICINE

## 2019-06-12 PROCEDURE — 3008F BODY MASS INDEX DOCD: CPT | Mod: CPTII,S$GLB,, | Performed by: INTERNAL MEDICINE

## 2019-06-12 PROCEDURE — 3078F PR MOST RECENT DIASTOLIC BLOOD PRESSURE < 80 MM HG: ICD-10-PCS | Mod: CPTII,S$GLB,, | Performed by: INTERNAL MEDICINE

## 2019-06-12 RX ORDER — HYDROXYCHLOROQUINE SULFATE 200 MG/1
200 TABLET, FILM COATED ORAL DAILY
Qty: 90 TABLET | Refills: 2 | Status: SHIPPED | OUTPATIENT
Start: 2019-06-12 | End: 2020-03-11

## 2019-06-12 ASSESSMENT — ROUTINE ASSESSMENT OF PATIENT INDEX DATA (RAPID3)
TOTAL RAPID3 SCORE: .5
PAIN SCORE: 1.5
PSYCHOLOGICAL DISTRESS SCORE: 1.1
AM STIFFNESS SCORE: 1, YES
MDHAQ FUNCTION SCORE: 0
WHEN YOU AWAKENED IN THE MORNING OVER THE LAST WEEK, PLEASE INDICATE THE AMOUNT OF TIME IT TAKES UNTIL YOU ARE AS LIMBER AS YOU WILL BE FOR THE DAY: 30 MINUTES
FATIGUE SCORE: 0
PATIENT GLOBAL ASSESSMENT SCORE: 0

## 2019-06-12 NOTE — PROGRESS NOTES
Subjective:       Patient ID: Chanel Charles is a 64 y.o. female.    Chief Complaint: Hand pain with trigger finger    HPI:  Chanel Charles is a 64 y.o. female with SLE and renal failure as well an extensive history of recurrent pleural effusions and pericardial effusion treated with recurrent thoracentesis (x3) and pericardiocentesis. She is s/p kidney transplant 4/6/2015. Patient in the past had 3-4 paracentesis for accumulation of abdominal fluid (last time was December 2011). She was hospitalized 4/2012 for acute altered mental status, slurred speech concerning for CVA. Dr. Ribera (rheum) was consulted and he did not think it was her lupus. It was felt that her AMS was caused by gabapentin and/or valtrex as those were new medications. After these medications were stopped she returned to her baseline function. She has history of a lung nodule and eventual TB results were negative. Patient had VATS procedure 3/2011 which showed a granuloma thought to be due histoplamosis but work up was negative. Patient had kidneys removed 4/2011 to help decrease protein loss from the body.    She was given Rituxan (4 infusions total) and later Nplate for thrombocytopenia by her hematologist and is back on Nplate.   Hematologist is Dr. Shelley () and platelets were 150s or so.  She has been off Nplate since transplant.       Colonoscopy 8/19/14 showed benign polyps, diverticulosis, internal and external hemrrhoids  Mammogram 6/24/14 was negative.  Pap smear 6/30/14 negative for lesion or malignancy          Interval History:    Here for follow up.  Saw ortho who wants to do surgery but she will first try medrol pack, splint and therapy.  Denies any issues.    Exercising regularly.        Review of Systems   Constitutional: Positive for fatigue. Negative for fever and unexpected weight change.   HENT: Negative for trouble swallowing.    Eyes: Negative for redness.   Respiratory: Negative for cough and  "shortness of breath.    Cardiovascular: Negative for chest pain.   Gastrointestinal: Negative for constipation and diarrhea.   Endocrine: Negative.    Genitourinary: Negative for dysuria and genital sores.   Musculoskeletal: Negative.    Skin: Negative.  Negative for rash.   Allergic/Immunologic: Negative.    Neurological: Negative for headaches.   Hematological: Does not bruise/bleed easily.   Psychiatric/Behavioral: Negative.          Objective:   BP (!) 151/78   Pulse 73   Ht 5' 3" (1.6 m)   Wt 61.2 kg (134 lb 14.7 oz)   BMI 23.90 kg/m²      Physical Exam   Constitutional: She is oriented to person, place, and time and well-developed, well-nourished, and in no distress.   HENT:   Head: Normocephalic and atraumatic.   Eyes: Conjunctivae and EOM are normal.   Neck: Neck supple.   Cardiovascular: Normal rate and regular rhythm.    Pulmonary/Chest: Effort normal and breath sounds normal.   Abdominal: Soft. Bowel sounds are normal.   Neurological: She is alert and oriented to person, place, and time. Gait normal.   Skin: Skin is warm and dry.     Psychiatric: Mood and affect normal.   Musculoskeletal: She exhibits tenderness (right 3rd flexor tendon nodule and left 3rd and 4th flexor tendon nodule).   Squaring of 1st CMC bilaterally       LABS    Component      Latest Ref Rng & Units 6/10/2019   WBC      3.90 - 12.70 K/uL 6.16   RBC      4.00 - 5.40 M/uL 4.57   Hemoglobin      12.0 - 16.0 g/dL 11.1 (L)   Hematocrit      37.0 - 48.5 % 36.0 (L)   MCV      82 - 98 fL 79 (L)   MCH      27.0 - 31.0 pg 24.3 (L)   MCHC      32.0 - 36.0 g/dL 30.8 (L)   RDW      11.5 - 14.5 % 16.8 (H)   Platelets      150 - 350 K/uL 128 (L)   MPV      9.2 - 12.9 fL SEE COMMENT   Immature Granulocytes      0.0 - 0.5 % 0.3   Gran # (ANC)      1.8 - 7.7 K/uL 3.4   Immature Grans (Abs)      0.00 - 0.04 K/uL 0.02   Lymph #      1.0 - 4.8 K/uL 2.0   Mono #      0.3 - 1.0 K/uL 0.7   Eos #      0.0 - 0.5 K/uL 0.2   Baso #      0.00 - 0.20 K/uL " 0.04   nRBC      0 /100 WBC 0   Gran%      38.0 - 73.0 % 54.4   Lymph%      18.0 - 48.0 % 31.7   Mono%      4.0 - 15.0 % 10.6   Eosinophil%      0.0 - 8.0 % 2.4   Basophil%      0.0 - 1.9 % 0.6   Differential Method       Automated   Glucose      70 - 110 mg/dL 89   Sodium      136 - 145 mmol/L 137   Potassium      3.5 - 5.1 mmol/L 5.0   Chloride      95 - 110 mmol/L 107   CO2      23 - 29 mmol/L 22 (L)   BUN, Bld      8 - 23 mg/dL 28 (H)   Calcium      8.7 - 10.5 mg/dL 10.5   Creatinine      0.5 - 1.4 mg/dL 1.1   Albumin      3.5 - 5.2 g/dL 3.9   Phosphorus      2.7 - 4.5 mg/dL 3.6   eGFR if African American      >60 mL/min/1.73 m:2 >60.0   eGFR if non African American      >60 mL/min/1.73 m:2 53.2 (A)   Anion Gap      8 - 16 mmol/L 8   Specimen UA       Urine, Clean Catch   Color, UA      Yellow, Straw, Ela Yellow   Appearance, UA      Clear Clear   pH, UA      5.0 - 8.0 6.0   Specific Gravity, UA      1.005 - 1.030 <=1.005   Protein, UA      Negative Negative   Glucose, UA      Negative Negative   Ketones, UA      Negative Negative   Bilirubin (UA)      Negative Negative   Occult Blood UA      Negative Negative   NITRITE UA      Negative Negative   Leukocytes, UA      Negative 2+ (A)   WBC, UA      0 - 5 /hpf 30 (H)   WBC Clumps, UA      None-Rare Few (A)   Squam Epithel, UA      /hpf 1   Microscopic Comment       SEE COMMENT   Tacrolimus Lvl      5.0 - 15.0 ng/mL 6.0   Vit D, 25-Hydroxy      30 - 96 ng/mL 48   Magnesium      1.6 - 2.6 mg/dL 1.7        Assessment:       1. SLE with +TANA hx, arthritis, alopecia, and glomerular nephritis now s/p bilateral nephrectomy then dialysis and s/p renal transplant (4/6/2015).   No evidence of active lupus currently. Still doing well.   2. Glomerulonephritis. S/p transplant 4/6/2015.  3. CAD  4. Fatigue  5. Long term Plaquenil use. Off CellCept since 9/2010  6. Serositis  7. Diastolic heart failure. Now resolved.  8. Thrombocytopenia. Now decreased.   9. Elevation in  homocysteine levels in March 2012 and started on Folbic.   10. Recurrent falls. No recent falls  11. Hypercalcemia. Being managed endocrine  12. Osteoporosis. Endocrine gave Reclast 9/2018  13. 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 Being evaluated by neurology.  Mass no longer there.   14. Flexor tendon nodules both hands.   Plan:       1. Continue Plaquenil 200 mg daily.  Last eye exam 12/2018  2. Still off Folbic per transplant  3. Patient to contact office if feels she is having a lupus flare  4. Off Nplate still. Follows hematology Dr. Shelley  5. Continue 2 old goats. Continue Voltaren gel for hand pains.  6. Vaccination (pneumonia, [place]) per transplant team and primary doctor.   7. Follow with dermatology in her area for alopecia on top of scalp.   8. Follow up with ortho regarding trigger fingers  9. Labs     RTO 4 months/prn

## 2019-06-12 NOTE — PROGRESS NOTES
Rapid3 Question Responses and Scores 10/16/2018   MDHAQ Score 0.1   Psychologic Score 1.1   Pain Score 2   When you awakened in the morning OVER THE LAST WEEK, did you feel stiff? No   If Yes, please indicate the number of hours until you are as limber as you will be for the day -   Fatigue Score 0.5   Global Health Score 0.5   RAPID3 Score 0.94

## 2019-06-14 ENCOUNTER — PATIENT MESSAGE (OUTPATIENT)
Dept: ORTHOPEDICS | Facility: CLINIC | Age: 64
End: 2019-06-14

## 2019-06-14 ENCOUNTER — PATIENT MESSAGE (OUTPATIENT)
Dept: NEPHROLOGY | Facility: CLINIC | Age: 64
End: 2019-06-14

## 2019-06-21 ENCOUNTER — OFFICE VISIT (OUTPATIENT)
Dept: NEPHROLOGY | Facility: CLINIC | Age: 64
End: 2019-06-21
Payer: COMMERCIAL

## 2019-06-21 VITALS
DIASTOLIC BLOOD PRESSURE: 72 MMHG | RESPIRATION RATE: 20 BRPM | WEIGHT: 132.69 LBS | BODY MASS INDEX: 23.51 KG/M2 | HEIGHT: 63 IN | SYSTOLIC BLOOD PRESSURE: 128 MMHG | HEART RATE: 64 BPM

## 2019-06-21 DIAGNOSIS — Z94.0 S/P KIDNEY TRANSPLANT: Primary | ICD-10-CM

## 2019-06-21 PROCEDURE — 3074F SYST BP LT 130 MM HG: CPT | Mod: CPTII,S$GLB,, | Performed by: INTERNAL MEDICINE

## 2019-06-21 PROCEDURE — 3078F DIAST BP <80 MM HG: CPT | Mod: CPTII,S$GLB,, | Performed by: INTERNAL MEDICINE

## 2019-06-21 PROCEDURE — 3008F BODY MASS INDEX DOCD: CPT | Mod: CPTII,S$GLB,, | Performed by: INTERNAL MEDICINE

## 2019-06-21 PROCEDURE — 3074F PR MOST RECENT SYSTOLIC BLOOD PRESSURE < 130 MM HG: ICD-10-PCS | Mod: CPTII,S$GLB,, | Performed by: INTERNAL MEDICINE

## 2019-06-21 PROCEDURE — 3078F PR MOST RECENT DIASTOLIC BLOOD PRESSURE < 80 MM HG: ICD-10-PCS | Mod: CPTII,S$GLB,, | Performed by: INTERNAL MEDICINE

## 2019-06-21 PROCEDURE — 99214 OFFICE O/P EST MOD 30 MIN: CPT | Mod: S$GLB,,, | Performed by: INTERNAL MEDICINE

## 2019-06-21 PROCEDURE — 99214 PR OFFICE/OUTPT VISIT, EST, LEVL IV, 30-39 MIN: ICD-10-PCS | Mod: S$GLB,,, | Performed by: INTERNAL MEDICINE

## 2019-06-21 PROCEDURE — 99999 PR PBB SHADOW E&M-EST. PATIENT-LVL III: ICD-10-PCS | Mod: PBBFAC,,, | Performed by: INTERNAL MEDICINE

## 2019-06-21 PROCEDURE — 3008F PR BODY MASS INDEX (BMI) DOCUMENTED: ICD-10-PCS | Mod: CPTII,S$GLB,, | Performed by: INTERNAL MEDICINE

## 2019-06-21 PROCEDURE — 99999 PR PBB SHADOW E&M-EST. PATIENT-LVL III: CPT | Mod: PBBFAC,,, | Performed by: INTERNAL MEDICINE

## 2019-06-21 NOTE — PROGRESS NOTES
Subjective:       Patient ID: Chanel Charles is a 64 y.o.   female who presents for follow-up evaluation of evaluation of Living Unrelated RT ( 4/6/2015 ) , HTN, SHPT ,       ORGAN: RIGHT KIDNEY    Donor Type: living    PHS Increased Risk: no    Cold Ischemia: 58 mins    Induction Medications: campath - alemtuzumab (anti-cd52)          Gabriel Wyman MD      HPI : Chanel Charles is a pleasant 64 -year-old -American woman with history of Living unrelated ( friend ) renal transplant in 4/6/2015, excellent allograft function on Prograf  3/2  Mg  , CellCept 500 mg twice a day. She has history of lupus nephritis, cause of end-stage kidney disease. She was on hemodialysis for about 4-1/2 years at [facility] HD unit [facility], recent office notes were reviewed. She follows with rheumatology Department in [place] , in the past she was followed by endocrinology Department for osteopenia/osteoporosis, she also had hypercalcemia for which she received IV Reclast ( Zoledronic acid ),   recent lab results were discussed with the patient , stable allograft fn , mild hypercalcemia noted, recent ortho notes and Rheumatology notes reviewed,        Past Medical History:   Diagnosis Date    Abnormal stress echo - with no blockage on LHC - 1/16/14 1/7/2014    Acid reflux     Anemia of chronic kidney failure     Anxiety     Arthritis     Awaiting kidney transplant 12/12/2013    CHF (congestive heart failure)     Coronary artery disease     Bare metal stent in 2007    ESRD (end stage renal disease)     Secondary to Lupus Nephritis, HD MWF    Hypercalcemia 7/15/2015    Hyperlipidemia 12/12/2013    Hyperparathyroidism 7/15/2015    Hyperparathyroidism, tertiary 8/7/2015    Immunocompromised state 5/5/2015    ITP (idiopathic thrombocytopenic purpura) 12/12/2013    Living-donor kidney transplant for SLE 4/6/15 4/6/2015    Induced with Thymoglobulin.     Lupus nephritis      Lupus nephritis 5/18/2015    Menopause 7/15/2015    Osteoporosis 12/24/2015    Pericardial effusion s/p pericardiocentesis 12/12/2013    Pleural effusion s/p Thoracentesis and VATS 12/12/2013    Prophylactic immunotherapy 6/29/2015    S/p Bilateral Nephrectomy (Severe proteinuria) - 2012 3/19/2015    Secondary hyperparathyroidism, renal     Shingles 4/2012    SLE (systemic lupus erythematosus) 1997       Current Outpatient Medications on File Prior to Visit   Medication Sig Dispense Refill    alprazolam (XANAX) 0.5 MG tablet Take 0.5 mg by mouth. 1 Tablet Oral Every day.  or as needed.      aspirin (ECOTRIN) 81 MG EC tablet Take 81 mg by mouth once daily.       atorvastatin (LIPITOR) 80 MG tablet Take 80 mg by mouth every evening. 1 Tablet Oral Every day      BIOTIN ORAL Take 1,000 mg by mouth Daily.       eszopiclone (LUNESTA) 2 MG Tab Take 2 mg by mouth. 1 Tablet Oral As directed.  1 tablet 1 time as needed at bedtime.      famotidine (PEPCID) 20 MG tablet Take 1 tablet (20 mg total) by mouth every evening. (Patient taking differently: Take 20 mg by mouth 2 (two) times daily as needed. ) 30 tablet 11    fluticasone (FLONASE) 50 mcg/actuation nasal spray 1 spray by Each Nare route once daily. (Patient taking differently: 1 spray by Each Nare route as needed. )  0    hydroxychloroquine (PLAQUENIL) 200 mg tablet Take 1 tablet (200 mg total) by mouth once daily. 90 tablet 2    loratadine (CLARITIN) 10 mg tablet Take 1 tablet (10 mg total) by mouth daily as needed for Allergies.  0    magnesium oxide (MAG-OX) 400 mg tablet Take 1 tablet (400 mg total) by mouth once daily. (Patient taking differently: Take 400 mg by mouth once daily. ) 30 tablet 9    multivitamin (THERAGRAN) tablet Take 1 tablet by mouth once daily.      mycophenolate (CELLCEPT) 250 mg Cap TAKE TWO CAPSULES BY MOUTH TWICE A  capsule 11    tacrolimus (PROGRAF) 1 MG Cap Take 2 capsules (2 mg) by mouth every morning and 1  capsule (1 mg )by mouth every evening 270 capsule 3    valacyclovir (VALTREX) 1000 MG tablet Take 1 tablet by mouth as needed.       vitamin D 1000 units Tab Take 2,000 Units by mouth every Monday.       [DISCONTINUED] methylPREDNISolone (MEDROL DOSEPACK) 4 mg tablet use as directed 1 Package 0     No current facility-administered medications on file prior to visit.        SH : She is a retired teacher, principal. She is not a smoker or alcohol drinker. She has a son who is 32 years old. She is currently living at home.       FH : Several family members with hypertension. Lupus in a distant uncle            Review of Systems  :    Constitutional: Negative for activity change, appetite change, fatigue and fever.   HENT: Negative for congestion, facial swelling, sore throat, trouble swallowing and voice change.    Eyes: Negative for redness and visual disturbance.   Respiratory: Negative for apnea, cough, chest tightness, shortness of breath and wheezing.    Cardiovascular: Negative for chest pain, palpitations and leg swelling.   Gastrointestinal: Negative for abdominal distention, abdominal pain, blood in stool, constipation, diarrhea, nausea and vomiting.   Genitourinary: Negative for decreased urine volume, difficulty urinating, dysuria, flank pain, frequency, hematuria, pelvic pain and urgency.   Musculoskeletal: Positive for arthralgias. Negative for back pain, gait problem and joint swelling.   Skin: Negative for color change and rash.   Neurological: Negative for dizziness, syncope, weakness and headaches.   Hematological: Does not bruise/bleed easily.   Psychiatric/Behavioral: Negative for agitation, behavioral problems and confusion. The patient is not nervous/anxious.             Objective:         Vitals:    06/21/19 1557   BP: 128/72   Pulse: 64   Resp: 20       Weight 132 lb, stable      Physical Exam  :      Constitutional: She is oriented to person, place, and time. She appears well-developed and  well-nourished. No distress.    HENT: Head: Normocephalic and atraumatic. Mouth/Throat: Oropharynx is clear and moist. No oropharyngeal exudate.    Eyes: Conjunctivae and EOM are normal. Pupils are equal, round, and reactive to light. No scleral icterus.    Neck: Normal range of motion. Neck supple. No JVD present. Carotid bruit is not present. No tracheal deviation present. No thyroid mass and no thyromegaly present.    Cardiovascular: Normal rate, regular rhythm, normal heart sounds and intact distal pulses. Exam reveals no gallop and no friction rub.    No murmur heard.    Pulmonary/Chest: Effort normal and breath sounds normal. No respiratory distress. She has no wheezes. She has no rales. She exhibits no tenderness.    Abdominal: Soft. Bowel sounds are normal. She exhibits no distension, no abdominal bruit, no ascites and no mass. There is no hepatosplenomegaly. There is no allgraft ( RLQ ) tenderness. There is no rebound, no guarding and no CVA tenderness.   Musculoskeletal: Normal range of motion. She exhibits no edema or tenderness. LUE AVF with a good thrill,   Lymphadenopathy: She has no cervical adenopathy.   Neurological: She is alert and oriented to person, place, and time. No cranial nerve deficit. She exhibits normal muscle tone. Coordination normal.    Skin: Skin is warm and intact. No rash noted. No erythema. No pallor.   Psychiatric: She has a normal mood and affect. Her behavior is normal. Judgment     Labs:    Lab Results   Component Value Date    CREATININE 1.1 06/10/2019    BUN 28 (H) 06/10/2019     06/10/2019    K 5.0 06/10/2019     06/10/2019    CO2 22 (L) 06/10/2019       Lab Results   Component Value Date    WBC 6.16 06/10/2019    HGB 11.1 (L) 06/10/2019    HCT 36.0 (L) 06/10/2019    MCV 79 (L) 06/10/2019     (L) 06/10/2019       Lab Results   Component Value Date    .0 (H) 01/22/2019    CALCIUM 10.5 06/10/2019    PHOS 3.6 06/10/2019       Lab Results   Component  Value Date    ALBUMIN 3.9 06/10/2019       Prograf level 6       Impression and Plan : 63-year-old -American woman seen in office today in follow-up for following medical problems ,       1. Status post Living Unrelated kidney transplant ( 4/6/2015 ) : Stable renal function with a serum creatinine of 1 mg/dL.        Serum Prograf levels is 6  ( range 4-7 ) , Prograf  1 mg twice daily.  Cont CellCept 500 mg twice a day.            2. Hypertension - blood pressure is controlled,  she is not on any blood pressure medications, patient is asymptomatic.      3. Hyperparathyroidism - follow PTH , mild hypercalcemia noted, ? Tertiary  hyperpara ,  again discussed low calcium diet,      4. Anemia of chronic kidney disease - most recent hemoglobin is 11 g. will continue to monitor.        5. Recent urinalysis shows normal range proteinuria.        6. Lupus nephritis - currently under remission. On Plaquenil, sees Dr Jj in [place] ,       7. Lytes -  on Mag supplements ,      8. Euvolemic on exam ,       9. Leukopenia : resolved, she is following with hematologist Karsten ()        10. Hyperkalemia : improved,       11.  Thrombocytopenia : mild, monitor,       follow-up in about 4 months.   More than 25 minutes of face-to-face time discussing labs and plan of care.       Sincerely,        Олег Joseph MD

## 2019-06-21 NOTE — LETTER
June 21, 2019        Gabriel Wyman MD  [address] [facility] [id number]             [facility]  [address]  [place] [address]  Phone: [phone]  Fax: [phone]   Patient: Chanel Charles   MR Number: [id number]   YOB: 1955   Date of Visit: 6/21/2019       Dear Dr. Wyman:    Thank you for referring Chanel Charles to me for evaluation. Attached you will find relevant portions of my assessment and plan of care.    If you have questions, please do not hesitate to call me. I look forward to following Chanel Charles along with you.    Sincerely,      Олег Joseph MD            CC  No Recipients    Enclosure

## 2019-07-01 ENCOUNTER — PATIENT MESSAGE (OUTPATIENT)
Dept: RHEUMATOLOGY | Facility: CLINIC | Age: 64
End: 2019-07-01

## 2019-07-03 ENCOUNTER — CLINICAL SUPPORT (OUTPATIENT)
Dept: REHABILITATION | Facility: HOSPITAL | Age: 64
End: 2019-07-03
Attending: ORTHOPAEDIC SURGERY
Payer: COMMERCIAL

## 2019-07-03 DIAGNOSIS — M65.342 TRIGGER RING FINGER OF LEFT HAND: ICD-10-CM

## 2019-07-03 DIAGNOSIS — M65.30 TRIGGER FINGER OF RIGHT HAND, UNSPECIFIED FINGER: ICD-10-CM

## 2019-07-03 PROCEDURE — 97110 THERAPEUTIC EXERCISES: CPT | Performed by: OCCUPATIONAL THERAPIST

## 2019-07-03 PROCEDURE — 97165 OT EVAL LOW COMPLEX 30 MIN: CPT | Performed by: OCCUPATIONAL THERAPIST

## 2019-07-03 PROCEDURE — L3913 HFO W/O JOINTS CF: HCPCS | Performed by: OCCUPATIONAL THERAPIST

## 2019-07-03 PROCEDURE — 97035 APP MDLTY 1+ULTRASOUND EA 15: CPT | Performed by: OCCUPATIONAL THERAPIST

## 2019-07-03 NOTE — PROGRESS NOTES
Patient: Chanel Charles  Date of Evaluation: 2019  Referring Physician: Pardeep Bishop M.D.  Diagnosis: Bilateral trigger finger MF/RF the left worse than the right  M65.342 (ICD-10-CM) - Trigger ring finger of left hand   M65.30 (ICD-10-CM) - Trigger finger of right hand, unspecified finger     PMH: kidney transplant/Lupus in remission    Referral Orders: Eval and treat 2x week for 60 days to include active/passive/resistive exercises.Patient orders  on .    Start Time: 1:10  End Time: 2:15  Total Time: 60 min  Group Time: 0    Age: 64 y.o.  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  Working presently: Yes  Last time worked: NA  Workmen's Compensation: No    Date of Injury: 3 years on the left hand and 12 years on the right  Date of Surgery: NA  Involved areas: right and left ring finger with the left worst than the right    Mechanism of Injury: repetitive injury  Past Medical History:   Diagnosis Date    Abnormal stress echo - with no blockage on LHC - 14    Acid reflux     Anemia of chronic kidney failure     Anxiety     Arthritis     Awaiting kidney transplant 2013    CHF (congestive heart failure)     Coronary artery disease     Bare metal stent in     ESRD (end stage renal disease)     Secondary to Lupus Nephritis, HD MWF    Hypercalcemia 7/15/2015    Hyperlipidemia 2013    Hyperparathyroidism 7/15/2015    Hyperparathyroidism, tertiary 2015    Immunocompromised state 2015    ITP (idiopathic thrombocytopenic purpura) 2013    Living-donor kidney transplant for SLE 4/6/15 2015    Induced with Thymoglobulin.     Lupus nephritis     Lupus nephritis 2015    Menopause 7/15/2015    Osteoporosis 2015    Pericardial effusion s/p pericardiocentesis 2013    Pleural effusion s/p Thoracentesis and VATS 2013    Prophylactic  immunotherapy 6/29/2015    S/p Bilateral Nephrectomy (Severe proteinuria) - 2012 3/19/2015    Secondary hyperparathyroidism, renal     Shingles 4/2012    SLE (systemic lupus erythematosus) 1997     Current Outpatient Medications   Medication Sig    alprazolam (XANAX) 0.5 MG tablet Take 0.5 mg by mouth. 1 Tablet Oral Every day.  or as needed.    aspirin (ECOTRIN) 81 MG EC tablet Take 81 mg by mouth once daily.     atorvastatin (LIPITOR) 80 MG tablet Take 80 mg by mouth every evening. 1 Tablet Oral Every day    BIOTIN ORAL Take 1,000 mg by mouth Daily.     eszopiclone (LUNESTA) 2 MG Tab Take 2 mg by mouth. 1 Tablet Oral As directed.  1 tablet 1 time as needed at bedtime.    famotidine (PEPCID) 20 MG tablet Take 1 tablet (20 mg total) by mouth every evening. (Patient taking differently: Take 20 mg by mouth 2 (two) times daily as needed. )    fluticasone (FLONASE) 50 mcg/actuation nasal spray 1 spray by Each Nare route once daily. (Patient taking differently: 1 spray by Each Nare route as needed. )    hydroxychloroquine (PLAQUENIL) 200 mg tablet Take 1 tablet (200 mg total) by mouth once daily.    loratadine (CLARITIN) 10 mg tablet Take 1 tablet (10 mg total) by mouth daily as needed for Allergies.    magnesium oxide (MAG-OX) 400 mg tablet Take 1 tablet (400 mg total) by mouth once daily. (Patient taking differently: Take 400 mg by mouth once daily. )    multivitamin (THERAGRAN) tablet Take 1 tablet by mouth once daily.    mycophenolate (CELLCEPT) 250 mg Cap TAKE TWO CAPSULES BY MOUTH TWICE A DAY    tacrolimus (PROGRAF) 1 MG Cap Take 2 capsules (2 mg) by mouth every morning and 1 capsule (1 mg )by mouth every evening    valacyclovir (VALTREX) 1000 MG tablet Take 1 tablet by mouth as needed.     vitamin D 1000 units Tab Take 2,000 Units by mouth every Monday.      No current facility-administered medications for this visit.      Review of patient's allergies indicates:   Allergen Reactions    Heparin  "analogues Other (See Comments)     Low platelets    Escitalopram oxalate      Felt anxious    Gabapentin      Other reaction(s): Hallucinations     X-Ray Results:   FINDINGS:  Right hand: There is no radiographic evidence of acute osseous, articular, or soft tissue abnormality.  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  No erosive changes demonstrated    Left hand: There is no radiographic evidence of acute osseous, articular, or soft tissue abnormality.  Severe degenerative findings again noted at the 1st CMC joint.  No erosive changes demonstrated.  No appreciable change from prior          MRI Results: NA  EMG Results: NA      Subjective  Chanel reports "she is wearing figure 8 braces only at night as instructed by her physician and using a stress ball for repetitive gripping." She has also tried 3 steroid injections on the left hand, with relief ,that lasted a year.Patient reports occasional thumb pain with texting, but right now the trigger finger is the most painful and the only thing she notices.    Functional Pain Scale Rating 0-10: 4/10 right hand    7/10 left hand  Location: A-1 pulley of bilateral hands MF/RF  Description: Aching on the right hand and Throbbing on the left hand  Activities which increase pain: Bending  Activities which decrease pain: nothing except movement thru out the days    Chanel's goals for therapy are: Decrease pain and Improve functional hand use     THE UPPER EXTREMITY FUNCTIONAL SCALE (UEFS) - The following scores are based on patient reported assessment.      0 = extreme difficulty or unable to perform activity; 1 = quite a bit of difficulty; 2 = moderate difficulty; 3 = a little bit of difficulty; 4 = no difficulty    1 Any of your usual work, housework, or school activities   4/4  2 Your usual hobbies, re creational or sporting activities    4/4  3 Lifting a bag of groceries to waist level      3/4  4 Lifting a bag of groceries above " your head     2/4  5 Grooming your hair         4/4  6 Pushing up on your hands (eg from bathtub or chair)    3/4  7 Preparing food (eg peeling, cutting)      3/4  8 Driving          4/4  9 Vacuuming, sweeping or raking       4/4  10 Dressing          4/4  11 Doing up buttons         3/4  12 Using tools or appliances        3/4  13 Opening doors         3/4  14 Cleaning          4/4  15 Tying or lacing shoes        4/4  16 Sleeping          4/4  17 Laundering clothes (eg washing, ironing, folding)    4/4  18 Opening a jar         2/4  19 Throwing a ball         4/4  20 Carrying a small suitcase with your affected limb    3/4    Minimum Level of Detectable Change (90% Confidence): 9 points SCORE: 71/80    Patient reports 89% ability on the Upper Extremity Functional Scale.    Objective    Observation: Skin intact  Deformity noted at the left thumb with MP joint subluxation    Sensation: No sensory complaints.   Stereognosis: Intact    Special Tests:   Grind Test  Positive  Pain with palpation of the A-1 pulley of bilateral MF's and the left RF    Edema: Circumferential measurements: None    Range of Motion: right Active range of motion/stiffness noted with left MF/RF MP joint end range    Left MP joints:  IF:0/81 MF:0/91  RF: 0/85  LF: /085    Right MP Joints:  IF: 0/76 MF:87 RF:85 LF: 86      Thumb Opposition: WNL  Palmar Abduction: WNL  Radial Abduction: WNL    Wrist Ext/Flex: WNL/WNL  Wrist RD/UD: WNL/WNL  Supination/Pronation: WNL/WNL  Elbow extension/flexion: WNL/WNL    Manual Muscle Test: Not tested  Muscle   Strength       Strength: (JAYSON Dynamometer in lbs.) Average 3 trials, Position II  Right: 34/28/22#  Left: 7/8/10#    Pinch Strength: (Pinch Gauge in psi's), Average 3 trials  Wayne Pinch R) 4.5psi's   L) 3psi's  3pt Pinch   R) 5.5psi's  L) 3psi's  2pt Pinch   R) 3psi's   L) 3psi's    Fine Anamika Coordination Tests: WNL    Functional Limitations: Patient presents with the following functional Limitations:    Self Care / ADL: Dressing, Grooming, Hygiene, Tying shoes and Buttons/Fastners    Work/Activities: Gripping    Leisure: Driving/lift weights on M/W/F 5#    Treatment included: OT evaluation and instruction in written HEP including (Hand Therapy/Finger Exercises) Blocking Exercises, Wrist extension Active, Thumb Palmar AB, Thumb Radial AB, Wrist flexion Passive, Digit Extension, Orthotic Fabrication/Fit/Training: trigger finger orthotic and Patient reported good understanding of above    Repetitions 10 each   Frequency 3 per day  Constructed trigger orthotic for the left MF with review of donning instructions and skin and joint precautions.Patient to wear it during the day.    Manual therapy 5-10 min.  One on one exercises: 5-10 min.    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 Patient has mild range of motion limitations of the  Left/right MP joints and pain with palpation of the A-1 pulley of bilateral MF's and the left RF. She has bilateral hand  and pinch weakness secondary to her trigger finger and CMC arthritis. Trigger finger symptoms and 1st CMC joint arthritis are interfering with bilateral hand functional usage and arthritis is probably contributing to the over use of her FDS on bilateral hands. Patient has guarded rehab potential secondary to the involvement of several digits, severity of left hand pain and deformity of her left thumb MP joint. Recommended a neoprene thumb brace to be purchased for the left thumb and thumb stabilization exercises and activity modifications. Recommended that she stop  strengthening exercises of the left hand and wear orthoplast trigger finger splint during the day, daily passive range of motion in flexion and extension and ice massage of the A-1 pully.    Treatment Diagnosis/ Problem List Bilateral Decreased ROM, Decreased   strength, Decreased pinch strength, Decreased functional hand use, Increased pain and Joint Stiffness    Co-morbidities which may impact the plan of care and potentially impede patient's progress in therapy include: 1st CMC arthritis with deformity and pain and bilateral hand involvement.    Patient's clinical presentation is  Evolving.     Complexity level is low.    Short Term Goals: Patient to be IND with HEP and modalities for pain/edema managment., Increase ROM 3-5 degrees to increase functional hand use for ADLs/work/leisure activities., Increase  strength 3-5 lbs. to improve functional grasp for ADLs/work/leisure activities. , Increase pinch 1-3 psi's to increase IND wiht button and FM Coordination., Decrease complaints of pain to  2 out of 10 to increase functional hand use for ADL/work/leisure activities. and Patient to be IND wiht Orthotic use, wear and care precautions.       Plan  Chanel Charles to be treated by Occupational Therapy 2 times per week for 8 weeks.   Treatment to include: Manual therapy/joint mobilizations, Modalities for pain management, US 3 mhz, Therapeutic exercises/activities. and Orthotic Fabrication/Fit/Training, as well as any other treatments deemed necessary based on the patient's needs or progress.

## 2019-07-15 ENCOUNTER — CLINICAL SUPPORT (OUTPATIENT)
Dept: REHABILITATION | Facility: HOSPITAL | Age: 64
End: 2019-07-15
Payer: COMMERCIAL

## 2019-07-15 DIAGNOSIS — M65.341 TRIGGER FINGER, RIGHT RING FINGER: ICD-10-CM

## 2019-07-15 PROCEDURE — 97110 THERAPEUTIC EXERCISES: CPT | Performed by: OCCUPATIONAL THERAPIST

## 2019-07-15 PROCEDURE — 97035 APP MDLTY 1+ULTRASOUND EA 15: CPT | Performed by: OCCUPATIONAL THERAPIST

## 2019-07-15 NOTE — PROGRESS NOTES
"Patient: Chanel Charles  Date of Evaluation: 07/15/2019  Referring Physician: Pardeep Bishop M.D.  Diagnosis: Bilateral trigger finger MF/RF the left worse than the right  M65.342 (ICD-10-CM) - Trigger ring finger of left hand   M65.30 (ICD-10-CM) - Trigger finger of right hand, unspecified finger     PMH: kidney transplant/Lupus in remission    Referral Orders: Eval and treat 2x week for 60 days to include active/passive/resistive exercises.Patient orders  on .    Start Time:2:05  End Time: 3:15  Total Time: 60 min  Group Time: 0    X-Ray Results:   FINDINGS:  Right hand: There is no radiographic evidence of acute osseous, articular, or soft tissue abnormality.  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  No erosive changes demonstrated    Left hand: There is no radiographic evidence of acute osseous, articular, or soft tissue abnormality.  Severe degenerative findings again noted at the 1st CMC joint.  No erosive changes demonstrated.  No appreciable change from prior          Subjective  Chanel reports "she is wearing the trigger finger brace I constructed for her during the day and it is decreasing her symptoms in her left MF." She is still symptomatic in her left ring finger.    Chanel's goals for therapy are: Decrease pain and Improve functional hand use     THE UPPER EXTREMITY FUNCTIONAL SCALE (UEFS) - The following scores are based on patient reported assessment.      0 = extreme difficulty or unable to perform activity; 1 = quite a bit of difficulty; 2 = moderate difficulty; 3 = a little bit of difficulty; 4 = no difficulty    1 Any of your usual work, housework, or school activities   4/4  2 Your usual hobbies, re creational or sporting activities    4/4  3 Lifting a bag of groceries to waist level      3/4  4 Lifting a bag of groceries above your head     2/4  5 Grooming your hair         4/4  6 Pushing up on your hands (eg from bathtub or " chair)    3/4  7 Preparing food (eg peeling, cutting)      3/4  8 Driving          4/4  9 Vacuuming, sweeping or raking       4/4  10 Dressing          4/4  11 Doing up buttons         3/4  12 Using tools or appliances        3/4  13 Opening doors         3/4  14 Cleaning          4/4  15 Tying or lacing shoes        4/4  16 Sleeping          4/4  17 Laundering clothes (eg washing, ironing, folding)    4/4  18 Opening a jar         2/4  19 Throwing a ball         4/4  20 Carrying a small suitcase with your affected limb    3/4    Minimum Level of Detectable Change (90% Confidence): 9 points SCORE: 71/80    Patient reports 89% ability on the Upper Extremity Functional Scale.    Objective    Observation: Skin intact  Deformity noted at the left thumb with MP joint subluxation    Special Tests:   Grind Test  Positive  Pain with palpation of the A-1 pulley of bilateral MF's and the left RF    Edema: Circumferential measurements: None    Range of Motion: right Active range of motion/stiffness noted with left MF/RF MP joint end range    Left MP joints:  IF:0/81 MF:0/91  RF: 0/85  LF: /085    Right MP Joints:  IF: 0/76 MF:87 RF:85 LF: 86    Manual Muscle Test: Not tested  Muscle   Strength       Strength: (JAYSON Dynamometer in lbs.) Average 3 trials, Position II  Right: 34/28/22#  Left: 7/8/10#    Pinch Strength: (Pinch Gauge in psi's), Average 3 trials  Wayne Pinch R) 4.5psi's   L) 3psi's  3pt Pinch   R) 5.5psi's  L) 3psi's  2pt Pinch   R) 3psi's   L) 3psi's    Fine Anamika Coordination Tests: WNL    Functional Limitations: Patient presents with the following functional Limitations:   Self Care / ADL: Dressing, Grooming, Hygiene, Tying shoes and Buttons/Fastners    Work/Activities: Gripping    Leisure: Driving/lift weights on M/W/F 5#    Treatment included: Blocking Exercises, Wrist extension/flexion Active in all 3 forearm positions, Thumb Palmar AB, Thumb Radial AB, Wrist flexion Passive, Digit Extension,    Repetitions  10 each   Frequency 3 per day    Moist heat bilateral hands f/b IASTM of bilateral palms and A-1 pulley's    Constructed trigger orthotic for the left MF/RF with review of donning instructions and skin and joint precautions.Patient to wear it during the day.    Bilateral 1st web space stretches: 4/15 second holds  Bilateral thumb isometric exercises of thumb stabilizers    Manual therapy 5-10 min.  One on one exercises: 5-10 min.    Assessment: Patient reported some improvement in the left MF after wearing her trigger finger brace. Constructed a trigger finger brace for both left MF and RF today. Focus of treatment on resting the left hand A-1 pulley and improving strength and function of bilateral thumbs.     Treatment Diagnosis/ Problem List Bilateral Decreased ROM, Decreased  strength, Decreased pinch strength, Decreased functional hand use, Increased pain and Joint Stiffness    Short Term Goals: Patient to be IND with HEP and modalities for pain/edema managment., Increase ROM 3-5 degrees to increase functional hand use for ADLs/work/leisure activities., Increase  strength 3-5 lbs. to improve functional grasp for ADLs/work/leisure activities. , Increase pinch 1-3 psi's to increase IND wiht button and FM Coordination., Decrease complaints of pain to  2 out of 10 to increase functional hand use for ADL/work/leisure activities. and Patient to be IND wiht Orthotic use, wear and care precautions.       Plan: Kinesio Taping of the left thumb CMC joint.  Chanel Charles to be treated by Occupational Therapy 2 times per week for 8 weeks.   Treatment to include: Manual therapy/joint mobilizations, Modalities for pain management, US 3 mhz, Therapeutic exercises/activities. and Orthotic Fabrication/Fit/Training, as well as any other treatments deemed necessary based on the patient's needs or progress.

## 2019-07-19 ENCOUNTER — CLINICAL SUPPORT (OUTPATIENT)
Dept: REHABILITATION | Facility: HOSPITAL | Age: 64
End: 2019-07-19
Payer: COMMERCIAL

## 2019-07-19 DIAGNOSIS — M65.341 TRIGGER FINGER, RIGHT RING FINGER: ICD-10-CM

## 2019-07-19 PROCEDURE — 97140 MANUAL THERAPY 1/> REGIONS: CPT | Performed by: OCCUPATIONAL THERAPIST

## 2019-07-19 PROCEDURE — 97530 THERAPEUTIC ACTIVITIES: CPT | Performed by: OCCUPATIONAL THERAPIST

## 2019-07-19 NOTE — PROGRESS NOTES
"Patient: Chanel Charles  Date of Evaluation: 2019  Referring Physician: Pardeep Bishop M.D.  Diagnosis: Bilateral trigger finger MF/RF the left worse than the right  M65.342 (ICD-10-CM) - Trigger ring finger of left hand   M65.30 (ICD-10-CM) - Trigger finger of right hand, unspecified finger     PMH: kidney transplant/Lupus in remission    Referral Orders: Eval and treat 2x week for 60 days to include active/passive/resistive exercises.Patient orders  on .    Start Time:10:45  End Time: 11:45  Total Time: 60 min  Group Time: 0    X-Ray Results:   FINDINGS:  Right hand: There is no radiographic evidence of acute osseous, articular, or soft tissue abnormality.  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  No erosive changes demonstrated    Left hand: There is no radiographic evidence of acute osseous, articular, or soft tissue abnormality.  Severe degenerative findings again noted at the 1st CMC joint.  No erosive changes demonstrated.  No appreciable change from prior          Subjective  Chanel reports "she is wearing the new trigger finger brace I constructed for her during the day and it is decreasing her symptoms in her left MF and IF." She reports that her pain in the AM is a  9/10 and lasts about an hour. The right hand pain is a 5/10 pain level at it's worst pain and best pain is 0/10 on 1-10 pain scale. She is considering surgery.     Chanel's goals for therapy are: Decrease pain and Improve functional hand use     THE UPPER EXTREMITY FUNCTIONAL SCALE (UEFS) - The following scores are based on patient reported assessment.      0 = extreme difficulty or unable to perform activity; 1 = quite a bit of difficulty; 2 = moderate difficulty; 3 = a little bit of difficulty; 4 = no difficulty    1 Any of your usual work, housework, or school activities   4/4  2 Your usual hobbies, re creational or sporting activities    4/4  3 Lifting a bag of " groceries to waist level      3/4  4 Lifting a bag of groceries above your head     2/4  5 Grooming your hair         4/4  6 Pushing up on your hands (eg from bathtub or chair)    3/4  7 Preparing food (eg peeling, cutting)      3/4  8 Driving          4/4  9 Vacuuming, sweeping or raking       4/4  10 Dressing          4/4  11 Doing up buttons         3/4  12 Using tools or appliances        3/4  13 Opening doors         3/4  14 Cleaning          4/4  15 Tying or lacing shoes        4/4  16 Sleeping          4/4  17 Laundering clothes (eg washing, ironing, folding)    4/4  18 Opening a jar         2/4  19 Throwing a ball         4/4  20 Carrying a small suitcase with your affected limb    3/4    Minimum Level of Detectable Change (90% Confidence): 9 points SCORE: 71/80    Patient reports 89% ability on the Upper Extremity Functional Scale.    Objective    Observation: Skin intact  Deformity noted at the left thumb with MP joint subluxation    Special Tests:   Grind Test  Positive  Pain with palpation of the A-1 pulley of bilateral MF's and the left RF    Edema: Circumferential measurements: None    Range of Motion: right Active range of motion/stiffness noted with left MF/RF MP joint end range    Left MP joints:  IF:0/81 MF:0/91  RF: 0/85  LF: /085    Right MP Joints:  IF: 0/76 MF:87 RF:85 LF: 86    Manual Muscle Test: Not tested  Muscle   Strength       Strength: (JAYSON Dynamometer in lbs.) Average 3 trials, Position II  Right: 34/28/22#  Left: 7/8/10#    Pinch Strength: (Pinch Gauge in psi's), Average 3 trials  Wayne Pinch R) 4.5psi's   L) 3psi's  3pt Pinch   R) 5.5psi's  L) 3psi's  2pt Pinch   R) 3psi's   L) 3psi's    Fine Anamika Coordination Tests: WNL    Functional Limitations: Patient presents with the following functional Limitations:   Self Care / ADL: Dressing, Grooming, Hygiene, Tying shoes and Buttons/Fastners    Work/Activities: Gripping    Leisure: Driving/lift weights on M/W/F 5#    Treatment  included: Blocking Exercises, Wrist extension/flexion Active in all 3 forearm positions, Thumb Palmar AB, Thumb Radial AB, Wrist flexion Passive, Digit Extension,    Repetitions 10 each   Frequency 3 per day    Moist heat bilateral hands f/b IASTM of bilateral palms and A-1 pulley's    Constructed trigger orthotic for the left MF/RF with review of donning instructions and skin and joint precautions.Patient to wear it during the day.    Bilateral 1st web space stretches: 4/15 second holds  Bilateral thumb isometric exercises of thumb stabilizers  ISOTONIC EXERCISES: 3 sets 10  Bilateral extension of the the shoulder  Bilateral rows  Bilateral ER    Manual therapy 13 min.  One on one exercises: 13 min.    Assessment: Patient reported some improvement in the left MF/RF after wearing her trigger finger brace. Recommended that she wear her trigger finger brace for approximately 4-6 weeks. Emphasized scapular muscle strengthening exercises to decrease over use of her wrist and finger flexors. Also recommended thumb muscle stabilization exercises secondary to 1st CMC and MP joint instability.     Treatment Diagnosis/ Problem List Bilateral Decreased ROM, Decreased  strength, Decreased pinch strength, Decreased functional hand use, Increased pain and Joint Stiffness    Short Term Goals: Patient to be IND with HEP and modalities for pain/edema managment.,Met 07/19/2019 Increase ROM 3-5 degrees to increase functional hand use for ADLs/work/leisure activities., Increase  strength 3-5 lbs. to improve functional grasp for ADLs/work/leisure activities. , Increase pinch 1-3 psi's to increase IND wiht button and FM Coordination., Decrease complaints of pain to  2 out of 10 to increase functional hand use for ADL/work/leisure activities. and Patient to be IND wiht Orthotic use, wear and care precautions. Met 07/19/2019      Plan: Patient to continue therapy at home to include icing with frozen water bottles and IASTM and  wearing her trigger finger brace. DISCHARGE OUTPATIENT OCCUPATIONAL THERAPY.

## 2019-07-22 ENCOUNTER — OFFICE VISIT (OUTPATIENT)
Dept: ORTHOPEDICS | Facility: CLINIC | Age: 64
End: 2019-07-22
Payer: COMMERCIAL

## 2019-07-22 VITALS
DIASTOLIC BLOOD PRESSURE: 59 MMHG | SYSTOLIC BLOOD PRESSURE: 115 MMHG | WEIGHT: 132 LBS | HEART RATE: 74 BPM | BODY MASS INDEX: 23.39 KG/M2 | HEIGHT: 63 IN

## 2019-07-22 DIAGNOSIS — Z94.0 LIVING-DONOR KIDNEY TRANSPLANT RECIPIENT: Chronic | ICD-10-CM

## 2019-07-22 DIAGNOSIS — M65.341 TRIGGER FINGER, RIGHT RING FINGER: ICD-10-CM

## 2019-07-22 DIAGNOSIS — M65.332 TRIGGER FINGER, LEFT MIDDLE FINGER: Primary | ICD-10-CM

## 2019-07-22 DIAGNOSIS — M65.331 TRIGGER MIDDLE FINGER OF RIGHT HAND: ICD-10-CM

## 2019-07-22 DIAGNOSIS — M65.342 TRIGGER RING FINGER OF LEFT HAND: ICD-10-CM

## 2019-07-22 DIAGNOSIS — Z94.0 IMMUNOSUPPRESSIVE MANAGEMENT ENCOUNTER FOLLOWING KIDNEY TRANSPLANT: ICD-10-CM

## 2019-07-22 DIAGNOSIS — Z79.899 IMMUNOSUPPRESSIVE MANAGEMENT ENCOUNTER FOLLOWING KIDNEY TRANSPLANT: ICD-10-CM

## 2019-07-22 PROCEDURE — 3078F PR MOST RECENT DIASTOLIC BLOOD PRESSURE < 80 MM HG: ICD-10-PCS | Mod: CPTII,S$GLB,, | Performed by: PHYSICIAN ASSISTANT

## 2019-07-22 PROCEDURE — 20550 PR INJECT TENDON SHEATH/LIGAMENT: ICD-10-PCS | Mod: F2,S$GLB,, | Performed by: PHYSICIAN ASSISTANT

## 2019-07-22 PROCEDURE — 3074F SYST BP LT 130 MM HG: CPT | Mod: CPTII,S$GLB,, | Performed by: PHYSICIAN ASSISTANT

## 2019-07-22 PROCEDURE — 3074F PR MOST RECENT SYSTOLIC BLOOD PRESSURE < 130 MM HG: ICD-10-PCS | Mod: CPTII,S$GLB,, | Performed by: PHYSICIAN ASSISTANT

## 2019-07-22 PROCEDURE — 3008F PR BODY MASS INDEX (BMI) DOCUMENTED: ICD-10-PCS | Mod: CPTII,S$GLB,, | Performed by: PHYSICIAN ASSISTANT

## 2019-07-22 PROCEDURE — 99214 PR OFFICE/OUTPT VISIT, EST, LEVL IV, 30-39 MIN: ICD-10-PCS | Mod: 25,S$GLB,, | Performed by: PHYSICIAN ASSISTANT

## 2019-07-22 PROCEDURE — 3078F DIAST BP <80 MM HG: CPT | Mod: CPTII,S$GLB,, | Performed by: PHYSICIAN ASSISTANT

## 2019-07-22 PROCEDURE — 3008F BODY MASS INDEX DOCD: CPT | Mod: CPTII,S$GLB,, | Performed by: PHYSICIAN ASSISTANT

## 2019-07-22 PROCEDURE — 99214 OFFICE O/P EST MOD 30 MIN: CPT | Mod: 25,S$GLB,, | Performed by: PHYSICIAN ASSISTANT

## 2019-07-22 PROCEDURE — 99999 PR PBB SHADOW E&M-EST. PATIENT-LVL IV: ICD-10-PCS | Mod: PBBFAC,,, | Performed by: PHYSICIAN ASSISTANT

## 2019-07-22 PROCEDURE — 99999 PR PBB SHADOW E&M-EST. PATIENT-LVL IV: CPT | Mod: PBBFAC,,, | Performed by: PHYSICIAN ASSISTANT

## 2019-07-22 PROCEDURE — 20550 NJX 1 TENDON SHEATH/LIGAMENT: CPT | Mod: F2,S$GLB,, | Performed by: PHYSICIAN ASSISTANT

## 2019-07-22 RX ORDER — METHYLPREDNISOLONE ACETATE 40 MG/ML
40 INJECTION, SUSPENSION INTRA-ARTICULAR; INTRALESIONAL; INTRAMUSCULAR; SOFT TISSUE ONCE
Status: COMPLETED | OUTPATIENT
Start: 2019-07-22 | End: 2019-07-22

## 2019-07-22 RX ADMIN — METHYLPREDNISOLONE ACETATE 40 MG: 40 INJECTION, SUSPENSION INTRA-ARTICULAR; INTRALESIONAL; INTRAMUSCULAR; SOFT TISSUE at 03:07

## 2019-07-22 NOTE — PROGRESS NOTES
Patient ID: Chanel Charles is a 64 y.o. female.    Chief Complaint: Pain of the Right Hand and Pain of the Left Hand      HPI: Chanel Charles  is a 64 y.o. female who c/o Pain of the Right Hand and Pain of the Left Hand   for duration of several years, but progressively worsening over about the last 7 months.  She saw Dr. diop in about 6 weeks ago.  He put her on a Medrol Dosepak.  She could not tolerate the side effects so she stopped taking it. Her main complaint today is the left ring and left middle fingers.  The right ring and middle fingers also give her some problems.  However the pain in the left hand keeps her from being able to make a full fist.  She denies associated numbness and tingling.  Pain level 7/10.  Quality aching, sharp, shooting.  Alleviating factors include rest and occupational therapy.  Aggravating factors include making a full fist.  Of note, she is in an immunocompromised state status post kidney transplant 4 years ago.    Past Medical History:   Diagnosis Date    Abnormal stress echo - with no blockage on LHC - 1/16/14 1/7/2014    Acid reflux     Anemia of chronic kidney failure     Anxiety     Arthritis     Awaiting kidney transplant 12/12/2013    CHF (congestive heart failure)     Coronary artery disease     Bare metal stent in 2007    ESRD (end stage renal disease)     Secondary to Lupus Nephritis, HD MWF    Hypercalcemia 7/15/2015    Hyperlipidemia 12/12/2013    Hyperparathyroidism 7/15/2015    Hyperparathyroidism, tertiary 8/7/2015    Immunocompromised state 5/5/2015    ITP (idiopathic thrombocytopenic purpura) 12/12/2013    Living-donor kidney transplant for SLE 4/6/15 4/6/2015    Induced with Thymoglobulin.     Lupus nephritis     Lupus nephritis 5/18/2015    Menopause 7/15/2015    Osteoporosis 12/24/2015    Pericardial effusion s/p pericardiocentesis 12/12/2013    Pleural effusion s/p Thoracentesis and VATS 12/12/2013    Prophylactic immunotherapy  2015    S/p Bilateral Nephrectomy (Severe proteinuria) - 2012 3/19/2015    Secondary hyperparathyroidism, renal     Shingles 2012    SLE (systemic lupus erythematosus)      Past Surgical History:   Procedure Laterality Date     SECTION      etopic pregnancy       NEPHRECTOMY      Bilateral 2/2 proteinuria    PARACENTESIS      PERICARDIOCENTESIS      SINUS SURGERY  May 2013    THORACENTESIS      TRANSPLANT-KIDNEY N/A 2015    Performed by Eamon Pina MD at [facility] OR [address] [place] [address]     Family History   Problem Relation Age of Onset    Osteoarthritis Mother     Coronary artery disease Father 55         from MI 72 (and had 2 MIs in his 50s-60s)    Rheum arthritis Paternal Uncle     Coronary artery disease Maternal Uncle 48         from MI at 62    Lupus Neg Hx     Psoriasis Neg Hx     Kidney disease Neg Hx      Social History     Socioeconomic History    Marital status:      Spouse name: Not on file    Number of children: Not on file    Years of education: Not on file    Highest education level: Not on file   Occupational History    Not on file   Social Needs    Financial resource strain: Not on file    Food insecurity:     Worry: Not on file     Inability: Not on file    Transportation needs:     Medical: Not on file     Non-medical: Not on file   Tobacco Use    Smoking status: Former Smoker     Last attempt to quit: 2004     Years since quitting: 15.5    Smokeless tobacco: Never Used   Substance and Sexual Activity    Alcohol use: Yes     Comment: 1 glass of wine per month or less.    Drug use: No    Sexual activity: Not on file   Lifestyle    Physical activity:     Days per week: Not on file     Minutes per session: Not on file    Stress: Not on file   Relationships    Social connections:     Talks on phone: Not on file     Gets together: Not on file     Attends Shinto service: Not on file     Active member of club or  organization: Not on file     Attends meetings of clubs or organizations: Not on file     Relationship status: Not on file   Other Topics Concern    Not on file   Social History Narrative    Wears a seatbelt.     Medication List with Changes/Refills   Current Medications    ALPRAZOLAM (XANAX) 0.5 MG TABLET    Take 0.5 mg by mouth. 1 Tablet Oral Every day.  or as needed.    ASPIRIN (ECOTRIN) 81 MG EC TABLET    Take 81 mg by mouth once daily.     ATORVASTATIN (LIPITOR) 80 MG TABLET    Take 80 mg by mouth every evening. 1 Tablet Oral Every day    BIOTIN ORAL    Take 1,000 mg by mouth Daily.     ESZOPICLONE (LUNESTA) 2 MG TAB    Take 2 mg by mouth. 1 Tablet Oral As directed.  1 tablet 1 time as needed at bedtime.    FAMOTIDINE (PEPCID) 20 MG TABLET    Take 1 tablet (20 mg total) by mouth every evening.    FLUTICASONE (FLONASE) 50 MCG/ACTUATION NASAL SPRAY    1 spray by Each Nare route once daily.    HYDROXYCHLOROQUINE (PLAQUENIL) 200 MG TABLET    Take 1 tablet (200 mg total) by mouth once daily.    LORATADINE (CLARITIN) 10 MG TABLET    Take 1 tablet (10 mg total) by mouth daily as needed for Allergies.    MAGNESIUM OXIDE (MAG-OX) 400 MG TABLET    Take 1 tablet (400 mg total) by mouth once daily.    MULTIVITAMIN (THERAGRAN) TABLET    Take 1 tablet by mouth once daily.    MYCOPHENOLATE (CELLCEPT) 250 MG CAP    TAKE TWO CAPSULES BY MOUTH TWICE A DAY    TACROLIMUS (PROGRAF) 1 MG CAP    Take 2 capsules (2 mg) by mouth every morning and 1 capsule (1 mg )by mouth every evening    VALACYCLOVIR (VALTREX) 1000 MG TABLET    Take 1 tablet by mouth as needed.     VITAMIN D 1000 UNITS TAB    Take 2,000 Units by mouth every Monday.      Review of patient's allergies indicates:   Allergen Reactions    Heparin analogues Other (See Comments)     Low platelets    Escitalopram oxalate      Felt anxious    Gabapentin      Other reaction(s): Hallucinations           Objective:        General    Vitals reviewed.  Constitutional: She is  oriented to person, place, and time. She appears well-developed and well-nourished. No distress.   HENT:   Right Ear: External ear normal.   Left Ear: External ear normal.   Nose: Nose normal.   Eyes: Pupils are equal, round, and reactive to light. Right eye exhibits no discharge. Left eye exhibits no discharge.   Neck: Normal range of motion.   Pulmonary/Chest: Effort normal. No respiratory distress.   Abdominal: Soft.   Neurological: She is alert and oriented to person, place, and time. No cranial nerve deficit. She exhibits normal muscle tone. Coordination normal.   Psychiatric: She has a normal mood and affect. Her behavior is normal. Judgment and thought content normal.             Right Hand/Wrist Exam     Inspection   Scars: Wrist - absent   Effusion: Wrist - absent   Bruising: Wrist - absent   Deformity: Wrist - deformity     Range of Motion     Wrist   Extension:  50 normal   Flexion:  70 normal   Abduction: 20 normal  Adduction: 35 normal    Tests   Phalens Sign: negative  Tinel's sign (median nerve): negative  Finkelstein's test: negative  Carpal Tunnel Compression Test: negative  Cubital Tunnel Compression Test: negative      Other     Neuorologic Exam    Median Distribution: normal  Ulnar Distribution: normal  Radial Distribution: normal    Comments:  Mild TTP a1 pulley right ring/mid fingers  No active triggering'      Left Hand/Wrist Exam     Inspection   Scars: Wrist - absent   Effusion: Wrist - absent   Bruising: Wrist - absent   Deformity: Wrist - absent     Range of Motion     Wrist   Extension:  50 normal   Flexion:  70 normal   Abduction: 20 normal  Adduction: 35 normal    Tests   Phalens Sign: negative  Tinel's sign (median nerve): negative  Finkelstein's test: negative  Carpal Tunnel Compression Test: negative  Cubital Tunnel Compression Test: negative      Other     Sensory Exam  Median Distribution: normal  Ulnar Distribution: normal  Radial Distribution: normal    Comments:  TTP A1 pulley  left ring and middle finger  Decreased flexion left ring and mid finger  No active triggering today      Right Elbow Exam     Inspection   Scars: absent  Effusion: absent  Bruising: absent  Deformity: absent  Atrophy: absent    Range of Motion   Extension:  0 normal   Flexion:  130 normal   Pronation: normal   Supination:  80 normal     Tests   Varus: negative  Valgus: negative  Tinel's sign (cubital tunnel): negative  Tennis Elbow: negative  Golfer's Elbow: negative  Radial Capitellar Grind: negative    Other   Sensation: normal    Comments:  Triggering 2 left and right middle and ring fingers.  Tenderness palpation over A1 pulley is at these levels, worst over left middle finger      Left Elbow Exam     Inspection   Scars: absent  Effusion: absent  Bruising: absent  Deformity: absent  Atrophy: absent    Range of Motion   Extension:  0 normal   Flexion:  130 normal   Pronation: normal   Supination:  80 normal     Tests   Varus: negative  Tinel's sign (cubital tunnel): negative  Tennis Elbow: negative  Golfer's Elbow: negative  Radial Capitellar Grind: negative    Other   Sensation: normal        Muscle Strength   Right Upper Extremity   Wrist extension: 5/5/5   Wrist flexion: 5/5/5   : 5/5/5   Pinch Mechanism: 5/5  Elbow Pronation:  5/5   Elbow Supination:  5/5   Elbow Extension: 5/5  Elbow Flexion: 5/5  Intrinsics: 5/5  EPL (Extensor Pollicis Longus): 5/5  Left Upper Extremity  Wrist extension: 5/5/5   Wrist flexion: 5/5/5   :  4/5/5   Pinch Mechanism: 5/5  Elbow Pronation:  5/5   Elbow Supination:  5/5   Elbow Extension: 5/5  Elbow Flexion: 5/5  Intrinsics: 5/5  EPL (Extensor Pollicis Longus): 5/5    Vascular Exam     Right Pulses      Radial:                    2+      Left Pulses      Radial:                    2+      Capillary Refill  Right Hand: normal capillary refill  Left Hand: normal capillary refill    Edema  Right Forearm: absent  Left Forearm: absent            Xray Images and report were  reviewed today.  I agree with the radiologist's interpretation.    X-Ray Hand 3 View Bilateral  Narrative: EXAMINATION:  XR HAND COMPLETE 3 VIEWS BILATERAL    CLINICAL HISTORY:  . Pain in right hand    TECHNIQUE:  AP, lateral, and oblique views of both hands were performed.    COMPARISON:  10/23/2018    FINDINGS:  Right hand: There is no radiographic evidence of acute osseous, articular, or soft tissue abnormality.  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  No erosive changes demonstrated    Left hand: There is no radiographic evidence of acute osseous, articular, or soft tissue abnormality.  Severe degenerative findings again noted at the 1st CMC joint.  No erosive changes demonstrated.  No appreciable change from prior  Impression: Degenerative changes as above.  No acute findings.    Electronically signed by: Carlos A Quiroga MD  Date:    06/10/2019  Time:    14:07        Assessment:       Encounter Diagnoses   Name Primary?    Trigger finger, left middle finger Yes    Trigger ring finger of left hand     Trigger middle finger of right hand     Trigger finger, right ring finger     Living-donor kidney transplant for SLE 4/6/15     Immunosuppressive management encounter following kidney transplant           Plan:       Chanel was seen today for pain and pain.    Diagnoses and all orders for this visit:    Trigger finger, left middle finger  -     methylPREDNISolone acetate injection 40 mg    Trigger ring finger of left hand  -     methylPREDNISolone acetate injection 40 mg    Trigger middle finger of right hand    Trigger finger, right ring finger    Living-donor kidney transplant for SLE 4/6/15    Immunosuppressive management encounter following kidney transplant        Chanel Charles is an established pt who comes in today for evaluation of the above problems.  This is my 1st visit with her.  She has been diagnosed with trigger finger of the left middle and ring  fingers.  She also has trigger fingers of the right ring and middle fingers to a lesser extent.  We have discussed risks and benefits of a steroid injection.  She wishes to proceed. She understands that she has a slightly higher risk of infection than typical patient due to her immunocompromised state.  I will have her follow up p.r.n..  If symptoms worsen or do not resolve, I would encourage her to follow-up with Dr. Brice to discuss possible surgical release.  She verbalizes understanding and agrees.    Follow up if symptoms worsen or fail to improve.    Left Trigger finger Injection Report:  After verbal consent was obtained for left trigger finger injection, patient ID, site, and side were verified.  The  left middle finger was sterilly prepped at the A-1 pulley in standard fashion.  A 25-gauge needle was introduced at the A-1 pulley site without complication. It was then injected with 5 mg lidocaine plain and 40 mg depomedrol.  A sterile bandaid was applied.  The patient was informed to apply an ice pack approximately 10min once arriving home and not to do anything strenuous for 24hours. She  was instructed to call if there were any problems. The patient was discharged in stable condition.    Left Trigger finger Injection Report:  After verbal consent was obtained for left trigger finger injection, patient ID, site, and side were verified.  The  left ring finger was sterilly prepped at the A-1 pulley in standard fashion.  A 25-gauge needle was introduced at the A-1 pulley site without complication. It was then injected with 5 mg lidocaine plain and 40 mg depomedrol.  A sterile bandaid was applied.  The patient was informed to apply an ice pack approximately 10min once arriving home and not to do anything strenuous for 24hours. She  was instructed to call if there were any problems. The patient was discharged in stable condition.    The patient understands, chooses and consents to this plan and accepts all    the risks which include but are not limited to the risks mentioned above.     Disclaimer: This note was prepared using a voice recognition system and is likely to have sound alike errors within the text.

## 2019-07-23 ENCOUNTER — PATIENT MESSAGE (OUTPATIENT)
Dept: ORTHOPEDICS | Facility: CLINIC | Age: 64
End: 2019-07-23

## 2019-08-15 ENCOUNTER — APPOINTMENT (OUTPATIENT)
Dept: RADIOLOGY | Facility: HOSPITAL | Age: 64
End: 2019-08-15
Attending: INTERNAL MEDICINE
Payer: COMMERCIAL

## 2019-08-15 DIAGNOSIS — E21.2 HYPERPARATHYROIDISM, TERTIARY: ICD-10-CM

## 2019-08-15 DIAGNOSIS — N18.2 CKD (CHRONIC KIDNEY DISEASE) STAGE 2, GFR 60-89 ML/MIN: ICD-10-CM

## 2019-08-15 DIAGNOSIS — M85.80 OSTEOPENIA, UNSPECIFIED LOCATION: ICD-10-CM

## 2019-08-15 PROCEDURE — 77080 DEXA BONE DENSITY SPINE HIP: ICD-10-PCS | Mod: 26,,, | Performed by: RADIOLOGY

## 2019-08-15 PROCEDURE — 77080 DXA BONE DENSITY AXIAL: CPT | Mod: 26,,, | Performed by: RADIOLOGY

## 2019-08-15 PROCEDURE — 77080 DXA BONE DENSITY AXIAL: CPT | Mod: TC

## 2019-08-20 ENCOUNTER — OFFICE VISIT (OUTPATIENT)
Dept: ENDOCRINOLOGY | Facility: CLINIC | Age: 64
End: 2019-08-20
Payer: COMMERCIAL

## 2019-08-20 VITALS
WEIGHT: 132.5 LBS | SYSTOLIC BLOOD PRESSURE: 126 MMHG | TEMPERATURE: 98 F | DIASTOLIC BLOOD PRESSURE: 70 MMHG | HEART RATE: 74 BPM | BODY MASS INDEX: 23.47 KG/M2

## 2019-08-20 DIAGNOSIS — N18.2 CKD (CHRONIC KIDNEY DISEASE) STAGE 2, GFR 60-89 ML/MIN: ICD-10-CM

## 2019-08-20 DIAGNOSIS — M81.0 OSTEOPOROSIS, UNSPECIFIED OSTEOPOROSIS TYPE, UNSPECIFIED PATHOLOGICAL FRACTURE PRESENCE: ICD-10-CM

## 2019-08-20 DIAGNOSIS — E21.2 HYPERPARATHYROIDISM, TERTIARY: Primary | ICD-10-CM

## 2019-08-20 PROCEDURE — 3074F PR MOST RECENT SYSTOLIC BLOOD PRESSURE < 130 MM HG: ICD-10-PCS | Mod: CPTII,S$GLB,, | Performed by: INTERNAL MEDICINE

## 2019-08-20 PROCEDURE — 3078F PR MOST RECENT DIASTOLIC BLOOD PRESSURE < 80 MM HG: ICD-10-PCS | Mod: CPTII,S$GLB,, | Performed by: INTERNAL MEDICINE

## 2019-08-20 PROCEDURE — 3008F PR BODY MASS INDEX (BMI) DOCUMENTED: ICD-10-PCS | Mod: CPTII,S$GLB,, | Performed by: INTERNAL MEDICINE

## 2019-08-20 PROCEDURE — 99999 PR PBB SHADOW E&M-EST. PATIENT-LVL III: ICD-10-PCS | Mod: PBBFAC,,, | Performed by: INTERNAL MEDICINE

## 2019-08-20 PROCEDURE — 99999 PR PBB SHADOW E&M-EST. PATIENT-LVL III: CPT | Mod: PBBFAC,,, | Performed by: INTERNAL MEDICINE

## 2019-08-20 PROCEDURE — 3008F BODY MASS INDEX DOCD: CPT | Mod: CPTII,S$GLB,, | Performed by: INTERNAL MEDICINE

## 2019-08-20 PROCEDURE — 3074F SYST BP LT 130 MM HG: CPT | Mod: CPTII,S$GLB,, | Performed by: INTERNAL MEDICINE

## 2019-08-20 PROCEDURE — 3078F DIAST BP <80 MM HG: CPT | Mod: CPTII,S$GLB,, | Performed by: INTERNAL MEDICINE

## 2019-08-20 PROCEDURE — 99214 PR OFFICE/OUTPT VISIT, EST, LEVL IV, 30-39 MIN: ICD-10-PCS | Mod: S$GLB,,, | Performed by: INTERNAL MEDICINE

## 2019-08-20 PROCEDURE — 99214 OFFICE O/P EST MOD 30 MIN: CPT | Mod: S$GLB,,, | Performed by: INTERNAL MEDICINE

## 2019-08-20 RX ORDER — SODIUM CHLORIDE 0.9 % (FLUSH) 0.9 %
10 SYRINGE (ML) INJECTION
Status: CANCELLED | OUTPATIENT
Start: 2019-08-20

## 2019-08-20 RX ORDER — ZOLEDRONIC ACID 5 MG/100ML
5 INJECTION, SOLUTION INTRAVENOUS
Status: CANCELLED | OUTPATIENT
Start: 2019-08-20

## 2019-08-20 RX ORDER — ACETAMINOPHEN 500 MG
500 TABLET ORAL
Status: CANCELLED | OUTPATIENT
Start: 2019-08-20

## 2019-08-20 RX ORDER — HEPARIN 100 UNIT/ML
500 SYRINGE INTRAVENOUS
Status: CANCELLED | OUTPATIENT
Start: 2019-08-20

## 2019-08-20 NOTE — PROGRESS NOTES
Patient ID: Chanel Charles is a 64 y.o. female.  Patient is here for follow up        Chief Complaint: Follow-up (osteopenia )      HPI   Patient is here to establish care with new endocrinologist, formally seen by Dr. Aguirre but lives near [place]  Has tertiary hyperparathyroidism and osteoporosis      S/p kidney transplant on 4/6/15 (hx of ESRD due to lupus nephritis, hx dialysis 4.5 yrs prior to transplant), now with CKD Stage 2 stable, remains on Plaquenil, celceptt and prograf.   Prior to renal tx, had tertiary HPT (PTH levels up to 1241), with hypercalcemia ranging 10.5-11.   Post renal transplant, PTH gradually declined and (most recent 95), Ca normalized to ULN (10-10.5) phos ranging 2.5-3, and alk phos normalized.  However recent calcium only slightly high at 10.6 but she thinks is related to her being on a cruise where she had a lot of items that she normally would not eat.     BMD 7/2015 showed osteoporosis of l-spine, total hip and fem neck and she had a repeat August 2017 that showed improvement as it showed osteopenia and her most recent bone density showed further improvement in the L-spine and femoral neck but total hip remain stable in the osteoporotic range with T-score of -2.6,.   Started fosamax 08/2015, intolerant due to nausea and abdominal pain.  Received IV reclast 1/2016, tolerated well and had her 2nd dose September 2017.      Fracture history: none  Falls: none  Weight bearing exercise: walking, dancing and lately she started home aerobic exercise via  Height loss:  None  Vitamin D status: replete, only takes MVI (Centrum Silver) and also she is taking vitamin-D supplement 1000 IU only every other day or 3 days a week  No HCTZ.  Steroid therapy: inhaled steroids (symbicort prn)  No h/o kidney stones.  No h/o diarrhea, malabsorption, or bypass   Menopause 47, remote hx ert long ago.      She is followed by Dr. Jj, rheumatologist and Dr. Joseph nephrologist    Her 24 hr urine  calcium in 2017 was not elevated      Remain pain that she encounters is in her hands  I have reviewed the past medical, family and social history    Review of Systems   Constitutional: Negative for appetite change, fatigue, fever and unexpected weight change.   HENT: Negative for sore throat and trouble swallowing.    Eyes: Negative for visual disturbance.   Respiratory: Negative for shortness of breath and wheezing.    Cardiovascular: Negative for chest pain, palpitations and leg swelling.   Gastrointestinal: Negative for diarrhea, nausea and vomiting.   Endocrine: Negative for cold intolerance, heat intolerance, polydipsia, polyphagia and polyuria.   Genitourinary: Negative for difficulty urinating, dysuria and menstrual problem.   Musculoskeletal: Positive for arthralgias. Negative for joint swelling.   Skin: Negative for rash.   Neurological: Negative for dizziness, weakness, numbness and headaches.   Psychiatric/Behavioral: Negative for confusion, dysphoric mood and sleep disturbance.       Objective:      Physical Exam   Constitutional: She appears well-developed and well-nourished. No distress.   HENT:   Head: Normocephalic and atraumatic.   Eyes: Conjunctivae are normal.   Neurological: She is alert. No sensory deficit.        Skin: Skin is warm and dry. No rash noted. She is not diaphoretic. No erythema.   Psychiatric: She has a normal mood and affect. Her behavior is normal.   Vitals reviewed.        Lab Review:   Lab Visit on 06/10/2019   Component Date Value    Specimen UA 06/10/2019 Urine, Clean Catch     Color, UA 06/10/2019 Yellow     Appearance, UA 06/10/2019 Clear     pH, UA 06/10/2019 6.0     Specific Gravity, UA 06/10/2019 <=1.005     Protein, UA 06/10/2019 Negative     Glucose, UA 06/10/2019 Negative     Ketones, UA 06/10/2019 Negative     Bilirubin (UA) 06/10/2019 Negative     Occult Blood UA 06/10/2019 Negative     Nitrite, UA 06/10/2019 Negative     Leukocytes, UA 06/10/2019 2+*     WBC, UA 06/10/2019 30*    WBC Clumps, UA 06/10/2019 Few*    Squam Epithel, UA 06/10/2019 1     Microscopic Comment 06/10/2019 SEE COMMENT    Lab Visit on 06/10/2019   Component Date Value    Tacrolimus Lvl 06/10/2019 6.0     Glucose 06/10/2019 89     Sodium 06/10/2019 137     Potassium 06/10/2019 5.0     Chloride 06/10/2019 107     CO2 06/10/2019 22*    BUN, Bld 06/10/2019 28*    Calcium 06/10/2019 10.5     Creatinine 06/10/2019 1.1     Albumin 06/10/2019 3.9     Phosphorus 06/10/2019 3.6     eGFR if African American 06/10/2019 >60.0     eGFR if non African Amer* 06/10/2019 53.2*    Anion Gap 06/10/2019 8     WBC 06/10/2019 6.16     RBC 06/10/2019 4.57     Hemoglobin 06/10/2019 11.1*    Hematocrit 06/10/2019 36.0*    Mean Corpuscular Volume 06/10/2019 79*    Mean Corpuscular Hemoglo* 06/10/2019 24.3*    Mean Corpuscular Hemoglo* 06/10/2019 30.8*    RDW 06/10/2019 16.8*    Platelets 06/10/2019 128*    MPV 06/10/2019 SEE COMMENT     Immature Granulocytes 06/10/2019 0.3     Gran # (ANC) 06/10/2019 3.4     Immature Grans (Abs) 06/10/2019 0.02     Lymph # 06/10/2019 2.0     Mono # 06/10/2019 0.7     Eos # 06/10/2019 0.2     Baso # 06/10/2019 0.04     nRBC 06/10/2019 0     Gran% 06/10/2019 54.4     Lymph% 06/10/2019 31.7     Mono% 06/10/2019 10.6     Eosinophil% 06/10/2019 2.4     Basophil% 06/10/2019 0.6     Differential Method 06/10/2019 Automated     Vit D, 25-Hydroxy 06/10/2019 48     Magnesium 06/10/2019 1.7    Lab Visit on 04/02/2019   Component Date Value    Protein, Urine Random 04/02/2019 <7     Creatinine, Random Ur 04/02/2019 36.0     Prot/Creat Ratio, Ur 04/02/2019 Unable to calculate    Lab Visit on 04/02/2019   Component Date Value    Tacrolimus Lvl 04/02/2019 6.3     WBC 04/02/2019 5.03     RBC 04/02/2019 4.22     Hemoglobin 04/02/2019 10.4*    Hematocrit 04/02/2019 33.4*    Mean Corpuscular Volume 04/02/2019 79*    Mean Corpuscular Hemoglo*  04/02/2019 24.6*    Mean Corpuscular Hemoglo* 04/02/2019 31.1*    RDW 04/02/2019 17.1*    Platelets 04/02/2019 158     MPV 04/02/2019 13.0*    Immature Granulocytes 04/02/2019 0.2     Gran # (ANC) 04/02/2019 2.7     Immature Grans (Abs) 04/02/2019 0.01     Lymph # 04/02/2019 1.5     Mono # 04/02/2019 0.6     Eos # 04/02/2019 0.1     Baso # 04/02/2019 0.04     nRBC 04/02/2019 0     Gran% 04/02/2019 53.5     Lymph% 04/02/2019 30.2     Mono% 04/02/2019 12.5     Eosinophil% 04/02/2019 2.8     Basophil% 04/02/2019 0.8     Differential Method 04/02/2019 Automated     Magnesium 04/02/2019 1.8     Glucose 04/02/2019 86     Sodium 04/02/2019 136     Potassium 04/02/2019 5.0     Chloride 04/02/2019 106     CO2 04/02/2019 22*    BUN, Bld 04/02/2019 22     Calcium 04/02/2019 10.1     Creatinine 04/02/2019 1.1     Albumin 04/02/2019 3.9     Phosphorus 04/02/2019 3.4     eGFR if African American 04/02/2019 >60.0     eGFR if non African Amer* 04/02/2019 53.6*    Anion Gap 04/02/2019 8     Total Protein 04/02/2019 6.8     Albumin 04/02/2019 3.9     Total Bilirubin 04/02/2019 1.0     Bilirubin, Direct 04/02/2019 0.5*    AST 04/02/2019 27     ALT 04/02/2019 20     Alkaline Phosphatase 04/02/2019 61     BK Virus DNA, Blood 04/02/2019 Not detected     BK Virus DNA PCR, Quant,* 04/02/2019 <125     Log BKV Copies/mL 04/02/2019 <2.10        Assessment:     1. Hyperparathyroidism, tertiary  PTH, intact   2. CKD (chronic kidney disease) stage 2, GFR 60-89 ml/min  PTH, intact   3. Osteoporosis, unspecified osteoporosis type, unspecified pathological fracture presence  PTH, intact    though she has had some improvement because she still is in the osteoporotic range in her hip area I recommend continuing with IV Reclast so will notify Rheumatology, she has an upcoming lab appointment which I will add a PTH for in early September  Plan:   Hyperparathyroidism, tertiary  -     PTH, intact; Future;  Expected date: 08/20/2019    CKD (chronic kidney disease) stage 2, GFR 60-89 ml/min  -     PTH, intact; Future; Expected date: 08/20/2019    Osteoporosis, unspecified osteoporosis type, unspecified pathological fracture presence  -     PTH, intact; Future; Expected date: 08/20/2019          Follow up in about 1 year (around 8/20/2020).    Labs prior to appointment? not applicable     Disclaimer:  This note may have been partially prepared using voice recognition software and  it may have not been extensively proofed, as such there could be errors within the text such as sound alike errors.

## 2019-09-04 ENCOUNTER — LAB VISIT (OUTPATIENT)
Dept: LAB | Facility: HOSPITAL | Age: 64
End: 2019-09-04
Attending: INTERNAL MEDICINE
Payer: COMMERCIAL

## 2019-09-04 DIAGNOSIS — M32.14 LUPUS NEPHRITIS: ICD-10-CM

## 2019-09-04 DIAGNOSIS — Z94.0 S/P KIDNEY TRANSPLANT: ICD-10-CM

## 2019-09-04 DIAGNOSIS — N18.2 CKD (CHRONIC KIDNEY DISEASE) STAGE 2, GFR 60-89 ML/MIN: ICD-10-CM

## 2019-09-04 DIAGNOSIS — M81.0 OSTEOPOROSIS, UNSPECIFIED OSTEOPOROSIS TYPE, UNSPECIFIED PATHOLOGICAL FRACTURE PRESENCE: ICD-10-CM

## 2019-09-04 DIAGNOSIS — Z79.899 ENCOUNTER FOR LONG-TERM (CURRENT) USE OF MEDICATIONS: ICD-10-CM

## 2019-09-04 DIAGNOSIS — M32.9 SLE (SYSTEMIC LUPUS ERYTHEMATOSUS): ICD-10-CM

## 2019-09-04 DIAGNOSIS — E21.2 HYPERPARATHYROIDISM, TERTIARY: ICD-10-CM

## 2019-09-04 LAB
ALBUMIN SERPL BCP-MCNC: 4.2 G/DL (ref 3.5–5.2)
ALP SERPL-CCNC: 68 U/L (ref 55–135)
ALT SERPL W/O P-5'-P-CCNC: 20 U/L (ref 10–44)
ANION GAP SERPL CALC-SCNC: 7 MMOL/L (ref 8–16)
ANION GAP SERPL CALC-SCNC: 7 MMOL/L (ref 8–16)
AST SERPL-CCNC: 28 U/L (ref 10–40)
BACTERIA #/AREA URNS HPF: NORMAL /HPF
BASOPHILS # BLD AUTO: 0.03 K/UL (ref 0–0.2)
BASOPHILS NFR BLD: 0.5 % (ref 0–1.9)
BILIRUB SERPL-MCNC: 1 MG/DL (ref 0.1–1)
BILIRUB UR QL STRIP: NEGATIVE
BUN SERPL-MCNC: 25 MG/DL (ref 8–23)
BUN SERPL-MCNC: 25 MG/DL (ref 8–23)
C3 SERPL-MCNC: 134 MG/DL (ref 50–180)
C4 SERPL-MCNC: 25 MG/DL (ref 11–44)
CALCIUM SERPL-MCNC: 10.4 MG/DL (ref 8.7–10.5)
CALCIUM SERPL-MCNC: 10.4 MG/DL (ref 8.7–10.5)
CHLORIDE SERPL-SCNC: 106 MMOL/L (ref 95–110)
CHLORIDE SERPL-SCNC: 106 MMOL/L (ref 95–110)
CLARITY UR: CLEAR
CO2 SERPL-SCNC: 23 MMOL/L (ref 23–29)
CO2 SERPL-SCNC: 23 MMOL/L (ref 23–29)
COLOR UR: YELLOW
CREAT SERPL-MCNC: 1.1 MG/DL (ref 0.5–1.4)
CREAT SERPL-MCNC: 1.1 MG/DL (ref 0.5–1.4)
DIFFERENTIAL METHOD: ABNORMAL
EOSINOPHIL # BLD AUTO: 0.2 K/UL (ref 0–0.5)
EOSINOPHIL NFR BLD: 3.3 % (ref 0–8)
ERYTHROCYTE [DISTWIDTH] IN BLOOD BY AUTOMATED COUNT: 17.4 % (ref 11.5–14.5)
EST. GFR  (AFRICAN AMERICAN): >60 ML/MIN/1.73 M^2
EST. GFR  (AFRICAN AMERICAN): >60 ML/MIN/1.73 M^2
EST. GFR  (NON AFRICAN AMERICAN): 53.2 ML/MIN/1.73 M^2
EST. GFR  (NON AFRICAN AMERICAN): 53.2 ML/MIN/1.73 M^2
GLUCOSE SERPL-MCNC: 89 MG/DL (ref 70–110)
GLUCOSE SERPL-MCNC: 89 MG/DL (ref 70–110)
GLUCOSE UR QL STRIP: NEGATIVE
HCT VFR BLD AUTO: 36.2 % (ref 37–48.5)
HGB BLD-MCNC: 11 G/DL (ref 12–16)
HGB UR QL STRIP: NEGATIVE
IMM GRANULOCYTES # BLD AUTO: 0.01 K/UL (ref 0–0.04)
IMM GRANULOCYTES NFR BLD AUTO: 0.2 % (ref 0–0.5)
KETONES UR QL STRIP: NEGATIVE
LEUKOCYTE ESTERASE UR QL STRIP: ABNORMAL
LYMPHOCYTES # BLD AUTO: 1.9 K/UL (ref 1–4.8)
LYMPHOCYTES NFR BLD: 33.2 % (ref 18–48)
MCH RBC QN AUTO: 24.1 PG (ref 27–31)
MCHC RBC AUTO-ENTMCNC: 30.4 G/DL (ref 32–36)
MCV RBC AUTO: 79 FL (ref 82–98)
MICROSCOPIC COMMENT: NORMAL
MONOCYTES # BLD AUTO: 0.6 K/UL (ref 0.3–1)
MONOCYTES NFR BLD: 10.2 % (ref 4–15)
NEUTROPHILS # BLD AUTO: 3 K/UL (ref 1.8–7.7)
NEUTROPHILS NFR BLD: 52.6 % (ref 38–73)
NITRITE UR QL STRIP: NEGATIVE
NRBC BLD-RTO: 0 /100 WBC
PH UR STRIP: 6 [PH] (ref 5–8)
PLATELET # BLD AUTO: 119 K/UL (ref 150–350)
PMV BLD AUTO: ABNORMAL FL (ref 9.2–12.9)
POTASSIUM SERPL-SCNC: 4.8 MMOL/L (ref 3.5–5.1)
POTASSIUM SERPL-SCNC: 4.8 MMOL/L (ref 3.5–5.1)
PROT SERPL-MCNC: 7.4 G/DL (ref 6–8.4)
PROT UR QL STRIP: NEGATIVE
PTH-INTACT SERPL-MCNC: 137 PG/ML (ref 9–77)
RBC # BLD AUTO: 4.56 M/UL (ref 4–5.4)
RBC #/AREA URNS HPF: 2 /HPF (ref 0–4)
SODIUM SERPL-SCNC: 136 MMOL/L (ref 136–145)
SODIUM SERPL-SCNC: 136 MMOL/L (ref 136–145)
SP GR UR STRIP: <=1.005 (ref 1–1.03)
SQUAMOUS #/AREA URNS HPF: 1 /HPF
URN SPEC COLLECT METH UR: ABNORMAL
WBC # BLD AUTO: 5.69 K/UL (ref 3.9–12.7)
WBC #/AREA URNS HPF: 2 /HPF (ref 0–5)

## 2019-09-04 PROCEDURE — 86160 COMPLEMENT ANTIGEN: CPT

## 2019-09-04 PROCEDURE — 85025 COMPLETE CBC W/AUTO DIFF WBC: CPT

## 2019-09-04 PROCEDURE — 80053 COMPREHEN METABOLIC PANEL: CPT

## 2019-09-04 PROCEDURE — 86225 DNA ANTIBODY NATIVE: CPT

## 2019-09-04 PROCEDURE — 83970 ASSAY OF PARATHORMONE: CPT

## 2019-09-04 PROCEDURE — 86160 COMPLEMENT ANTIGEN: CPT | Mod: 59

## 2019-09-04 PROCEDURE — 36415 COLL VENOUS BLD VENIPUNCTURE: CPT

## 2019-09-04 PROCEDURE — 81000 URINALYSIS NONAUTO W/SCOPE: CPT

## 2019-09-05 ENCOUNTER — PATIENT MESSAGE (OUTPATIENT)
Dept: RHEUMATOLOGY | Facility: CLINIC | Age: 64
End: 2019-09-05

## 2019-09-05 LAB — DSDNA AB SER-ACNC: NORMAL [IU]/ML

## 2019-09-11 ENCOUNTER — OFFICE VISIT (OUTPATIENT)
Dept: RHEUMATOLOGY | Facility: CLINIC | Age: 64
End: 2019-09-11
Payer: COMMERCIAL

## 2019-09-11 VITALS
HEART RATE: 68 BPM | DIASTOLIC BLOOD PRESSURE: 63 MMHG | WEIGHT: 136.44 LBS | SYSTOLIC BLOOD PRESSURE: 121 MMHG | HEIGHT: 63 IN | BODY MASS INDEX: 24.18 KG/M2

## 2019-09-11 DIAGNOSIS — N18.2 CKD (CHRONIC KIDNEY DISEASE) STAGE 2, GFR 60-89 ML/MIN: Chronic | ICD-10-CM

## 2019-09-11 DIAGNOSIS — D84.9 IMMUNOCOMPROMISED STATE: ICD-10-CM

## 2019-09-11 DIAGNOSIS — M81.0 OSTEOPOROSIS, UNSPECIFIED OSTEOPOROSIS TYPE, UNSPECIFIED PATHOLOGICAL FRACTURE PRESENCE: ICD-10-CM

## 2019-09-11 DIAGNOSIS — D69.6 THROMBOCYTOPENIA: Chronic | ICD-10-CM

## 2019-09-11 DIAGNOSIS — M32.14 LUPUS NEPHRITIS: ICD-10-CM

## 2019-09-11 DIAGNOSIS — M32.8 OTHER FORMS OF SYSTEMIC LUPUS ERYTHEMATOSUS, UNSPECIFIED ORGAN INVOLVEMENT STATUS: Primary | ICD-10-CM

## 2019-09-11 PROCEDURE — 3008F PR BODY MASS INDEX (BMI) DOCUMENTED: ICD-10-PCS | Mod: CPTII,S$GLB,, | Performed by: INTERNAL MEDICINE

## 2019-09-11 PROCEDURE — 3078F PR MOST RECENT DIASTOLIC BLOOD PRESSURE < 80 MM HG: ICD-10-PCS | Mod: CPTII,S$GLB,, | Performed by: INTERNAL MEDICINE

## 2019-09-11 PROCEDURE — 99999 PR PBB SHADOW E&M-EST. PATIENT-LVL III: CPT | Mod: PBBFAC,,, | Performed by: INTERNAL MEDICINE

## 2019-09-11 PROCEDURE — 3078F DIAST BP <80 MM HG: CPT | Mod: CPTII,S$GLB,, | Performed by: INTERNAL MEDICINE

## 2019-09-11 PROCEDURE — 3008F BODY MASS INDEX DOCD: CPT | Mod: CPTII,S$GLB,, | Performed by: INTERNAL MEDICINE

## 2019-09-11 PROCEDURE — 99214 OFFICE O/P EST MOD 30 MIN: CPT | Mod: S$GLB,,, | Performed by: INTERNAL MEDICINE

## 2019-09-11 PROCEDURE — 99999 PR PBB SHADOW E&M-EST. PATIENT-LVL III: ICD-10-PCS | Mod: PBBFAC,,, | Performed by: INTERNAL MEDICINE

## 2019-09-11 PROCEDURE — 99214 PR OFFICE/OUTPT VISIT, EST, LEVL IV, 30-39 MIN: ICD-10-PCS | Mod: S$GLB,,, | Performed by: INTERNAL MEDICINE

## 2019-09-11 PROCEDURE — 3074F SYST BP LT 130 MM HG: CPT | Mod: CPTII,S$GLB,, | Performed by: INTERNAL MEDICINE

## 2019-09-11 PROCEDURE — 3074F PR MOST RECENT SYSTOLIC BLOOD PRESSURE < 130 MM HG: ICD-10-PCS | Mod: CPTII,S$GLB,, | Performed by: INTERNAL MEDICINE

## 2019-09-11 ASSESSMENT — ROUTINE ASSESSMENT OF PATIENT INDEX DATA (RAPID3)
MDHAQ FUNCTION SCORE: 0
TOTAL RAPID3 SCORE: .17
PATIENT GLOBAL ASSESSMENT SCORE: 0
FATIGUE SCORE: 0
PAIN SCORE: .5
AM STIFFNESS SCORE: 0, NO
PSYCHOLOGICAL DISTRESS SCORE: 1.1

## 2019-09-11 ASSESSMENT — SYSTEMIC LUPUS ERYTHEMATOSUS DISEASE ACTIVITY INDEX (SLEDAI): TOTAL_SCORE: 0

## 2019-09-11 NOTE — PROGRESS NOTES
Subjective:       Patient ID: Chnael Charles is a 64 y.o. female.    Chief Complaint: Hand pain with trigger finger    HPI:  Chanel Charles is a 64 y.o. female with SLE and renal failure as well an extensive history of recurrent pleural effusions and pericardial effusion treated with recurrent thoracentesis (x3) and pericardiocentesis. She is s/p kidney transplant 4/6/2015. Patient in the past had 3-4 paracentesis for accumulation of abdominal fluid (last time was December 2011). She was hospitalized 4/2012 for acute altered mental status, slurred speech concerning for CVA. Dr. Ribera (rheum) was consulted and he did not think it was her lupus. It was felt that her AMS was caused by gabapentin and/or valtrex as those were new medications. After these medications were stopped she returned to her baseline function. She has history of a lung nodule and eventual TB results were negative. Patient had VATS procedure 3/2011 which showed a granuloma thought to be due histoplamosis but work up was negative. Patient had kidneys removed 4/2011 to help decrease protein loss from the body.    She was given Rituxan (4 infusions total) and later Nplate for thrombocytopenia by her hematologist and is back on Nplate.   Hematologist is Dr. Shelley () and platelets were 150s or so.  She has been off Nplate since transplant.       Colonoscopy 8/19/14 showed benign polyps, diverticulosis, internal and external hemrrhoids  Mammogram 6/24/14 was negative.  Pap smear 6/30/14 negative for lesion or malignancy          Interval History:    Here for follow up.  Denies any issues.  Dealing with stress of losing mother in law and god daughter in serious accident.  Travelling without issues.    Exercising regularly.  Did therapy for trigger fingers and had injections which helped        Review of Systems   Constitutional: Negative for fatigue, fever and unexpected weight change.   HENT: Negative for trouble swallowing.   "  Eyes: Negative for redness.   Respiratory: Negative for cough and shortness of breath.    Cardiovascular: Negative for chest pain.   Gastrointestinal: Negative for constipation and diarrhea.   Endocrine: Negative.    Genitourinary: Negative for dysuria and genital sores.   Musculoskeletal: Negative.    Skin: Negative.  Negative for rash.   Allergic/Immunologic: Negative.    Neurological: Negative for headaches.   Hematological: Does not bruise/bleed easily.   Psychiatric/Behavioral: Negative.          Objective:   /63 (BP Location: Right arm, Patient Position: Sitting, BP Method: Medium (Automatic))   Pulse 68   Ht 5' 3" (1.6 m)   Wt 61.9 kg (136 lb 7.4 oz)   BMI 24.17 kg/m²      Physical Exam   Constitutional: She is oriented to person, place, and time and well-developed, well-nourished, and in no distress.   HENT:   Head: Normocephalic and atraumatic.   Eyes: Conjunctivae and EOM are normal.   Neck: Neck supple.   Cardiovascular: Normal rate and regular rhythm.    Pulmonary/Chest: Effort normal and breath sounds normal.   Abdominal: Soft. Bowel sounds are normal.   Neurological: She is alert and oriented to person, place, and time. Gait normal.   Skin: Skin is warm and dry.     Psychiatric: Mood and affect normal.   Musculoskeletal: She exhibits no tenderness (No pain at right 3rd flexor tendon nodule and left 3rd and 4th flexor tendon nodule).   Squaring of 1st CMC bilaterally       LABS    Component      Latest Ref Rng & Units 9/4/2019 9/4/2019 9/4/2019           8:23 AM  8:23 AM  8:09 AM   WBC      3.90 - 12.70 K/uL  5.69    RBC      4.00 - 5.40 M/uL  4.56    Hemoglobin      12.0 - 16.0 g/dL  11.0 (L)    Hematocrit      37.0 - 48.5 %  36.2 (L)    MCV      82 - 98 fL  79 (L)    MCH      27.0 - 31.0 pg  24.1 (L)    MCHC      32.0 - 36.0 g/dL  30.4 (L)    RDW      11.5 - 14.5 %  17.4 (H)    Platelets      150 - 350 K/uL  119 (L)    MPV      9.2 - 12.9 fL  SEE COMMENT    Immature Granulocytes      0.0 - " 0.5 %  0.2    Gran # (ANC)      1.8 - 7.7 K/uL  3.0    Immature Grans (Abs)      0.00 - 0.04 K/uL  0.01    Lymph #      1.0 - 4.8 K/uL  1.9    Mono #      0.3 - 1.0 K/uL  0.6    Eos #      0.0 - 0.5 K/uL  0.2    Baso #      0.00 - 0.20 K/uL  0.03    nRBC      0 /100 WBC  0    Gran%      38.0 - 73.0 %  52.6    Lymph%      18.0 - 48.0 %  33.2    Mono%      4.0 - 15.0 %  10.2    Eosinophil%      0.0 - 8.0 %  3.3    Basophil%      0.0 - 1.9 %  0.5    Differential Method        Automated    Sodium      136 - 145 mmol/L 136 136    Potassium      3.5 - 5.1 mmol/L 4.8 4.8    Chloride      95 - 110 mmol/L 106 106    CO2      23 - 29 mmol/L 23 23    Glucose      70 - 110 mg/dL 89 89    BUN, Bld      8 - 23 mg/dL 25 (H) 25 (H)    Creatinine      0.5 - 1.4 mg/dL 1.1 1.1    Calcium      8.7 - 10.5 mg/dL 10.4 10.4    PROTEIN TOTAL      6.0 - 8.4 g/dL  7.4    Albumin      3.5 - 5.2 g/dL  4.2    BILIRUBIN TOTAL      0.1 - 1.0 mg/dL  1.0    Alkaline Phosphatase      55 - 135 U/L  68    AST      10 - 40 U/L  28    ALT      10 - 44 U/L  20    Anion Gap      8 - 16 mmol/L 7 (L) 7 (L)    eGFR if African American      >60 mL/min/1.73 m:2 >60.0 >60.0    eGFR if non African American      >60 mL/min/1.73 m:2 53.2 (A) 53.2 (A)    Specimen UA         Urine, Clean Catch   Color, UA      Yellow, Straw, Ela   Yellow   Appearance, UA      Clear   Clear   pH, UA      5.0 - 8.0   6.0   Specific Gravity, UA      1.005 - 1.030   <=1.005   Protein, UA      Negative   Negative   Glucose, UA      Negative   Negative   Ketones, UA      Negative   Negative   Bilirubin (UA)      Negative   Negative   Occult Blood UA      Negative   Negative   NITRITE UA      Negative   Negative   Leukocytes, UA      Negative   1+ (A)   RBC, UA      0 - 4 /hpf   2   WBC, UA      0 - 5 /hpf   2   Bacteria, UA      None-Occ /hpf   Rare   Squam Epithel, UA      /hpf   1   Microscopic Comment         SEE COMMENT   Complement (C-4)      11 - 44 mg/dL  25    Complement (C-3)       50 - 180 mg/dL  134    ds DNA Ab      Negative 1:10  Negative 1:10    PTH      9.0 - 77.0 pg/mL  137.0 (H)         Assessment:       1. SLE with +TANA hx, arthritis, alopecia, and glomerular nephritis now s/p bilateral nephrectomy then dialysis and s/p renal transplant (4/6/2015).   No evidence of active lupus currently. Still doing well.   2. Glomerulonephritis. S/p transplant 4/6/2015.  3. CAD  4. Fatigue  5. Long term Plaquenil use. Off CellCept since 9/2010  6. Serositis  7. Diastolic heart failure. Now resolved.  8. Thrombocytopenia. Still decreased.   9. Elevation in homocysteine levels in March 2012 and started on Folbic.   10. Recurrent falls. No recent falls  11. Hypercalcemia. Being managed endocrine  12. Osteoporosis. Endocrine gave Reclast scheduled 9/2019  13. 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 Being evaluated by neurology.  Mass no longer there.   14. Flexor tendon nodules both hands.   Plan:       1. Continue Plaquenil 200 mg daily.  Last eye exam 12/2018  2. Still off Folbic per transplant  3. Patient to contact office if feels she is having a lupus flare  4. Off Nplate still. Follows hematology Dr. Shelley  5. Continue 2 old goats. Continue Voltaren gel for hand pains.  6. Vaccination (pneumonia, Shingle) per transplant team and primary doctor.   7. Follow with dermatology in her area for alopecia on top of scalp.   8. Follow up with ortho regarding trigger fingers  9. Labs     RTO 4 months/prn

## 2019-09-13 ENCOUNTER — INFUSION (OUTPATIENT)
Dept: RHEUMATOLOGY | Facility: HOSPITAL | Age: 64
End: 2019-09-13
Payer: COMMERCIAL

## 2019-09-13 VITALS
DIASTOLIC BLOOD PRESSURE: 67 MMHG | RESPIRATION RATE: 18 BRPM | WEIGHT: 134.5 LBS | BODY MASS INDEX: 23.82 KG/M2 | SYSTOLIC BLOOD PRESSURE: 129 MMHG | HEART RATE: 69 BPM

## 2019-09-13 DIAGNOSIS — M81.0 OSTEOPOROSIS, UNSPECIFIED OSTEOPOROSIS TYPE, UNSPECIFIED PATHOLOGICAL FRACTURE PRESENCE: ICD-10-CM

## 2019-09-13 DIAGNOSIS — M81.0 AGE-RELATED OSTEOPOROSIS WITHOUT CURRENT PATHOLOGICAL FRACTURE: Primary | ICD-10-CM

## 2019-09-13 PROCEDURE — 25000003 PHARM REV CODE 250: Performed by: INTERNAL MEDICINE

## 2019-09-13 PROCEDURE — 63600175 PHARM REV CODE 636 W HCPCS: Performed by: INTERNAL MEDICINE

## 2019-09-13 PROCEDURE — A4216 STERILE WATER/SALINE, 10 ML: HCPCS | Performed by: INTERNAL MEDICINE

## 2019-09-13 PROCEDURE — 96365 THER/PROPH/DIAG IV INF INIT: CPT

## 2019-09-13 RX ORDER — SODIUM CHLORIDE 0.9 % (FLUSH) 0.9 %
10 SYRINGE (ML) INJECTION
Status: CANCELLED | OUTPATIENT
Start: 2019-09-13

## 2019-09-13 RX ORDER — HEPARIN 100 UNIT/ML
500 SYRINGE INTRAVENOUS
Status: CANCELLED | OUTPATIENT
Start: 2019-09-13

## 2019-09-13 RX ORDER — ACETAMINOPHEN 500 MG
500 TABLET ORAL
Status: CANCELLED | OUTPATIENT
Start: 2019-09-13

## 2019-09-13 RX ORDER — ZOLEDRONIC ACID 5 MG/100ML
5 INJECTION, SOLUTION INTRAVENOUS
Status: COMPLETED | OUTPATIENT
Start: 2019-09-13 | End: 2019-09-13

## 2019-09-13 RX ORDER — ACETAMINOPHEN 500 MG
500 TABLET ORAL
Status: DISCONTINUED | OUTPATIENT
Start: 2019-09-13 | End: 2019-09-13 | Stop reason: HOSPADM

## 2019-09-13 RX ORDER — SODIUM CHLORIDE 0.9 % (FLUSH) 0.9 %
10 SYRINGE (ML) INJECTION
Status: DISCONTINUED | OUTPATIENT
Start: 2019-09-13 | End: 2019-09-13 | Stop reason: HOSPADM

## 2019-09-13 RX ORDER — ZOLEDRONIC ACID 5 MG/100ML
5 INJECTION, SOLUTION INTRAVENOUS
Status: CANCELLED | OUTPATIENT
Start: 2019-09-13

## 2019-09-13 RX ADMIN — Medication 10 ML: at 11:09

## 2019-09-13 RX ADMIN — ACETAMINOPHEN 500 MG: 500 TABLET ORAL at 11:09

## 2019-09-13 RX ADMIN — ZOLEDRONIC ACID 5 MG: 5 INJECTION, SOLUTION INTRAVENOUS at 11:09

## 2019-09-20 ENCOUNTER — PATIENT MESSAGE (OUTPATIENT)
Dept: ENDOCRINOLOGY | Facility: CLINIC | Age: 64
End: 2019-09-20

## 2019-11-25 ENCOUNTER — PATIENT MESSAGE (OUTPATIENT)
Dept: RHEUMATOLOGY | Facility: CLINIC | Age: 64
End: 2019-11-25

## 2020-01-01 ENCOUNTER — PATIENT MESSAGE (OUTPATIENT)
Dept: RHEUMATOLOGY | Facility: CLINIC | Age: 65
End: 2020-01-01

## 2020-01-01 ENCOUNTER — PATIENT MESSAGE (OUTPATIENT)
Dept: ORTHOPEDICS | Facility: CLINIC | Age: 65
End: 2020-01-01

## 2020-01-01 ENCOUNTER — PATIENT MESSAGE (OUTPATIENT)
Dept: TRANSPLANT | Facility: CLINIC | Age: 65
End: 2020-01-01

## 2020-01-01 ENCOUNTER — PATIENT OUTREACH (OUTPATIENT)
Dept: OTHER | Facility: OTHER | Age: 65
End: 2020-01-01

## 2020-01-01 ENCOUNTER — TELEPHONE (OUTPATIENT)
Dept: NEPHROLOGY | Facility: CLINIC | Age: 65
End: 2020-01-01

## 2020-01-01 ENCOUNTER — OFFICE VISIT (OUTPATIENT)
Dept: ORTHOPEDICS | Facility: CLINIC | Age: 65
End: 2020-01-01
Payer: MEDICARE

## 2020-01-01 ENCOUNTER — PATIENT MESSAGE (OUTPATIENT)
Dept: NEPHROLOGY | Facility: CLINIC | Age: 65
End: 2020-01-01

## 2020-01-01 ENCOUNTER — OFFICE VISIT (OUTPATIENT)
Dept: RHEUMATOLOGY | Facility: CLINIC | Age: 65
End: 2020-01-01
Payer: MEDICARE

## 2020-01-01 ENCOUNTER — INFUSION (OUTPATIENT)
Dept: INFUSION THERAPY | Facility: HOSPITAL | Age: 65
End: 2020-01-01
Attending: INTERNAL MEDICINE
Payer: MEDICARE

## 2020-01-01 ENCOUNTER — OFFICE VISIT (OUTPATIENT)
Dept: NEPHROLOGY | Facility: CLINIC | Age: 65
End: 2020-01-01
Payer: MEDICARE

## 2020-01-01 ENCOUNTER — PATIENT MESSAGE (OUTPATIENT)
Dept: INFUSION THERAPY | Facility: HOSPITAL | Age: 65
End: 2020-01-01

## 2020-01-01 ENCOUNTER — LAB VISIT (OUTPATIENT)
Dept: LAB | Facility: HOSPITAL | Age: 65
End: 2020-01-01
Attending: INTERNAL MEDICINE
Payer: MEDICARE

## 2020-01-01 VITALS
TEMPERATURE: 98 F | HEART RATE: 81 BPM | BODY MASS INDEX: 23.67 KG/M2 | WEIGHT: 133.63 LBS | DIASTOLIC BLOOD PRESSURE: 63 MMHG | SYSTOLIC BLOOD PRESSURE: 129 MMHG | RESPIRATION RATE: 20 BRPM | OXYGEN SATURATION: 100 %

## 2020-01-01 VITALS
WEIGHT: 133.63 LBS | BODY MASS INDEX: 23.68 KG/M2 | HEIGHT: 63 IN | SYSTOLIC BLOOD PRESSURE: 125 MMHG | HEART RATE: 77 BPM | DIASTOLIC BLOOD PRESSURE: 66 MMHG

## 2020-01-01 VITALS
HEIGHT: 63 IN | RESPIRATION RATE: 20 BRPM | WEIGHT: 133.63 LBS | BODY MASS INDEX: 23.68 KG/M2 | HEART RATE: 60 BPM | SYSTOLIC BLOOD PRESSURE: 130 MMHG | DIASTOLIC BLOOD PRESSURE: 62 MMHG

## 2020-01-01 VITALS
DIASTOLIC BLOOD PRESSURE: 71 MMHG | BODY MASS INDEX: 23.57 KG/M2 | HEART RATE: 86 BPM | SYSTOLIC BLOOD PRESSURE: 148 MMHG | HEIGHT: 63 IN | WEIGHT: 133 LBS

## 2020-01-01 DIAGNOSIS — Z94.0 S/P KIDNEY TRANSPLANT: ICD-10-CM

## 2020-01-01 DIAGNOSIS — Z94.0 LIVING-DONOR KIDNEY TRANSPLANT RECIPIENT: Chronic | ICD-10-CM

## 2020-01-01 DIAGNOSIS — M32.9 SYSTEMIC LUPUS ERYTHEMATOSUS, UNSPECIFIED SLE TYPE, UNSPECIFIED ORGAN INVOLVEMENT STATUS: ICD-10-CM

## 2020-01-01 DIAGNOSIS — D84.9 IMMUNOCOMPROMISED STATE: ICD-10-CM

## 2020-01-01 DIAGNOSIS — M18.11 ARTHRITIS OF CARPOMETACARPAL (CMC) JOINT OF RIGHT THUMB: ICD-10-CM

## 2020-01-01 DIAGNOSIS — I10 HTN (HYPERTENSION), BENIGN: Primary | ICD-10-CM

## 2020-01-01 DIAGNOSIS — D84.9 IMMUNOSUPPRESSION: ICD-10-CM

## 2020-01-01 DIAGNOSIS — M65.341 TRIGGER FINGER, RIGHT RING FINGER: ICD-10-CM

## 2020-01-01 DIAGNOSIS — N18.2 ANEMIA OF CHRONIC RENAL FAILURE, STAGE 2 (MILD): ICD-10-CM

## 2020-01-01 DIAGNOSIS — M65.342 TRIGGER RING FINGER OF LEFT HAND: ICD-10-CM

## 2020-01-01 DIAGNOSIS — N18.30 CHRONIC KIDNEY DISEASE (CKD), STAGE III (MODERATE): Primary | ICD-10-CM

## 2020-01-01 DIAGNOSIS — M32.0 DRUG-INDUCED SYSTEMIC LUPUS ERYTHEMATOSUS, UNSPECIFIED ORGAN INVOLVEMENT STATUS: ICD-10-CM

## 2020-01-01 DIAGNOSIS — M65.331 TRIGGER MIDDLE FINGER OF RIGHT HAND: Primary | ICD-10-CM

## 2020-01-01 DIAGNOSIS — M81.0 OSTEOPOROSIS, UNSPECIFIED OSTEOPOROSIS TYPE, UNSPECIFIED PATHOLOGICAL FRACTURE PRESENCE: ICD-10-CM

## 2020-01-01 DIAGNOSIS — E21.2 HYPERPARATHYROIDISM, TERTIARY: ICD-10-CM

## 2020-01-01 DIAGNOSIS — M32.14 LUPUS NEPHRITIS: ICD-10-CM

## 2020-01-01 DIAGNOSIS — M65.332 TRIGGER FINGER, LEFT MIDDLE FINGER: Primary | ICD-10-CM

## 2020-01-01 DIAGNOSIS — M18.12 ARTHRITIS OF CARPOMETACARPAL (CMC) JOINT OF LEFT THUMB: ICD-10-CM

## 2020-01-01 DIAGNOSIS — N18.2 CKD (CHRONIC KIDNEY DISEASE) STAGE 2, GFR 60-89 ML/MIN: ICD-10-CM

## 2020-01-01 DIAGNOSIS — D63.1 ANEMIA OF CHRONIC RENAL FAILURE, STAGE 2 (MILD): ICD-10-CM

## 2020-01-01 DIAGNOSIS — M65.332 TRIGGER FINGER, LEFT MIDDLE FINGER: ICD-10-CM

## 2020-01-01 DIAGNOSIS — I10 HTN (HYPERTENSION), BENIGN: ICD-10-CM

## 2020-01-01 DIAGNOSIS — M81.0 AGE-RELATED OSTEOPOROSIS WITHOUT CURRENT PATHOLOGICAL FRACTURE: Primary | ICD-10-CM

## 2020-01-01 DIAGNOSIS — M32.8 OTHER FORMS OF SYSTEMIC LUPUS ERYTHEMATOSUS, UNSPECIFIED ORGAN INVOLVEMENT STATUS: Primary | ICD-10-CM

## 2020-01-01 LAB
25(OH)D3+25(OH)D2 SERPL-MCNC: 39 NG/ML (ref 30–96)
ACANTHOCYTES BLD QL SMEAR: PRESENT
ALBUMIN SERPL BCP-MCNC: 3.6 G/DL (ref 3.5–5.2)
ALBUMIN SERPL BCP-MCNC: 3.6 G/DL (ref 3.5–5.2)
ALP SERPL-CCNC: 71 U/L (ref 55–135)
ANION GAP SERPL CALC-SCNC: 10 MMOL/L (ref 8–16)
ANISOCYTOSIS BLD QL SMEAR: SLIGHT
BASOPHILS # BLD AUTO: 0.05 K/UL (ref 0–0.2)
BASOPHILS NFR BLD: 0.8 % (ref 0–1.9)
BUN SERPL-MCNC: 52 MG/DL (ref 8–23)
CALCIUM SERPL-MCNC: 9.8 MG/DL (ref 8.7–10.5)
CHLORIDE SERPL-SCNC: 107 MMOL/L (ref 95–110)
CO2 SERPL-SCNC: 21 MMOL/L (ref 23–29)
CREAT SERPL-MCNC: 1.3 MG/DL (ref 0.5–1.4)
DACRYOCYTES BLD QL SMEAR: ABNORMAL
DIFFERENTIAL METHOD: ABNORMAL
EOSINOPHIL # BLD AUTO: 0.2 K/UL (ref 0–0.5)
EOSINOPHIL NFR BLD: 2.6 % (ref 0–8)
ERYTHROCYTE [DISTWIDTH] IN BLOOD BY AUTOMATED COUNT: 16.7 % (ref 11.5–14.5)
EST. GFR  (AFRICAN AMERICAN): 50 ML/MIN/1.73 M^2
EST. GFR  (NON AFRICAN AMERICAN): 43 ML/MIN/1.73 M^2
GLUCOSE SERPL-MCNC: 87 MG/DL (ref 70–110)
HCT VFR BLD AUTO: 36.2 % (ref 37–48.5)
HGB BLD-MCNC: 11.1 G/DL (ref 12–16)
IMM GRANULOCYTES # BLD AUTO: 0.01 K/UL (ref 0–0.04)
IMM GRANULOCYTES NFR BLD AUTO: 0.2 % (ref 0–0.5)
LYMPHOCYTES # BLD AUTO: 2.4 K/UL (ref 1–4.8)
LYMPHOCYTES NFR BLD: 39.1 % (ref 18–48)
MAGNESIUM SERPL-MCNC: 1.8 MG/DL (ref 1.6–2.6)
MCH RBC QN AUTO: 24 PG (ref 27–31)
MCHC RBC AUTO-ENTMCNC: 30.7 G/DL (ref 32–36)
MCV RBC AUTO: 78 FL (ref 82–98)
MONOCYTES # BLD AUTO: 0.8 K/UL (ref 0.3–1)
MONOCYTES NFR BLD: 13.1 % (ref 4–15)
NEUTROPHILS # BLD AUTO: 2.7 K/UL (ref 1.8–7.7)
NEUTROPHILS NFR BLD: 44.4 % (ref 38–73)
NRBC BLD-RTO: 0 /100 WBC
PHOSPHATE SERPL-MCNC: 3.7 MG/DL (ref 2.7–4.5)
PLATELET # BLD AUTO: 141 K/UL (ref 150–350)
PLATELET BLD QL SMEAR: ABNORMAL
PMV BLD AUTO: 11.3 FL (ref 9.2–12.9)
POIKILOCYTOSIS BLD QL SMEAR: SLIGHT
POTASSIUM SERPL-SCNC: 5.1 MMOL/L (ref 3.5–5.1)
PTH-INTACT SERPL-MCNC: 150 PG/ML (ref 9–77)
RBC # BLD AUTO: 4.63 M/UL (ref 4–5.4)
SODIUM SERPL-SCNC: 138 MMOL/L (ref 136–145)
TACROLIMUS BLD-MCNC: 4.3 NG/ML (ref 5–15)
WBC # BLD AUTO: 6.04 K/UL (ref 3.9–12.7)

## 2020-01-01 PROCEDURE — 99214 PR OFFICE/OUTPT VISIT, EST, LEVL IV, 30-39 MIN: ICD-10-PCS | Mod: 25,S$GLB,, | Performed by: PHYSICIAN ASSISTANT

## 2020-01-01 PROCEDURE — 80197 ASSAY OF TACROLIMUS: CPT

## 2020-01-01 PROCEDURE — 20550 NJX 1 TENDON SHEATH/LIGAMENT: CPT | Mod: RT,S$GLB,, | Performed by: PHYSICIAN ASSISTANT

## 2020-01-01 PROCEDURE — 83735 ASSAY OF MAGNESIUM: CPT

## 2020-01-01 PROCEDURE — 1101F PT FALLS ASSESS-DOCD LE1/YR: CPT | Mod: CPTII,S$GLB,, | Performed by: PHYSICIAN ASSISTANT

## 2020-01-01 PROCEDURE — 85025 COMPLETE CBC W/AUTO DIFF WBC: CPT

## 2020-01-01 PROCEDURE — 3008F BODY MASS INDEX DOCD: CPT | Mod: CPTII,S$GLB,, | Performed by: PHYSICIAN ASSISTANT

## 2020-01-01 PROCEDURE — 3008F PR BODY MASS INDEX (BMI) DOCUMENTED: ICD-10-PCS | Mod: CPTII,S$GLB,, | Performed by: PHYSICIAN ASSISTANT

## 2020-01-01 PROCEDURE — 99999 PR PBB SHADOW E&M-EST. PATIENT-LVL IV: CPT | Mod: PBBFAC,,, | Performed by: INTERNAL MEDICINE

## 2020-01-01 PROCEDURE — 99214 OFFICE O/P EST MOD 30 MIN: CPT | Mod: 25,S$GLB,, | Performed by: PHYSICIAN ASSISTANT

## 2020-01-01 PROCEDURE — 3008F BODY MASS INDEX DOCD: CPT | Mod: CPTII,S$GLB,, | Performed by: INTERNAL MEDICINE

## 2020-01-01 PROCEDURE — 99214 PR OFFICE/OUTPT VISIT, EST, LEVL IV, 30-39 MIN: ICD-10-PCS | Mod: 95,,, | Performed by: INTERNAL MEDICINE

## 2020-01-01 PROCEDURE — 99999 PR PBB SHADOW E&M-EST. PATIENT-LVL IV: ICD-10-PCS | Mod: PBBFAC,,, | Performed by: PHYSICIAN ASSISTANT

## 2020-01-01 PROCEDURE — 36415 COLL VENOUS BLD VENIPUNCTURE: CPT

## 2020-01-01 PROCEDURE — 99214 OFFICE O/P EST MOD 30 MIN: CPT | Mod: S$GLB,,, | Performed by: INTERNAL MEDICINE

## 2020-01-01 PROCEDURE — 1101F PR PT FALLS ASSESS DOC 0-1 FALLS W/OUT INJ PAST YR: ICD-10-PCS | Mod: CPTII,S$GLB,, | Performed by: PHYSICIAN ASSISTANT

## 2020-01-01 PROCEDURE — 99999 PR PBB SHADOW E&M-EST. PATIENT-LVL IV: ICD-10-PCS | Mod: PBBFAC,,, | Performed by: INTERNAL MEDICINE

## 2020-01-01 PROCEDURE — 80069 RENAL FUNCTION PANEL: CPT

## 2020-01-01 PROCEDURE — 20550 TENDON SHEATH: R LONG MCP: ICD-10-PCS | Mod: RT,S$GLB,, | Performed by: PHYSICIAN ASSISTANT

## 2020-01-01 PROCEDURE — 82306 VITAMIN D 25 HYDROXY: CPT

## 2020-01-01 PROCEDURE — 20600 SMALL JOINT ASPIRATION/INJECTION: ICD-10-PCS | Mod: RT,S$GLB,, | Performed by: PHYSICIAN ASSISTANT

## 2020-01-01 PROCEDURE — 1101F PT FALLS ASSESS-DOCD LE1/YR: CPT | Mod: CPTII,S$GLB,, | Performed by: INTERNAL MEDICINE

## 2020-01-01 PROCEDURE — 3008F PR BODY MASS INDEX (BMI) DOCUMENTED: ICD-10-PCS | Mod: CPTII,S$GLB,, | Performed by: INTERNAL MEDICINE

## 2020-01-01 PROCEDURE — 99999 PR PBB SHADOW E&M-EST. PATIENT-LVL IV: CPT | Mod: PBBFAC,,, | Performed by: PHYSICIAN ASSISTANT

## 2020-01-01 PROCEDURE — 84075 ASSAY ALKALINE PHOSPHATASE: CPT

## 2020-01-01 PROCEDURE — 63600175 PHARM REV CODE 636 W HCPCS: Performed by: INTERNAL MEDICINE

## 2020-01-01 PROCEDURE — 96365 THER/PROPH/DIAG IV INF INIT: CPT

## 2020-01-01 PROCEDURE — 1101F PR PT FALLS ASSESS DOC 0-1 FALLS W/OUT INJ PAST YR: ICD-10-PCS | Mod: CPTII,S$GLB,, | Performed by: INTERNAL MEDICINE

## 2020-01-01 PROCEDURE — 20550 TENDON SHEATH: L LONG MCP: ICD-10-PCS | Mod: 59,51,F2,S$GLB | Performed by: PHYSICIAN ASSISTANT

## 2020-01-01 PROCEDURE — 25000003 PHARM REV CODE 250: Performed by: INTERNAL MEDICINE

## 2020-01-01 PROCEDURE — 83970 ASSAY OF PARATHORMONE: CPT

## 2020-01-01 PROCEDURE — 99214 OFFICE O/P EST MOD 30 MIN: CPT | Mod: 95,,, | Performed by: INTERNAL MEDICINE

## 2020-01-01 PROCEDURE — 20550 NJX 1 TENDON SHEATH/LIGAMENT: CPT | Mod: 59,51,F2,S$GLB | Performed by: PHYSICIAN ASSISTANT

## 2020-01-01 PROCEDURE — 1101F PR PT FALLS ASSESS DOC 0-1 FALLS W/OUT INJ PAST YR: ICD-10-PCS | Mod: CPTII,95,, | Performed by: INTERNAL MEDICINE

## 2020-01-01 PROCEDURE — 99214 PR OFFICE/OUTPT VISIT, EST, LEVL IV, 30-39 MIN: ICD-10-PCS | Mod: S$GLB,,, | Performed by: INTERNAL MEDICINE

## 2020-01-01 PROCEDURE — 20600 DRAIN/INJ JOINT/BURSA W/O US: CPT | Mod: RT,S$GLB,, | Performed by: PHYSICIAN ASSISTANT

## 2020-01-01 PROCEDURE — 1101F PT FALLS ASSESS-DOCD LE1/YR: CPT | Mod: CPTII,95,, | Performed by: INTERNAL MEDICINE

## 2020-01-01 RX ORDER — ZOLEDRONIC ACID 5 MG/100ML
5 INJECTION, SOLUTION INTRAVENOUS
Status: COMPLETED | OUTPATIENT
Start: 2020-01-01 | End: 2020-01-01

## 2020-01-01 RX ORDER — ZOLEDRONIC ACID 5 MG/100ML
5 INJECTION, SOLUTION INTRAVENOUS
Status: CANCELLED | OUTPATIENT
Start: 2020-01-01

## 2020-01-01 RX ORDER — SODIUM CHLORIDE 0.9 % (FLUSH) 0.9 %
10 SYRINGE (ML) INJECTION
Status: DISCONTINUED | OUTPATIENT
Start: 2020-01-01 | End: 2020-01-01 | Stop reason: HOSPADM

## 2020-01-01 RX ORDER — SODIUM CHLORIDE 0.9 % (FLUSH) 0.9 %
10 SYRINGE (ML) INJECTION
Status: CANCELLED | OUTPATIENT
Start: 2020-01-01

## 2020-01-01 RX ORDER — ACETAMINOPHEN 500 MG
500 TABLET ORAL
Status: CANCELLED | OUTPATIENT
Start: 2020-01-01

## 2020-01-01 RX ORDER — METHYLPREDNISOLONE ACETATE 80 MG/ML
40 INJECTION, SUSPENSION INTRA-ARTICULAR; INTRALESIONAL; INTRAMUSCULAR; SOFT TISSUE
Status: DISCONTINUED | OUTPATIENT
Start: 2020-01-01 | End: 2020-01-01 | Stop reason: HOSPADM

## 2020-01-01 RX ORDER — HYDROXYCHLOROQUINE SULFATE 200 MG/1
200 TABLET, FILM COATED ORAL DAILY
Qty: 90 TABLET | Refills: 3 | Status: SHIPPED | OUTPATIENT
Start: 2020-01-01 | End: 2021-01-01 | Stop reason: SDUPTHER

## 2020-01-01 RX ORDER — ACETAMINOPHEN 500 MG
500 TABLET ORAL
Status: DISCONTINUED | OUTPATIENT
Start: 2020-01-01 | End: 2020-01-01 | Stop reason: HOSPADM

## 2020-01-01 RX ORDER — OLMESARTAN MEDOXOMIL 5 MG/1
10 TABLET ORAL DAILY
Qty: 180 TABLET | Refills: 3 | Status: SHIPPED | OUTPATIENT
Start: 2020-01-01 | End: 2020-01-01

## 2020-01-01 RX ORDER — HEPARIN 100 UNIT/ML
500 SYRINGE INTRAVENOUS
Status: CANCELLED | OUTPATIENT
Start: 2020-01-01

## 2020-01-01 RX ADMIN — METHYLPREDNISOLONE ACETATE 40 MG: 80 INJECTION, SUSPENSION INTRA-ARTICULAR; INTRALESIONAL; INTRAMUSCULAR; SOFT TISSUE at 02:11

## 2020-01-01 RX ADMIN — ZOLEDRONIC ACID 5 MG: 5 INJECTION, SOLUTION INTRAVENOUS at 02:09

## 2020-01-01 RX ADMIN — ACETAMINOPHEN 500 MG: 500 TABLET ORAL at 02:09

## 2020-01-01 RX ADMIN — METHYLPREDNISOLONE ACETATE 40 MG: 80 INJECTION, SUSPENSION INTRA-ARTICULAR; INTRALESIONAL; INTRAMUSCULAR; SOFT TISSUE at 03:10

## 2020-01-01 ASSESSMENT — ROUTINE ASSESSMENT OF PATIENT INDEX DATA (RAPID3)
AM STIFFNESS SCORE: 0, NO
FATIGUE SCORE: 0
PSYCHOLOGICAL DISTRESS SCORE: 1.1
PAIN SCORE: 1
TOTAL RAPID3 SCORE: 0.5
PATIENT GLOBAL ASSESSMENT SCORE: 0.5
MDHAQ FUNCTION SCORE: 0

## 2020-01-06 ENCOUNTER — LAB VISIT (OUTPATIENT)
Dept: LAB | Facility: HOSPITAL | Age: 65
End: 2020-01-06
Attending: INTERNAL MEDICINE
Payer: COMMERCIAL

## 2020-01-06 DIAGNOSIS — D69.6 THROMBOCYTOPENIA: Chronic | ICD-10-CM

## 2020-01-06 DIAGNOSIS — D84.9 IMMUNOCOMPROMISED STATE: ICD-10-CM

## 2020-01-06 DIAGNOSIS — M32.14 LUPUS NEPHRITIS: ICD-10-CM

## 2020-01-06 DIAGNOSIS — M32.8 OTHER FORMS OF SYSTEMIC LUPUS ERYTHEMATOSUS, UNSPECIFIED ORGAN INVOLVEMENT STATUS: ICD-10-CM

## 2020-01-06 DIAGNOSIS — N18.2 CKD (CHRONIC KIDNEY DISEASE) STAGE 2, GFR 60-89 ML/MIN: Chronic | ICD-10-CM

## 2020-01-06 LAB
ALBUMIN SERPL BCP-MCNC: 3.7 G/DL (ref 3.5–5.2)
ALP SERPL-CCNC: 57 U/L (ref 55–135)
ALT SERPL W/O P-5'-P-CCNC: 25 U/L (ref 10–44)
ANION GAP SERPL CALC-SCNC: 7 MMOL/L (ref 8–16)
AST SERPL-CCNC: 30 U/L (ref 10–40)
BILIRUB SERPL-MCNC: 0.9 MG/DL (ref 0.1–1)
BUN SERPL-MCNC: 28 MG/DL (ref 8–23)
C3 SERPL-MCNC: 122 MG/DL (ref 50–180)
C4 SERPL-MCNC: 17 MG/DL (ref 11–44)
CALCIUM SERPL-MCNC: 10.1 MG/DL (ref 8.7–10.5)
CHLORIDE SERPL-SCNC: 109 MMOL/L (ref 95–110)
CO2 SERPL-SCNC: 23 MMOL/L (ref 23–29)
CREAT SERPL-MCNC: 1.1 MG/DL (ref 0.5–1.4)
CRP SERPL-MCNC: 0.4 MG/L (ref 0–8.2)
ERYTHROCYTE [SEDIMENTATION RATE] IN BLOOD BY WESTERGREN METHOD: 12 MM/HR (ref 0–36)
EST. GFR  (AFRICAN AMERICAN): >60 ML/MIN/1.73 M^2
EST. GFR  (NON AFRICAN AMERICAN): 53.2 ML/MIN/1.73 M^2
GLUCOSE SERPL-MCNC: 87 MG/DL (ref 70–110)
POTASSIUM SERPL-SCNC: 4.8 MMOL/L (ref 3.5–5.1)
PROT SERPL-MCNC: 6.6 G/DL (ref 6–8.4)
SODIUM SERPL-SCNC: 139 MMOL/L (ref 136–145)

## 2020-01-06 PROCEDURE — 36415 COLL VENOUS BLD VENIPUNCTURE: CPT

## 2020-01-06 PROCEDURE — 86160 COMPLEMENT ANTIGEN: CPT | Mod: 59

## 2020-01-06 PROCEDURE — 80053 COMPREHEN METABOLIC PANEL: CPT

## 2020-01-06 PROCEDURE — 85652 RBC SED RATE AUTOMATED: CPT

## 2020-01-06 PROCEDURE — 86140 C-REACTIVE PROTEIN: CPT

## 2020-01-06 PROCEDURE — 86160 COMPLEMENT ANTIGEN: CPT

## 2020-01-07 ENCOUNTER — PATIENT MESSAGE (OUTPATIENT)
Dept: RHEUMATOLOGY | Facility: CLINIC | Age: 65
End: 2020-01-07

## 2020-01-08 ENCOUNTER — PATIENT MESSAGE (OUTPATIENT)
Dept: RHEUMATOLOGY | Facility: CLINIC | Age: 65
End: 2020-01-08

## 2020-01-13 ENCOUNTER — LAB VISIT (OUTPATIENT)
Dept: LAB | Facility: HOSPITAL | Age: 65
End: 2020-01-13
Attending: INTERNAL MEDICINE
Payer: COMMERCIAL

## 2020-01-13 DIAGNOSIS — M32.9 SLE (SYSTEMIC LUPUS ERYTHEMATOSUS): ICD-10-CM

## 2020-01-13 DIAGNOSIS — M32.14 LUPUS NEPHRITIS: ICD-10-CM

## 2020-01-13 DIAGNOSIS — Z79.899 ENCOUNTER FOR LONG-TERM (CURRENT) USE OF MEDICATIONS: ICD-10-CM

## 2020-01-13 LAB
BASOPHILS # BLD AUTO: 0.04 K/UL (ref 0–0.2)
BASOPHILS NFR BLD: 0.7 % (ref 0–1.9)
DIFFERENTIAL METHOD: ABNORMAL
EOSINOPHIL # BLD AUTO: 0.2 K/UL (ref 0–0.5)
EOSINOPHIL NFR BLD: 3.4 % (ref 0–8)
ERYTHROCYTE [DISTWIDTH] IN BLOOD BY AUTOMATED COUNT: 17.4 % (ref 11.5–14.5)
HCT VFR BLD AUTO: 36.4 % (ref 37–48.5)
HGB BLD-MCNC: 10.9 G/DL (ref 12–16)
IMM GRANULOCYTES # BLD AUTO: 0.01 K/UL (ref 0–0.04)
IMM GRANULOCYTES NFR BLD AUTO: 0.2 % (ref 0–0.5)
LYMPHOCYTES # BLD AUTO: 1.7 K/UL (ref 1–4.8)
LYMPHOCYTES NFR BLD: 30.6 % (ref 18–48)
MCH RBC QN AUTO: 24.4 PG (ref 27–31)
MCHC RBC AUTO-ENTMCNC: 29.9 G/DL (ref 32–36)
MCV RBC AUTO: 81 FL (ref 82–98)
MONOCYTES # BLD AUTO: 0.6 K/UL (ref 0.3–1)
MONOCYTES NFR BLD: 11.2 % (ref 4–15)
NEUTROPHILS # BLD AUTO: 3 K/UL (ref 1.8–7.7)
NEUTROPHILS NFR BLD: 53.9 % (ref 38–73)
NRBC BLD-RTO: 0 /100 WBC
PLATELET # BLD AUTO: 129 K/UL (ref 150–350)
PMV BLD AUTO: ABNORMAL FL (ref 9.2–12.9)
RBC # BLD AUTO: 4.47 M/UL (ref 4–5.4)
WBC # BLD AUTO: 5.56 K/UL (ref 3.9–12.7)

## 2020-01-13 PROCEDURE — 85025 COMPLETE CBC W/AUTO DIFF WBC: CPT

## 2020-01-13 PROCEDURE — 86225 DNA ANTIBODY NATIVE: CPT

## 2020-01-13 PROCEDURE — 36415 COLL VENOUS BLD VENIPUNCTURE: CPT

## 2020-01-14 LAB — DSDNA AB SER-ACNC: NORMAL [IU]/ML

## 2020-01-22 ENCOUNTER — LAB VISIT (OUTPATIENT)
Dept: LAB | Facility: HOSPITAL | Age: 65
End: 2020-01-22
Attending: INTERNAL MEDICINE
Payer: COMMERCIAL

## 2020-01-22 ENCOUNTER — OFFICE VISIT (OUTPATIENT)
Dept: RHEUMATOLOGY | Facility: CLINIC | Age: 65
End: 2020-01-22
Payer: COMMERCIAL

## 2020-01-22 VITALS
HEART RATE: 68 BPM | BODY MASS INDEX: 24.3 KG/M2 | SYSTOLIC BLOOD PRESSURE: 136 MMHG | DIASTOLIC BLOOD PRESSURE: 69 MMHG | HEIGHT: 63 IN | WEIGHT: 137.13 LBS

## 2020-01-22 DIAGNOSIS — D84.9 IMMUNOCOMPROMISED STATE: ICD-10-CM

## 2020-01-22 DIAGNOSIS — M32.8 OTHER FORMS OF SYSTEMIC LUPUS ERYTHEMATOSUS, UNSPECIFIED ORGAN INVOLVEMENT STATUS: Primary | ICD-10-CM

## 2020-01-22 DIAGNOSIS — R80.9 PROTEINURIA, UNSPECIFIED TYPE: ICD-10-CM

## 2020-01-22 DIAGNOSIS — M32.8 OTHER FORMS OF SYSTEMIC LUPUS ERYTHEMATOSUS, UNSPECIFIED ORGAN INVOLVEMENT STATUS: ICD-10-CM

## 2020-01-22 LAB
C3 SERPL-MCNC: 121 MG/DL (ref 50–180)
C4 SERPL-MCNC: 19 MG/DL (ref 11–44)

## 2020-01-22 PROCEDURE — 99999 PR PBB SHADOW E&M-EST. PATIENT-LVL III: CPT | Mod: PBBFAC,,, | Performed by: INTERNAL MEDICINE

## 2020-01-22 PROCEDURE — 99214 PR OFFICE/OUTPT VISIT, EST, LEVL IV, 30-39 MIN: ICD-10-PCS | Mod: S$GLB,,, | Performed by: INTERNAL MEDICINE

## 2020-01-22 PROCEDURE — 3075F PR MOST RECENT SYSTOLIC BLOOD PRESS GE 130-139MM HG: ICD-10-PCS | Mod: CPTII,S$GLB,, | Performed by: INTERNAL MEDICINE

## 2020-01-22 PROCEDURE — 3008F BODY MASS INDEX DOCD: CPT | Mod: CPTII,S$GLB,, | Performed by: INTERNAL MEDICINE

## 2020-01-22 PROCEDURE — 99999 PR PBB SHADOW E&M-EST. PATIENT-LVL III: ICD-10-PCS | Mod: PBBFAC,,, | Performed by: INTERNAL MEDICINE

## 2020-01-22 PROCEDURE — 3075F SYST BP GE 130 - 139MM HG: CPT | Mod: CPTII,S$GLB,, | Performed by: INTERNAL MEDICINE

## 2020-01-22 PROCEDURE — 3008F PR BODY MASS INDEX (BMI) DOCUMENTED: ICD-10-PCS | Mod: CPTII,S$GLB,, | Performed by: INTERNAL MEDICINE

## 2020-01-22 PROCEDURE — 36415 COLL VENOUS BLD VENIPUNCTURE: CPT

## 2020-01-22 PROCEDURE — 86160 COMPLEMENT ANTIGEN: CPT

## 2020-01-22 PROCEDURE — 3078F DIAST BP <80 MM HG: CPT | Mod: CPTII,S$GLB,, | Performed by: INTERNAL MEDICINE

## 2020-01-22 PROCEDURE — 3078F PR MOST RECENT DIASTOLIC BLOOD PRESSURE < 80 MM HG: ICD-10-PCS | Mod: CPTII,S$GLB,, | Performed by: INTERNAL MEDICINE

## 2020-01-22 PROCEDURE — 86160 COMPLEMENT ANTIGEN: CPT | Mod: 59

## 2020-01-22 PROCEDURE — 86225 DNA ANTIBODY NATIVE: CPT

## 2020-01-22 PROCEDURE — 99214 OFFICE O/P EST MOD 30 MIN: CPT | Mod: S$GLB,,, | Performed by: INTERNAL MEDICINE

## 2020-01-22 ASSESSMENT — ROUTINE ASSESSMENT OF PATIENT INDEX DATA (RAPID3)
PSYCHOLOGICAL DISTRESS SCORE: 2.2
PAIN SCORE: 1.5
PATIENT GLOBAL ASSESSMENT SCORE: .5
TOTAL RAPID3 SCORE: .67
AM STIFFNESS SCORE: 0, NO
MDHAQ FUNCTION SCORE: 0
FATIGUE SCORE: .5

## 2020-01-22 ASSESSMENT — SYSTEMIC LUPUS ERYTHEMATOSUS DISEASE ACTIVITY INDEX (SLEDAI): TOTAL_SCORE: 4

## 2020-01-22 NOTE — PROGRESS NOTES
Subjective:       Patient ID: Chanel Charles is a 64 y.o. female.    Chief Complaint: Hand pain with trigger finger    HPI:  Chanel Charles is a 64 y.o. female with SLE and renal failure as well an extensive history of recurrent pleural effusions and pericardial effusion treated with recurrent thoracentesis (x3) and pericardiocentesis. She is s/p kidney transplant 4/6/2015. Patient in the past had 3-4 paracentesis for accumulation of abdominal fluid (last time was December 2011). She was hospitalized 4/2012 for acute altered mental status, slurred speech concerning for CVA. Dr. Ribera (rheum) was consulted and he did not think it was her lupus. It was felt that her AMS was caused by gabapentin and/or valtrex as those were new medications. After these medications were stopped she returned to her baseline function. She has history of a lung nodule and eventual TB results were negative. Patient had VATS procedure 3/2011 which showed a granuloma thought to be due histoplamosis but work up was negative. Patient had kidneys removed 4/2011 to help decrease protein loss from the body.    She was given Rituxan (4 infusions total) and later Nplate for thrombocytopenia by her hematologist and is back on Nplate.   Hematologist is Dr. Shelley () and platelets were 150s or so.  She has been off Nplate since transplant.       Colonoscopy 8/19/14 showed benign polyps, diverticulosis, internal and external hemrrhoids  Mammogram 6/24/14 was negative.  Pap smear 6/30/14 negative for lesion or malignancy          Interval History:    Here for follow up.  Denies any issues.      Exercising regularly.  Did therapy for trigger fingers and had injections which helped in the past.  Now with more activity but not wearing braces.         Review of Systems   Constitutional: Negative for fatigue, fever and unexpected weight change.   HENT: Negative for trouble swallowing.    Eyes: Negative for redness.   Respiratory:  "Negative for cough and shortness of breath.    Cardiovascular: Negative for chest pain.   Gastrointestinal: Negative for constipation and diarrhea.   Endocrine: Negative.    Genitourinary: Negative for dysuria and genital sores.   Musculoskeletal: Negative.    Skin: Negative.  Negative for rash.   Allergic/Immunologic: Negative.    Neurological: Negative for headaches.   Hematological: Does not bruise/bleed easily.   Psychiatric/Behavioral: Negative.          Objective:   /69 (BP Location: Right arm, Patient Position: Sitting, BP Method: Medium (Automatic))   Pulse 68   Ht 5' 3" (1.6 m)   Wt 62.2 kg (137 lb 2 oz)   BMI 24.29 kg/m²      Physical Exam   Constitutional: She is oriented to person, place, and time and well-developed, well-nourished, and in no distress.   HENT:   Head: Normocephalic and atraumatic.   Eyes: Conjunctivae and EOM are normal.   Neck: Neck supple.   Cardiovascular: Normal rate and regular rhythm.    Pulmonary/Chest: Effort normal and breath sounds normal.   Abdominal: Soft. Bowel sounds are normal.   Neurological: She is alert and oriented to person, place, and time. Gait normal.   Skin: Skin is warm and dry.     Psychiatric: Mood and affect normal.   Musculoskeletal: She exhibits no tenderness (No pain at right 3rd flexor tendon nodule and left 3rd and 4th flexor tendon nodule).   Squaring of 1st CMC bilaterally       LABS    Component      Latest Ref Rng & Units 1/13/2020 1/6/2020   WBC      3.90 - 12.70 K/uL 5.56    RBC      4.00 - 5.40 M/uL 4.47    Hemoglobin      12.0 - 16.0 g/dL 10.9 (L)    Hematocrit      37.0 - 48.5 % 36.4 (L)    MCV      82 - 98 fL 81 (L)    MCH      27.0 - 31.0 pg 24.4 (L)    MCHC      32.0 - 36.0 g/dL 29.9 (L)    RDW      11.5 - 14.5 % 17.4 (H)    Platelets      150 - 350 K/uL 129 (L)    MPV      9.2 - 12.9 fL SEE COMMENT    Immature Granulocytes      0.0 - 0.5 % 0.2    Gran # (ANC)      1.8 - 7.7 K/uL 3.0    Immature Grans (Abs)      0.00 - 0.04 K/uL 0.01 "    Lymph #      1.0 - 4.8 K/uL 1.7    Mono #      0.3 - 1.0 K/uL 0.6    Eos #      0.0 - 0.5 K/uL 0.2    Baso #      0.00 - 0.20 K/uL 0.04    nRBC      0 /100 WBC 0    Gran%      38.0 - 73.0 % 53.9    Lymph%      18.0 - 48.0 % 30.6    Mono%      4.0 - 15.0 % 11.2    Eosinophil%      0.0 - 8.0 % 3.4    Basophil%      0.0 - 1.9 % 0.7    Differential Method       Automated    Sodium      136 - 145 mmol/L  139   Potassium      3.5 - 5.1 mmol/L  4.8   Chloride      95 - 110 mmol/L  109   CO2      23 - 29 mmol/L  23   Glucose      70 - 110 mg/dL  87   BUN, Bld      8 - 23 mg/dL  28 (H)   Creatinine      0.5 - 1.4 mg/dL  1.1   Calcium      8.7 - 10.5 mg/dL  10.1   PROTEIN TOTAL      6.0 - 8.4 g/dL  6.6   Albumin      3.5 - 5.2 g/dL  3.7   BILIRUBIN TOTAL      0.1 - 1.0 mg/dL  0.9   Alkaline Phosphatase      55 - 135 U/L  57   AST      10 - 40 U/L  30   ALT      10 - 44 U/L  25   Anion Gap      8 - 16 mmol/L  7 (L)   eGFR if African American      >60 mL/min/1.73 m:2  >60.0   eGFR if non African American      >60 mL/min/1.73 m:2  53.2 (A)   Specimen UA        Urine, Clean Catch   Color, UA      Yellow, Straw, Ela  Yellow   Appearance, UA      Clear  Clear   pH, UA      5.0 - 8.0  6.0   Specific Gravity, UA      1.005 - 1.030  1.025   Protein, UA      Negative  2+ (A)   Glucose, UA      Negative  Negative   Ketones, UA      Negative  Negative   Bilirubin (UA)      Negative  Negative   Occult Blood UA      Negative  Trace (A)   NITRITE UA      Negative  Negative   Leukocytes, UA      Negative  Negative   RBC, UA      0 - 4 /hpf  3   WBC, UA      0 - 5 /hpf  1   Bacteria, UA      None-Occ /hpf  Rare   Squam Epithel, UA      /hpf  2   Hyaline Casts, UA      0-1/lpf /lpf  0   Microscopic Comment        SEE COMMENT   Protein, Urine Random      0 - 15 mg/dL  70 (H)   Creatinine, Random Ur      15.0 - 325.0 mg/dL  125.0   Prot/Creat Ratio, Ur      0.00 - 0.20  0.56 (H)   Complement (C-3)      50 - 180 mg/dL  122   Complement  (C-4)      11 - 44 mg/dL  17   CRP      0.0 - 8.2 mg/L  0.4   Sed Rate      0 - 36 mm/Hr  12   ds DNA Ab      Negative 1:10 Negative 1:10         Assessment:       1. SLE with +TANA hx, arthritis, alopecia, and glomerular nephritis now s/p bilateral nephrectomy then dialysis and s/p renal transplant (4/6/2015).   No evidence of active lupus currently. Still doing well.  Labs with proteinuria  2. Glomerulonephritis. S/p transplant 4/6/2015.  3. CAD  4. Fatigue  5. Long term Plaquenil use. Off CellCept since 9/2010  6. Serositis  7. Diastolic heart failure. Now resolved.  8. Thrombocytopenia. Still decreased.   9. Elevation in homocysteine levels in March 2012 and started on Folbic.   10. Recurrent falls. No recent falls  11. Hypercalcemia. Being managed endocrine  12. Osteoporosis. Endocrine gave Reclast scheduled 9/2019  13. 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 Being evaluated by neurology.  Mass no longer there.   14. Flexor tendon nodules both hands.   Plan:       1. Continue Plaquenil 200 mg daily.  Last eye exam 6/2019  2. Still off Folbic per transplant  3. Patient to contact office if feels she is having a lupus flare  4. Off Nplate still. Follows hematology Dr. Shelley  5. Continue 2 old goats. Continue Voltaren gel for hand pains.  6. Vaccination (pneumonia, Shingle) per transplant team and primary doctor.   7. Follow with dermatology in her area for alopecia on top of scalp.   8. Follow up with ortho regarding trigger fingers  9. Labs  11.  Proteinuria. Will evaluate with labs     RTO 4 months/prn

## 2020-01-22 NOTE — PROGRESS NOTES
Rapid3 Question Responses and Scores 1/16/2020   MDHAQ Score 0   Psychologic Score 2.2   Pain Score 1.5   When you awakened in the morning OVER THE LAST WEEK, did you feel stiff? No   If Yes, please indicate the number of hours until you are as limber as you will be for the day -   Fatigue Score 0.5   Global Health Score 0.5   RAPID3 Score 0.66       Answers for HPI/ROS submitted by the patient on 1/16/2020   fever: No  eye redness: No  headaches: No  shortness of breath: No  chest pain: No  trouble swallowing: No  diarrhea: No  constipation: No  unexpected weight change: No  genital sore: No  dysuria: No  During the last 3 days, have you had a skin rash?: No  Bruises or bleeds easily: No  cough: No

## 2020-01-23 ENCOUNTER — PATIENT MESSAGE (OUTPATIENT)
Dept: NEPHROLOGY | Facility: CLINIC | Age: 65
End: 2020-01-23

## 2020-01-23 ENCOUNTER — PATIENT MESSAGE (OUTPATIENT)
Dept: RHEUMATOLOGY | Facility: CLINIC | Age: 65
End: 2020-01-23

## 2020-01-23 LAB — DSDNA AB SER-ACNC: NORMAL [IU]/ML

## 2020-01-23 NOTE — TELEPHONE ENCOUNTER
Patient informed over the phone about the elevation in her protein creatinine ratio.  She will contact her nephrologist.  I have also sent a message.

## 2020-01-30 ENCOUNTER — OFFICE VISIT (OUTPATIENT)
Dept: NEPHROLOGY | Facility: CLINIC | Age: 65
End: 2020-01-30
Payer: COMMERCIAL

## 2020-01-30 VITALS
WEIGHT: 131.19 LBS | RESPIRATION RATE: 20 BRPM | HEIGHT: 63 IN | SYSTOLIC BLOOD PRESSURE: 138 MMHG | BODY MASS INDEX: 23.25 KG/M2 | HEART RATE: 80 BPM | DIASTOLIC BLOOD PRESSURE: 72 MMHG

## 2020-01-30 DIAGNOSIS — I10 HTN (HYPERTENSION), BENIGN: ICD-10-CM

## 2020-01-30 DIAGNOSIS — Z94.0 S/P KIDNEY TRANSPLANT: Primary | ICD-10-CM

## 2020-01-30 PROCEDURE — 99215 OFFICE O/P EST HI 40 MIN: CPT | Mod: S$GLB,,, | Performed by: INTERNAL MEDICINE

## 2020-01-30 PROCEDURE — 3078F DIAST BP <80 MM HG: CPT | Mod: CPTII,S$GLB,, | Performed by: INTERNAL MEDICINE

## 2020-01-30 PROCEDURE — 99215 PR OFFICE/OUTPT VISIT, EST, LEVL V, 40-54 MIN: ICD-10-PCS | Mod: S$GLB,,, | Performed by: INTERNAL MEDICINE

## 2020-01-30 PROCEDURE — 3075F SYST BP GE 130 - 139MM HG: CPT | Mod: CPTII,S$GLB,, | Performed by: INTERNAL MEDICINE

## 2020-01-30 PROCEDURE — 99999 PR PBB SHADOW E&M-EST. PATIENT-LVL III: CPT | Mod: PBBFAC,,, | Performed by: INTERNAL MEDICINE

## 2020-01-30 PROCEDURE — 3008F BODY MASS INDEX DOCD: CPT | Mod: CPTII,S$GLB,, | Performed by: INTERNAL MEDICINE

## 2020-01-30 PROCEDURE — 3075F PR MOST RECENT SYSTOLIC BLOOD PRESS GE 130-139MM HG: ICD-10-PCS | Mod: CPTII,S$GLB,, | Performed by: INTERNAL MEDICINE

## 2020-01-30 PROCEDURE — 99999 PR PBB SHADOW E&M-EST. PATIENT-LVL III: ICD-10-PCS | Mod: PBBFAC,,, | Performed by: INTERNAL MEDICINE

## 2020-01-30 PROCEDURE — 3078F PR MOST RECENT DIASTOLIC BLOOD PRESSURE < 80 MM HG: ICD-10-PCS | Mod: CPTII,S$GLB,, | Performed by: INTERNAL MEDICINE

## 2020-01-30 PROCEDURE — 3008F PR BODY MASS INDEX (BMI) DOCUMENTED: ICD-10-PCS | Mod: CPTII,S$GLB,, | Performed by: INTERNAL MEDICINE

## 2020-01-30 RX ORDER — OLMESARTAN MEDOXOMIL 5 MG/1
5 TABLET ORAL DAILY
Qty: 30 TABLET | Refills: 8 | Status: SHIPPED | OUTPATIENT
Start: 2020-01-30 | End: 2020-02-06 | Stop reason: DRUGHIGH

## 2020-01-30 NOTE — PROGRESS NOTES
Subjective:       Patient ID: Chanel Charles is a 64 y.o.   female who presents for follow-up evaluation of Living Unrelated RT ( 4/6/2015 ) , HTN, SHPT ,       ORGAN: RIGHT KIDNEY    Donor Type: living    PHS Increased Risk: no    Cold Ischemia: 58 mins    Induction Medications: campath - alemtuzumab (anti-cd52)         Gabriel Wyman MD      HPI: Chanel Charles is a pleasant 64 -year-old -American woman with history of Living unrelated ( friend ) renal transplant in 4/6/2015, excellent allograft function on Prograf  2/1  Mg  , CellCept 500 mg twice a day. She has history of lupus nephritis, cause of end-stage kidney disease. She was on hemodialysis for about 4-1/2 years at [facility] unit [facility], has a EMANUEL AVF , Dr Roca, recent other office notes were reviewed. She follows with rheumatology Department in [place] , followed by endocrinology Department for osteopenia/osteoporosis, she also had hypercalcemia for which she received IV Reclast ( Zoledronic acid ),   recent lab results were discussed with the patient , mild proteinuria noted on the recent labs, can be due to slightly elevated blood pressure due to change in diet in the recent past, lupus serologies was negative,       Imp Info : has extensive history of recurrent pleural effusions and pericardial effusion treated with recurrent thoracentesis (x3) and pericardiocentesis.  Patient in the past had 3-4 paracentesis for accumulation of abdominal fluid ,       Past Medical History:   Diagnosis Date    Abnormal stress echo - with no blockage on LHC - 1/16/14 1/7/2014    Acid reflux     Anemia of chronic kidney failure     Anxiety     Arthritis     Awaiting kidney transplant 12/12/2013    CHF (congestive heart failure)     Coronary artery disease     Bare metal stent in 2007    ESRD (end stage renal disease)     Secondary to Lupus Nephritis, HD MWF    Hypercalcemia 7/15/2015    Hyperlipidemia  12/12/2013    Hyperparathyroidism 7/15/2015    Hyperparathyroidism, tertiary 8/7/2015    Immunocompromised state 5/5/2015    ITP (idiopathic thrombocytopenic purpura) 12/12/2013    Living-donor kidney transplant for SLE 4/6/15 4/6/2015    Induced with Thymoglobulin.     Lupus nephritis     Lupus nephritis 5/18/2015    Menopause 7/15/2015    Osteoporosis 12/24/2015    Pericardial effusion s/p pericardiocentesis 12/12/2013    Pleural effusion s/p Thoracentesis and VATS 12/12/2013    Prophylactic immunotherapy 6/29/2015    S/p Bilateral Nephrectomy (Severe proteinuria) - 2012 3/19/2015    Secondary hyperparathyroidism, renal     Shingles 4/2012    SLE (systemic lupus erythematosus) 1997       Current Outpatient Medications on File Prior to Visit   Medication Sig Dispense Refill    alprazolam (XANAX) 0.5 MG tablet Take 0.5 mg by mouth. 1 Tablet Oral Every day.  or as needed.      aspirin (ECOTRIN) 81 MG EC tablet Take 81 mg by mouth once daily.       atorvastatin (LIPITOR) 80 MG tablet Take 80 mg by mouth every evening. 1 Tablet Oral Every day      BIOTIN ORAL Take 1,000 mg by mouth Daily.       eszopiclone (LUNESTA) 2 MG Tab Take 2 mg by mouth. 1 Tablet Oral As directed.  1 tablet 1 time as needed at bedtime.      famotidine (PEPCID) 20 MG tablet Take 1 tablet (20 mg total) by mouth every evening. (Patient taking differently: Take 20 mg by mouth 2 (two) times daily as needed. ) 30 tablet 11    fluticasone (FLONASE) 50 mcg/actuation nasal spray 1 spray by Each Nare route once daily. (Patient taking differently: 1 spray by Each Nare route as needed. )  0    hydroxychloroquine (PLAQUENIL) 200 mg tablet Take 1 tablet (200 mg total) by mouth once daily. 90 tablet 2    loratadine (CLARITIN) 10 mg tablet Take 1 tablet (10 mg total) by mouth daily as needed for Allergies.  0    magnesium oxide (MAG-OX) 400 mg tablet Take 1 tablet (400 mg total) by mouth once daily. (Patient taking differently: Take  400 mg by mouth once daily. ) 30 tablet 9    multivitamin (THERAGRAN) tablet Take 1 tablet by mouth once daily.      mycophenolate (CELLCEPT) 250 mg Cap TAKE TWO CAPSULES BY MOUTH TWICE A  capsule 11    tacrolimus (PROGRAF) 1 MG Cap Take 2 capsules (2 mg) by mouth every morning and 1 capsule (1 mg )by mouth every evening 270 capsule 3    valacyclovir (VALTREX) 1000 MG tablet Take 1 tablet by mouth as needed.       vitamin D 1000 units Tab Take 2,000 Units by mouth every Monday.        No current facility-administered medications on file prior to visit.        SH : She is a retired teacher, principal. She is not a smoker or alcohol drinker. She has a son who is 32 years old. She is currently living at home.       FH : Several family members with hypertension. Lupus in a distant uncle        Review of Systems  :      Constitutional: Negative for activity change, appetite change, fatigue and fever.   HENT: Negative for congestion, facial swelling, sore throat, trouble swallowing and voice change.    Eyes: Negative for redness and visual disturbance.   Respiratory: Negative for apnea, cough, chest tightness, shortness of breath and wheezing.    Cardiovascular: Negative for chest pain, palpitations and leg swelling.   Gastrointestinal: Negative for abdominal distention, abdominal pain, blood in stool, constipation, diarrhea, nausea and vomiting.   Genitourinary: Negative for decreased urine volume, difficulty urinating, dysuria, flank pain, frequency, hematuria, pelvic pain and urgency.   Musculoskeletal: Positive for arthralgias. Negative for back pain, gait problem and joint swelling.   Skin: Negative for color change and rash.   Neurological: Negative for dizziness, syncope, weakness and headaches.   Hematological: Does not bruise/bleed easily.   Psychiatric/Behavioral: Negative for agitation, behavioral problems and confusion. The patient is not nervous/anxious.        Objective:         Vitals:     "01/30/20 0841   BP: 138/72   Pulse: 80   Resp: 20   Weight: 59.5 kg (131 lb 2.8 oz)   Height: 5' 3" (1.6 m)       Weight stable       Physical Exam   Constitutional: She is oriented to person, place, and time. She appears well-developed and well-nourished. No distress.   HENT:   Head: Normocephalic and atraumatic.   Mouth/Throat: Oropharynx is clear and moist. No oropharyngeal exudate.   Eyes: Pupils are equal, round, and reactive to light. Conjunctivae and EOM are normal. No scleral icterus.   Neck: Normal range of motion. Neck supple. No JVD present. Carotid bruit is not present. No tracheal deviation present. No thyroid mass and no thyromegaly present.   Cardiovascular: Normal rate, regular rhythm, normal heart sounds and intact distal pulses. Exam reveals no gallop and no friction rub.   No murmur heard.  Pulmonary/Chest: Effort normal and breath sounds normal. No respiratory distress. She has no wheezes. She has no rales. She exhibits no tenderness.   Abdominal: Soft. Bowel sounds are normal. She exhibits no distension, no abdominal bruit, no ascites and no mass. There is no hepatosplenomegaly. There is no tenderness. There is no rebound, no guarding and no CVA tenderness.   No allograft tenderness, RLQ   Musculoskeletal: Normal range of motion. She exhibits no edema or tenderness.   LUE AVF, good thrill    Lymphadenopathy:     She has no cervical adenopathy.   Neurological: She is alert and oriented to person, place, and time. She has normal reflexes. She displays normal reflexes. No cranial nerve deficit. She exhibits normal muscle tone. Coordination normal.   Skin: Skin is warm and intact. No rash noted. No erythema. No pallor.   Psychiatric: She has a normal mood and affect. Her behavior is normal. Judgment normal.     :        Labs:    Lab Results   Component Value Date    CREATININE 1.1 01/06/2020    BUN 28 (H) 01/06/2020     01/06/2020    K 4.8 01/06/2020     01/06/2020    CO2 23 01/06/2020 "       Lab Results   Component Value Date    WBC 5.56 01/13/2020    HGB 10.9 (L) 01/13/2020    HCT 36.4 (L) 01/13/2020    MCV 81 (L) 01/13/2020     (L) 01/13/2020         Lab Results   Component Value Date    .0 (H) 09/04/2019    CALCIUM 10.1 01/06/2020    PHOS 3.6 06/10/2019       Lab Results   Component Value Date    ALBUMIN 3.7 01/06/2020       Lab Results   Component Value Date    URICACID 6.2 (H) 09/12/2018          Prograf level 6       Impression and Plan : 64-year-old -American woman seen in office today in follow-up for following medical problems ,       1. Status post Living Unrelated kidney transplant ( 4/6/2015 ) : Stable renal function with a serum creatinine of 1 mg/dL.        Serum Prograf levels is 6  ( range 4-7 ) , Prograf  2mg in AM and 1 mg in PM ,   Cont CellCept 500 mg twice a day.     Will repeat Prograf level in a week, last level was about 6 months ago,    Has a LUE AVF , good thrill            2. Hypertension - blood pressure is slightly elevated, target blood pressure less than 130/80, discussed low-salt diet, also proteinuria noted at about 0.5 g per day, will start her on Benicar 5 mg daily, check renal panel in a week,      controlled,  she is not on any blood pressure medications, patient is asymptomatic.      3. Hyperparathyroidism - follow PTH ,  ? Tertiary  hyperpara ,  again discussed low calcium diet, followed by Endocrine       4. Anemia of chronic kidney disease - most recent hemoglobin is 11 g. will continue to monitor.        5.  Proteinuria - mild at 0.5 g per day, start Benicar 5 mg daily, lupus serology negative,      6. Lupus nephritis - currently under remission. On Plaquenil, sees Dr Jj in [place] ,       7. Lytes -  on Mag supplements ,      8. Euvolemic on exam ,       9. Leukopenia : resolved, she is following with hematologist Karsten ()        10. Hyperkalemia : improved,       11.  Thrombocytopenia : mild, monitor,       follow-up  in about 3 months.   More than 40 minutes of face-to-face time discussing labs and plan of care.       Олег Joseph MD

## 2020-01-30 NOTE — LETTER
January 30, 2020        Gabriel Wyman MD  [address] [address]             [facility]  [address] [facility] [address] [address]  Phone: [phone]  Fax: [phone]   Patient: Chanel Charles   MR Number: [id number]   YOB: 1955   Date of Visit: 1/30/2020       Dear Dr. Wyman:    Thank you for referring Chanel Charles to me for evaluation. Attached you will find relevant portions of my assessment and plan of care.    If you have questions, please do not hesitate to call me. I look forward to following Chanel Charles along with you.    Sincerely,      Олег Joseph MD            CC  No Recipients    Enclosure

## 2020-01-30 NOTE — PATIENT INSTRUCTIONS
Avoid NSAID pain medications such as advil, aleve, motrin, ibuprofen, naprosyn, meloxicam, diclofenac, mobic.       Please start Olmesartan 5 mg a day in AM

## 2020-02-04 ENCOUNTER — PATIENT MESSAGE (OUTPATIENT)
Dept: NEPHROLOGY | Facility: CLINIC | Age: 65
End: 2020-02-04

## 2020-02-06 ENCOUNTER — LAB VISIT (OUTPATIENT)
Dept: LAB | Facility: HOSPITAL | Age: 65
End: 2020-02-06
Attending: INTERNAL MEDICINE
Payer: COMMERCIAL

## 2020-02-06 DIAGNOSIS — I10 HTN (HYPERTENSION), BENIGN: Primary | ICD-10-CM

## 2020-02-06 DIAGNOSIS — Z94.0 S/P KIDNEY TRANSPLANT: ICD-10-CM

## 2020-02-06 LAB
ALBUMIN SERPL BCP-MCNC: 3.7 G/DL (ref 3.5–5.2)
ANION GAP SERPL CALC-SCNC: 7 MMOL/L (ref 8–16)
BILIRUB UR QL STRIP: NEGATIVE
BUN SERPL-MCNC: 24 MG/DL (ref 8–23)
CALCIUM SERPL-MCNC: 10.8 MG/DL (ref 8.7–10.5)
CHLORIDE SERPL-SCNC: 110 MMOL/L (ref 95–110)
CLARITY UR: CLEAR
CO2 SERPL-SCNC: 24 MMOL/L (ref 23–29)
COLOR UR: ABNORMAL
CREAT SERPL-MCNC: 1.1 MG/DL (ref 0.5–1.4)
CREAT UR-MCNC: 18 MG/DL (ref 15–325)
EST. GFR  (AFRICAN AMERICAN): >60 ML/MIN/1.73 M^2
EST. GFR  (NON AFRICAN AMERICAN): 53 ML/MIN/1.73 M^2
GLUCOSE SERPL-MCNC: 85 MG/DL (ref 70–110)
GLUCOSE UR QL STRIP: NEGATIVE
HGB UR QL STRIP: NEGATIVE
KETONES UR QL STRIP: NEGATIVE
LEUKOCYTE ESTERASE UR QL STRIP: ABNORMAL
MICROSCOPIC COMMENT: NORMAL
NITRITE UR QL STRIP: NEGATIVE
PH UR STRIP: 6 [PH] (ref 5–8)
PHOSPHATE SERPL-MCNC: 3.1 MG/DL (ref 2.7–4.5)
POTASSIUM SERPL-SCNC: 5 MMOL/L (ref 3.5–5.1)
PROT UR QL STRIP: NEGATIVE
PROT UR-MCNC: 20 MG/DL (ref 0–15)
PROT/CREAT UR: 1.11 MG/G{CREAT} (ref 0–0.2)
SODIUM SERPL-SCNC: 141 MMOL/L (ref 136–145)
SP GR UR STRIP: <=1.005 (ref 1–1.03)
URN SPEC COLLECT METH UR: ABNORMAL
WBC #/AREA URNS HPF: 0 /HPF (ref 0–5)

## 2020-02-06 PROCEDURE — 36415 COLL VENOUS BLD VENIPUNCTURE: CPT

## 2020-02-06 PROCEDURE — 80197 ASSAY OF TACROLIMUS: CPT

## 2020-02-06 PROCEDURE — 82570 ASSAY OF URINE CREATININE: CPT

## 2020-02-06 PROCEDURE — 81000 URINALYSIS NONAUTO W/SCOPE: CPT

## 2020-02-06 PROCEDURE — 80069 RENAL FUNCTION PANEL: CPT

## 2020-02-06 RX ORDER — OLMESARTAN MEDOXOMIL 5 MG/1
10 TABLET ORAL DAILY
Qty: 60 TABLET | Refills: 9 | Status: SHIPPED | OUTPATIENT
Start: 2020-02-06 | End: 2020-05-19 | Stop reason: DRUGHIGH

## 2020-02-06 NOTE — PROGRESS NOTES
Currently on Benicar 5 mg daily, will increase dose to 10 mg daily, prescription sent to pharmacy, check potassium in a week,      Good morning Dr. Joseph,     As instructed, my blood pressure for Wednesday was 143/67 and this morning was 146/65.  Evidently I missed Tuesday.  I have felt a little light headed yesterday and this morning.  I am preparing to come there for my labs.  I should be there by 8:30 a.m.     Please advise.  I don't like feeling like this.     Thank you,     Chanel Charles

## 2020-02-07 ENCOUNTER — PATIENT MESSAGE (OUTPATIENT)
Dept: NEPHROLOGY | Facility: CLINIC | Age: 65
End: 2020-02-07

## 2020-02-07 LAB — TACROLIMUS BLD-MCNC: 7.5 NG/ML (ref 5–15)

## 2020-02-19 DIAGNOSIS — Z94.0 S/P KIDNEY TRANSPLANT: ICD-10-CM

## 2020-02-20 RX ORDER — TACROLIMUS 1 MG/1
CAPSULE ORAL
Qty: 270 CAPSULE | Refills: 3 | Status: SHIPPED | OUTPATIENT
Start: 2020-02-20 | End: 2020-04-03

## 2020-03-10 DIAGNOSIS — M32.0 DRUG-INDUCED SYSTEMIC LUPUS ERYTHEMATOSUS, UNSPECIFIED ORGAN INVOLVEMENT STATUS: ICD-10-CM

## 2020-03-11 DIAGNOSIS — Z94.0 KIDNEY REPLACED BY TRANSPLANT: Primary | ICD-10-CM

## 2020-03-11 RX ORDER — HYDROXYCHLOROQUINE SULFATE 200 MG/1
TABLET, FILM COATED ORAL
Qty: 90 TABLET | Refills: 3 | Status: SHIPPED | OUTPATIENT
Start: 2020-03-11 | End: 2020-01-01 | Stop reason: SDUPTHER

## 2020-03-18 ENCOUNTER — PATIENT MESSAGE (OUTPATIENT)
Dept: OTHER | Facility: OTHER | Age: 65
End: 2020-03-18

## 2020-03-26 ENCOUNTER — DOCUMENTATION ONLY (OUTPATIENT)
Dept: REHABILITATION | Facility: HOSPITAL | Age: 65
End: 2020-03-26

## 2020-03-26 NOTE — PROGRESS NOTES
Outpatient Therapy Discharge Summary     Name: Chanel Charles  Clinic Number: [id number]  Date of Evaluation: 2019  Referring Physician: Pardeep Bishop M.D.  Diagnosis: Bilateral trigger finger MF/RF the left worse than the right  M65.342 (ICD-10-CM) - Trigger ring finger of left hand   M65.30 (ICD-10-CM) - Trigger finger of right hand, unspecified finger      PMH: kidney transplant/Lupus in remission     Referral Orders: Eval and treat 2x week for 60 days to include active/passive/resistive exercises.Patient orders  on .       Therapy Diagnosis: Hand pain    Date of Last visit: 2020  Total Visits Received: 3  Cancelled Visits: 0  No Show Visits: 0    Assessment       Short Term Goals: Patient to be IND with HEP and modalities for pain/edema managment.,Met 2019 Increase ROM 3-5 degrees to increase functional hand use for ADLs/work/leisure activities., Increase  strength 3-5 lbs. to improve functional grasp for ADLs/work/leisure activities. , Increase pinch 1-3 psi's to increase IND wiht button and FM Coordination., Decrease complaints of pain to  2 out of 10 to increase functional hand use for ADL/work/leisure activities. and Patient to be IND wiht Orthotic use, wear and care precautions. Met 2019       Discharge reason: Patient has not attended therapy since 2020    Plan   This patient is discharged from Occupational Therapy

## 2020-04-01 ENCOUNTER — LAB VISIT (OUTPATIENT)
Dept: LAB | Facility: HOSPITAL | Age: 65
End: 2020-04-01
Attending: INTERNAL MEDICINE
Payer: COMMERCIAL

## 2020-04-01 ENCOUNTER — PATIENT MESSAGE (OUTPATIENT)
Dept: TRANSPLANT | Facility: CLINIC | Age: 65
End: 2020-04-01

## 2020-04-01 DIAGNOSIS — Z94.0 KIDNEY REPLACED BY TRANSPLANT: ICD-10-CM

## 2020-04-01 LAB
ALBUMIN SERPL BCP-MCNC: 3.9 G/DL (ref 3.5–5.2)
ALBUMIN SERPL BCP-MCNC: 3.9 G/DL (ref 3.5–5.2)
ALP SERPL-CCNC: 68 U/L (ref 55–135)
ALT SERPL W/O P-5'-P-CCNC: 24 U/L (ref 10–44)
ANION GAP SERPL CALC-SCNC: 6 MMOL/L (ref 8–16)
AST SERPL-CCNC: 27 U/L (ref 10–40)
BASOPHILS # BLD AUTO: 0.04 K/UL (ref 0–0.2)
BASOPHILS NFR BLD: 0.6 % (ref 0–1.9)
BILIRUB DIRECT SERPL-MCNC: 0.4 MG/DL (ref 0.1–0.3)
BILIRUB SERPL-MCNC: 0.8 MG/DL (ref 0.1–1)
BUN SERPL-MCNC: 26 MG/DL (ref 8–23)
CALCIUM SERPL-MCNC: 10 MG/DL (ref 8.7–10.5)
CHLORIDE SERPL-SCNC: 109 MMOL/L (ref 95–110)
CO2 SERPL-SCNC: 25 MMOL/L (ref 23–29)
CREAT SERPL-MCNC: 1.1 MG/DL (ref 0.5–1.4)
DIFFERENTIAL METHOD: ABNORMAL
EOSINOPHIL # BLD AUTO: 0.3 K/UL (ref 0–0.5)
EOSINOPHIL NFR BLD: 3.7 % (ref 0–8)
ERYTHROCYTE [DISTWIDTH] IN BLOOD BY AUTOMATED COUNT: 16.9 % (ref 11.5–14.5)
EST. GFR  (AFRICAN AMERICAN): >60 ML/MIN/1.73 M^2
EST. GFR  (NON AFRICAN AMERICAN): 53.2 ML/MIN/1.73 M^2
GLUCOSE SERPL-MCNC: 82 MG/DL (ref 70–110)
HCT VFR BLD AUTO: 36 % (ref 37–48.5)
HGB BLD-MCNC: 10.9 G/DL (ref 12–16)
IMM GRANULOCYTES # BLD AUTO: 0.03 K/UL (ref 0–0.04)
IMM GRANULOCYTES NFR BLD AUTO: 0.4 % (ref 0–0.5)
LYMPHOCYTES # BLD AUTO: 2 K/UL (ref 1–4.8)
LYMPHOCYTES NFR BLD: 29.8 % (ref 18–48)
MAGNESIUM SERPL-MCNC: 1.8 MG/DL (ref 1.6–2.6)
MCH RBC QN AUTO: 24.2 PG (ref 27–31)
MCHC RBC AUTO-ENTMCNC: 30.3 G/DL (ref 32–36)
MCV RBC AUTO: 80 FL (ref 82–98)
MONOCYTES # BLD AUTO: 0.9 K/UL (ref 0.3–1)
MONOCYTES NFR BLD: 12.9 % (ref 4–15)
NEUTROPHILS # BLD AUTO: 3.6 K/UL (ref 1.8–7.7)
NEUTROPHILS NFR BLD: 52.6 % (ref 38–73)
NRBC BLD-RTO: 0 /100 WBC
PHOSPHATE SERPL-MCNC: 3.4 MG/DL (ref 2.7–4.5)
PLATELET # BLD AUTO: 122 K/UL (ref 150–350)
PMV BLD AUTO: ABNORMAL FL (ref 9.2–12.9)
POTASSIUM SERPL-SCNC: 5.1 MMOL/L (ref 3.5–5.1)
PROT SERPL-MCNC: 6.9 G/DL (ref 6–8.4)
RBC # BLD AUTO: 4.51 M/UL (ref 4–5.4)
SODIUM SERPL-SCNC: 140 MMOL/L (ref 136–145)
WBC # BLD AUTO: 6.77 K/UL (ref 3.9–12.7)

## 2020-04-01 PROCEDURE — 86352 CELL FUNCTION ASSAY W/STIM: CPT

## 2020-04-01 PROCEDURE — 80197 ASSAY OF TACROLIMUS: CPT

## 2020-04-01 PROCEDURE — 85025 COMPLETE CBC W/AUTO DIFF WBC: CPT

## 2020-04-01 PROCEDURE — 83735 ASSAY OF MAGNESIUM: CPT

## 2020-04-01 PROCEDURE — 84075 ASSAY ALKALINE PHOSPHATASE: CPT

## 2020-04-01 PROCEDURE — 80069 RENAL FUNCTION PANEL: CPT

## 2020-04-01 PROCEDURE — 87799 DETECT AGENT NOS DNA QUANT: CPT

## 2020-04-02 ENCOUNTER — TELEPHONE (OUTPATIENT)
Dept: TRANSPLANT | Facility: CLINIC | Age: 65
End: 2020-04-02

## 2020-04-02 LAB — TACROLIMUS BLD-MCNC: 8.3 NG/ML (ref 5–15)

## 2020-04-02 NOTE — TELEPHONE ENCOUNTER
Spoke to pt, reviewed labs and below instructions, pt confirms that she started decreased dose this morning.  Confirmed video visit 4/8/20 and will repeat fk level on 5/1/20.    Notes recorded by Bridget Chowdhury MD on 4/2/2020 at 9:35 AM CDT  The following message sent to patient via [facility]:  Since you are 4 years post transplant, I recommend lowering tacrolimus from 02/01 down to 1 mg twice daily.  Repeat tacrolimus level in about 1 month.

## 2020-04-03 DIAGNOSIS — Z94.0 S/P KIDNEY TRANSPLANT: ICD-10-CM

## 2020-04-03 LAB — IMMUNKNOW (STIMULATED): 293 NG/ML (ref 226–524)

## 2020-04-03 RX ORDER — TACROLIMUS 1 MG/1
CAPSULE ORAL
Qty: 180 CAPSULE | Refills: 3 | Status: SHIPPED | OUTPATIENT
Start: 2020-04-03 | End: 2021-01-01 | Stop reason: SDUPTHER

## 2020-04-07 ENCOUNTER — TELEPHONE (OUTPATIENT)
Dept: TRANSPLANT | Facility: CLINIC | Age: 65
End: 2020-04-07

## 2020-04-07 NOTE — TELEPHONE ENCOUNTER
Pt called to check status of readiness for video visit.. Pt is walked through and all prepared for visit and given instructions to sign in 15 minutes prior.

## 2020-04-07 NOTE — PROGRESS NOTES
Post-Transplant Assessment    Referring Physician: Shukri Reeves  Current Nephrologist: Олег Joseph    ORGAN: RIGHT KIDNEY  Donor Type: living  PHS Increased Risk: no  Cold Ischemia: 58 mins  Induction Medications: campath - alemtuzumab (anti-cd52)    Subjective:   The patient location is: Home  The chief complaint leading to consultation is: assessment of renal function and immunosuppression  Visit type: Virtual visit with synchronous audio and video  Total time spent with patient: 26 min  Each patient to whom he or she provides medical services by telemedicine is:  (1) informed of the relationship between the physician and patient and the respective role of any other health care provider with respect to management of the patient; and (2) notified that he or she may decline to receive medical services by telemedicine and may withdraw from such care at any time.    Notes below:    CC:  Reassessment of renal allograft function and management of chronic immunosuppression.    HPI:  Ms. Charles is a 64 y.o. year old Black or  female who received a living kidney transplant on 4/6/15.  She has CKD stage 2 - GFR 60-89 and her baseline creatinine is between 1.0-1.1. She takes mycophenolate mofetil and tacrolimus for maintenance immunosuppression.      Pertinent History:  -ESRD from lupus nephritis and required RRT 11/2010.   -Living donor transplant surgery 4/6/15 was was induced with alemtuzumab.   -h/o  ITP and was on Nplate [id number] pre transplant, after receiving rituxan x 4 doses.  Not taking Nplate since transplant  -h/o hypercalcemia and osteopenia/osteoporosis, followed by Endocrinology.  Status post IV Reclast [zolendronic acid]  -h/o CAD [BMS 2007] and CHF and follows with cardiology.  -recurrent pleural and pericardial effusions requiring multiple thoracentess & pericardiocentesis. VATS procedure 3/2011 and bilateral nephrectomies for/2011    Update for 04/08/2020  Feels well and no new  concerns.  H/o plantar fasciitis, feels better. Doing flexing exercises  Anxious abt people she knows dying, having occ nighmares  Denies fevr, CP, SOB  Denies GI sx  Reports no pain over allograft or LUTS  Denies edema  Last tacro elevated, and dose lowered 2/1 to 1/1    Review of Systems - as above    Objective:   There were no vitals taken for this visit.body mass index is unknown because there is no height or weight on file.    Physical Exam  A&O x3 in NAD, breathing unlabored. Judgement normal.    Labs:  Recent Labs   Lab 09/12/18  1018  01/22/19  0833  04/02/19  0839  06/10/19  0854 09/04/19  0823  01/06/20  0834 01/13/20  1107 01/22/20  1240 02/06/20  0900 02/06/20  0916 04/01/20  0811 04/01/20  0819   WBC 4.70   < > 5.23   < > 5.03  --  6.16 5.69  --   --  5.56  --   --   --   --  6.77   Hemoglobin 10.5 L   < > 10.6 L   < > 10.4 L  --  11.1 L 11.0 L  --   --  10.9 L  --   --   --   --  10.9 L   Hematocrit 33.9 L   < > 33.8 L   < > 33.4 L  --  36.0 L 36.2 L  --   --  36.4 L  --   --   --   --  36.0 L   Sodium  --    < > 135 L  135 L   < > 136  --  137 136  136  --  139  --   --  141  --   --  140   Potassium  --    < > 4.8  4.8   < > 5.0  --  5.0 4.8  4.8  --  4.8  --   --  5.0  --   --  5.1   Chloride  --    < > 105  105   < > 106  --  107 106  106  --  109  --   --  110  --   --  109   CO2  --    < > 25  25   < > 22 L  --  22 L 23  23  --  23  --   --  24  --   --  25   BUN, Bld  --    < > 20  20   < > 22  --  28 H 25 H  25 H  --  28 H  --   --  24 H  --   --  26 H   Creatinine  --    < > 1.0  1.0   < > 1.1  --  1.1 1.1  1.1  --  1.1  --   --  1.1  --   --  1.1   eGFR if non African American  --    < > >60.0  >60.0   < > 53.6 A  --  53.2 A 53.2 A  53.2 A  --  53.2 A  --   --  53 A  --   --  53.2 A   Calcium  --    < > 10.4  10.4   < > 10.1  --  10.5 10.4  10.4  --  10.1  --   --  10.8 H  --   --  10.0   Phosphorus  --   --  3.2  --  3.4  --  3.6  --   --   --   --   --  3.1  --   --  3.4    Magnesium 1.8  1.8  --  1.7  --  1.8  --  1.7  --   --   --   --   --   --   --   --  1.8   Albumin  --    < > 3.9   < > 3.9  3.9  --  3.9 4.2  --  3.7  --   --  3.7  --   --  3.9  3.9   AST  --    < >  --    < > 27  --   --  28  --  30  --   --   --   --   --  27   ALT  --    < >  --    < > 20  --   --  20  --  25  --   --   --   --   --  24   Prot/Creat Ratio, Ur  --    < >  --    < >  --    < >  --   --    < >  --   --  0.72 H  --  1.11 H 0.80 H  --    PTH, Intact 118.0 H  --  121.0 H  --   --   --   --  137.0 H  --   --   --   --   --   --   --   --    Tacrolimus Lvl 5.4  --  3.8 L   < > 6.3  --  6.0  --   --   --   --   --  7.5  --   --  8.3    < > = values in this interval not displayed.   Labs were reviewed with the patient.    Assessment:     1. Chronic kidney disease (CKD), stage II (mild)    2. Living-donor kidney transplant recipient    3. Immunosuppressive management encounter following kidney transplant    4. Proteinuria, unspecified type    5. Renal hypertension        Plan:     Allograft function assessment - CKD II, doing very well 5 yrs post txp  Recent Labs   Lab 01/06/20  0834 02/06/20  0900 04/01/20  0819   Creatinine 1.1 1.1 1.1   eGFR if non  53.2 A 53 A 53.2 A   eGFR if African American >60.0 >60 >60.0      Encounter for Monitoring Immunosuppression post Transplant  Recent Labs   Lab 06/10/19  0854 02/06/20  0900 04/01/20  0819   Tacrolimus Lvl 6.0 7.5 8.3   -Target level for Chanel is 4-6. Level is supratherapeutic on 1/1.    Repeat tacrolimus level in 1 month after start lower dose.  -Recheck as per guidelines.  -Monitor for side effects and toxicities, given narrow therapeutic window and significant risk of AE     Evaluation for bone marrow suppression (r/t immunosuppression toxicity or infection)  -Anemia of chronic disease noted.  H/H essentially unchanged.  Does not meet criteria for erythropoietin therapy.  Add iron studies to next labs given last Fe test on record  was 2015.  -Longstanding thrombocytopenia, stable.  - overall counts are acceptable.  Will simply continue to monitor.  Lab Results   Component Value Date    WBC 6.77 04/01/2020    HGB 10.9 (L) 04/01/2020    HCT 36.0 (L) 04/01/2020     (L) 04/01/2020     Renal hypertension  -Well controlled    Metabolic bone disease [Secondary hyperparathyroidism, Phosphorus metabolism disorders] - counts acceptable. Will defer management to Dr. Joseph, if needed in future  Recent Labs   Lab 09/12/18  1018  01/22/19  0833  04/02/19  0839 06/10/19  0854 09/04/19  0823 01/06/20  0834 02/06/20  0900 04/01/20  0819   Albumin  --    < > 3.9   < > 3.9  3.9 3.9 4.2 3.7 3.7 3.9  3.9   Calcium  --    < > 10.4  10.4   < > 10.1 10.5 10.4  10.4 10.1 10.8 H 10.0   Phosphorus  --   --  3.2  --  3.4 3.6  --   --  3.1 3.4   Magnesium 1.8  1.8  --  1.7  --  1.8 1.7  --   --   --  1.8   PTH, Intact 118.0 H  --  121.0 H  --   --   --  137.0 H  --   --   --     < > = values in this interval not displayed.      Assessment of electrolytes  -all electrolytes acceptable.  Lab Results   Component Value Date     04/01/2020    K 5.1 04/01/2020     04/01/2020    CO2 25 04/01/2020    MG 1.8 04/01/2020     Screening for BK infection to prevent allograft dysfunction  Lab Results   Component Value Date    BKVIRUSDNAUR Not detected 01/13/2016    BKQUANTURINE <250 01/13/2016    BKVIRUSLOG <2.40 01/13/2016    BKVIRUSPCRQB <125 04/01/2020   -updated BK screen this month shows virus undetected, as desired.  -Continue blood PCR screening as per program guidelines to prevent BK viremia and nephropathy leading to allograft dysfunction and potential graft failure    Proteinuria & urinary screens  Recent Labs   Lab 02/06/20  0916 04/01/20  0811   Protein, UA Negative Trace A   Glucose, UA Negative Negative   Occult Blood UA Negative Trace A   Leukocytes, UA Trace A Negative   Prot/Creat Ratio, Ur 1.11 H 0.80 H   -some proteinuria detected  previously, better today since Benicar started.  I have emphasized importance of following blood pressures and with her nephrologist to monitor this between our visits.    Follow-up:   Annual visit with transplant with routine transplant labs as per written guidelines.  Will also follow with her nephrologist. Management of kidney disease and related issues (HTN, anemia, SPTH, metabolic bone disease) should continue by community nephrologist.     MD ELOY Carranza Patient Status  Functional Status: 90% - Able to carry on normal activity: minor symptoms of disease  Physical Capacity: No Limitations

## 2020-04-08 ENCOUNTER — OFFICE VISIT (OUTPATIENT)
Dept: TRANSPLANT | Facility: CLINIC | Age: 65
End: 2020-04-08
Payer: COMMERCIAL

## 2020-04-08 DIAGNOSIS — I12.9 RENAL HYPERTENSION: ICD-10-CM

## 2020-04-08 DIAGNOSIS — R80.9 PROTEINURIA, UNSPECIFIED TYPE: ICD-10-CM

## 2020-04-08 DIAGNOSIS — Z79.899 IMMUNOSUPPRESSIVE MANAGEMENT ENCOUNTER FOLLOWING KIDNEY TRANSPLANT: ICD-10-CM

## 2020-04-08 DIAGNOSIS — Z94.0 IMMUNOSUPPRESSIVE MANAGEMENT ENCOUNTER FOLLOWING KIDNEY TRANSPLANT: ICD-10-CM

## 2020-04-08 DIAGNOSIS — Z94.0 LIVING-DONOR KIDNEY TRANSPLANT RECIPIENT: ICD-10-CM

## 2020-04-08 DIAGNOSIS — N18.2 CHRONIC KIDNEY DISEASE (CKD), STAGE II (MILD): Primary | ICD-10-CM

## 2020-04-08 PROCEDURE — 99215 OFFICE O/P EST HI 40 MIN: CPT | Mod: 95,,, | Performed by: INTERNAL MEDICINE

## 2020-04-08 PROCEDURE — 99215 PR OFFICE/OUTPT VISIT, EST, LEVL V, 40-54 MIN: ICD-10-PCS | Mod: 95,,, | Performed by: INTERNAL MEDICINE

## 2020-04-08 NOTE — PATIENT INSTRUCTIONS
Happy 5th yr anniversary! I expect you will have many more!    -For your health, continue following with your general nephrologist, PCP, GYN for routine/preventative care.  -I plan to renew your immunosuppressant and transplant related medications at your annual visit, and will defer the rest to your other providers.  -Keep up the great work!  -Remember to keep transplant posted about medication changes or new developments in your health.    -Don't forget we have pharmacist, dietician and social workers that are able to help you, at your request.    -You may try over these over the counter remedies that are considered safe for your transplant:  Fever or pain - Tylenol (acetaminophen). We do not recommend NSAIDS such as Motrin, Advil, Aleve,naprosen, BC powders, etc. If in doubt, please check!  Cough Suppressant - Dextromethorphan Hydrobromide 30 mg or cough drops ([place] or equivalent generic)  Nasal congestion - Saline nasal spray or Afrin nasal spray (Afrin is oxymetazoline, but should ONLY USE FOR 3 DAYS). Note tablet form decongestants (Sudafed, phenylephrine) can raise blood pressure and are not recommended  Allergy symptoms - Antihistamine (Benadryl, Claritin, Zyrtec). These can also help dry up drainage.  For sore throat -  salt water gargles, Chloraseptic spray or sore throat lozenges   For thick secretions (thick mucous) - Guaifenesin (Mucinex), saline nasal spray     -Don't forget we recommend yearly influenza vaccine. It does not protect you against all infections, but helps protect transplant patients from influenza related serious illness or death!     -As always, feel free to ask any questions and raise any concerns regarding your health care.    Warmest Wishes,  Dr. Ita Chowdhury and your entire transplant team

## 2020-04-08 NOTE — LETTER
April 8, 2020        Олег Joseph  [address] [facility]  [place] [address]  Phone: [phone]  Fax: [phone]             [facility]  [address]  [facility] [address]  Phone: [phone]   Patient: Chanel Charles   MR Number: [id number]   YOB: 1955   Date of Visit: 4/8/2020       Dear Dr. Олег Joseph    Thank you for referring Chanel Charles to me for evaluation. Attached you will find relevant portions of my assessment and plan of care.    If you have questions, please do not hesitate to call me. I look forward to following Chanel Charles along with you.    Sincerely,    Bridget Chowdhury MD    Enclosure    If you would like to receive this communication electronically, please contact [email or web address] or [phone] to request Your[employer] Link access.    Your[employer] Link is a tool which provides read-only access to select patient information with whom you have a relationship. Its easy to use and provides real time access to review your patients record including encounter summaries, notes, results, and demographic information.    If you feel you have received this communication in error or would no longer like to receive these types of communications, please e-mail [email or web address]

## 2020-04-15 DIAGNOSIS — D50.8 OTHER IRON DEFICIENCY ANEMIA: ICD-10-CM

## 2020-04-15 DIAGNOSIS — Z94.0 KIDNEY REPLACED BY TRANSPLANT: Primary | ICD-10-CM

## 2020-05-01 ENCOUNTER — LAB VISIT (OUTPATIENT)
Dept: LAB | Facility: HOSPITAL | Age: 65
End: 2020-05-01
Attending: INTERNAL MEDICINE
Payer: COMMERCIAL

## 2020-05-01 DIAGNOSIS — Z94.0 S/P KIDNEY TRANSPLANT: ICD-10-CM

## 2020-05-01 DIAGNOSIS — Z94.0 KIDNEY REPLACED BY TRANSPLANT: ICD-10-CM

## 2020-05-01 DIAGNOSIS — D50.8 OTHER IRON DEFICIENCY ANEMIA: ICD-10-CM

## 2020-05-01 LAB
FERRITIN SERPL-MCNC: 578 NG/ML (ref 20–300)
IRON SERPL-MCNC: 122 UG/DL (ref 30–160)
SATURATED IRON: 38 % (ref 20–50)
TOTAL IRON BINDING CAPACITY: 324 UG/DL (ref 250–450)
TRANSFERRIN SERPL-MCNC: 219 MG/DL (ref 200–375)

## 2020-05-01 PROCEDURE — 83540 ASSAY OF IRON: CPT

## 2020-05-01 PROCEDURE — 80197 ASSAY OF TACROLIMUS: CPT

## 2020-05-01 PROCEDURE — 82728 ASSAY OF FERRITIN: CPT

## 2020-05-01 PROCEDURE — 36415 COLL VENOUS BLD VENIPUNCTURE: CPT

## 2020-05-02 LAB — TACROLIMUS BLD-MCNC: 5.4 NG/ML (ref 5–15)

## 2020-05-13 ENCOUNTER — LAB VISIT (OUTPATIENT)
Dept: LAB | Facility: HOSPITAL | Age: 65
End: 2020-05-13
Attending: INTERNAL MEDICINE
Payer: COMMERCIAL

## 2020-05-13 DIAGNOSIS — Z94.0 S/P KIDNEY TRANSPLANT: ICD-10-CM

## 2020-05-13 LAB
ALBUMIN SERPL BCP-MCNC: 3.6 G/DL (ref 3.5–5.2)
ANION GAP SERPL CALC-SCNC: 8 MMOL/L (ref 8–16)
BASOPHILS # BLD AUTO: 0.04 K/UL (ref 0–0.2)
BASOPHILS NFR BLD: 0.8 % (ref 0–1.9)
BUN SERPL-MCNC: 26 MG/DL (ref 8–23)
CALCIUM SERPL-MCNC: 9.8 MG/DL (ref 8.7–10.5)
CHLORIDE SERPL-SCNC: 108 MMOL/L (ref 95–110)
CO2 SERPL-SCNC: 23 MMOL/L (ref 23–29)
CREAT SERPL-MCNC: 1.1 MG/DL (ref 0.5–1.4)
DIFFERENTIAL METHOD: ABNORMAL
EOSINOPHIL # BLD AUTO: 0.2 K/UL (ref 0–0.5)
EOSINOPHIL NFR BLD: 4.1 % (ref 0–8)
ERYTHROCYTE [DISTWIDTH] IN BLOOD BY AUTOMATED COUNT: 17.1 % (ref 11.5–14.5)
EST. GFR  (AFRICAN AMERICAN): >60 ML/MIN/1.73 M^2
EST. GFR  (NON AFRICAN AMERICAN): 53 ML/MIN/1.73 M^2
GLUCOSE SERPL-MCNC: 82 MG/DL (ref 70–110)
HCT VFR BLD AUTO: 33.2 % (ref 37–48.5)
HGB BLD-MCNC: 10.4 G/DL (ref 12–16)
IMM GRANULOCYTES # BLD AUTO: 0.01 K/UL (ref 0–0.04)
IMM GRANULOCYTES NFR BLD AUTO: 0.2 % (ref 0–0.5)
LYMPHOCYTES # BLD AUTO: 1.9 K/UL (ref 1–4.8)
LYMPHOCYTES NFR BLD: 38 % (ref 18–48)
MAGNESIUM SERPL-MCNC: 1.6 MG/DL (ref 1.6–2.6)
MCH RBC QN AUTO: 24.3 PG (ref 27–31)
MCHC RBC AUTO-ENTMCNC: 31.3 G/DL (ref 32–36)
MCV RBC AUTO: 78 FL (ref 82–98)
MONOCYTES # BLD AUTO: 0.6 K/UL (ref 0.3–1)
MONOCYTES NFR BLD: 13.1 % (ref 4–15)
NEUTROPHILS # BLD AUTO: 2.1 K/UL (ref 1.8–7.7)
NEUTROPHILS NFR BLD: 44 % (ref 38–73)
NRBC BLD-RTO: 0 /100 WBC
PHOSPHATE SERPL-MCNC: 3.6 MG/DL (ref 2.7–4.5)
PLATELET # BLD AUTO: 152 K/UL (ref 150–350)
PMV BLD AUTO: 12.3 FL (ref 9.2–12.9)
POTASSIUM SERPL-SCNC: 4.9 MMOL/L (ref 3.5–5.1)
RBC # BLD AUTO: 4.28 M/UL (ref 4–5.4)
SODIUM SERPL-SCNC: 139 MMOL/L (ref 136–145)
WBC # BLD AUTO: 4.87 K/UL (ref 3.9–12.7)

## 2020-05-13 PROCEDURE — 85025 COMPLETE CBC W/AUTO DIFF WBC: CPT

## 2020-05-13 PROCEDURE — 80197 ASSAY OF TACROLIMUS: CPT

## 2020-05-13 PROCEDURE — 83735 ASSAY OF MAGNESIUM: CPT

## 2020-05-13 PROCEDURE — 36415 COLL VENOUS BLD VENIPUNCTURE: CPT

## 2020-05-13 PROCEDURE — 80069 RENAL FUNCTION PANEL: CPT

## 2020-05-14 LAB — TACROLIMUS BLD-MCNC: 4 NG/ML (ref 5–15)

## 2020-05-15 DIAGNOSIS — Z94.0 S/P KIDNEY TRANSPLANT: ICD-10-CM

## 2020-05-15 RX ORDER — MYCOPHENOLATE MOFETIL 250 MG/1
CAPSULE ORAL
Qty: 120 CAPSULE | Refills: 11 | Status: SHIPPED | OUTPATIENT
Start: 2020-05-15 | End: 2021-01-01 | Stop reason: SDUPTHER

## 2020-05-17 ENCOUNTER — PATIENT MESSAGE (OUTPATIENT)
Dept: RHEUMATOLOGY | Facility: CLINIC | Age: 65
End: 2020-05-17

## 2020-05-19 ENCOUNTER — OFFICE VISIT (OUTPATIENT)
Dept: NEPHROLOGY | Facility: CLINIC | Age: 65
End: 2020-05-19
Payer: COMMERCIAL

## 2020-05-19 ENCOUNTER — PATIENT MESSAGE (OUTPATIENT)
Dept: RHEUMATOLOGY | Facility: CLINIC | Age: 65
End: 2020-05-19

## 2020-05-19 VITALS
WEIGHT: 133.81 LBS | HEIGHT: 63 IN | DIASTOLIC BLOOD PRESSURE: 78 MMHG | SYSTOLIC BLOOD PRESSURE: 136 MMHG | RESPIRATION RATE: 20 BRPM | HEART RATE: 60 BPM | BODY MASS INDEX: 23.71 KG/M2

## 2020-05-19 DIAGNOSIS — E21.0 HYPERPARATHYROIDISM, PRIMARY: ICD-10-CM

## 2020-05-19 DIAGNOSIS — N06.0 ISOLATED PROTEINURIA WITH MINOR GLOMERULAR ABNORMALITY: ICD-10-CM

## 2020-05-19 DIAGNOSIS — N18.2 ANEMIA OF CHRONIC RENAL FAILURE, STAGE 2 (MILD): ICD-10-CM

## 2020-05-19 DIAGNOSIS — Z94.0 S/P KIDNEY TRANSPLANT: Primary | ICD-10-CM

## 2020-05-19 DIAGNOSIS — D63.1 ANEMIA OF CHRONIC RENAL FAILURE, STAGE 2 (MILD): ICD-10-CM

## 2020-05-19 PROCEDURE — 3008F PR BODY MASS INDEX (BMI) DOCUMENTED: ICD-10-PCS | Mod: CPTII,S$GLB,, | Performed by: INTERNAL MEDICINE

## 2020-05-19 PROCEDURE — 99214 OFFICE O/P EST MOD 30 MIN: CPT | Mod: S$GLB,,, | Performed by: INTERNAL MEDICINE

## 2020-05-19 PROCEDURE — 99214 PR OFFICE/OUTPT VISIT, EST, LEVL IV, 30-39 MIN: ICD-10-PCS | Mod: S$GLB,,, | Performed by: INTERNAL MEDICINE

## 2020-05-19 PROCEDURE — 1101F PR PT FALLS ASSESS DOC 0-1 FALLS W/OUT INJ PAST YR: ICD-10-PCS | Mod: CPTII,S$GLB,, | Performed by: INTERNAL MEDICINE

## 2020-05-19 PROCEDURE — 99999 PR PBB SHADOW E&M-EST. PATIENT-LVL III: CPT | Mod: PBBFAC,,, | Performed by: INTERNAL MEDICINE

## 2020-05-19 PROCEDURE — 1101F PT FALLS ASSESS-DOCD LE1/YR: CPT | Mod: CPTII,S$GLB,, | Performed by: INTERNAL MEDICINE

## 2020-05-19 PROCEDURE — 3078F PR MOST RECENT DIASTOLIC BLOOD PRESSURE < 80 MM HG: ICD-10-PCS | Mod: CPTII,S$GLB,, | Performed by: INTERNAL MEDICINE

## 2020-05-19 PROCEDURE — 3075F SYST BP GE 130 - 139MM HG: CPT | Mod: CPTII,S$GLB,, | Performed by: INTERNAL MEDICINE

## 2020-05-19 PROCEDURE — 99999 PR PBB SHADOW E&M-EST. PATIENT-LVL III: ICD-10-PCS | Mod: PBBFAC,,, | Performed by: INTERNAL MEDICINE

## 2020-05-19 PROCEDURE — 3008F BODY MASS INDEX DOCD: CPT | Mod: CPTII,S$GLB,, | Performed by: INTERNAL MEDICINE

## 2020-05-19 PROCEDURE — 3075F PR MOST RECENT SYSTOLIC BLOOD PRESS GE 130-139MM HG: ICD-10-PCS | Mod: CPTII,S$GLB,, | Performed by: INTERNAL MEDICINE

## 2020-05-19 PROCEDURE — 3078F DIAST BP <80 MM HG: CPT | Mod: CPTII,S$GLB,, | Performed by: INTERNAL MEDICINE

## 2020-05-19 RX ORDER — OLMESARTAN MEDOXOMIL 5 MG/1
10 TABLET ORAL DAILY
Qty: 180 TABLET | Refills: 3
Start: 2020-05-19 | End: 2020-01-01 | Stop reason: SDUPTHER

## 2020-05-19 NOTE — PATIENT INSTRUCTIONS
Avoid NSAID pain medications such as advil, aleve, motrin, ibuprofen, naprosyn, meloxicam, diclofenac, mobic.     Low salt diet

## 2020-05-19 NOTE — PROGRESS NOTES
Subjective:       Patient ID: Chanel Charles is a 65 y.o.   female who presents for follow-up evaluation of Living Unrelated RT ( 4/6/2015 ) , HTN, SHPT ,       ORGAN: RIGHT KIDNEY    Donor Type: living    PHS Increased Risk: no    Cold Ischemia: 58 mins    Induction Medications: campath - alemtuzumab (anti-cd52)         Gabriel Wyman MD         HPI :  Chanel Charles is a pleasant 65 -year-old -American woman with history of Living unrelated ( friend ) renal transplant in 4/6/2015, excellent allograft function on Prograf  1 mg bid,  CellCept 500 mg twice a day. She has history of lupus nephritis, cause of end-stage kidney disease. She was on hemodialysis for about 4-1/2 years at [facility] HD unit [facility], has a EMANUEL AVF , Dr Roca, recent other office notes were reviewed. She follows with rheumatology Department in [place] , followed by endocrinology Department for osteopenia/osteoporosis, she also had hypercalcemia for which she received IV Reclast ( Zoledronic acid ),   recent lab results were discussed with the patient , mild proteinuria noted on the recent labs, can be due to hypertension, chronic allograft nephropathy, started Benicar,         Imp Info : has extensive history of recurrent pleural effusions and pericardial effusion treated with recurrent thoracentesis (x3) and pericardiocentesis.  Patient in the past had 3-4 paracentesis for accumulation of abdominal fluid ,           Past Medical History:   Diagnosis Date    Abnormal stress echo - with no blockage on LHC - 1/16/14 1/7/2014    Acid reflux     Anemia of chronic kidney failure     Anxiety     Arthritis     Awaiting kidney transplant 12/12/2013    CHF (congestive heart failure)     Coronary artery disease     Bare metal stent in 2007    ESRD (end stage renal disease)     Secondary to Lupus Nephritis, HD MWF    Hypercalcemia 7/15/2015    Hyperlipidemia 12/12/2013    Hyperparathyroidism  7/15/2015    Hyperparathyroidism, tertiary 8/7/2015    Immunocompromised state 5/5/2015    ITP (idiopathic thrombocytopenic purpura) 12/12/2013    Living-donor kidney transplant for SLE 4/6/15 4/6/2015    Induced with Thymoglobulin.     Lupus nephritis     Lupus nephritis 5/18/2015    Menopause 7/15/2015    Osteoporosis 12/24/2015    Pericardial effusion s/p pericardiocentesis 12/12/2013    Pleural effusion s/p Thoracentesis and VATS 12/12/2013    Prophylactic immunotherapy 6/29/2015    S/p Bilateral Nephrectomy (Severe proteinuria) - 2012 3/19/2015    Secondary hyperparathyroidism, renal     Shingles 4/2012    SLE (systemic lupus erythematosus) 1997       Current Outpatient Medications on File Prior to Visit   Medication Sig Dispense Refill    alprazolam (XANAX) 0.5 MG tablet Take 0.5 mg by mouth. 1 Tablet Oral Every day.  or as needed.      aspirin (ECOTRIN) 81 MG EC tablet Take 81 mg by mouth once daily.       atorvastatin (LIPITOR) 80 MG tablet Take 80 mg by mouth every evening. 1 Tablet Oral Every day      BIOTIN ORAL Take 1,000 mg by mouth Daily.       eszopiclone (LUNESTA) 2 MG Tab Take 2 mg by mouth. 1 Tablet Oral As directed.  1 tablet 1 time as needed at bedtime.      famotidine (PEPCID) 20 MG tablet Take 1 tablet (20 mg total) by mouth every evening. 30 tablet 11    fluticasone (FLONASE) 50 mcg/actuation nasal spray 1 spray by Each Nare route once daily. (Patient taking differently: 1 spray by Each Nare route as needed. )  0    hydroxychloroquine (PLAQUENIL) 200 mg tablet TAKE 1 TABLET DAILY 90 tablet 3    loratadine (CLARITIN) 10 mg tablet Take 1 tablet (10 mg total) by mouth daily as needed for Allergies.  0    magnesium oxide (MAG-OX) 400 mg tablet Take 1 tablet (400 mg total) by mouth once daily. 30 tablet 9    multivitamin (THERAGRAN) tablet Take 1 tablet by mouth once daily.      mycophenolate (CELLCEPT) 250 mg Cap TAKE TWO CAPSULES BY MOUTH TWICE A  capsule 11     tacrolimus (PROGRAF) 1 MG Cap Take 1 capsules (1 mg) by mouth every morning and 1 capsule (1 mg )by mouth every evening. Z94.0 kidney transplant. 180 capsule 3    valacyclovir (VALTREX) 1000 MG tablet Take 1 tablet by mouth as needed.       vitamin D 1000 units Tab Take 2,000 Units by mouth every Monday.       [DISCONTINUED] olmesartan (BENICAR) 5 MG Tab Take 2 tablets (10 mg total) by mouth once daily. 60 tablet 9     No current facility-administered medications on file prior to visit.      SH : She is a retired teacher, principal. She is not a smoker or alcohol drinker. She has a son who is 32 years old. She is currently living at home.       FH : Several family members with hypertension. Lupus in a distant uncle        Review of Systems   Constitutional: Negative for activity change, appetite change, fatigue and fever.   HENT: Negative for congestion, facial swelling, sore throat, trouble swallowing and voice change.    Eyes: Negative for redness and visual disturbance.   Respiratory: Negative for apnea, cough, chest tightness, shortness of breath and wheezing.    Cardiovascular: Negative for chest pain, palpitations and leg swelling.   Gastrointestinal: Negative for abdominal distention, abdominal pain, blood in stool, constipation, diarrhea, nausea and vomiting.   Genitourinary: Negative for decreased urine volume, difficulty urinating, dysuria, flank pain, frequency, hematuria, pelvic pain and urgency.   Musculoskeletal: Negative for back pain, gait problem and joint swelling.   Skin: Negative for color change and rash.   Neurological: Negative for dizziness, syncope, weakness and headaches.   Hematological: Does not bruise/bleed easily.   Psychiatric/Behavioral: Negative for agitation, behavioral problems and confusion. The patient is not nervous/anxious.      :            Objective:         Vitals:    05/19/20 0817   BP: 136/78   Pulse: 60   Resp: 20       Weight 133 lbs , stable     Physical Exam   :    Constitutional: She is oriented to person, place, and time. She appears well-developed and well-nourished. No distress.   HENT: Head: Normocephalic and atraumatic. Mouth/Throat: Oropharynx is clear and moist. No oropharyngeal exudate.   Eyes: Pupils are equal, round, and reactive to light. Conjunctivae and EOM are normal. No scleral icterus.   Neck: Normal range of motion. Neck supple. No JVD present. Carotid bruit is not present. No tracheal deviation present. No thyroid mass and no thyromegaly present.   Cardiovascular: Normal rate, regular rhythm, normal heart sounds and intact distal pulses. Exam reveals no gallop and no friction rub.   No murmur heard.  Pulmonary/Chest: Effort normal and breath sounds normal. No respiratory distress. She has no wheezes. She has no rales. She exhibits no tenderness.   Abdominal: Soft. Bowel sounds are normal. She exhibits no distension, no abdominal bruit, no ascites and no mass. There is no hepatosplenomegaly. There is no tenderness. There is no rebound, no guarding and no CVA tenderness.   No allograft tenderness, RLQ   Musculoskeletal: Normal range of motion. She exhibits no edema or tenderness.   LUE AVF, good thrill    Lymphadenopathy:   She has no cervical adenopathy.   Neurological: She is alert and oriented to person, place, and time. She has normal reflexes. She displays normal reflexes. No cranial nerve deficit. She exhibits normal muscle tone. Coordination normal.   Skin: Skin is warm and intact. No rash noted. No erythema. No pallor.   Psychiatric: She has a normal mood and affect. Her behavior is normal. Judgment normal.       Labs:    Lab Results   Component Value Date    CREATININE 1.1 05/13/2020    BUN 26 (H) 05/13/2020     05/13/2020    K 4.9 05/13/2020     05/13/2020    CO2 23 05/13/2020       Lab Results   Component Value Date    WBC 4.87 05/13/2020    HGB 10.4 (L) 05/13/2020    HCT 33.2 (L) 05/13/2020    MCV 78 (L) 05/13/2020      05/13/2020         Lab Results   Component Value Date    .0 (H) 09/04/2019    CALCIUM 9.8 05/13/2020    PHOS 3.6 05/13/2020       Lab Results   Component Value Date    ALBUMIN 3.6 05/13/2020       Prograf level 4       Impression and Plan : 64-year-old -American woman seen in office today in follow-up for following medical problems ,       1. Status post Living Unrelated kidney transplant ( 4/6/2015 ) : Stable renal function with a serum creatinine of 1 mg/dL.        Serum Prograf levels is 4  ( range 4-7 ) , on Prograf  1 mg bid, recheck level in a month  ,   Cont CellCept 500 mg twice a day.      Has a LUE AVF , good thrill , recent BK virus screen was negative         2. Hypertension - blood pressure is improved, discussed low-salt diet, continue Benicar, will try to enroll in digital blood pressure monitoring program,      3. Hyperparathyroidism - follow PTH ,  ? Tertiary  hyperpara ,  again discussed low calcium diet, followed by Endocrine       4. Anemia of chronic kidney disease - most recent hemoglobin is 11 g. will continue to monitor.        5.  Proteinuria - mild at 0.5 g per day, start Benicar 5 mg daily, lupus serology negative,      6. Lupus nephritis - currently under remission. On Plaquenil, sees Dr Jj in [place] ,       7. Lytes -  on Mag supplements ,      8. Euvolemic on exam ,       9. Leukopenia : resolved, she is following with hematologist Karsten ()        10. Hyperkalemia : improved,       11.  Thrombocytopenia : better       follow-up in about 4 months.   More than 30 minutes of face-to-face time discussing labs and plan of care.             Олег Joseph MD

## 2020-05-20 ENCOUNTER — OFFICE VISIT (OUTPATIENT)
Dept: RHEUMATOLOGY | Facility: CLINIC | Age: 65
End: 2020-05-20
Payer: COMMERCIAL

## 2020-05-20 DIAGNOSIS — Z87.39 HX OF LUPUS NEPHRITIS: Chronic | ICD-10-CM

## 2020-05-20 DIAGNOSIS — M32.8 OTHER FORMS OF SYSTEMIC LUPUS ERYTHEMATOSUS, UNSPECIFIED ORGAN INVOLVEMENT STATUS: Primary | ICD-10-CM

## 2020-05-20 DIAGNOSIS — D84.9 IMMUNOCOMPROMISED STATE: ICD-10-CM

## 2020-05-20 NOTE — PROGRESS NOTES
Established Patient - Audio Only Telehealth Visit     The patient location is: ***  The chief complaint leading to consultation is: ***  Visit type: Virtual visit with audio only (telephone)  Total time spent with patient: ***       The reason for the audio only service rather than synchronous audio and video virtual visit was related to technical difficulties or patient preference/necessity.     Each patient to whom I provide medical services by telemedicine is:  (1) informed of the relationship between the physician and patient and the respective role of any other health care provider with respect to management of the patient; and (2) notified that they may decline to receive medical services by telemedicine and may withdraw from such care at any time. Patient verbally consented to receive this service via voice-only telephone call.       HPI: ***     Assessment and plan:  ***                        This service was not originating from a related E/M service provided within the previous 7 days nor will  to an E/M service or procedure within the next 24 hours or my soonest available appointment.  Prevailing standard of care was able to be met in this audio-only visit.

## 2020-05-20 NOTE — PROGRESS NOTES
Established Patient - Audio Only Telehealth Visit     The patient location is: home  The chief complaint leading to consultation is: lupus  Visit type: Virtual visit with audio only (telephone)  Total time spent with patient: 25 minutes       The reason for the audio only service rather than synchronous audio and video virtual visit was related to technical difficulties or patient preference/necessity.     Each patient to whom I provide medical services by telemedicine is:  (1) informed of the relationship between the physician and patient and the respective role of any other health care provider with respect to management of the patient; and (2) notified that they may decline to receive medical services by telemedicine and may withdraw from such care at any time. Patient verbally consented to receive this service via voice-only telephone call.       HPI:  is a 64 y.o. female with SLE and renal failure as well an extensive history of recurrent pleural effusions and pericardial effusion treated with recurrent thoracentesis (x3) and pericardiocentesis. She is s/p kidney transplant 4/6/2015. Patient in the past had 3-4 paracentesis for accumulation of abdominal fluid (last time was December 2011). She was hospitalized 4/2012 for acute altered mental status, slurred speech concerning for CVA. Dr. Ribera (rheum) was consulted and he did not think it was her lupus. It was felt that her AMS was caused by gabapentin and/or valtrex as those were new medications. After these medications were stopped she returned to her baseline function. She has history of a lung nodule and eventual TB results were negative. Patient had VATS procedure 3/2011 which showed a granuloma thought to be due histoplamosis but work up was negative. Patient had kidneys removed 4/2011 to help decrease protein loss from the body.    She was given Rituxan (4 infusions total) and later Nplate for thrombocytopenia by her hematologist and is back on Nplate.    Hematologist is Dr. Shelley () and platelets were 150s or so.  She has been off Nplate since transplant.       Colonoscopy 8/19/14 showed benign polyps, diverticulosis, internal and external hemrrhoids  Mammogram 6/24/14 was negative.  Pap smear 6/30/14 negative for lesion or malignancy            Interval History:  Headache for last two weeks.  Occurs in area of usual sinus headaches. Tylenol helps.  Flonase helps.   Aunt discharged after COVID 19 infection.   Death of another aunt who lives in [place].  Dealing with great deal of stress.  Dealing with stress of son.       LABS    Component      Latest Ref Rng & Units 5/13/2020 5/1/2020   WBC      3.90 - 12.70 K/uL 4.87    RBC      4.00 - 5.40 M/uL 4.28    Hemoglobin      12.0 - 16.0 g/dL 10.4 (L)    Hematocrit      37.0 - 48.5 % 33.2 (L)    MCV      82 - 98 fL 78 (L)    MCH      27.0 - 31.0 pg 24.3 (L)    MCHC      32.0 - 36.0 g/dL 31.3 (L)    RDW      11.5 - 14.5 % 17.1 (H)    Platelets      150 - 350 K/uL 152    MPV      9.2 - 12.9 fL 12.3    Immature Granulocytes      0.0 - 0.5 % 0.2    Gran # (ANC)      1.8 - 7.7 K/uL 2.1    Immature Grans (Abs)      0.00 - 0.04 K/uL 0.01    Lymph #      1.0 - 4.8 K/uL 1.9    Mono #      0.3 - 1.0 K/uL 0.6    Eos #      0.0 - 0.5 K/uL 0.2    Baso #      0.00 - 0.20 K/uL 0.04    nRBC      0 /100 WBC 0    Gran%      38.0 - 73.0 % 44.0    Lymph%      18.0 - 48.0 % 38.0    Mono%      4.0 - 15.0 % 13.1    Eosinophil%      0.0 - 8.0 % 4.1    Basophil%      0.0 - 1.9 % 0.8    Differential Method       Automated    Glucose      70 - 110 mg/dL 82    Sodium      136 - 145 mmol/L 139    Potassium      3.5 - 5.1 mmol/L 4.9    Chloride      95 - 110 mmol/L 108    CO2      23 - 29 mmol/L 23    BUN, Bld      8 - 23 mg/dL 26 (H)    Calcium      8.7 - 10.5 mg/dL 9.8    Creatinine      0.5 - 1.4 mg/dL 1.1    Albumin      3.5 - 5.2 g/dL 3.6    Phosphorus      2.7 - 4.5 mg/dL 3.6    eGFR if African American      >60 mL/min/1.73  m:2 >60    eGFR if non African American      >60 mL/min/1.73 m:2 53 (A)    Anion Gap      8 - 16 mmol/L 8    Specimen UA       Urine, Clean Catch    Color, UA      Yellow, Straw, Ela Yellow    Appearance, UA      Clear Clear    pH, UA      5.0 - 8.0 6.0    Specific Gravity, UA      1.005 - 1.030 1.010    Protein, UA      Negative Trace (A)    Glucose, UA      Negative Negative    Ketones, UA      Negative Negative    Bilirubin (UA)      Negative Negative    Occult Blood UA      Negative Trace (A)    NITRITE UA      Negative Negative    Leukocytes, UA      Negative Negative    Protein, Urine Random      0 - 15 mg/dL 25 (H)    Creatinine, Random Ur      15.0 - 325.0 mg/dL 25.0    Prot/Creat Ratio, Ur      0.00 - 0.20 1.00 (H)    Ferritin      20.0 - 300.0 ng/mL  578 (H)   Tacrolimus Lvl      5.0 - 15.0 ng/mL 4.0 (L)    Magnesium      1.6 - 2.6 mg/dL 1.6         Assessment:     1. SLE with +TANA hx, arthritis, alopecia, and glomerular nephritis now s/p bilateral nephrectomy then dialysis and s/p renal transplant (4/6/2015).   No evidence of active lupus currently. Still doing well.  Labs with proteinuria  2. Glomerulonephritis. S/p transplant 4/6/2015.  3. CAD  4. Fatigue  5. Long term Plaquenil use. Off CellCept since 9/2010  6. Serositis  7. Diastolic heart failure. Now resolved.  8. Thrombocytopenia. Still decreased.   9. Elevation in homocysteine levels in March 2012 and started on Folbic.   10. Recurrent falls. No recent falls  11. Hypercalcemia. Being managed endocrine  12. Osteoporosis. Endocrine gave Reclast scheduled 9/2019  13. 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 Being evaluated by neurology.  Mass no longer there.   14. Flexor tendon nodules both hands.       Plan:     1. Continue Plaquenil 200 mg daily.  Last eye exam 6/2019  2. Still off Folbic per transplant  3. Patient to contact office if feels she is having a lupus flare  4. Off Nplate still. Follows hematology Dr. Shelley  5. Continue 2 old goats.  Continue Voltaren gel for hand pains.  6. Vaccination (pneumonia, [place]) per transplant team and primary doctor.   7. Follow with dermatology in her area for alopecia on top of scalp.   8. Follow up with ortho regarding trigger fingers.  Jayy tape fingers at night.  9. Labs  11.  Proteinuria. Will evaluate with labs     RTO 4 months/prn       This service was not originating from a related E/M service provided within the previous 7 days nor will  to an E/M service or procedure within the next 24 hours or my soonest available appointment.  Prevailing standard of care was able to be met in this audio-only visit.

## 2020-05-21 ENCOUNTER — PATIENT MESSAGE (OUTPATIENT)
Dept: OTHER | Facility: OTHER | Age: 65
End: 2020-05-21

## 2020-05-22 ENCOUNTER — PATIENT MESSAGE (OUTPATIENT)
Dept: RHEUMATOLOGY | Facility: CLINIC | Age: 65
End: 2020-05-22

## 2020-06-16 ENCOUNTER — LAB VISIT (OUTPATIENT)
Dept: LAB | Facility: HOSPITAL | Age: 65
End: 2020-06-16
Attending: INTERNAL MEDICINE
Payer: COMMERCIAL

## 2020-06-16 DIAGNOSIS — Z94.0 S/P KIDNEY TRANSPLANT: ICD-10-CM

## 2020-06-16 DIAGNOSIS — M32.8 OTHER FORMS OF SYSTEMIC LUPUS ERYTHEMATOSUS, UNSPECIFIED ORGAN INVOLVEMENT STATUS: ICD-10-CM

## 2020-06-16 DIAGNOSIS — D84.9 IMMUNOCOMPROMISED STATE: ICD-10-CM

## 2020-06-16 LAB
C3 SERPL-MCNC: 117 MG/DL (ref 50–180)
C4 SERPL-MCNC: 19 MG/DL (ref 11–44)

## 2020-06-16 PROCEDURE — 86225 DNA ANTIBODY NATIVE: CPT

## 2020-06-16 PROCEDURE — 86160 COMPLEMENT ANTIGEN: CPT | Mod: 59

## 2020-06-16 PROCEDURE — 36415 COLL VENOUS BLD VENIPUNCTURE: CPT

## 2020-06-16 PROCEDURE — 80197 ASSAY OF TACROLIMUS: CPT

## 2020-06-16 PROCEDURE — 86160 COMPLEMENT ANTIGEN: CPT

## 2020-06-17 ENCOUNTER — PATIENT MESSAGE (OUTPATIENT)
Dept: RHEUMATOLOGY | Facility: CLINIC | Age: 65
End: 2020-06-17

## 2020-06-17 LAB
DSDNA AB SER-ACNC: NORMAL [IU]/ML
TACROLIMUS BLD-MCNC: 4.4 NG/ML (ref 5–15)

## 2020-06-18 ENCOUNTER — PATIENT OUTREACH (OUTPATIENT)
Dept: OTHER | Facility: OTHER | Age: 65
End: 2020-06-18

## 2020-07-13 ENCOUNTER — PATIENT OUTREACH (OUTPATIENT)
Dept: OTHER | Facility: OTHER | Age: 65
End: 2020-07-13

## 2020-07-13 DIAGNOSIS — I10 HTN (HYPERTENSION), BENIGN: ICD-10-CM

## 2020-07-13 PROBLEM — I50.9 CHF (NYHA CLASS II, ACC/AHA STAGE C): Status: ACTIVE | Noted: 2020-07-13

## 2020-07-13 PROBLEM — N18.6 ESRD (END-STAGE RENAL DISEASE) DUE TO SLE: Status: ACTIVE | Noted: 2020-07-13

## 2020-07-13 PROBLEM — M32.14 ESRD (END-STAGE RENAL DISEASE) DUE TO SLE: Status: ACTIVE | Noted: 2020-07-13

## 2020-07-13 PROBLEM — I43: Status: ACTIVE | Noted: 2020-07-13

## 2020-07-13 PROBLEM — M32.19: Status: ACTIVE | Noted: 2020-07-13

## 2020-07-13 PROBLEM — I70.0 AORTIC CALCIFICATION: Status: ACTIVE | Noted: 2018-07-18

## 2020-07-13 NOTE — LETTER
July 13, 2020     Chanel Charles  [address] [facility] [facility] [place] [address]       Dear Chanel,    Welcome to [facility]! Our goal is to make care effective, proactive and convenient by using data you send us from home to better treat your chronic conditions.              My name is Min Rider, and I am your dedicated Digital Medicine clinician. As an expert in medication management, I will help ensure that the medications you are taking continue to provide the intended benefits and help you reach your goals. You can reach me directly at [phone] or by sending me a message directly through your [facility] account.      I am Nelly Mcclendon and I will be your health . My job is to help you identify lifestyle changes to improve your disease control. We will talk about nutrition, exercise, and other ways you may be able to adjust your current habits to better your health. Additionally, we will help ensure you are completing the tests and screenings that are necessary to help manage your conditions. You can reach me directly at [phone] or by sending me a message directly through your [facility] account.    Most importantly, YOU are at the center of this team. Together, we will work to improve your overall health and encourage you to meet your goals for a healthier lifestyle.     What we expect from YOU:  · Please take frequent home blood pressure measurements. We ask that you take at least 1 blood pressure reading per week, but more information will better help us get you know you. Be sure you rest for a few minutes before taking the reading in a quiet, comfortable place.     Be available to receive phone calls or Namelyt messages, when appropriate, from your care team. Please let us know if there are any specific days or times that work best for us to reach you via phone.     Complete routine tests and screenings. Dont worry, we will help keep you on track!           What you should  expect from your Digital Medicine Care Team:   We will work with you to create a personalized plan of care and provide you with encouragement and education, including regarding lifestyle changes, that could help you manage your disease states.     We will adjust your current medications, if needed, and continue to monitor your long-term progress.     We will provide you and your physician with monthly progress reports after you have been in the program for more than 30 days.     We will send you reminders through [employer] and text messages to help ensure you do not miss any testing deadlines to help manage your disease states.    You will be able to reach us by phone or through your [facility] account by clicking our names under Care Team on the right side of the home screen.    I look forward to working with you to achieve your blood pressure goals!    We look forward to working with you to help manage your health,    Sincerely,    Your Digital Medicine Team    Please visit our websites to learn more:   · Hypertension: [email or web address]      Remember, we are not available for emergencies. If you have an emergency, please contact your doctors office directly or call [facility] ([phone] or [phone]) or [phone].

## 2020-07-13 NOTE — PROGRESS NOTES
Digital Medicine: Health  Introduction    Introduced Chanel Charles to Digital Medicine. Discussed health  role and recommended lifestyle modifications.    The history is provided by the patient. No  was used.     HYPERTENSION  Our goal is to get BP to consistently below 130/80mmHg and make the process convenient so patient can avoid extra trips to the office. Getting your blood pressure below 130/80mmHg (definition of control) will reduce your risk for heart attack, kidney failure, stroke and death (as well as kidney failure, eye disease, & dementia)      Reviewed that the Digital Medicine care team - consisting of a clinician and a health  - will follow the most current evidence-based national guidelines for treating your condition.  The health  will focus on lifestyle modifications and motivation while the clinician will focus on medication therapy.  The care team will review all data on a regular basis and reach out as needed.      Explained that one of the key parts of the program is communication with the care team.  Asked patient to respond to outreach attempts and complete questionnaires.  Stressed importance of medication adherence.    Reviewed non-pharmacologic therapies and impact on BP.      Explained that we expect patient to obtain several blood pressures per week at random times of day.  Instructed patient not to allow anyone else to use phone and monitoring device.  Confirmed appropriate BP monitoring technique.      Explained to patient that the digital medicine team is not available for emergencies.  Patient will call [facility] on-call ([phone] or [phone]) or [phone] if needed.      Patient's BP goal is 130/80.Patient's BP average is 145/68 mmHg, which is above goal, per 2017 ACC/AHA Hypertension Guidelines.          Last 5 Patient Entered Readings                                      Current 30 Day Average: 145/68     Recent Readings 7/13/2020  7/12/2020 7/12/2020 7/12/2020 7/11/2020    SBP (mmHg) 138 137 140 144 167    DBP (mmHg) 70 68 64 75 72    Pulse 79 86 88 95 74            INTERVENTION(S)  reviewed appropriate dose schedule, recommended diet modifications, recommend physical activity, reviewed monitoring technique and encouragement/support    PLAN  patient verbalizes understanding and continue monitoring    Will f/u in 4 weeks, sooner if concerns.       There are no preventive care reminders to display for this patient.    Reviewed the importance of self-monitoring, medication adherence, and that the health  can be used as a resource for lifestyle modifications to help reduce or maintain a healthy lifestyle.    Sent link to [facility] webpages and my contact information via [employer] for future questions. Follow up scheduled.             Diet Screening       Encouraged to decrease sodium intake to <2,000 mg per day and recommend DASH diet.     Physical Activity Screening       Encouraged to increase exercise to at least 150 minutes per week.      SDOH

## 2020-07-14 ENCOUNTER — TELEPHONE (OUTPATIENT)
Dept: NEPHROLOGY | Facility: CLINIC | Age: 65
End: 2020-07-14

## 2020-07-14 NOTE — TELEPHONE ENCOUNTER
LET HE KNOW DR RODRÍGUEZ IS HERE TODAY AND SHE WILL COME TO GET HER PAPERS SIGNED .7/14/2020/1235/SF

## 2020-07-14 NOTE — TELEPHONE ENCOUNTER
----- Message from Gia Gamez sent at 7/14/2020 12:00 PM CDT -----  Regarding: PT  Contact: PT  PT needs to get the doctor to sign a form for her insurance that states she no longer on dialysis and she had a kidney transplant. The PT is going to bring the form since she doesn't have a fax machine and just wanted to call ahead and let the office know that she'll be dropping it off today.     Callback: [phone]

## 2020-08-05 ENCOUNTER — TELEPHONE (OUTPATIENT)
Dept: ENDOCRINOLOGY | Facility: CLINIC | Age: 65
End: 2020-08-05

## 2020-08-06 ENCOUNTER — PATIENT OUTREACH (OUTPATIENT)
Dept: OTHER | Facility: OTHER | Age: 65
End: 2020-08-06

## 2020-08-06 ENCOUNTER — OFFICE VISIT (OUTPATIENT)
Dept: ENDOCRINOLOGY | Facility: CLINIC | Age: 65
End: 2020-08-06
Payer: MEDICARE

## 2020-08-06 VITALS
WEIGHT: 134.5 LBS | HEIGHT: 63 IN | HEART RATE: 69 BPM | TEMPERATURE: 99 F | RESPIRATION RATE: 18 BRPM | DIASTOLIC BLOOD PRESSURE: 70 MMHG | BODY MASS INDEX: 23.83 KG/M2 | SYSTOLIC BLOOD PRESSURE: 132 MMHG

## 2020-08-06 DIAGNOSIS — N18.2 CKD (CHRONIC KIDNEY DISEASE) STAGE 2, GFR 60-89 ML/MIN: Primary | ICD-10-CM

## 2020-08-06 DIAGNOSIS — M81.0 OSTEOPOROSIS, UNSPECIFIED OSTEOPOROSIS TYPE, UNSPECIFIED PATHOLOGICAL FRACTURE PRESENCE: ICD-10-CM

## 2020-08-06 DIAGNOSIS — E21.2 HYPERPARATHYROIDISM, TERTIARY: ICD-10-CM

## 2020-08-06 DIAGNOSIS — M32.9 SYSTEMIC LUPUS ERYTHEMATOSUS, UNSPECIFIED SLE TYPE, UNSPECIFIED ORGAN INVOLVEMENT STATUS: ICD-10-CM

## 2020-08-06 PROCEDURE — 99214 PR OFFICE/OUTPT VISIT, EST, LEVL IV, 30-39 MIN: ICD-10-PCS | Mod: S$GLB,,, | Performed by: INTERNAL MEDICINE

## 2020-08-06 PROCEDURE — 1101F PR PT FALLS ASSESS DOC 0-1 FALLS W/OUT INJ PAST YR: ICD-10-PCS | Mod: CPTII,S$GLB,, | Performed by: INTERNAL MEDICINE

## 2020-08-06 PROCEDURE — 99999 PR PBB SHADOW E&M-EST. PATIENT-LVL V: ICD-10-PCS | Mod: PBBFAC,,, | Performed by: INTERNAL MEDICINE

## 2020-08-06 PROCEDURE — 99214 OFFICE O/P EST MOD 30 MIN: CPT | Mod: S$GLB,,, | Performed by: INTERNAL MEDICINE

## 2020-08-06 PROCEDURE — 99999 PR PBB SHADOW E&M-EST. PATIENT-LVL V: CPT | Mod: PBBFAC,,, | Performed by: INTERNAL MEDICINE

## 2020-08-06 PROCEDURE — 1101F PT FALLS ASSESS-DOCD LE1/YR: CPT | Mod: CPTII,S$GLB,, | Performed by: INTERNAL MEDICINE

## 2020-08-06 PROCEDURE — 3008F PR BODY MASS INDEX (BMI) DOCUMENTED: ICD-10-PCS | Mod: CPTII,S$GLB,, | Performed by: INTERNAL MEDICINE

## 2020-08-06 PROCEDURE — 3008F BODY MASS INDEX DOCD: CPT | Mod: CPTII,S$GLB,, | Performed by: INTERNAL MEDICINE

## 2020-08-06 NOTE — PROGRESS NOTES
Patient ID: Chanel Charles is a 65 y.o. female.  Patient is here for follow up        Chief Complaint: Follow-up      Follow-up  Associated symptoms include arthralgias. Pertinent negatives include no chest pain, fatigue, fever, headaches, joint swelling, nausea, numbness, rash, sore throat, vomiting or weakness.      Patient is here to establish care with new endocrinologist, formally seen by Dr. 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   Prior to renal tx, had tertiary HPT (PTH levels up to 1241), with hypercalcemia ranging 10.5-11.   Post renal transplant, PTH gradually declined and (most recent 95), Ca normalized to ULN (10-10.5) phos ranging 2.5-3, and alk phos normalized.  However recent calcium only slightly high at 10.6 but she thinks is related to her being on a cruise where she had a lot of items that she normally would not eat.     Regarding osteoporosis:  She had improvement in her L-spine and femoral neck on last bone density August 2019     Started fosamax 08/2015, intolerant due to nausea and abdominal pain.  Received IV reclast again last September 2019 , began in 2016       Fracture history: none  Falls: none  Weight bearing exercise: walking, dancing and lately she started home aerobic exercise via  Height loss:  None  Vitamin D status: replete, only takes MVI (Centrum Silver) and also she is taking vitamin-D supplement 1000 IU only every other day or 3 days a week  No HCTZ.  Steroid therapy: inhaled steroids (symbicort prn)  No h/o kidney stones.  No h/o diarrhea, malabsorption, or bypass   Menopause 47, remote hx ert long ago.      She is followed by Dr. Jj, rheumatologist and Dr. Joseph nephrologist    Her 24 hr urine calcium in 2017 was not elevated      Only  pain she encounters is in her hands  I have reviewed the past medical, family and social history    Review of Systems   Constitutional: Negative for appetite change, fatigue, fever and unexpected weight change.   HENT: Negative for sore throat and trouble swallowing.    Eyes: Negative for visual disturbance.   Respiratory: Negative for shortness of breath and wheezing.    Cardiovascular: Negative for chest pain, palpitations and leg swelling.   Gastrointestinal: Negative for diarrhea, nausea and vomiting.   Endocrine: Negative for cold intolerance, heat intolerance, polydipsia, polyphagia and polyuria.   Genitourinary: Negative for difficulty urinating, dysuria and menstrual problem.   Musculoskeletal: Positive for arthralgias. Negative for joint swelling.   Skin: Negative for rash.   Neurological: Negative for dizziness, weakness, numbness and headaches.   Psychiatric/Behavioral: Negative for confusion, dysphoric mood and sleep disturbance.       Objective:      Physical Exam  Vitals signs reviewed.   Constitutional:       General: She is not in acute distress.     Appearance: She is well-developed. She is not diaphoretic.   HENT:      Head: Normocephalic and atraumatic.   Eyes:      Conjunctiva/sclera: Conjunctivae normal.   Skin:     General: Skin is warm and dry.      Findings: No erythema or rash.   Neurological:      Mental Status: She is alert.      Sensory: No sensory deficit.      Comments:      Psychiatric:         Behavior: Behavior normal.           Lab Review:   Lab Visit on 06/16/2020   Component Date Value    Tacrolimus Lvl 06/16/2020 4.4*    ds DNA Ab 06/16/2020 Negative 1:10     Complement (C-3) 06/16/2020 117     Complement (C-4) 06/16/2020 19    Lab Visit on 05/13/2020   Component Date Value    Glucose 05/13/2020 82     Sodium 05/13/2020 139     Potassium 05/13/2020 4.9     Chloride 05/13/2020 108     CO2 05/13/2020 23     BUN, Bld 05/13/2020 26*    Calcium 05/13/2020 9.8      Creatinine 05/13/2020 1.1     Albumin 05/13/2020 3.6     Phosphorus 05/13/2020 3.6     eGFR if African American 05/13/2020 >60     eGFR if non African Amer* 05/13/2020 53*    Anion Gap 05/13/2020 8     WBC 05/13/2020 4.87     RBC 05/13/2020 4.28     Hemoglobin 05/13/2020 10.4*    Hematocrit 05/13/2020 33.2*    Mean Corpuscular Volume 05/13/2020 78*    Mean Corpuscular Hemoglo* 05/13/2020 24.3*    Mean Corpuscular Hemoglo* 05/13/2020 31.3*    RDW 05/13/2020 17.1*    Platelets 05/13/2020 152     MPV 05/13/2020 12.3     Immature Granulocytes 05/13/2020 0.2     Gran # (ANC) 05/13/2020 2.1     Immature Grans (Abs) 05/13/2020 0.01     Lymph # 05/13/2020 1.9     Mono # 05/13/2020 0.6     Eos # 05/13/2020 0.2     Baso # 05/13/2020 0.04     nRBC 05/13/2020 0     Gran% 05/13/2020 44.0     Lymph% 05/13/2020 38.0     Mono% 05/13/2020 13.1     Eosinophil% 05/13/2020 4.1     Basophil% 05/13/2020 0.8     Differential Method 05/13/2020 Automated     Tacrolimus Lvl 05/13/2020 4.0*    Magnesium 05/13/2020 1.6    Lab Visit on 05/13/2020   Component Date Value    Specimen UA 05/13/2020 Urine, Clean Catch     Color, UA 05/13/2020 Yellow     Appearance, UA 05/13/2020 Clear     pH, UA 05/13/2020 6.0     Specific [place], UA 05/13/2020 1.010     Protein, UA 05/13/2020 Trace*    Glucose, UA 05/13/2020 Negative     Ketones, UA 05/13/2020 Negative     Bilirubin (UA) 05/13/2020 Negative     Occult Blood UA 05/13/2020 Trace*    Nitrite, UA 05/13/2020 Negative     Leukocytes, UA 05/13/2020 Negative     Protein, Urine Random 05/13/2020 25*    Creatinine, Random Ur 05/13/2020 25.0     Prot/Creat Ratio, Ur 05/13/2020 1.00*   Lab Visit on 05/01/2020   Component Date Value    Tacrolimus Lvl 05/01/2020 5.4     Iron 05/01/2020 122     Transferrin 05/01/2020 219     TIBC 05/01/2020 324     Saturated Iron 05/01/2020 38     Ferritin 05/01/2020 578*   Lab Visit on 04/01/2020   Component Date Value     Specimen UA 04/01/2020 Urine, Clean Catch     Color, UA 04/01/2020 Straw     Appearance, UA 04/01/2020 Clear     pH, UA 04/01/2020 6.0     Specific Gravity, UA 04/01/2020 <=1.005     Protein, UA 04/01/2020 Trace*    Glucose, UA 04/01/2020 Negative     Ketones, UA 04/01/2020 Negative     Bilirubin (UA) 04/01/2020 Negative     Occult Blood UA 04/01/2020 Trace*    Nitrite, UA 04/01/2020 Negative     Leukocytes, UA 04/01/2020 Negative     Protein, Urine Random 04/01/2020 28*    Creatinine, Random Ur 04/01/2020 35.0     Prot/Creat Ratio, Ur 04/01/2020 0.80*   Lab Visit on 04/01/2020   Component Date Value    Tacrolimus Lvl 04/01/2020 8.3     ImmunKnow (Stimulated) 04/01/2020 293     WBC 04/01/2020 6.77     RBC 04/01/2020 4.51     Hemoglobin 04/01/2020 10.9*    Hematocrit 04/01/2020 36.0*    Mean Corpuscular Volume 04/01/2020 80*    Mean Corpuscular Hemoglo* 04/01/2020 24.2*    Mean Corpuscular Hemoglo* 04/01/2020 30.3*    RDW 04/01/2020 16.9*    Platelets 04/01/2020 122*    MPV 04/01/2020 SEE COMMENT     Immature Granulocytes 04/01/2020 0.4     Gran # (ANC) 04/01/2020 3.6     Immature Grans (Abs) 04/01/2020 0.03     Lymph # 04/01/2020 2.0     Mono # 04/01/2020 0.9     Eos # 04/01/2020 0.3     Baso # 04/01/2020 0.04     nRBC 04/01/2020 0     Gran% 04/01/2020 52.6     Lymph% 04/01/2020 29.8     Mono% 04/01/2020 12.9     Eosinophil% 04/01/2020 3.7     Basophil% 04/01/2020 0.6     Differential Method 04/01/2020 Automated     Glucose 04/01/2020 82     Sodium 04/01/2020 140     Potassium 04/01/2020 5.1     Chloride 04/01/2020 109     CO2 04/01/2020 25     BUN, Bld 04/01/2020 26*    Calcium 04/01/2020 10.0     Creatinine 04/01/2020 1.1     Albumin 04/01/2020 3.9     Phosphorus 04/01/2020 3.4     eGFR if African American 04/01/2020 >60.0     eGFR if non African Amer* 04/01/2020 53.2*    Anion Gap 04/01/2020 6*    Magnesium 04/01/2020 1.8     Total Protein 04/01/2020 6.9      Albumin 04/01/2020 3.9     Total Bilirubin 04/01/2020 0.8     Bilirubin, Direct 04/01/2020 0.4*    AST 04/01/2020 27     ALT 04/01/2020 24     Alkaline Phosphatase 04/01/2020 68     BK Virus DNA, Blood 04/01/2020 Not detected     BK Virus DNA PCR, Quant,* 04/01/2020 <125     Log BKV Copies/mL 04/01/2020 <2.10        Assessment:     1. CKD (chronic kidney disease) stage 2, GFR 60-89 ml/min  PTH, intact    Vitamin D    Albumin    Alkaline phosphatase   2. Hyperparathyroidism, tertiary  PTH, intact    Vitamin D    Albumin    Alkaline phosphatase   3. Osteoporosis, unspecified osteoporosis type, unspecified pathological fracture presence  PTH, intact    Vitamin D    Albumin    Alkaline phosphatase   4. Systemic lupus erythematosus, unspecified SLE type, unspecified organ involvement status  PTH, intact    Vitamin D    Albumin    Alkaline phosphatase    though she has had some improvement because she still is in the osteoporotic range in her hip area so I recommend continuing with IV Reclast which she is due neck is month, she will get labs prior to infusion for review, continue vitamin-D   Can follow up in 1 year  Plan:   CKD (chronic kidney disease) stage 2, GFR 60-89 ml/min  -     PTH, intact; Future; Expected date: 08/06/2020  -     Vitamin D; Future; Expected date: 08/06/2020  -     Albumin; Future; Expected date: 08/06/2020  -     Alkaline phosphatase; Future; Expected date: 08/06/2020    Hyperparathyroidism, tertiary  -     PTH, intact; Future; Expected date: 08/06/2020  -     Vitamin D; Future; Expected date: 08/06/2020  -     Albumin; Future; Expected date: 08/06/2020  -     Alkaline phosphatase; Future; Expected date: 08/06/2020    Osteoporosis, unspecified osteoporosis type, unspecified pathological fracture presence  -     PTH, intact; Future; Expected date: 08/06/2020  -     Vitamin D; Future; Expected date: 08/06/2020  -     Albumin; Future; Expected date: 08/06/2020  -     Alkaline phosphatase;  Future; Expected date: 08/06/2020    Systemic lupus erythematosus, unspecified SLE type, unspecified organ involvement status  -     PTH, intact; Future; Expected date: 08/06/2020  -     Vitamin D; Future; Expected date: 08/06/2020  -     Albumin; Future; Expected date: 08/06/2020  -     Alkaline phosphatase; Future; Expected date: 08/06/2020          No follow-ups on file.    Labs prior to appointment? not applicable     Disclaimer:  This note may have been partially prepared using voice recognition software and  it may have not been extensively proofed, as such there could be errors within the text such as sound alike errors.

## 2020-08-06 NOTE — PROGRESS NOTES
Digital Medicine: Clinician Introduction    Chanel Charles is a 65 y.o. female who is newly enrolled in the Digital Medicine Clinic.    Pt reports doing well without concern.      The history is provided by the patient.      Review of patient's allergies indicates:   -- Heparin analogues -- Other (See Comments)    --  Low platelets   -- Escitalopram oxalate     --  Felt anxious   -- Gabapentin     --  Other reaction(s): Hallucinations  Completed Medication Reconciliation  Verified pharmacy information.    HYPERTENSION  Explained hypertension digital medicine goals including BP goal less than or equal to 130/80mmHg, improved convenience of BP management and reduced risk of heart attack, kidney failure, stroke, eye disease, dementia, and death.     Explained that we expect patient to submit several blood pressure readings per week at random times of the day, but at least 30 minutes after taking blood pressure medications. Instructed patient not to allow anyone else to use their blood pressure monitor and phone as data submitted is directly entered into medical record. Reviewed and confirmed appropriate blood pressure monitoring technique.         Reviewed signs/symptoms of hypotension (lightheaded, dizziness, weakness)     Patient's BP goal is less than or equal to 130/80. Patients BP average is 131/63 mmHg, which is above goal, per 2017 ACC/AHA Hypertension Guidelines.       Last 5 Patient Entered Readings                                      Current 30 Day Average: 131/63     Recent Readings 8/5/2020 8/3/2020 8/3/2020 7/31/2020 7/31/2020    SBP (mmHg) 121 124 140 134 149    DBP (mmHg) 56 50 65 61 65    Pulse 72 78 78 72 71              Depression Screening  Chanel Charles screened negative on the depression screening.     Sleep Apnea Screening  Patient not previously diagnosed with GYPSY       Medication Affordability Screening  Patient did not answer the medication affordability questionnaires. Patient is  currently not having problems affording medications    Medication Adherence-Medication adherence was assessed.  Patient continue taking medication as prescribed.            ASSESSMENT(S)  Patients BP average is 131/63 mmHg, which is above goal. Patient's BP goal is less than or equal to 130/80 per 2017 ACC/AHA Hypertension Guidelines.       Hypertension Plan  Continue current therapy. Defer changes for now due to current trend.  Await MD intervention. Will message Dr. Joseph clarifying BP goal due to h/o kidney transplant.       Addressed any questions or concerns and patient has my contact information if needed prior to next outreach. Patient verbalizes understanding.      Explained the importance of self-monitoring and medication adherence. Encouraged the patient to communicate with their health  for lifestyle modifications to help improve or maintain a healthy lifestyle.        Sent link to [facility] [employer] [facility] [facility] webpages and my contact information via [employer] for future questions.        Explained to the patient that the Digital Medicine team is not available for emergencies. Advised patient call [facility] On Call ([phone] or [phone]) or [phone] if needed.         There are no preventive care reminders to display for this patient.    Current Medication Regimen:  Hypertension Medications             olmesartan (BENICAR) 5 MG Tab Take 2 tablets (10 mg total) by mouth once daily.

## 2020-08-10 ENCOUNTER — PATIENT OUTREACH (OUTPATIENT)
Dept: OTHER | Facility: OTHER | Age: 65
End: 2020-08-10

## 2020-08-10 NOTE — PROGRESS NOTES
DOCUMENTATION QUERY    PROVIDERS: Please select “Cosign w/ note”to reply to query.    To better represent the severity of illness of your patient, please review the following information and exercise your independent professional judgment in responding to this query.     Pressure Injury Coccyx Stage 2 POA is documented in the 12/20 Wound Team Progress Note. Based upon the clinical findings, risk factors, and treatment, can you clarify if you agree with this assessment?    • Agree with Wound Care RN assessment  • Disagree with Wound Care RN assessment (document your clinical findings)  • Other explanation of clinical findings  • Unable to determine    The medical record reflects the following:   Clinical Findings  Per 12/20 Wound Team Progress Note: Pressure Injury 12/1/18, Coccyx, stage 2 POA.  State of healing: early/partial granulation.  Site: red. Brittany-wound: pink. Margins: attached defined edges.  Length: 1cm, Width: 0.4cm, Depth: 0.2cm, Surface area: 0.4cm^2.  Closure: secondary intention. Scant serious drainage.     Treatment  Wound RN assessment, cleansed w/ NS, mepilex dressing, turning   Risk Factors  Age, dehydration, weakness, decreased mobility   Location within medical record  Progress Notes     Thank you,   Elena Fontanez RN, BSN  Clinical   [phone]           Digital Medicine: Health  Follow-Up    Average blood pressure today is 128/63.     The history is provided by the patient.             Reason for review: Blood pressure at goal        Topics Covered on Call: physical activity and Diet          Diet-Change  No 24 hour dietary recall  Patient reports eating or drinking the following: Patient reports that her diet has been low sodium for awhile. She uses a low sodium substitute. She knows what she can and cannot eat for her kidneys. She drinks 4 bottles of water every day. Doesn't do soft drinks often.      Physical Activity-Change      She added Exercise video to Her physical activity routine.        Additional physical activity details: Patient reports that her friend's daughter is a  and has these exercise videos she has been doing. She is doing a video on MWF and is planning on incorporating more walking in as well.       Medication Adherence-Medication adherence was asssessed.  Patient continue taking medication as prescribed.          Substance, Sleep, Stress-change  stress-assessed  Details:She does a lot of deep breathing   Intervention(s):    Sleep-not assessed  Details:  Intervention(s):    Alcohol -assessed  Details:She has a glass of wine twice a week   Intervention(s):    Tobacco-Assessed  Details:No tobacco use  Intervention(s):          Continue current diet/physical activity routine.       Addressed any questions or concerns and patient has my contact information if needed prior to next outreach. Patient verbalizes understanding.          There are no preventive care reminders to display for this patient.    Last 5 Patient Entered Readings                                      Current 30 Day Average: 128/63     Recent Readings 8/10/2020 8/10/2020 8/8/2020 8/5/2020 8/3/2020    SBP (mmHg) 126 154 115 121 124    DBP (mmHg) 62 66 60 56 50    Pulse 80 69 79 72 78

## 2020-09-08 NOTE — PROGRESS NOTES
Subjective:       Patient ID: Chanel Charles is a 65 y.o.    female who presents for follow-up evaluation of Living Unrelated RT ( 4/6/2015 ) , HTN, SHPT ,       ORGAN: RIGHT KIDNEY    Donor Type: living    PHS Increased Risk: no    Cold Ischemia: 58 mins    Induction Medications: campath - alemtuzumab (anti-cd52)         Gabriel Wyman MD       HPI : Chanel Charles is a pleasant 65 -year-old -American woman with history of Living unrelated ( friend ) renal transplant in 4/6/2015, excellent allograft function on Prograf  1 mg bid,  CellCept 500 mg twice a day. She has history of lupus nephritis, cause of end-stage kidney disease. She was on hemodialysis for about 4-1/2 years at [facility] unit [facility], has a EMANUEL AVF , Dr Roca, recent other office notes were reviewed. She follows with rheumatology Department in [place] , followed by endocrinology Department for osteopenia/osteoporosis, she also had hypercalcemia for which she received IV Reclast ( Zoledronic acid ),   recent lab results were discussed with the patient , mild proteinuria noted on the recent labs, can be due to hypertension, chronic allograft nephropathy, on Benicar,         Imp Info : has extensive history of recurrent pleural effusions and pericardial effusion treated with recurrent thoracentesis (x3) and pericardiocentesis.  Patient in the past had 3-4 paracentesis for accumulation of abdominal fluid ,               Past Medical History:   Diagnosis Date    Abnormal stress echo - with no blockage on LHC - 1/16/14 1/7/2014    Acid reflux     Anemia of chronic kidney failure     Anxiety     Arthritis     Awaiting kidney transplant 12/12/2013    CHF (congestive heart failure)     Coronary artery disease     Bare metal stent in 2007    Dilated cardiomyopathy secondary to systemic lupus erythematosus 7/13/2020    ESRD (end stage renal disease)     Secondary to Lupus Nephritis, HD MWF     HTN (hypertension), benign 7/13/2020    Hypercalcemia 7/15/2015    Hyperlipidemia 12/12/2013    Hyperparathyroidism 7/15/2015    Hyperparathyroidism, tertiary 8/7/2015    Immunocompromised state 5/5/2015    ITP (idiopathic thrombocytopenic purpura) 12/12/2013    Living-donor kidney transplant for SLE 4/6/15 4/6/2015    Induced with Thymoglobulin.     Lupus nephritis     Lupus nephritis 5/18/2015    Menopause 7/15/2015    Osteoporosis 12/24/2015    Pericardial effusion s/p pericardiocentesis 12/12/2013    Pleural effusion s/p Thoracentesis and VATS 12/12/2013    Prophylactic immunotherapy 6/29/2015    S/p Bilateral Nephrectomy (Severe proteinuria) - 2012 3/19/2015    Secondary hyperparathyroidism, renal     Shingles 4/2012    SLE (systemic lupus erythematosus) 1997       Current Outpatient Medications on File Prior to Visit   Medication Sig Dispense Refill    alprazolam (XANAX) 0.5 MG tablet Take 0.5 mg by mouth. 1 Tablet Oral Every day.  or as needed.      aspirin (ECOTRIN) 81 MG EC tablet Take 81 mg by mouth once daily.       atorvastatin (LIPITOR) 80 MG tablet Take 80 mg by mouth every evening. 1 Tablet Oral Every day      BIOTIN ORAL Take 1,000 mg by mouth Daily.       eszopiclone (LUNESTA) 2 MG Tab Take 2 mg by mouth. 1 Tablet Oral As directed.  1 tablet 1 time as needed at bedtime.      famotidine (PEPCID) 20 MG tablet Take 1 tablet (20 mg total) by mouth every evening. 30 tablet 11    fluticasone (FLONASE) 50 mcg/actuation nasal spray 1 spray by Each Nare route once daily. (Patient taking differently: 1 spray by Each Nare route as needed. )  0    hydroxychloroquine (PLAQUENIL) 200 mg tablet TAKE 1 TABLET DAILY 90 tablet 3    loratadine (CLARITIN) 10 mg tablet Take 1 tablet (10 mg total) by mouth daily as needed for Allergies.  0    magnesium oxide (MAG-OX) 400 mg tablet Take 1 tablet (400 mg total) by mouth once daily. 30 tablet 9    multivitamin (THERAGRAN) tablet Take 1 tablet by  mouth once daily.      mycophenolate (CELLCEPT) 250 mg Cap TAKE TWO CAPSULES BY MOUTH TWICE A  capsule 11    olmesartan (BENICAR) 5 MG Tab Take 2 tablets (10 mg total) by mouth once daily. 180 tablet 3    tacrolimus (PROGRAF) 1 MG Cap Take 1 capsules (1 mg) by mouth every morning and 1 capsule (1 mg )by mouth every evening. Z94.0 kidney transplant. 180 capsule 3    valacyclovir (VALTREX) 1000 MG tablet Take 1 tablet by mouth as needed.       vitamin D 1000 units Tab Take 2,000 Units by mouth every Monday.        No current facility-administered medications on file prior to visit.        SH : She is a retired teacher, principal. She is not a smoker or alcohol drinker. She has a son who is 32 years old. She is currently living at home.       FH : Several family members with hypertension. Lupus in a distant uncle        Review of Systems  :      Constitutional: Negative for activity change, appetite change, fatigue and fever.   HENT: Negative for congestion, facial swelling, sore throat, trouble swallowing and voice change.    Eyes: Negative for redness and visual disturbance.   Respiratory: Negative for apnea, cough, chest tightness, shortness of breath and wheezing.    Cardiovascular: Negative for chest pain, palpitations and leg swelling.   Gastrointestinal: Negative for abdominal distention, abdominal pain, blood in stool, constipation, diarrhea, nausea and vomiting.   Genitourinary: Negative for decreased urine volume, difficulty urinating, dysuria, flank pain, frequency, hematuria, pelvic pain and urgency.   Musculoskeletal: Negative for back pain, gait problem and joint swelling.   Skin: Negative for color change and rash.   Neurological: Negative for dizziness, syncope, weakness and headaches.   Hematological: Does not bruise/bleed easily.   Psychiatric/Behavioral: Negative for agitation, behavioral problems and confusion. The patient is not nervous/anxious.      :          Objective:            Vitals:    09/08/20 0906   BP: 130/62   Pulse: 60   Resp: 20       Weight 133 lbs , stable     Physical Exam  :    Constitutional: She is oriented to person, place, and time. She appears well-developed and well-nourished. No distress.   HENT: Head: Normocephalic and atraumatic. Mouth/Throat: Oropharynx is clear and moist. No oropharyngeal exudate.   Eyes: Pupils are equal, round, and reactive to light. Conjunctivae and EOM are normal. No scleral icterus.   Neck: Normal range of motion. Neck supple. No JVD present. Carotid bruit is not present. No tracheal deviation present. No thyroid mass and no thyromegaly present.   Cardiovascular: Normal rate, regular rhythm, normal heart sounds and intact distal pulses. Exam reveals no gallop and no friction rub.   No murmur heard.  Pulmonary/Chest: Effort normal and breath sounds normal. No respiratory distress. She has no wheezes. She has no rales. She exhibits no tenderness.   Abdominal: Soft. Bowel sounds are normal. She exhibits no distension, no abdominal bruit, no ascites and no mass. There is no hepatosplenomegaly. There is no tenderness. There is no rebound, no guarding and no CVA tenderness.   No allograft tenderness, RLQ   Musculoskeletal: Normal range of motion. She exhibits no edema or tenderness.   LUE AVF, good thrill    Lymphadenopathy:   She has no cervical adenopathy.   Neurological: She is alert and oriented to person, place, and time.  No cranial nerve deficit. She exhibits normal muscle tone. Coordination normal.   Skin: Skin is warm and intact. No rash noted. No erythema. No pallor.   Psychiatric: She has a normal mood and affect. Her behavior is normal. Judgment normal.         Labs:    Lab Results   Component Value Date    CREATININE 1.3 09/03/2020    BUN 52 (H) 09/03/2020     09/03/2020    K 5.1 09/03/2020     09/03/2020    CO2 21 (L) 09/03/2020       Lab Results   Component Value Date    WBC 6.04 09/03/2020    HGB 11.1 (L) 09/03/2020     HCT 36.2 (L) 09/03/2020    MCV 78 (L) 09/03/2020     (L) 09/03/2020       Lab Results   Component Value Date    .0 (H) 09/03/2020    CALCIUM 9.8 09/03/2020    PHOS 3.7 09/03/2020         Lab Results   Component Value Date    URICACID 6.2 (H) 09/12/2018       Lab Results   Component Value Date    ALBUMIN 3.6 09/03/2020    ALBUMIN 3.6 09/03/2020       Prograf level 4.3       Impression and Plan : 65 -year-old -American woman seen in office today in follow-up for following medical problems ,       1. Status post Living Unrelated kidney transplant ( 4/6/2015 ) : Stable renal function with a serum creatinine of 1.3 mg/dL. Discussed adequate fluid intake ,       Serum Prograf levels is 4.3  ( range 4-7 ) , on Prograf  1 mg bid, Cont CellCept 500 mg twice a day.      Has a LUE AVF , good thrill , recent BK virus screen was negative         2. Hypertension - blood pressure is improved, discussed low-salt diet, continue Benicar,        3. Hyperparathyroidism - follow PTH ,  ? Tertiary  hyperpara ,  again discussed low calcium diet, followed by Endocrine       4. Anemia of chronic kidney disease - most recent hemoglobin is 11 g. will continue to monitor.        5.  Proteinuria - mild at 0.5 - 1  g per day, cont  Benicar , lupus serology negative,      6. Lupus nephritis - currently under remission. On Plaquenil, sees Dr Jj in [place] ,       7. Lytes -  on Mag supplements ,      8. Euvolemic on exam ,       9. Leukopenia : resolved, she is following with hematologist Karsten ()         10. Hyperkalemia : mild, discussed low K diet ,      11.  Thrombocytopenia : better     12.  Metabolic acidosis, mild, monitor      follow-up in about 4 months.   More than 30 minutes of face-to-face time discussing labs and plan of care.         Олег Joseph MD

## 2020-09-18 NOTE — PROGRESS NOTES
Digital Medicine: Clinician Follow-Up    Patient reports doing well without concern.  Reports that she has focused on decreasing stress, primarily through not watching the news as much.      Follow-up reason(s): routine follow up.     Hypertension    Readings are trending down   Patient is not experiencing signs/symptoms of hypotension.          Last 5 Patient Entered Readings                                      Current 30 Day Average: 128/67     Recent Readings 9/14/2020 9/11/2020 9/9/2020 9/7/2020 9/4/2020    SBP (mmHg) 125 119 127 124 129    DBP (mmHg) 68 61 60 57 59    Pulse 80 75 71 75 64               Screenings    ASSESSMENT(S)  Patients BP average is 128/67 mmHg, which is at goal. Patient's BP goal is less than or equal to 130/80 per 2017 ACC/AHA Hypertension Guidelines.    Hypertension Plan  Continue current therapy.       Addressed patient questions and patient has my contact information if needed prior to next outreach. Patient verbalizes understanding.            There are no preventive care reminders to display for this patient.  There are no preventive care reminders to display for this patient.    Hypertension Medications             olmesartan (BENICAR) 5 MG Tab Take 2 tablets (10 mg total) by mouth once daily.

## 2020-09-21 NOTE — PROGRESS NOTES
Digital Medicine: Health  Follow-Up    The history is provided by the patient.             Reason for review: Blood pressure at goal        Topics Covered on Call: physical activity and Diet    Additional Follow-up details: Average blood pressure today is 125/66. Blood pressure is stable and below goal. Patient is doing well and denies concerns.           Diet-no change to diet    No change to diet.  Patient reports eating or drinking the following: She is still watching sodium intake and doing well with diet.       Physical Activity-no change to routine  No change to exercise routine.       Additional physical activity details: She has still been getting her exercise in and staying active.       Medication Adherence-Medication adherence was assessed.      Substance, Sleep, Stress-Not assessed      Continue current diet/physical activity routine.       Addressed patient questions and patient has my contact information if needed prior to next outreach. Patient verbalizes understanding.          There are no preventive care reminders to display for this patient.    Last 5 Patient Entered Readings                                      Current 30 Day Average: 125/66     Recent Readings 9/21/2020 9/18/2020 9/14/2020 9/11/2020 9/9/2020    SBP (mmHg) 118 132 125 119 127    DBP (mmHg) 59 64 68 61 60    Pulse 71 72 80 75 71

## 2020-09-22 NOTE — NURSING
Infusion # 5  Recent Labs? reviewed  Recent Invasive or planned dental procedures? denied  Premeds? Tylenol 500 mg po    Reclast 5 mg administered IV over 15 minutes.   See Vitals and MAR for further details.

## 2020-09-22 NOTE — PLAN OF CARE
Problem: Adult Inpatient Plan of Care  Goal: Plan of Care Review  Outcome: Ongoing, Progressing  Flowsheets (Taken 9/22/2020 1433)  Plan of Care Reviewed With: patient  Goal: Patient-Specific Goal (Individualization)  Outcome: Ongoing, Progressing  Flowsheets (Taken 9/22/2020 1433)  Individualized Care Needs: legs elevated  Anxieties, Fears or Concerns: None  Patient-Specific Goals (Include Timeframe): pt will verbalize understanding of today's infusion POT  Goal: Absence of Hospital-Acquired Illness or Injury  Outcome: Ongoing, Progressing  Goal: Optimal Comfort and Wellbeing  Outcome: Ongoing, Progressing  Intervention: Provide Person-Centered Care  Flowsheets (Taken 9/22/2020 1433)  Trust Relationship/Rapport:   care explained   questions encouraged   choices provided   reassurance provided   emotional support provided   thoughts/feelings acknowledged   empathic listening provided   questions answered

## 2020-10-01 NOTE — PROCEDURES
Small Joint Aspiration/Injection: R long MCP    Date/Time: 10/1/2020 3:20 PM  Performed by: Karina Saeed PA-C  Authorized by: Karina Saeed PA-C     Consent Done?:  Yes (Verbal)  Indications:  Arthritis and pain  Site marked: the procedure site was marked    Timeout: prior to procedure the correct patient, procedure, and site was verified    Prep: patient was prepped and draped in usual sterile fashion      Local anesthesia used?: Yes    Anesthetic total (ml):  0.5    Location:  Long finger  Site:  R long MCP  Needle size:  25 G  Medications:  40 mg methylPREDNISolone acetate 80 mg/mL  Patient tolerance:  Patient tolerated the procedure well with no immediate complications

## 2020-10-01 NOTE — PROCEDURES
Tendon Sheath: R long MCP    Date/Time: 10/1/2020 3:20 PM  Performed by: Karina Saeed PA-C  Authorized by: Karina Saeed PA-C     Consent Done?:  Yes (Verbal)  Indications:  Pain  Site marked: the procedure site was marked    Timeout: prior to procedure the correct patient, procedure, and site was verified    Prep: patient was prepped and draped in usual sterile fashion      Local anesthesia used?: Yes    Anesthesia:  Local infiltration  Location:  Long finger  Site:  R long MCP  Needle size:  25 G  Approach:  Volar  Medications:  40 mg methylPREDNISolone acetate 80 mg/mL  Patient tolerance:  Patient tolerated the procedure well with no immediate complications

## 2020-10-01 NOTE — PROCEDURES
Tendon Sheath: R ring MCP    Date/Time: 10/1/2020 3:20 PM  Performed by: Karina Saeed PA-C  Authorized by: Karina Saeed PA-C     Consent Done?:  Yes (Verbal)  Indications:  Pain  Site marked: the procedure site was marked    Timeout: prior to procedure the correct patient, procedure, and site was verified    Prep: patient was prepped and draped in usual sterile fashion      Local anesthesia used?: Yes    Anesthesia:  Local infiltration  Anesthetic total (ml):  0.5    Location:  Ring finger  Site:  R ring MCP  Medications:  40 mg methylPREDNISolone acetate 80 mg/mL  Patient tolerance:  Patient tolerated the procedure well with no immediate complications

## 2020-10-01 NOTE — PROGRESS NOTES
Patient ID: Chanel Charles is a 65 y.o. female.    Chief Complaint: Pain of the Left Hand and Pain of the Right Hand      HPI: Chanel Charles  is a 65 y.o. female who c/o Pain of the Left Hand and Pain of the Right Hand   for duration of years but worse more recently in the last month or so.  She has no trigger fingers of the bilateral long and middle fingers.  The right side bothers her more because she is right handed.  She is also complaining of pain at the base of both thumbs.  She denies associated numbness and tingling.  She complains of associated locking and catching on the fingers as above.  4/10 in severity.  Quality is constantly aching.  Improved with figure-eight splints, over-the-counter creams, Tylenol, prior injections.  Aggravating factors include gripping.    Past Medical History:   Diagnosis Date    Abnormal stress echo - with no blockage on LHC - 1/16/14 1/7/2014    Acid reflux     Anemia of chronic kidney failure     Anxiety     Arthritis     Awaiting kidney transplant 12/12/2013    CHF (congestive heart failure)     Coronary artery disease     Bare metal stent in 2007    Dilated cardiomyopathy secondary to systemic lupus erythematosus 7/13/2020    ESRD (end stage renal disease)     Secondary to Lupus Nephritis, HD MWF    HTN (hypertension), benign 7/13/2020    Hypercalcemia 7/15/2015    Hyperlipidemia 12/12/2013    Hyperparathyroidism 7/15/2015    Hyperparathyroidism, tertiary 8/7/2015    Immunocompromised state 5/5/2015    ITP (idiopathic thrombocytopenic purpura) 12/12/2013    Living-donor kidney transplant for SLE 4/6/15 4/6/2015    Induced with Thymoglobulin.     Lupus nephritis     Lupus nephritis 5/18/2015    Menopause 7/15/2015    Osteoporosis 12/24/2015    Pericardial effusion s/p pericardiocentesis 12/12/2013    Pleural effusion s/p Thoracentesis and VATS 12/12/2013    Prophylactic immunotherapy 6/29/2015    S/p Bilateral Nephrectomy (Severe  proteinuria) - 2012 3/19/2015    Secondary hyperparathyroidism, renal     Shingles 2012    SLE (systemic lupus erythematosus)      Past Surgical History:   Procedure Laterality Date     SECTION      etopic pregnancy       NEPHRECTOMY      Bilateral 2/2 proteinuria    PARACENTESIS      PERICARDIOCENTESIS      SINUS SURGERY  May 2013    THORACENTESIS       Family History   Problem Relation Age of Onset    Osteoarthritis Mother     Coronary artery disease Father 55         from MI 72 (and had 2 MIs in his 50s-60s)    Rheum arthritis Paternal Uncle     Coronary artery disease Maternal Uncle 48         from MI at 62    Lupus Neg Hx     Psoriasis Neg Hx     Kidney disease Neg Hx      Social History     Socioeconomic History    Marital status:      Spouse name: Not on file    Number of children: Not on file    Years of education: Not on file    Highest education level: Not on file   Occupational History    Not on file   Social Needs    Financial resource strain: Not hard at all    Food insecurity     Worry: Never true     Inability: Never true    Transportation needs     Medical: No     Non-medical: No   Tobacco Use    Smoking status: Former Smoker     Quit date: 2004     Years since quittin.7    Smokeless tobacco: Never Used   Substance and Sexual Activity    Alcohol use: Yes     Frequency: 2-4 times a month     Drinks per session: 1 or 2     Binge frequency: Never     Comment: 1 glass of wine per month or less.    Drug use: No    Sexual activity: Not on file   Lifestyle    Physical activity     Days per week: Not on file     Minutes per session: Not on file    Stress: Not on file   Relationships    Social connections     Talks on phone: More than three times a week     Gets together: More than three times a week     Attends [employer] service: More than 4 times per year     Active member of club or organization: Yes     Attends  meetings of clubs or organizations: More than 4 times per year     Relationship status:    Other Topics Concern    Not on file   Social History Narrative    Wears a seatbelt.     Medication List with Changes/Refills   Current Medications    ALPRAZOLAM (XANAX) 0.5 MG TABLET    Take 0.5 mg by mouth. 1 Tablet Oral Every day.  or as needed.    ASPIRIN (ECOTRIN) 81 MG EC TABLET    Take 81 mg by mouth once daily.     ATORVASTATIN (LIPITOR) 80 MG TABLET    Take 80 mg by mouth every evening. 1 Tablet Oral Every day    BIOTIN ORAL    Take 1,000 mg by mouth Daily.     ESZOPICLONE (LUNESTA) 2 MG TAB    Take 2 mg by mouth. 1 Tablet Oral As directed.  1 tablet 1 time as needed at bedtime.    FAMOTIDINE (PEPCID) 20 MG TABLET    Take 1 tablet (20 mg total) by mouth every evening.    FLUTICASONE (FLONASE) 50 MCG/ACTUATION NASAL SPRAY    1 spray by Each Nare route once daily.    HYDROXYCHLOROQUINE (PLAQUENIL) 200 MG TABLET    TAKE 1 TABLET DAILY    LORATADINE (CLARITIN) 10 MG TABLET    Take 1 tablet (10 mg total) by mouth daily as needed for Allergies.    MAGNESIUM OXIDE (MAG-OX) 400 MG TABLET    Take 1 tablet (400 mg total) by mouth once daily.    MULTIVITAMIN (THERAGRAN) TABLET    Take 1 tablet by mouth once daily.    MYCOPHENOLATE (CELLCEPT) 250 MG CAP    TAKE TWO CAPSULES BY MOUTH TWICE A DAY    OLMESARTAN (BENICAR) 5 MG TAB    Take 2 tablets (10 mg total) by mouth once daily.    TACROLIMUS (PROGRAF) 1 MG CAP    Take 1 capsules (1 mg) by mouth every morning and 1 capsule (1 mg )by mouth every evening. Z94.0 kidney transplant.    VALACYCLOVIR (VALTREX) 1000 MG TABLET    Take 1 tablet by mouth as needed.     VITAMIN D 1000 UNITS TAB    Take 2,000 Units by mouth every Monday.      Review of patient's allergies indicates:   Allergen Reactions    Heparin analogues Other (See Comments)     Low platelets    Escitalopram oxalate      Felt anxious    Gabapentin      Other reaction(s): Hallucinations       Objective:         General    Nursing note and vitals reviewed.  Constitutional: She is oriented to person, place, and time. She appears well-developed and well-nourished.   HENT:   Head: Normocephalic and atraumatic.   Eyes: EOM are normal.   Neck: Normal range of motion.   Cardiovascular: Normal rate and regular rhythm.    Pulmonary/Chest: Effort normal. No respiratory distress.   Abdominal: Soft.   Neurological: She is alert and oriented to person, place, and time.   Psychiatric: She has a normal mood and affect. Her behavior is normal.             Right Hand/Wrist Exam     Inspection   Scars: Wrist - absent Hand -  absent  Effusion: Wrist - absent Hand -  absent  Bruising: Wrist - absent Hand -  absent  Deformity: Wrist - deformity Hand -  deformity    Tenderness   The patient is tender to palpation of the duran area.    Range of Motion     Wrist   Extension: normal   Flexion: normal   Pronation: normal   Supination: normal     Tests     Atrophy   Thenar:  negative  Hypothenar:  negative  Intrinsic:  positive  1st Dorsal Interosseous: negative    Other     Neuorologic Exam    Median Distribution: normal  Ulnar Distribution: normal  Radial Distribution: normal    Comments:  2+ radial pulse  TTP A1 pulley long and middle fingers  Active triggering on exam long and middle fingers    TTP 1st cmc  Pain with rotatory compression  (+) crepitus      Left Hand/Wrist Exam     Inspection   Scars: Wrist - absent Hand -  absent  Effusion: Wrist - absent Hand -  absent  Bruising: Wrist - absent Hand -  absent  Deformity: Wrist - absent Hand -  absent    Tenderness   The patient is tender to palpation of the duran area.     Range of Motion     Wrist   Extension: normal   Flexion: normal   Pronation: normal   Supination: normal     Tests     Atrophy  Thenar:  Negative  Hypothenar:  negative  Intrinsic: positive  1st Dorsal Interosseous:  negative    Other     Sensory Exam  Median Distribution: normal  Ulnar Distribution: normal  Radial  Distribution: normal    Comments:  2+ radial pulse  TTP A1 pulley long and middle fingers  Active triggering today long and middle fingers    TTP 1st cmc  Pain with rotatory compression  (+) crepitus          Muscle Strength   Right Upper Extremity   Wrist extension: 5/5   Wrist flexion: 5/5   : 4/5   Pinch Mechanism: 5/5  Left Upper Extremity  Wrist extension: 5/5   Wrist flexion: 5/5   :  4/5   Pinch Mechanism: 4/5    Vascular Exam       Capillary Refill  Right Hand: normal capillary refill  Left Hand: normal capillary refill      Xray Images and report were reviewed today.  I agree with the radiologist's interpretation.    X-Ray Hand 3 View Bilateral  Order: [id number]  Status:  Final result   Visible to patient:  Yes (Patient Portal) Next appt:  10/27/2020 at 10:30 AM in Rheumatology (Lexii Nation MD) Dx:  Bilateral hand pain  Details    Reading Physician Reading Date Result Priority   Carlos A Quiroga MD  [phone] 6/10/2019 Routine      Narrative & Impression     EXAMINATION:  XR HAND COMPLETE 3 VIEWS BILATERAL     CLINICAL HISTORY:  . Pain in right hand     TECHNIQUE:  AP, lateral, and oblique views of both hands were performed.     COMPARISON:  10/23/2018     FINDINGS:  Right hand: There is no radiographic evidence of acute osseous, articular, or soft tissue abnormality.  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  No erosive changes demonstrated     Left hand: There is no radiographic evidence of acute osseous, articular, or soft tissue abnormality.  Severe degenerative findings again noted at the 1st CMC joint.  No erosive changes demonstrated.  No appreciable change from prior     Impression:     Degenerative changes as above.  No acute findings.        Electronically signed by: Carlos A Quiroga MD  Date:                                            06/10/2019  Time:                                           14:07               Assessment:        Encounter Diagnoses   Name Primary?    Trigger middle finger of right hand Yes    Trigger finger, right ring finger     Trigger ring finger of left hand     Trigger finger, left middle finger     Arthritis of carpometacarpal (CMC) joint of left thumb     Arthritis of carpometacarpal (CMC) joint of right thumb           Plan:       Chanel was seen today for pain and pain.    Diagnoses and all orders for this visit:    Trigger middle finger of right hand  -     Cancel: Small Joint Aspiration/Injection: R long MCP  -     Tendon Sheath: R long MCP    Trigger finger, right ring finger  -     Tendon Sheath: R ring MCP    Trigger ring finger of left hand    Trigger finger, left middle finger    Arthritis of carpometacarpal (CMC) joint of left thumb    Arthritis of carpometacarpal (CMC) joint of right thumb    Other orders  -     Cancel: Small Joint Aspiration/Injection        Chanel Charles is an established pt who comes in today for worsening symptoms of established problems.  We have discussed risks and benefits of corticosteroid injection.  She is elected to have the right long and ring trigger fingers injected.  I will put her into some thumb spica splints for symptomatic relief that she can use during the day for bilateral basal joint arthritis.  She can do warm water soaks 2 to 3 times a day p.r.n..  She can continue Tylenol and over-the-counter creams.  I will see her back in the office in about a month.  We can consider injecting the other 2 trigger fingers at that time verses the CMC arthritic joints.  She verbalized understanding and agrees.    Follow up in about 1 month (around 11/1/2020) for no xray.    The patient understands, chooses and consents to this plan and accepts all   the risks which include but are not limited to the risks mentioned above.     Disclaimer: This note was prepared using a voice recognition system and is likely to have sound alike errors within the text.

## 2020-10-27 NOTE — PROGRESS NOTES
Rapid3 Question Responses and Scores 10/26/2020   MDHAQ Score 0   Psychologic Score 1.1   Pain Score 1   When you awakened in the morning OVER THE LAST WEEK, did you feel stiff? No   If Yes, please indicate the number of hours until you are as limber as you will be for the day -   Fatigue Score 0   Global Health Score 0.5   RAPID3 Score 0.5       Answers for HPI/ROS submitted by the patient on 10/26/2020   fever: No  eye redness: No  mouth sores: No  headaches: No  shortness of breath: No  chest pain: No  trouble swallowing: No  diarrhea: No  constipation: No  unexpected weight change: No  genital sore: No  dysuria: No  During the last 3 days, have you had a skin rash?: No  Bruises or bleeds easily: No  cough: No

## 2020-10-27 NOTE — PROGRESS NOTES
CC: Lupus       HPI:  is a 65 y.o. female with SLE and renal failure as well an extensive history of recurrent pleural effusions and pericardial effusion treated with recurrent thoracentesis (x3) and pericardiocentesis. She is s/p kidney transplant 4/6/2015. Patient in the past had 3-4 paracentesis for accumulation of abdominal fluid (last time was December 2011). She was hospitalized 4/2012 for acute altered mental status, slurred speech concerning for CVA. Dr. Ribera (rheum) was consulted and he did not think it was her lupus. It was felt that her AMS was caused by gabapentin and/or valtrex as those were new medications. After these medications were stopped she returned to her baseline function. She has history of a lung nodule and eventual TB results were negative. Patient had VATS procedure 3/2011 which showed a granuloma thought to be due histoplamosis but work up was negative. Patient had kidneys removed 4/2011 to help decrease protein loss from the body.    She was given Rituxan (4 infusions total) and later Nplate for thrombocytopenia by her hematologist and is back on Nplate.   Hematologist is Dr. Shelley () and platelets were 150s or so.  She has been off Nplate since transplant.       Colonoscopy 2019 again showed benign polyps, diverticulosis, internal and external hemrrhoids  Mammogram was negative 1/2020.  Pap smear 8/2020 negative for lesion or malignancy            Interval History:  Doing well.  Had flu vaccination.  Blood pressure being monitored since it was elevated.  Exercising with program.   No oral/nasal ulcers, alopecia, joint pains except for hand pain/trigger finger that improved with injection.  Ortho consider left thumb surgery but it does not bother her much.       Answers for HPI/ROS submitted by the patient on 10/26/2020   fever: No  eye redness: No  mouth sores: No  headaches: No  shortness of breath: No  chest pain: No  trouble swallowing: No  diarrhea:  No  constipation: No  unexpected weight change: No  genital sore: No  dysuria: No  During the last 3 days, have you had a skin rash?: No  Bruises or bleeds easily: No  cough: No       LABS    Component      Latest Ref Rng & Units 9/3/2020 9/3/2020 9/3/2020           7:54 AM  7:30 AM  7:30 AM   WBC      3.90 - 12.70 K/uL   6.04   RBC      4.00 - 5.40 M/uL   4.63   Hemoglobin      12.0 - 16.0 g/dL   11.1 (L)   Hematocrit      37.0 - 48.5 %   36.2 (L)   MCV      82 - 98 fL   78 (L)   MCH      27.0 - 31.0 pg   24.0 (L)   MCHC      32.0 - 36.0 g/dL   30.7 (L)   RDW      11.5 - 14.5 %   16.7 (H)   Platelets      150 - 350 K/uL   141 (L)   MPV      9.2 - 12.9 fL   11.3   Immature Granulocytes      0.0 - 0.5 %   0.2   Gran # (ANC)      1.8 - 7.7 K/uL   2.7   Immature Grans (Abs)      0.00 - 0.04 K/uL   0.01   Lymph #      1.0 - 4.8 K/uL   2.4   Mono #      0.3 - 1.0 K/uL   0.8   Eos #      0.0 - 0.5 K/uL   0.2   Baso #      0.00 - 0.20 K/uL   0.05   nRBC      0 /100 WBC   0   Gran %      38.0 - 73.0 %   44.4   Lymph %      18.0 - 48.0 %   39.1   Mono %      4.0 - 15.0 %   13.1   Eosinophil %      0.0 - 8.0 %   2.6   Basophil %      0.0 - 1.9 %   0.8   Platelet Estimate         Decreased (A)   Aniso         Slight   Poik         Slight   Tear Drop Cells         Occasional   Acanthocytes         Present   Differential Method         Automated   Glucose      70 - 110 mg/dL   87   Sodium      136 - 145 mmol/L   138   Potassium      3.5 - 5.1 mmol/L   5.1   Chloride      95 - 110 mmol/L   107   CO2      23 - 29 mmol/L   21 (L)   BUN      8 - 23 mg/dL   52 (H)   Calcium      8.7 - 10.5 mg/dL   9.8   Creatinine      0.5 - 1.4 mg/dL   1.3   Albumin      3.5 - 5.2 g/dL  3.6 3.6   Phosphorus      2.7 - 4.5 mg/dL   3.7   eGFR if African American      >60 mL/min/1.73 m:2   50 (A)   eGFR if non African American      >60 mL/min/1.73 m:2   43 (A)   Anion Gap      8 - 16 mmol/L   10   Specimen UA       Urine, Clean Catch     Color, UA       Yellow, Straw, Ela Yellow     Appearance, UA      Clear Clear     pH, UA      5.0 - 8.0 6.0     Specific Gravity, UA      1.005 - 1.030 1.010     Protein, UA      Negative 1+ (A)     Glucose, UA      Negative Negative     Ketones, UA      Negative Negative     Bilirubin (UA)      Negative Negative     Occult Blood UA      Negative Trace (A)     NITRITE UA      Negative Negative     Leukocytes, UA      Negative Negative     RBC, UA      0 - 4 /hpf 1     WBC, UA      0 - 5 /hpf 0     Bacteria, UA      None-Occ /hpf None     Hyaline Casts, UA      0-1/lpf /lpf 0     Microscopic Comment       SEE COMMENT     Protein, Urine Random      0 - 15 mg/dL 38 (H)     Creatinine, Urine      15.0 - 325.0 mg/dL 48.0     Prot/Creat Ratio, Urine      0.00 - 0.20 0.79 (H)     Tacrolimus Lvl      5.0 - 15.0 ng/mL   4.3 (L)   Magnesium      1.6 - 2.6 mg/dL   1.8   PTH      9.0 - 77.0 pg/mL   150.0 (H)   Vit D, 25-Hydroxy      30 - 96 ng/mL   39   Alkaline Phosphatase      55 - 135 U/L   71        Assessment:     1. SLE with +TANA hx, arthritis, alopecia, and glomerular nephritis now s/p bilateral nephrectomy then dialysis and s/p renal transplant (4/6/2015).   No evidence of active lupus currently. Doing well.    2. Glomerulonephritis. S/p transplant 4/6/2015.  3. CAD  4. Fatigue  5. Long term Plaquenil use. Off CellCept since 9/2010  6. Serositis  7. Diastolic heart failure. Now resolved.  Now BP is being monitored due to recent elevation.  8. Thrombocytopenia. Still decreased.   9. Elevation in homocysteine levels in March 2012 and started on Folbic.   10. Recurrent falls. No recent falls  11. Hypercalcemia. Being managed endocrine  12. Osteoporosis. Endocrine gave Reclast scheduled 9/2020  13. 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 Being evaluated by neurology.  Mass no longer there.   14. Flexor tendon nodules both hands.       Plan:     1. Continue Plaquenil 200 mg daily.  Last eye exam 6/2020  2. Still off Folbic per transplant  3.  Patient to contact office if feels she is having a lupus flare or any un usual symptoms.   4. Off Nplate still. Follows hematology Dr. Shelley  5. Continue 2 old goats. Continue Voltaren gel for hand pains.  6. Vaccination (pneumonia, [place]) per transplant team and primary doctor.   7. Follow with dermatology in her area for alopecia on top of scalp.   8. Follow up with ortho regarding trigger fingers.  Jayy tape fingers at night.  9. Labs  10. Had Reclast 9/22/2020  11.  Proteinuria. Will evaluate with labs  12.  Monitor BP and work to manage stress.         RTO 3 months/prn       The patient location is: home  The chief complaint leading to consultation is: lupus    Visit type: TELE AUDIOVISUAL:55157    Face to Face time with patient:20 minutes  25 minutes of total time spent on the encounter, which includes face to face time and non-face to face time preparing to see the patient (eg, review of tests), Obtaining and/or reviewing separately obtained history, Documenting clinical information in the electronic or other health record, Independently interpreting results (not separately reported) and communicating results to the patient/family/caregiver, or Care coordination (not separately reported).         Each patient to whom he or she provides medical services by telemedicine is:  (1) informed of the relationship between the physician and patient and the respective role of any other health care provider with respect to management of the patient; and (2) notified that he or she may decline to receive medical services by telemedicine and may withdraw from such care at any time.    Notes: see above

## 2020-11-02 NOTE — PROCEDURES
Tendon Sheath: L long MCP    Date/Time: 11/2/2020 2:20 PM  Performed by: Karina Saeed PA-C  Authorized by: Karina Saeed PA-C     Consent Done?:  Yes (Verbal)  Indications:  Pain  Site marked: the procedure site was marked    Timeout: prior to procedure the correct patient, procedure, and site was verified    Prep: patient was prepped and draped in usual sterile fashion      Local anesthesia used?: Yes    Local anesthetic:  Lidocaine 1% without epinephrine  Anesthetic total (ml):  0.5    Location:  Long finger  Site:  L long MCP  Needle size:  25 G  Medications:  40 mg methylPREDNISolone acetate 80 mg/mL  Patient tolerance:  Patient tolerated the procedure well with no immediate complications

## 2020-11-02 NOTE — PROCEDURES
Small Joint Aspiration/Injection    Date/Time: 11/2/2020 2:20 PM  Performed by: Karina Saeed PA-C  Authorized by: Karina Saeed PA-C     Indications:  Arthritis and pain  Site marked: the procedure site was marked    Timeout: prior to procedure the correct patient, procedure, and site was verified    Prep: patient was prepped and draped in usual sterile fashion      Local anesthetic:  Lidocaine 1% without epinephrine  Anesthetic total (ml):  0.5    Location:  Thumb (rt cmc)  Needle size:  25 G  Medications:  40 mg methylPREDNISolone acetate 80 mg/mL  Patient tolerance:  Patient tolerated the procedure well with no immediate complications

## 2020-11-02 NOTE — PROGRESS NOTES
Patient ID: Chanel Charles is a 65 y.o. female.    Chief Complaint: Pain of the Left Hand and Pain of the Right Hand      HPI: Chanel Charles  is a 65 y.o. female who c/o Pain of the Left Hand and Pain of the Right Hand   for duration of years.  I saw her a month ago and injected the right long and ring trigger fingers.  She comes in today to discuss trigger finger splint a left-handed well as bilateral CMC arthritis.  Pain level presently is 3/10.  The most concerning problems for her today are the left long finger and right CMC joint.  Quality is aching and intermittent.  She complains of associated locking in the long finger of the left hand.  She has trouble with gripping utilizing the right hand.  Alleviating factors include ointments, Tylenol, prior injections for other problems.  Aggravating factors include morning and gripping.    Past Medical History:   Diagnosis Date    Abnormal stress echo - with no blockage on LHC - 1/16/14 1/7/2014    Acid reflux     Anemia of chronic kidney failure     Anxiety     Arthritis     Awaiting kidney transplant 12/12/2013    CHF (congestive heart failure)     Coronary artery disease     Bare metal stent in 2007    Dilated cardiomyopathy secondary to systemic lupus erythematosus 7/13/2020    ESRD (end stage renal disease)     Secondary to Lupus Nephritis, HD MWF    HTN (hypertension), benign 7/13/2020    Hypercalcemia 7/15/2015    Hyperlipidemia 12/12/2013    Hyperparathyroidism 7/15/2015    Hyperparathyroidism, tertiary 8/7/2015    Immunocompromised state 5/5/2015    ITP (idiopathic thrombocytopenic purpura) 12/12/2013    Living-donor kidney transplant for SLE 4/6/15 4/6/2015    Induced with Thymoglobulin.     Lupus nephritis     Lupus nephritis 5/18/2015    Menopause 7/15/2015    Osteoporosis 12/24/2015    Pericardial effusion s/p pericardiocentesis 12/12/2013    Pleural effusion s/p Thoracentesis and VATS 12/12/2013    Prophylactic  immunotherapy 2015    S/p Bilateral Nephrectomy (Severe proteinuria) - 2012 3/19/2015    Secondary hyperparathyroidism, renal     Shingles 2012    SLE (systemic lupus erythematosus)      Past Surgical History:   Procedure Laterality Date     SECTION      etopic pregnancy       NEPHRECTOMY      Bilateral 2/2 proteinuria    PARACENTESIS      PERICARDIOCENTESIS      SINUS SURGERY  May 2013    THORACENTESIS       Family History   Problem Relation Age of Onset    Osteoarthritis Mother     Coronary artery disease Father 55         from MI 72 (and had 2 MIs in his 50s-60s)    Rheum arthritis Paternal Uncle     Coronary artery disease Maternal Uncle 48         from MI at 62    Lupus Neg Hx     Psoriasis Neg Hx     Kidney disease Neg Hx      Social History     Socioeconomic History    Marital status:      Spouse name: Not on file    Number of children: Not on file    Years of education: Not on file    Highest education level: Not on file   Occupational History    Not on file   Social Needs    Financial resource strain: Not hard at all    Food insecurity     Worry: Never true     Inability: Never true    Transportation needs     Medical: No     Non-medical: No   Tobacco Use    Smoking status: Former Smoker     Quit date: 2004     Years since quittin.8    Smokeless tobacco: Never Used   Substance and Sexual Activity    Alcohol use: Yes     Frequency: 2-4 times a month     Drinks per session: 1 or 2     Binge frequency: Never     Comment: 1 glass of wine per month or less.    Drug use: No    Sexual activity: Not on file   Lifestyle    Physical activity     Days per week: Not on file     Minutes per session: Not on file    Stress: Not on file   Relationships    Social connections     Talks on phone: More than three times a week     Gets together: More than three times a week     Attends Spiritism service: More than 4 times per  year     Active member of club or organization: Yes     Attends meetings of clubs or organizations: More than 4 times per year     Relationship status:    Other Topics Concern    Not on file   Social History Narrative    Wears a seatbelt.     Medication List with Changes/Refills   Current Medications    ALPRAZOLAM (XANAX) 0.5 MG TABLET    Take 0.5 mg by mouth. 1 Tablet Oral Every day.  or as needed.    ASPIRIN (ECOTRIN) 81 MG EC TABLET    Take 81 mg by mouth once daily.     ATORVASTATIN (LIPITOR) 80 MG TABLET    Take 80 mg by mouth every evening. 1 Tablet Oral Every day    BIOTIN ORAL    Take 1,000 mg by mouth Daily.     ESZOPICLONE (LUNESTA) 2 MG TAB    Take 2 mg by mouth. 1 Tablet Oral As directed.  1 tablet 1 time as needed at bedtime.    FAMOTIDINE (PEPCID) 20 MG TABLET    Take 1 tablet (20 mg total) by mouth every evening.    FLUTICASONE (FLONASE) 50 MCG/ACTUATION NASAL SPRAY    1 spray by Each Nare route once daily.    HYDROXYCHLOROQUINE (PLAQUENIL) 200 MG TABLET    TAKE 1 TABLET DAILY    LORATADINE (CLARITIN) 10 MG TABLET    Take 1 tablet (10 mg total) by mouth daily as needed for Allergies.    MAGNESIUM OXIDE (MAG-OX) 400 MG TABLET    Take 1 tablet (400 mg total) by mouth once daily.    MULTIVITAMIN (THERAGRAN) TABLET    Take 1 tablet by mouth once daily.    MYCOPHENOLATE (CELLCEPT) 250 MG CAP    TAKE TWO CAPSULES BY MOUTH TWICE A DAY    OLMESARTAN (BENICAR) 5 MG TAB    Take 2 tablets (10 mg total) by mouth once daily.    TACROLIMUS (PROGRAF) 1 MG CAP    Take 1 capsules (1 mg) by mouth every morning and 1 capsule (1 mg )by mouth every evening. Z94.0 kidney transplant.    VALACYCLOVIR (VALTREX) 1000 MG TABLET    Take 1 tablet by mouth as needed.     VITAMIN D 1000 UNITS TAB    Take 2,000 Units by mouth every Monday.      Review of patient's allergies indicates:   Allergen Reactions    Heparin analogues Other (See Comments)     Low platelets    Escitalopram oxalate      Felt anxious    Gabapentin       Other reaction(s): Hallucinations       Objective:        General    Nursing note and vitals reviewed.  Constitutional: She is oriented to person, place, and time. She appears well-developed and well-nourished.   HENT:   Head: Normocephalic and atraumatic.   Eyes: EOM are normal.   Cardiovascular: Normal rate and regular rhythm.    Pulmonary/Chest: Effort normal.   Neurological: She is alert and oriented to person, place, and time.   Psychiatric: She has a normal mood and affect. Her behavior is normal.             Right Hand/Wrist Exam     Inspection   Scars: Wrist - absent Hand -  absent  Effusion: Wrist - absent Hand -  absent  Bruising: Wrist - absent Hand -  absent  Deformity: Wrist - deformity Hand -  deformity    Tenderness   The patient is tender to palpation of the duran area.    Range of Motion     Wrist   Extension: normal   Flexion: normal   Pronation: normal   Supination: normal     Tests     Atrophy   Thenar:  negative  Hypothenar:  negative  Intrinsic:  positive  1st Dorsal Interosseous: negative    Other     Neuorologic Exam    Median Distribution: normal  Ulnar Distribution: normal  Radial Distribution: normal    Comments:  2+ radial pulse  TTP A1 pulley long and middle fingers  No active triggering on exam long and middle fingers    TTP 1st cmc  Pain with rotatory compression  (+) crepitus      Left Hand/Wrist Exam     Inspection   Scars: Wrist - absent Hand -  absent  Effusion: Wrist - absent Hand -  absent  Bruising: Wrist - absent Hand -  absent  Deformity: Wrist - absent Hand -  absent    Tenderness   The patient is tender to palpation of the duran area.     Range of Motion     Wrist   Extension: normal   Flexion: normal   Pronation: normal   Supination: normal     Tests     Atrophy  Thenar:  Negative  Hypothenar:  negative  Intrinsic: positive  1st Dorsal Interosseous:  negative    Other     Sensory Exam  Median Distribution: normal  Ulnar Distribution: normal  Radial Distribution:  normal    Comments:  2+ radial pulse  TTP A1 pulley long fingers  Active triggering today long fingers  No active tirggering ring finger    TTP 1st cmc  Pain with rotatory compression  (+) crepitus          Muscle Strength   Right Upper Extremity   Wrist extension: 5/5   Wrist flexion: 5/5   : 4/5   Pinch Mechanism: 5/5  Left Upper Extremity  Wrist extension: 5/5   Wrist flexion: 5/5   :  4/5   Pinch Mechanism: 4/5    Vascular Exam       Capillary Refill  Right Hand: normal capillary refill  Left Hand: normal capillary refill      Xray Images and report were reviewed today.  I agree with the radiologist's interpretation.    X-Ray Hand 3 View Bilateral  Order: [id number]  Status:  Final result   Visible to patient:  Yes (Patient Portal) Next appt:  01/21/2021 at 09:40 AM in Lab (LABORATORY, [facility]) Dx:  Bilateral hand pain  Details    Reading Physician Reading Date Result Priority   Carlos A Quiroga MD  [phone] 6/10/2019 Routine      Narrative & Impression     EXAMINATION:  XR HAND COMPLETE 3 VIEWS BILATERAL     CLINICAL HISTORY:  . Pain in right hand     TECHNIQUE:  AP, lateral, and oblique views of both hands were performed.     COMPARISON:  10/23/2018     FINDINGS:  Right hand: There is no radiographic evidence of acute osseous, articular, or soft tissue abnormality.  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  No erosive changes demonstrated     Left hand: There is no radiographic evidence of acute osseous, articular, or soft tissue abnormality.  Severe degenerative findings again noted at the 1st CMC joint.  No erosive changes demonstrated.  No appreciable change from prior     Impression:     Degenerative changes as above.  No acute findings.        Electronically signed by: Carlos A Quiroga MD  Date:                                            06/10/2019  Time:                                           14:07               Assessment:       Encounter Diagnoses    Name Primary?    Trigger finger, left middle finger Yes    Trigger ring finger of left hand     Arthritis of carpometacarpal (CMC) joint of left thumb     Arthritis of carpometacarpal (CMC) joint of right thumb           Plan:       Chanel was seen today for pain and pain.    Diagnoses and all orders for this visit:    Trigger finger, left middle finger  -     Tendon Sheath: R long MCP    Trigger ring finger of left hand    Arthritis of carpometacarpal (CMC) joint of left thumb    Arthritis of carpometacarpal (CMC) joint of right thumb  -     Small Joint Aspiration/Injection        Chanel Charles is an established pt who comes in today for follow-up on the above.  Given that the left middle finger and right CMC joint are most problematic for her, we have discussed risks and benefits of injecting those 2 areas.  She wishes to proceed.  The left ring finger is less of an issue for her.  The left thumb does pretty well.  She may continue with intermittent thumb spica splint as needed for symptomatic relief.  If pain worsens or does not improve after injections, I would recommend referral to Dr. Guerrero for consideration of surgical management.  She verbalizes understanding and agrees.  She will follow up with me p.r.n..    Follow up if symptoms worsen or fail to improve.    The patient understands, chooses and consents to this plan and accepts all   the risks which include but are not limited to the risks mentioned above.     Disclaimer: This note was prepared using a voice recognition system and is likely to have sound alike errors within the text.

## 2020-11-16 NOTE — PROGRESS NOTES
Digital Medicine: Health  Follow-Up    The history is provided by the patient.             Reason for review: Blood pressure at goal        Topics Covered on Call: physical activity and Diet    Additional Follow-up details: Average blood pressure today is 126/61. Blood pressure has been a little elevated but still below goal. She reports that she was having really bad arthritis pain in her thumb and had to get a cortisone shot. She is feeling better now and hoping blood pressure will go back down again.             Diet-no change to diet    No change to diet.  Patient reports eating or drinking the following: Denies changes and still working on low sodium diet.       Physical Activity-no change to routine  No change to exercise routine.       Additional physical activity details: She is still getting her exercise in MWF. This is working well for her.       Medication Adherence-Medication adherence was assessed.      Substance, Sleep, Stress-Not assessed      Continue current diet/physical activity routine.       Addressed patient questions and patient has my contact information if needed prior to next outreach. Patient verbalizes understanding.             There are no preventive care reminders to display for this patient.      Last 5 Patient Entered Readings                                      Current 30 Day Average: 126/61     Recent Readings 11/13/2020 11/11/2020 11/10/2020 11/9/2020 11/9/2020    SBP (mmHg) 127 134 116 141 141    DBP (mmHg) 64 61 64 64 60    Pulse 72 82 81 78 77

## 2021-01-01 ENCOUNTER — TELEPHONE (OUTPATIENT)
Dept: TRANSPLANT | Facility: CLINIC | Age: 66
End: 2021-01-01

## 2021-01-01 ENCOUNTER — LAB VISIT (OUTPATIENT)
Dept: LAB | Facility: HOSPITAL | Age: 66
End: 2021-01-01
Attending: INTERNAL MEDICINE
Payer: COMMERCIAL

## 2021-01-01 ENCOUNTER — HOSPITAL ENCOUNTER (OUTPATIENT)
Dept: RADIOLOGY | Facility: HOSPITAL | Age: 66
Discharge: HOME | End: 2021-03-09
Attending: INTERNAL MEDICINE
Payer: MEDICARE

## 2021-01-01 ENCOUNTER — PATIENT MESSAGE (OUTPATIENT)
Dept: TRANSPLANT | Facility: CLINIC | Age: 66
End: 2021-01-01

## 2021-01-01 ENCOUNTER — OFFICE VISIT (OUTPATIENT)
Dept: TRANSPLANT | Facility: CLINIC | Age: 66
End: 2021-01-01
Payer: COMMERCIAL

## 2021-01-01 ENCOUNTER — PATIENT MESSAGE (OUTPATIENT)
Dept: RHEUMATOLOGY | Facility: CLINIC | Age: 66
End: 2021-01-01

## 2021-01-01 ENCOUNTER — PATIENT MESSAGE (OUTPATIENT)
Dept: NEPHROLOGY | Facility: CLINIC | Age: 66
End: 2021-01-01

## 2021-01-01 ENCOUNTER — LAB VISIT (OUTPATIENT)
Dept: LAB | Facility: HOSPITAL | Age: 66
End: 2021-01-01
Attending: INTERNAL MEDICINE
Payer: MEDICARE

## 2021-01-01 ENCOUNTER — CLINICAL SUPPORT (OUTPATIENT)
Dept: TRANSPLANT | Facility: CLINIC | Age: 66
End: 2021-01-01
Payer: COMMERCIAL

## 2021-01-01 ENCOUNTER — INFUSION (OUTPATIENT)
Dept: INFUSION THERAPY | Facility: HOSPITAL | Age: 66
End: 2021-01-01
Attending: INTERNAL MEDICINE
Payer: COMMERCIAL

## 2021-01-01 ENCOUNTER — ANESTHESIA EVENT (OUTPATIENT)
Dept: MEDSURG UNIT | Facility: HOSPITAL | Age: 66
DRG: 641 | End: 2021-01-01
Payer: MEDICARE

## 2021-01-01 ENCOUNTER — ANESTHESIA (OUTPATIENT)
Dept: MEDSURG UNIT | Facility: HOSPITAL | Age: 66
DRG: 641 | End: 2021-01-01
Payer: COMMERCIAL

## 2021-01-01 ENCOUNTER — PATIENT MESSAGE (OUTPATIENT)
Dept: ADMINISTRATIVE | Facility: OTHER | Age: 66
End: 2021-01-01

## 2021-01-01 ENCOUNTER — HOSPITAL ENCOUNTER (OUTPATIENT)
Dept: TRANSFUSION MEDICINE | Facility: HOSPITAL | Age: 66
Discharge: HOME | End: 2021-03-22
Attending: INTERNAL MEDICINE
Payer: MEDICARE

## 2021-01-01 ENCOUNTER — PATIENT MESSAGE (OUTPATIENT)
Dept: NEPHROLOGY | Facility: HOSPITAL | Age: 66
End: 2021-01-01

## 2021-01-01 ENCOUNTER — TELEPHONE (OUTPATIENT)
Dept: RADIOLOGY | Facility: HOSPITAL | Age: 66
End: 2021-01-01

## 2021-01-01 ENCOUNTER — HOSPITAL ENCOUNTER (OUTPATIENT)
Dept: TRANSFUSION MEDICINE | Facility: HOSPITAL | Age: 66
Discharge: HOME | End: 2021-03-17
Attending: INTERNAL MEDICINE
Payer: MEDICARE

## 2021-01-01 ENCOUNTER — OFFICE VISIT (OUTPATIENT)
Dept: RHEUMATOLOGY | Facility: CLINIC | Age: 66
End: 2021-01-01
Payer: COMMERCIAL

## 2021-01-01 ENCOUNTER — TELEPHONE (OUTPATIENT)
Dept: NEPHROLOGY | Facility: CLINIC | Age: 66
End: 2021-01-01

## 2021-01-01 ENCOUNTER — OFFICE VISIT (OUTPATIENT)
Dept: DERMATOLOGY | Facility: CLINIC | Age: 66
End: 2021-01-01
Payer: COMMERCIAL

## 2021-01-01 ENCOUNTER — HOSPITAL ENCOUNTER (OUTPATIENT)
Dept: TRANSFUSION MEDICINE | Facility: HOSPITAL | Age: 66
Discharge: HOME | End: 2021-03-16
Attending: INTERNAL MEDICINE
Payer: MEDICARE

## 2021-01-01 ENCOUNTER — LAB VISIT (OUTPATIENT)
Dept: LAB | Facility: HOSPITAL | Age: 66
End: 2021-01-01
Payer: COMMERCIAL

## 2021-01-01 ENCOUNTER — HOSPITAL ENCOUNTER (OUTPATIENT)
Dept: TRANSFUSION MEDICINE | Facility: HOSPITAL | Age: 66
Discharge: HOME | End: 2021-03-19
Attending: INTERNAL MEDICINE
Payer: MEDICARE

## 2021-01-01 ENCOUNTER — PATIENT MESSAGE (OUTPATIENT)
Dept: DERMATOLOGY | Facility: CLINIC | Age: 66
End: 2021-01-01

## 2021-01-01 ENCOUNTER — OFFICE VISIT (OUTPATIENT)
Dept: ELECTROPHYSIOLOGY | Facility: CLINIC | Age: 66
End: 2021-01-01
Payer: COMMERCIAL

## 2021-01-01 ENCOUNTER — HOSPITAL ENCOUNTER (INPATIENT)
Facility: HOSPITAL | Age: 66
LOS: 5 days | Discharge: HOME | DRG: 641 | End: 2021-04-07
Attending: EMERGENCY MEDICINE | Admitting: INTERNAL MEDICINE
Payer: MEDICARE

## 2021-01-01 ENCOUNTER — LAB VISIT (OUTPATIENT)
Dept: LAB | Facility: HOSPITAL | Age: 66
End: 2021-01-01
Payer: MEDICARE

## 2021-01-01 ENCOUNTER — TELEPHONE (OUTPATIENT)
Dept: INFUSION THERAPY | Facility: HOSPITAL | Age: 66
End: 2021-01-01

## 2021-01-01 ENCOUNTER — HOSPITAL ENCOUNTER (OUTPATIENT)
Dept: CARDIOLOGY | Facility: CLINIC | Age: 66
Discharge: HOME | End: 2021-05-31
Payer: COMMERCIAL

## 2021-01-01 ENCOUNTER — OFFICE VISIT (OUTPATIENT)
Dept: TRANSPLANT | Facility: CLINIC | Age: 66
End: 2021-01-01
Payer: MEDICARE

## 2021-01-01 ENCOUNTER — DOCUMENTATION ONLY (OUTPATIENT)
Dept: TRANSPLANT | Facility: CLINIC | Age: 66
End: 2021-01-01

## 2021-01-01 ENCOUNTER — HOSPITAL ENCOUNTER (OUTPATIENT)
Dept: TRANSFUSION MEDICINE | Facility: HOSPITAL | Age: 66
Discharge: HOME | End: 2021-03-15
Attending: INTERNAL MEDICINE
Payer: MEDICARE

## 2021-01-01 ENCOUNTER — OFFICE VISIT (OUTPATIENT)
Dept: NEPHROLOGY | Facility: CLINIC | Age: 66
End: 2021-01-01
Payer: MEDICARE

## 2021-01-01 ENCOUNTER — TELEPHONE (OUTPATIENT)
Dept: RHEUMATOLOGY | Facility: CLINIC | Age: 66
End: 2021-01-01

## 2021-01-01 ENCOUNTER — HOSPITAL ENCOUNTER (EMERGENCY)
Facility: HOSPITAL | Age: 66
Discharge: LEFT BEFORE DISCHARGE | End: 2021-04-01
Payer: COMMERCIAL

## 2021-01-01 VITALS
BODY MASS INDEX: 23.04 KG/M2 | WEIGHT: 130.06 LBS | RESPIRATION RATE: 16 BRPM | DIASTOLIC BLOOD PRESSURE: 62 MMHG | TEMPERATURE: 98 F | SYSTOLIC BLOOD PRESSURE: 138 MMHG | OXYGEN SATURATION: 98 % | HEART RATE: 79 BPM | HEIGHT: 63 IN

## 2021-01-01 VITALS
WEIGHT: 134.69 LBS | TEMPERATURE: 98 F | TEMPERATURE: 98 F | OXYGEN SATURATION: 97 % | HEART RATE: 97 BPM | BODY MASS INDEX: 23.86 KG/M2 | WEIGHT: 134.69 LBS | HEART RATE: 91 BPM | RESPIRATION RATE: 18 BRPM | HEIGHT: 63 IN | OXYGEN SATURATION: 97 % | RESPIRATION RATE: 18 BRPM | DIASTOLIC BLOOD PRESSURE: 62 MMHG | HEIGHT: 63 IN | BODY MASS INDEX: 23.86 KG/M2 | SYSTOLIC BLOOD PRESSURE: 160 MMHG | DIASTOLIC BLOOD PRESSURE: 62 MMHG | SYSTOLIC BLOOD PRESSURE: 160 MMHG

## 2021-01-01 VITALS
OXYGEN SATURATION: 100 % | TEMPERATURE: 98 F | DIASTOLIC BLOOD PRESSURE: 59 MMHG | DIASTOLIC BLOOD PRESSURE: 67 MMHG | BODY MASS INDEX: 23.03 KG/M2 | HEART RATE: 62 BPM | HEART RATE: 76 BPM | HEIGHT: 63 IN | RESPIRATION RATE: 20 BRPM | TEMPERATURE: 98 F | WEIGHT: 130 LBS | WEIGHT: 130 LBS | SYSTOLIC BLOOD PRESSURE: 147 MMHG | RESPIRATION RATE: 18 BRPM | BODY MASS INDEX: 23.04 KG/M2 | OXYGEN SATURATION: 99 % | SYSTOLIC BLOOD PRESSURE: 121 MMHG

## 2021-01-01 VITALS
HEART RATE: 71 BPM | RESPIRATION RATE: 18 BRPM | RESPIRATION RATE: 18 BRPM | SYSTOLIC BLOOD PRESSURE: 130 MMHG | OXYGEN SATURATION: 100 % | OXYGEN SATURATION: 99 % | HEIGHT: 63 IN | HEART RATE: 71 BPM | WEIGHT: 128.88 LBS | DIASTOLIC BLOOD PRESSURE: 58 MMHG | SYSTOLIC BLOOD PRESSURE: 135 MMHG | TEMPERATURE: 97 F | BODY MASS INDEX: 22.84 KG/M2 | TEMPERATURE: 98 F | BODY MASS INDEX: 23.08 KG/M2 | WEIGHT: 130.31 LBS | DIASTOLIC BLOOD PRESSURE: 65 MMHG

## 2021-01-01 VITALS
OXYGEN SATURATION: 95 % | HEIGHT: 63 IN | SYSTOLIC BLOOD PRESSURE: 119 MMHG | WEIGHT: 137.81 LBS | BODY MASS INDEX: 24.42 KG/M2 | DIASTOLIC BLOOD PRESSURE: 56 MMHG | HEART RATE: 67 BPM | RESPIRATION RATE: 18 BRPM

## 2021-01-01 VITALS
HEART RATE: 73 BPM | WEIGHT: 130 LBS | TEMPERATURE: 98 F | BODY MASS INDEX: 23.04 KG/M2 | DIASTOLIC BLOOD PRESSURE: 71 MMHG | RESPIRATION RATE: 18 BRPM | SYSTOLIC BLOOD PRESSURE: 134 MMHG | HEIGHT: 63 IN | SYSTOLIC BLOOD PRESSURE: 134 MMHG | OXYGEN SATURATION: 100 % | BODY MASS INDEX: 23.04 KG/M2 | DIASTOLIC BLOOD PRESSURE: 60 MMHG | HEART RATE: 72 BPM | DIASTOLIC BLOOD PRESSURE: 76 MMHG | SYSTOLIC BLOOD PRESSURE: 128 MMHG | RESPIRATION RATE: 18 BRPM | TEMPERATURE: 98 F | HEART RATE: 76 BPM | HEIGHT: 63 IN | WEIGHT: 130 LBS | SYSTOLIC BLOOD PRESSURE: 128 MMHG | HEART RATE: 72 BPM | SYSTOLIC BLOOD PRESSURE: 146 MMHG | HEART RATE: 67 BPM | TEMPERATURE: 98 F | RESPIRATION RATE: 18 BRPM | DIASTOLIC BLOOD PRESSURE: 72 MMHG | DIASTOLIC BLOOD PRESSURE: 61 MMHG | TEMPERATURE: 98 F

## 2021-01-01 VITALS
SYSTOLIC BLOOD PRESSURE: 132 MMHG | WEIGHT: 131.94 LBS | BODY MASS INDEX: 23.04 KG/M2 | TEMPERATURE: 98 F | HEART RATE: 74 BPM | SYSTOLIC BLOOD PRESSURE: 129 MMHG | OXYGEN SATURATION: 100 % | BODY MASS INDEX: 23.37 KG/M2 | TEMPERATURE: 98 F | WEIGHT: 130 LBS | RESPIRATION RATE: 18 BRPM | HEART RATE: 71 BPM | DIASTOLIC BLOOD PRESSURE: 79 MMHG | DIASTOLIC BLOOD PRESSURE: 64 MMHG | HEIGHT: 63 IN

## 2021-01-01 VITALS
SYSTOLIC BLOOD PRESSURE: 147 MMHG | TEMPERATURE: 99 F | RESPIRATION RATE: 16 BRPM | DIASTOLIC BLOOD PRESSURE: 69 MMHG | HEART RATE: 71 BPM | OXYGEN SATURATION: 99 %

## 2021-01-01 VITALS
SYSTOLIC BLOOD PRESSURE: 129 MMHG | HEART RATE: 70 BPM | DIASTOLIC BLOOD PRESSURE: 67 MMHG | WEIGHT: 135.81 LBS | BODY MASS INDEX: 24.06 KG/M2 | HEIGHT: 63 IN

## 2021-01-01 VITALS
DIASTOLIC BLOOD PRESSURE: 54 MMHG | HEART RATE: 66 BPM | WEIGHT: 136.69 LBS | BODY MASS INDEX: 24.22 KG/M2 | SYSTOLIC BLOOD PRESSURE: 123 MMHG | HEIGHT: 63 IN

## 2021-01-01 VITALS
HEART RATE: 79 BPM | SYSTOLIC BLOOD PRESSURE: 130 MMHG | WEIGHT: 134.25 LBS | DIASTOLIC BLOOD PRESSURE: 70 MMHG | BODY MASS INDEX: 23.79 KG/M2 | RESPIRATION RATE: 16 BRPM | HEIGHT: 63 IN

## 2021-01-01 DIAGNOSIS — E87.5 HYPERKALEMIA: ICD-10-CM

## 2021-01-01 DIAGNOSIS — E53.8 FOLIC ACID DEFICIENCY: ICD-10-CM

## 2021-01-01 DIAGNOSIS — N25.81 SECONDARY HYPERPARATHYROIDISM OF RENAL ORIGIN: ICD-10-CM

## 2021-01-01 DIAGNOSIS — I10 HTN (HYPERTENSION), BENIGN: ICD-10-CM

## 2021-01-01 DIAGNOSIS — Z79.60 LONG-TERM USE OF IMMUNOSUPPRESSANT MEDICATION: ICD-10-CM

## 2021-01-01 DIAGNOSIS — T86.10 COMPLICATION OF TRANSPLANTED KIDNEY, UNSPECIFIED COMPLICATION: ICD-10-CM

## 2021-01-01 DIAGNOSIS — Z94.0 KIDNEY REPLACED BY TRANSPLANT: ICD-10-CM

## 2021-01-01 DIAGNOSIS — M65.331 TRIGGER MIDDLE FINGER OF RIGHT HAND: ICD-10-CM

## 2021-01-01 DIAGNOSIS — N18.2 STAGE 2 CHRONIC KIDNEY DISEASE: ICD-10-CM

## 2021-01-01 DIAGNOSIS — Z79.899 IMMUNOSUPPRESSIVE MANAGEMENT ENCOUNTER FOLLOWING KIDNEY TRANSPLANT: ICD-10-CM

## 2021-01-01 DIAGNOSIS — I51.7 LAE (LEFT ATRIAL ENLARGEMENT): ICD-10-CM

## 2021-01-01 DIAGNOSIS — Z94.0 IMMUNOSUPPRESSIVE MANAGEMENT ENCOUNTER FOLLOWING KIDNEY TRANSPLANT: ICD-10-CM

## 2021-01-01 DIAGNOSIS — J90 PLEURAL EFFUSION: ICD-10-CM

## 2021-01-01 DIAGNOSIS — D69.3 IDIOPATHIC THROMBOCYTOPENIC PURPURA (ITP): ICD-10-CM

## 2021-01-01 DIAGNOSIS — Z94.0 S/P KIDNEY TRANSPLANT: ICD-10-CM

## 2021-01-01 DIAGNOSIS — I50.9 CHF (NYHA CLASS I, ACC/AHA STAGE B): ICD-10-CM

## 2021-01-01 DIAGNOSIS — T86.11 ANTIBODY MEDIATED REJECTION OF KIDNEY TRANSPLANT: Primary | ICD-10-CM

## 2021-01-01 DIAGNOSIS — M32.14 LUPUS NEPHRITIS: ICD-10-CM

## 2021-01-01 DIAGNOSIS — D50.8 OTHER IRON DEFICIENCY ANEMIA: ICD-10-CM

## 2021-01-01 DIAGNOSIS — N18.2 ANEMIA OF CHRONIC RENAL FAILURE, STAGE 2 (MILD): ICD-10-CM

## 2021-01-01 DIAGNOSIS — M32.19 DILATED CARDIOMYOPATHY SECONDARY TO SYSTEMIC LUPUS ERYTHEMATOSUS: ICD-10-CM

## 2021-01-01 DIAGNOSIS — I43 DILATED CARDIOMYOPATHY SECONDARY TO SYSTEMIC LUPUS ERYTHEMATOSUS: ICD-10-CM

## 2021-01-01 DIAGNOSIS — I50.9 CHF (NYHA CLASS II, ACC/AHA STAGE C): ICD-10-CM

## 2021-01-01 DIAGNOSIS — D69.3 IDIOPATHIC THROMBOCYTOPENIC PURPURA: ICD-10-CM

## 2021-01-01 DIAGNOSIS — N18.9 ANEMIA OF CHRONIC KIDNEY FAILURE: ICD-10-CM

## 2021-01-01 DIAGNOSIS — I25.10 CORONARY ARTERY DISEASE: ICD-10-CM

## 2021-01-01 DIAGNOSIS — Z94.0 S/P LIVING-DONOR KIDNEY TRANSPLANTATION: ICD-10-CM

## 2021-01-01 DIAGNOSIS — E21.2 HYPERPARATHYROIDISM, TERTIARY: ICD-10-CM

## 2021-01-01 DIAGNOSIS — E83.52 HYPERCALCEMIA: ICD-10-CM

## 2021-01-01 DIAGNOSIS — Z94.0 KIDNEY REPLACED BY TRANSPLANT: Primary | ICD-10-CM

## 2021-01-01 DIAGNOSIS — M32.9 SLE (SYSTEMIC LUPUS ERYTHEMATOSUS): ICD-10-CM

## 2021-01-01 DIAGNOSIS — T86.11 ACUTE RENAL TRANSPLANT REJECTION: ICD-10-CM

## 2021-01-01 DIAGNOSIS — N06.0 ISOLATED PROTEINURIA WITH MINOR GLOMERULAR ABNORMALITY: ICD-10-CM

## 2021-01-01 DIAGNOSIS — D63.1 ANEMIA OF CHRONIC KIDNEY FAILURE: ICD-10-CM

## 2021-01-01 DIAGNOSIS — I10 HTN (HYPERTENSION), BENIGN: Primary | ICD-10-CM

## 2021-01-01 DIAGNOSIS — N18.2 CKD (CHRONIC KIDNEY DISEASE) STAGE 2, GFR 60-89 ML/MIN: ICD-10-CM

## 2021-01-01 DIAGNOSIS — D84.9 IMMUNOCOMPROMISED STATE: Primary | ICD-10-CM

## 2021-01-01 DIAGNOSIS — D84.9 IMMUNOCOMPROMISED STATE: ICD-10-CM

## 2021-01-01 DIAGNOSIS — M81.0 AGE-RELATED OSTEOPOROSIS WITHOUT CURRENT PATHOLOGICAL FRACTURE: ICD-10-CM

## 2021-01-01 DIAGNOSIS — M65.332 TRIGGER FINGER, LEFT MIDDLE FINGER: ICD-10-CM

## 2021-01-01 DIAGNOSIS — N18.6 ESRD (END-STAGE RENAL DISEASE) DUE TO SLE: ICD-10-CM

## 2021-01-01 DIAGNOSIS — Z91.89 AT RISK FOR OPPORTUNISTIC INFECTIONS: ICD-10-CM

## 2021-01-01 DIAGNOSIS — I48.92 ATRIAL FLUTTER, UNSPECIFIED TYPE: ICD-10-CM

## 2021-01-01 DIAGNOSIS — R07.89 CHEST PRESSURE: ICD-10-CM

## 2021-01-01 DIAGNOSIS — I10 ESSENTIAL HYPERTENSION: ICD-10-CM

## 2021-01-01 DIAGNOSIS — Z92.89 HISTORY OF CARDIOVERSION: ICD-10-CM

## 2021-01-01 DIAGNOSIS — M32.8 OTHER FORMS OF SYSTEMIC LUPUS ERYTHEMATOSUS, UNSPECIFIED ORGAN INVOLVEMENT STATUS: Primary | ICD-10-CM

## 2021-01-01 DIAGNOSIS — Z94.0 LIVING-DONOR KIDNEY TRANSPLANT RECIPIENT: Primary | ICD-10-CM

## 2021-01-01 DIAGNOSIS — E78.5 HYPERLIPIDEMIA: ICD-10-CM

## 2021-01-01 DIAGNOSIS — T86.10 COMPLICATION OF TRANSPLANTED KIDNEY, UNSPECIFIED COMPLICATION: Primary | ICD-10-CM

## 2021-01-01 DIAGNOSIS — I48.92 ATRIAL FLUTTER: ICD-10-CM

## 2021-01-01 DIAGNOSIS — I70.0 AORTIC CALCIFICATION: ICD-10-CM

## 2021-01-01 DIAGNOSIS — Z78.0 MENOPAUSE: ICD-10-CM

## 2021-01-01 DIAGNOSIS — M65.341 TRIGGER FINGER, RIGHT RING FINGER: ICD-10-CM

## 2021-01-01 DIAGNOSIS — L81.0 POSTINFLAMMATORY HYPERPIGMENTATION: Primary | ICD-10-CM

## 2021-01-01 DIAGNOSIS — M32.14 ESRD (END-STAGE RENAL DISEASE) DUE TO SLE: ICD-10-CM

## 2021-01-01 DIAGNOSIS — N06.0 ISOLATED PROTEINURIA WITH MINOR GLOMERULAR ABNORMALITY: Primary | ICD-10-CM

## 2021-01-01 DIAGNOSIS — M87.059 AVASCULAR NECROSIS OF BONE OF HIP, UNSPECIFIED LATERALITY: ICD-10-CM

## 2021-01-01 DIAGNOSIS — R53.83 FATIGUE, UNSPECIFIED TYPE: ICD-10-CM

## 2021-01-01 DIAGNOSIS — T86.11 ANTIBODY MEDIATED REJECTION OF KIDNEY TRANSPLANT: ICD-10-CM

## 2021-01-01 DIAGNOSIS — I48.3 TYPICAL ATRIAL FLUTTER: Primary | ICD-10-CM

## 2021-01-01 DIAGNOSIS — D63.1 ANEMIA OF CHRONIC RENAL FAILURE, STAGE 2 (MILD): ICD-10-CM

## 2021-01-01 DIAGNOSIS — D84.9 IMMUNOSUPPRESSION: ICD-10-CM

## 2021-01-01 DIAGNOSIS — Z94.0 LIVING-DONOR KIDNEY TRANSPLANT RECIPIENT: ICD-10-CM

## 2021-01-01 DIAGNOSIS — R94.39 ABNORMAL STRESS ECHO: ICD-10-CM

## 2021-01-01 DIAGNOSIS — M65.342 TRIGGER RING FINGER OF LEFT HAND: ICD-10-CM

## 2021-01-01 DIAGNOSIS — R11.0 NAUSEA: Primary | ICD-10-CM

## 2021-01-01 DIAGNOSIS — Z87.39 HX OF LUPUS NEPHRITIS: ICD-10-CM

## 2021-01-01 DIAGNOSIS — Z94.0 S/P KIDNEY TRANSPLANT: Primary | ICD-10-CM

## 2021-01-01 DIAGNOSIS — I48.91 ATRIAL FIBRILLATION AND FLUTTER: ICD-10-CM

## 2021-01-01 DIAGNOSIS — E78.2 MIXED HYPERLIPIDEMIA: ICD-10-CM

## 2021-01-01 DIAGNOSIS — R11.2 INTRACTABLE VOMITING WITH NAUSEA, UNSPECIFIED VOMITING TYPE: Primary | ICD-10-CM

## 2021-01-01 DIAGNOSIS — M85.80 OSTEOPENIA, UNSPECIFIED LOCATION: ICD-10-CM

## 2021-01-01 DIAGNOSIS — N18.2 STAGE 2 CHRONIC KIDNEY DISEASE: Primary | ICD-10-CM

## 2021-01-01 DIAGNOSIS — Z90.5 S/P NEPHRECTOMY: ICD-10-CM

## 2021-01-01 DIAGNOSIS — D63.1 ANEMIA OF CHRONIC RENAL FAILURE, STAGE 3A: Primary | ICD-10-CM

## 2021-01-01 DIAGNOSIS — I31.39 PERICARDIAL EFFUSION: ICD-10-CM

## 2021-01-01 DIAGNOSIS — M32.0 DRUG-INDUCED SYSTEMIC LUPUS ERYTHEMATOSUS, UNSPECIFIED ORGAN INVOLVEMENT STATUS: ICD-10-CM

## 2021-01-01 DIAGNOSIS — L50.8 ACUTE URTICARIA: Primary | ICD-10-CM

## 2021-01-01 DIAGNOSIS — I48.0 PAROXYSMAL ATRIAL FIBRILLATION: ICD-10-CM

## 2021-01-01 DIAGNOSIS — I48.3 TYPICAL ATRIAL FLUTTER: ICD-10-CM

## 2021-01-01 DIAGNOSIS — D69.6 THROMBOCYTOPENIA: ICD-10-CM

## 2021-01-01 DIAGNOSIS — M81.0 OSTEOPOROSIS: ICD-10-CM

## 2021-01-01 DIAGNOSIS — N18.2 CKD (CHRONIC KIDNEY DISEASE), STAGE II: ICD-10-CM

## 2021-01-01 DIAGNOSIS — E87.1 HYPONATREMIA: ICD-10-CM

## 2021-01-01 DIAGNOSIS — N18.31 ANEMIA OF CHRONIC RENAL FAILURE, STAGE 3A: Primary | ICD-10-CM

## 2021-01-01 DIAGNOSIS — M32.8 OTHER FORMS OF SYSTEMIC LUPUS ERYTHEMATOSUS, UNSPECIFIED ORGAN INVOLVEMENT STATUS: ICD-10-CM

## 2021-01-01 DIAGNOSIS — I48.0 PAROXYSMAL ATRIAL FIBRILLATION: Primary | ICD-10-CM

## 2021-01-01 DIAGNOSIS — I15.0 RENOVASCULAR HYPERTENSION: ICD-10-CM

## 2021-01-01 DIAGNOSIS — Z29.89 PROPHYLACTIC IMMUNOTHERAPY: ICD-10-CM

## 2021-01-01 DIAGNOSIS — Z94.0 LIVING-DONOR KIDNEY TRANSPLANT RECIPIENT: Primary | Chronic | ICD-10-CM

## 2021-01-01 DIAGNOSIS — E55.9 VITAMIN D DEFICIENCY: Primary | ICD-10-CM

## 2021-01-01 DIAGNOSIS — N18.2 CKD (CHRONIC KIDNEY DISEASE) STAGE 2, GFR 60-89 ML/MIN: Chronic | ICD-10-CM

## 2021-01-01 DIAGNOSIS — N18.30 CHRONIC KIDNEY DISEASE (CKD), STAGE III (MODERATE): ICD-10-CM

## 2021-01-01 DIAGNOSIS — I25.10 CORONARY ARTERY DISEASE INVOLVING NATIVE CORONARY ARTERY OF NATIVE HEART WITHOUT ANGINA PECTORIS: ICD-10-CM

## 2021-01-01 DIAGNOSIS — R07.9 CHEST PAIN, UNSPECIFIED TYPE: ICD-10-CM

## 2021-01-01 DIAGNOSIS — T86.11 ANTIBODY MEDIATED REJECTION OF RENAL TRANSPLANT: ICD-10-CM

## 2021-01-01 DIAGNOSIS — M32.14 SYSTEMIC LUPUS ERYTHEMATOSUS WITH GLOMERULAR DISEASE, UNSPECIFIED SLE TYPE: ICD-10-CM

## 2021-01-01 DIAGNOSIS — Z94.0 LIVING-DONOR KIDNEY TRANSPLANT RECIPIENT: Chronic | ICD-10-CM

## 2021-01-01 DIAGNOSIS — D68.9 COAGULATION DEFECT, UNSPECIFIED: Primary | ICD-10-CM

## 2021-01-01 DIAGNOSIS — Z87.39 HX OF LUPUS NEPHRITIS: Chronic | ICD-10-CM

## 2021-01-01 DIAGNOSIS — Z79.899 ENCOUNTER FOR LONG-TERM (CURRENT) USE OF OTHER MEDICATIONS: ICD-10-CM

## 2021-01-01 DIAGNOSIS — N18.2 CKD (CHRONIC KIDNEY DISEASE) STAGE 2, GFR 60-89 ML/MIN: Primary | ICD-10-CM

## 2021-01-01 DIAGNOSIS — I48.92 ATRIAL FIBRILLATION AND FLUTTER: ICD-10-CM

## 2021-01-01 DIAGNOSIS — Z95.820 S/P ANGIOPLASTY WITH STENT: ICD-10-CM

## 2021-01-01 DIAGNOSIS — E83.51 HYPOCALCEMIA: ICD-10-CM

## 2021-01-01 DIAGNOSIS — E78.5 HYPERLIPIDEMIA, UNSPECIFIED HYPERLIPIDEMIA TYPE: ICD-10-CM

## 2021-01-01 DIAGNOSIS — Z29.89 PROPHYLACTIC IMMUNOTHERAPY: Primary | ICD-10-CM

## 2021-01-01 DIAGNOSIS — R00.0 TACHYCARDIA: ICD-10-CM

## 2021-01-01 DIAGNOSIS — K52.9 GASTROENTERITIS: ICD-10-CM

## 2021-01-01 DIAGNOSIS — L50.9 HIVES: ICD-10-CM

## 2021-01-01 LAB
25(OH)D3+25(OH)D2 SERPL-MCNC: 27 NG/ML (ref 30–96)
25(OH)D3+25(OH)D2 SERPL-MCNC: 36 NG/ML (ref 30–96)
ALBUMIN SERPL BCP-MCNC: 2.7 G/DL (ref 3.5–5.2)
ALBUMIN SERPL BCP-MCNC: 2.9 G/DL (ref 3.5–5.2)
ALBUMIN SERPL BCP-MCNC: 2.9 G/DL (ref 3.5–5.2)
ALBUMIN SERPL BCP-MCNC: 3.1 G/DL (ref 3.5–5.2)
ALBUMIN SERPL BCP-MCNC: 3.2 G/DL (ref 3.5–5.2)
ALBUMIN SERPL BCP-MCNC: 3.3 G/DL (ref 3.5–5.2)
ALBUMIN SERPL BCP-MCNC: 3.4 G/DL (ref 3.5–5.2)
ALBUMIN SERPL BCP-MCNC: 3.5 G/DL (ref 3.5–5.2)
ALBUMIN SERPL BCP-MCNC: 3.6 G/DL (ref 3.5–5.2)
ALBUMIN SERPL BCP-MCNC: 3.7 G/DL (ref 3.5–5.2)
ALBUMIN SERPL BCP-MCNC: 3.8 G/DL (ref 3.5–5.2)
ALBUMIN SERPL BCP-MCNC: 3.8 G/DL (ref 3.5–5.2)
ALBUMIN SERPL BCP-MCNC: 4.2 G/DL (ref 3.5–5.2)
ALBUMIN SERPL BCP-MCNC: 4.6 G/DL (ref 3.5–5.2)
ALBUMIN SERPL BCP-MCNC: 4.6 G/DL (ref 3.5–5.2)
ALBUMIN SERPL ELPH-MCNC: 3.48 G/DL (ref 3.35–5.55)
ALP SERPL-CCNC: 42 U/L (ref 55–135)
ALP SERPL-CCNC: 43 U/L (ref 55–135)
ALP SERPL-CCNC: 43 U/L (ref 55–135)
ALP SERPL-CCNC: 45 U/L (ref 55–135)
ALP SERPL-CCNC: 47 U/L (ref 55–135)
ALP SERPL-CCNC: 48 U/L (ref 55–135)
ALP SERPL-CCNC: 51 U/L (ref 55–135)
ALP SERPL-CCNC: 52 U/L (ref 55–135)
ALP SERPL-CCNC: 54 U/L (ref 55–135)
ALPHA1 GLOB SERPL ELPH-MCNC: 0.25 G/DL (ref 0.17–0.41)
ALPHA2 GLOB SERPL ELPH-MCNC: 1.01 G/DL (ref 0.43–0.99)
ALT SERPL W/O P-5'-P-CCNC: 43 U/L (ref 10–44)
ALT SERPL W/O P-5'-P-CCNC: 43 U/L (ref 10–44)
ALT SERPL W/O P-5'-P-CCNC: 45 U/L (ref 10–44)
ALT SERPL W/O P-5'-P-CCNC: 46 U/L (ref 10–44)
ALT SERPL W/O P-5'-P-CCNC: 47 U/L (ref 10–44)
ALT SERPL W/O P-5'-P-CCNC: 50 U/L (ref 10–44)
ALT SERPL W/O P-5'-P-CCNC: 51 U/L (ref 10–44)
ALT SERPL W/O P-5'-P-CCNC: 59 U/L (ref 10–44)
ALT SERPL W/O P-5'-P-CCNC: 79 U/L (ref 10–44)
ANION GAP SERPL CALC-SCNC: 10 MMOL/L (ref 8–16)
ANION GAP SERPL CALC-SCNC: 11 MMOL/L (ref 8–16)
ANION GAP SERPL CALC-SCNC: 11 MMOL/L (ref 8–16)
ANION GAP SERPL CALC-SCNC: 12 MMOL/L (ref 8–16)
ANION GAP SERPL CALC-SCNC: 12 MMOL/L (ref 8–16)
ANION GAP SERPL CALC-SCNC: 13 MMOL/L (ref 8–16)
ANION GAP SERPL CALC-SCNC: 4 MMOL/L (ref 8–16)
ANION GAP SERPL CALC-SCNC: 4 MMOL/L (ref 8–16)
ANION GAP SERPL CALC-SCNC: 5 MMOL/L (ref 8–16)
ANION GAP SERPL CALC-SCNC: 5 MMOL/L (ref 8–16)
ANION GAP SERPL CALC-SCNC: 6 MMOL/L (ref 8–16)
ANION GAP SERPL CALC-SCNC: 7 MMOL/L (ref 8–16)
ANION GAP SERPL CALC-SCNC: 8 MMOL/L (ref 8–16)
ANION GAP SERPL CALC-SCNC: 9 MMOL/L (ref 8–16)
ANISOCYTOSIS BLD QL SMEAR: SLIGHT
ASCENDING AORTA: 2.74 CM
AST SERPL-CCNC: 30 U/L (ref 10–40)
AST SERPL-CCNC: 42 U/L (ref 10–40)
AST SERPL-CCNC: 47 U/L (ref 10–40)
AST SERPL-CCNC: 47 U/L (ref 10–40)
AST SERPL-CCNC: 48 U/L (ref 10–40)
AST SERPL-CCNC: 49 U/L (ref 10–40)
AST SERPL-CCNC: 53 U/L (ref 10–40)
AST SERPL-CCNC: 55 U/L (ref 10–40)
AST SERPL-CCNC: 72 U/L (ref 10–40)
AV INDEX (PROSTH): 0.71
AV MEAN GRADIENT: 6 MMHG
AV PEAK GRADIENT: 12 MMHG
AV VALVE AREA: 2.14 CM2
AV VELOCITY RATIO: 0.59
B-GLOBULIN SERPL ELPH-MCNC: 0.68 G/DL (ref 0.5–1.1)
BACTERIA #/AREA URNS AUTO: ABNORMAL /HPF
BACTERIA #/AREA URNS HPF: ABNORMAL /HPF
BACTERIA #/AREA URNS HPF: NORMAL /HPF
BACTERIA #/AREA URNS HPF: NORMAL /HPF
BASOPHILS # BLD AUTO: 0 K/UL (ref 0–0.2)
BASOPHILS # BLD AUTO: 0.01 K/UL (ref 0–0.2)
BASOPHILS # BLD AUTO: 0.02 K/UL (ref 0–0.2)
BASOPHILS # BLD AUTO: 0.03 K/UL (ref 0–0.2)
BASOPHILS # BLD AUTO: 0.04 K/UL (ref 0–0.2)
BASOPHILS # BLD AUTO: 0.05 K/UL (ref 0–0.2)
BASOPHILS NFR BLD: 0 % (ref 0–1.9)
BASOPHILS NFR BLD: 0.1 % (ref 0–1.9)
BASOPHILS NFR BLD: 0.3 % (ref 0–1.9)
BASOPHILS NFR BLD: 0.4 % (ref 0–1.9)
BASOPHILS NFR BLD: 0.4 % (ref 0–1.9)
BASOPHILS NFR BLD: 0.5 % (ref 0–1.9)
BASOPHILS NFR BLD: 0.6 % (ref 0–1.9)
BASOPHILS NFR BLD: 0.7 % (ref 0–1.9)
BASOPHILS NFR BLD: 0.8 % (ref 0–1.9)
BILIRUB DIRECT SERPL-MCNC: 0.2 MG/DL (ref 0.1–0.3)
BILIRUB DIRECT SERPL-MCNC: 0.3 MG/DL (ref 0.1–0.3)
BILIRUB DIRECT SERPL-MCNC: 0.4 MG/DL (ref 0.1–0.3)
BILIRUB DIRECT SERPL-MCNC: 0.5 MG/DL (ref 0.1–0.3)
BILIRUB SERPL-MCNC: 0.5 MG/DL (ref 0.1–1)
BILIRUB SERPL-MCNC: 0.6 MG/DL (ref 0.1–1)
BILIRUB SERPL-MCNC: 0.7 MG/DL (ref 0.1–1)
BILIRUB SERPL-MCNC: 0.8 MG/DL (ref 0.1–1)
BILIRUB SERPL-MCNC: 1 MG/DL (ref 0.1–1)
BILIRUB UR QL STRIP: NEGATIVE
BKV DNA UR QL NAA+PROBE: NOT DETECTED
BNP SERPL-MCNC: 1417 PG/ML (ref 0–99)
BSA FOR ECHO PROCEDURE: 1.62 M2
BUN SERPL-MCNC: 17 MG/DL (ref 8–23)
BUN SERPL-MCNC: 17 MG/DL (ref 8–23)
BUN SERPL-MCNC: 18 MG/DL (ref 8–23)
BUN SERPL-MCNC: 20 MG/DL (ref 8–23)
BUN SERPL-MCNC: 21 MG/DL (ref 8–23)
BUN SERPL-MCNC: 22 MG/DL (ref 8–23)
BUN SERPL-MCNC: 23 MG/DL (ref 8–23)
BUN SERPL-MCNC: 23 MG/DL (ref 8–23)
BUN SERPL-MCNC: 24 MG/DL (ref 8–23)
BUN SERPL-MCNC: 25 MG/DL (ref 8–23)
BUN SERPL-MCNC: 26 MG/DL (ref 8–23)
BUN SERPL-MCNC: 26 MG/DL (ref 8–23)
BUN SERPL-MCNC: 27 MG/DL (ref 8–23)
BUN SERPL-MCNC: 29 MG/DL (ref 8–23)
BUN SERPL-MCNC: 29 MG/DL (ref 8–23)
BUN SERPL-MCNC: 30 MG/DL (ref 8–23)
BUN SERPL-MCNC: 31 MG/DL (ref 8–23)
BUN SERPL-MCNC: 32 MG/DL (ref 8–23)
BUN SERPL-MCNC: 33 MG/DL (ref 8–23)
BUN SERPL-MCNC: 34 MG/DL (ref 8–23)
BUN SERPL-MCNC: 34 MG/DL (ref 8–23)
BUN SERPL-MCNC: 36 MG/DL (ref 8–23)
BUN SERPL-MCNC: 37 MG/DL (ref 8–23)
BUN SERPL-MCNC: 41 MG/DL (ref 8–23)
BUN SERPL-MCNC: 41 MG/DL (ref 8–23)
BUN SERPL-MCNC: 42 MG/DL (ref 8–23)
BUN SERPL-MCNC: 46 MG/DL (ref 8–23)
BURR CELLS BLD QL SMEAR: ABNORMAL
C3 SERPL-MCNC: 135 MG/DL (ref 50–180)
C3 SERPL-MCNC: 95 MG/DL (ref 50–180)
C4 SERPL-MCNC: 15 MG/DL (ref 11–44)
C4 SERPL-MCNC: 24 MG/DL (ref 11–44)
CALCIUM SERPL-MCNC: 10 MG/DL (ref 8.7–10.5)
CALCIUM SERPL-MCNC: 10.2 MG/DL (ref 8.7–10.5)
CALCIUM SERPL-MCNC: 6.6 MG/DL (ref 8.7–10.5)
CALCIUM SERPL-MCNC: 7 MG/DL (ref 8.7–10.5)
CALCIUM SERPL-MCNC: 7.6 MG/DL (ref 8.7–10.5)
CALCIUM SERPL-MCNC: 7.8 MG/DL (ref 8.7–10.5)
CALCIUM SERPL-MCNC: 8 MG/DL (ref 8.7–10.5)
CALCIUM SERPL-MCNC: 8.1 MG/DL (ref 8.7–10.5)
CALCIUM SERPL-MCNC: 8.2 MG/DL (ref 8.7–10.5)
CALCIUM SERPL-MCNC: 8.3 MG/DL (ref 8.7–10.5)
CALCIUM SERPL-MCNC: 8.4 MG/DL (ref 8.7–10.5)
CALCIUM SERPL-MCNC: 8.6 MG/DL (ref 8.7–10.5)
CALCIUM SERPL-MCNC: 8.7 MG/DL (ref 8.7–10.5)
CALCIUM SERPL-MCNC: 8.7 MG/DL (ref 8.7–10.5)
CALCIUM SERPL-MCNC: 8.8 MG/DL (ref 8.7–10.5)
CALCIUM SERPL-MCNC: 8.9 MG/DL (ref 8.7–10.5)
CALCIUM SERPL-MCNC: 9.1 MG/DL (ref 8.7–10.5)
CALCIUM SERPL-MCNC: 9.1 MG/DL (ref 8.7–10.5)
CALCIUM SERPL-MCNC: 9.2 MG/DL (ref 8.7–10.5)
CALCIUM SERPL-MCNC: 9.3 MG/DL (ref 8.7–10.5)
CALCIUM SERPL-MCNC: 9.4 MG/DL (ref 8.7–10.5)
CALCIUM SERPL-MCNC: 9.5 MG/DL (ref 8.7–10.5)
CALCIUM SERPL-MCNC: 9.6 MG/DL (ref 8.7–10.5)
CALCIUM SERPL-MCNC: 9.6 MG/DL (ref 8.7–10.5)
CALCIUM SERPL-MCNC: 9.7 MG/DL (ref 8.7–10.5)
CALCIUM SERPL-MCNC: 9.8 MG/DL (ref 8.7–10.5)
CALCIUM SERPL-MCNC: 9.9 MG/DL (ref 8.7–10.5)
CALCIUM SERPL-MCNC: 9.9 MG/DL (ref 8.7–10.5)
CHLORIDE SERPL-SCNC: 100 MMOL/L (ref 95–110)
CHLORIDE SERPL-SCNC: 101 MMOL/L (ref 95–110)
CHLORIDE SERPL-SCNC: 102 MMOL/L (ref 95–110)
CHLORIDE SERPL-SCNC: 103 MMOL/L (ref 95–110)
CHLORIDE SERPL-SCNC: 104 MMOL/L (ref 95–110)
CHLORIDE SERPL-SCNC: 105 MMOL/L (ref 95–110)
CHLORIDE SERPL-SCNC: 105 MMOL/L (ref 95–110)
CHLORIDE SERPL-SCNC: 106 MMOL/L (ref 95–110)
CHLORIDE SERPL-SCNC: 107 MMOL/L (ref 95–110)
CHLORIDE SERPL-SCNC: 112 MMOL/L (ref 95–110)
CHLORIDE SERPL-SCNC: 114 MMOL/L (ref 95–110)
CHLORIDE SERPL-SCNC: 91 MMOL/L (ref 95–110)
CHLORIDE SERPL-SCNC: 93 MMOL/L (ref 95–110)
CHLORIDE SERPL-SCNC: 94 MMOL/L (ref 95–110)
CHLORIDE SERPL-SCNC: 94 MMOL/L (ref 95–110)
CHLORIDE SERPL-SCNC: 95 MMOL/L (ref 95–110)
CHLORIDE SERPL-SCNC: 96 MMOL/L (ref 95–110)
CHLORIDE SERPL-SCNC: 97 MMOL/L (ref 95–110)
CHLORIDE SERPL-SCNC: 98 MMOL/L (ref 95–110)
CHLORIDE SERPL-SCNC: 98 MMOL/L (ref 95–110)
CHLORIDE SERPL-SCNC: 99 MMOL/L (ref 95–110)
CLARITY UR REFRACT.AUTO: CLEAR
CLARITY UR: CLEAR
CMV DNA SERPL NAA+PROBE-ACNC: <35 IU/ML
CMV DNA SERPL NAA+PROBE-ACNC: NORMAL IU/ML
CO2 SERPL-SCNC: 12 MMOL/L (ref 23–29)
CO2 SERPL-SCNC: 15 MMOL/L (ref 23–29)
CO2 SERPL-SCNC: 17 MMOL/L (ref 23–29)
CO2 SERPL-SCNC: 18 MMOL/L (ref 23–29)
CO2 SERPL-SCNC: 19 MMOL/L (ref 23–29)
CO2 SERPL-SCNC: 20 MMOL/L (ref 23–29)
CO2 SERPL-SCNC: 21 MMOL/L (ref 23–29)
CO2 SERPL-SCNC: 22 MMOL/L (ref 23–29)
CO2 SERPL-SCNC: 23 MMOL/L (ref 23–29)
CO2 SERPL-SCNC: 24 MMOL/L (ref 23–29)
CO2 SERPL-SCNC: 25 MMOL/L (ref 23–29)
CO2 SERPL-SCNC: 25 MMOL/L (ref 23–29)
CO2 SERPL-SCNC: 26 MMOL/L (ref 23–29)
COLOR UR AUTO: YELLOW
COLOR UR: ABNORMAL
COLOR UR: YELLOW
COLOR UR: YELLOW
CREAT SERPL-MCNC: 0.7 MG/DL (ref 0.5–1.4)
CREAT SERPL-MCNC: 0.8 MG/DL (ref 0.5–1.4)
CREAT SERPL-MCNC: 0.9 MG/DL (ref 0.5–1.4)
CREAT SERPL-MCNC: 1 MG/DL (ref 0.5–1.4)
CREAT SERPL-MCNC: 1.1 MG/DL (ref 0.5–1.4)
CREAT SERPL-MCNC: 1.2 MG/DL (ref 0.5–1.4)
CREAT SERPL-MCNC: 1.4 MG/DL (ref 0.5–1.4)
CREAT UR-MCNC: 27 MG/DL (ref 15–325)
CREAT UR-MCNC: 28 MG/DL (ref 15–325)
CREAT UR-MCNC: 89 MG/DL (ref 15–325)
CREAT UR-MCNC: 90 MG/DL (ref 15–325)
CRP SERPL-MCNC: 0.2 MG/L (ref 0–8.2)
CTP QC/QA: YES
CV ECHO LV RWT: 0.46 CM
CYTOMEGALOVIRUS DNA: NOT DETECTED
CYTOMEGALOVIRUS LOG (IU/ML): NOT DETECTED LOGIU/ML
CYTOMEGALOVIRUS PCR, QUANT: NOT DETECTED IU/ML
DACRYOCYTES BLD QL SMEAR: ABNORMAL
DIFFERENTIAL METHOD: ABNORMAL
DOP CALC AO PEAK VEL: 1.72 M/S
DOP CALC AO VTI: 28.52 CM
DOP CALC LVOT AREA: 3 CM2
DOP CALC LVOT DIAMETER: 1.96 CM
DOP CALC LVOT PEAK VEL: 1.01 M/S
DOP CALC LVOT STROKE VOLUME: 60.92 CM3
DOP CALCLVOT PEAK VEL VTI: 20.2 CM
DSDNA AB SER-ACNC: NORMAL [IU]/ML
DSDNA AB SER-ACNC: NORMAL [IU]/ML
E WAVE DECELERATION TIME: 143.74 MSEC
E/A RATIO: 1.77
E/E' RATIO: 15.6 M/S
EBV DNA SPEC QL NAA+PROBE: NOT DETECTED
ECHO LV POSTERIOR WALL: 0.88 CM (ref 0.6–1.1)
EJECTION FRACTION: 48 %
EOSINOPHIL # BLD AUTO: 0 K/UL (ref 0–0.5)
EOSINOPHIL # BLD AUTO: 0.1 K/UL (ref 0–0.5)
EOSINOPHIL # BLD AUTO: 0.2 K/UL (ref 0–0.5)
EOSINOPHIL # BLD AUTO: 0.3 K/UL (ref 0–0.5)
EOSINOPHIL NFR BLD: 0 % (ref 0–8)
EOSINOPHIL NFR BLD: 0.1 % (ref 0–8)
EOSINOPHIL NFR BLD: 0.4 % (ref 0–8)
EOSINOPHIL NFR BLD: 0.5 % (ref 0–8)
EOSINOPHIL NFR BLD: 0.6 % (ref 0–8)
EOSINOPHIL NFR BLD: 0.8 % (ref 0–8)
EOSINOPHIL NFR BLD: 0.8 % (ref 0–8)
EOSINOPHIL NFR BLD: 0.9 % (ref 0–8)
EOSINOPHIL NFR BLD: 0.9 % (ref 0–8)
EOSINOPHIL NFR BLD: 1 % (ref 0–8)
EOSINOPHIL NFR BLD: 1.1 % (ref 0–8)
EOSINOPHIL NFR BLD: 1.1 % (ref 0–8)
EOSINOPHIL NFR BLD: 1.3 % (ref 0–8)
EOSINOPHIL NFR BLD: 1.4 % (ref 0–8)
EOSINOPHIL NFR BLD: 1.6 % (ref 0–8)
EOSINOPHIL NFR BLD: 1.7 % (ref 0–8)
EOSINOPHIL NFR BLD: 1.9 % (ref 0–8)
EOSINOPHIL NFR BLD: 2.7 % (ref 0–8)
EOSINOPHIL NFR BLD: 3.7 % (ref 0–8)
EOSINOPHIL NFR BLD: 3.7 % (ref 0–8)
EOSINOPHIL NFR BLD: 4 % (ref 0–8)
EOSINOPHIL NFR BLD: 4.6 % (ref 0–8)
EPSTEIN BARR VIRUS SOURCE: NORMAL
ERYTHROCYTE [DISTWIDTH] IN BLOOD BY AUTOMATED COUNT: 15.7 % (ref 11.5–14.5)
ERYTHROCYTE [DISTWIDTH] IN BLOOD BY AUTOMATED COUNT: 15.8 % (ref 11.5–14.5)
ERYTHROCYTE [DISTWIDTH] IN BLOOD BY AUTOMATED COUNT: 15.9 % (ref 11.5–14.5)
ERYTHROCYTE [DISTWIDTH] IN BLOOD BY AUTOMATED COUNT: 16 % (ref 11.5–14.5)
ERYTHROCYTE [DISTWIDTH] IN BLOOD BY AUTOMATED COUNT: 16.1 % (ref 11.5–14.5)
ERYTHROCYTE [DISTWIDTH] IN BLOOD BY AUTOMATED COUNT: 16.3 % (ref 11.5–14.5)
ERYTHROCYTE [DISTWIDTH] IN BLOOD BY AUTOMATED COUNT: 16.4 % (ref 11.5–14.5)
ERYTHROCYTE [DISTWIDTH] IN BLOOD BY AUTOMATED COUNT: 16.5 % (ref 11.5–14.5)
ERYTHROCYTE [DISTWIDTH] IN BLOOD BY AUTOMATED COUNT: 16.6 % (ref 11.5–14.5)
ERYTHROCYTE [DISTWIDTH] IN BLOOD BY AUTOMATED COUNT: 16.6 % (ref 11.5–14.5)
ERYTHROCYTE [DISTWIDTH] IN BLOOD BY AUTOMATED COUNT: 16.7 % (ref 11.5–14.5)
ERYTHROCYTE [DISTWIDTH] IN BLOOD BY AUTOMATED COUNT: 16.7 % (ref 11.5–14.5)
ERYTHROCYTE [DISTWIDTH] IN BLOOD BY AUTOMATED COUNT: 16.8 % (ref 11.5–14.5)
ERYTHROCYTE [DISTWIDTH] IN BLOOD BY AUTOMATED COUNT: 17.1 % (ref 11.5–14.5)
ERYTHROCYTE [DISTWIDTH] IN BLOOD BY AUTOMATED COUNT: 17.2 % (ref 11.5–14.5)
ERYTHROCYTE [DISTWIDTH] IN BLOOD BY AUTOMATED COUNT: 17.4 % (ref 11.5–14.5)
ERYTHROCYTE [DISTWIDTH] IN BLOOD BY AUTOMATED COUNT: 17.5 % (ref 11.5–14.5)
ERYTHROCYTE [DISTWIDTH] IN BLOOD BY AUTOMATED COUNT: 18.2 % (ref 11.5–14.5)
ERYTHROCYTE [DISTWIDTH] IN BLOOD BY AUTOMATED COUNT: 18.4 % (ref 11.5–14.5)
ERYTHROCYTE [DISTWIDTH] IN BLOOD BY AUTOMATED COUNT: 18.7 % (ref 11.5–14.5)
ERYTHROCYTE [DISTWIDTH] IN BLOOD BY AUTOMATED COUNT: 19.4 % (ref 11.5–14.5)
ERYTHROCYTE [DISTWIDTH] IN BLOOD BY AUTOMATED COUNT: 19.5 % (ref 11.5–14.5)
ERYTHROCYTE [DISTWIDTH] IN BLOOD BY AUTOMATED COUNT: 19.6 % (ref 11.5–14.5)
ERYTHROCYTE [DISTWIDTH] IN BLOOD BY AUTOMATED COUNT: 19.9 % (ref 11.5–14.5)
ERYTHROCYTE [DISTWIDTH] IN BLOOD BY AUTOMATED COUNT: 20.1 % (ref 11.5–14.5)
ERYTHROCYTE [DISTWIDTH] IN BLOOD BY AUTOMATED COUNT: 20.5 % (ref 11.5–14.5)
ERYTHROCYTE [DISTWIDTH] IN BLOOD BY AUTOMATED COUNT: 20.8 % (ref 11.5–14.5)
ERYTHROCYTE [SEDIMENTATION RATE] IN BLOOD BY WESTERGREN METHOD: 12 MM/HR (ref 0–36)
EST. GFR  (AFRICAN AMERICAN): 45.5 ML/MIN/1.73 M^2
EST. GFR  (AFRICAN AMERICAN): 54.4 ML/MIN/1.73 M^2
EST. GFR  (AFRICAN AMERICAN): 54.8 ML/MIN/1.73 M^2
EST. GFR  (AFRICAN AMERICAN): >60 ML/MIN/1.73 M^2
EST. GFR  (NON AFRICAN AMERICAN): 39.5 ML/MIN/1.73 M^2
EST. GFR  (NON AFRICAN AMERICAN): 47.2 ML/MIN/1.73 M^2
EST. GFR  (NON AFRICAN AMERICAN): 47.5 ML/MIN/1.73 M^2
EST. GFR  (NON AFRICAN AMERICAN): 52.4 ML/MIN/1.73 M^2
EST. GFR  (NON AFRICAN AMERICAN): 52.8 ML/MIN/1.73 M^2
EST. GFR  (NON AFRICAN AMERICAN): 53 ML/MIN/1.73 M^2
EST. GFR  (NON AFRICAN AMERICAN): 58.8 ML/MIN/1.73 M^2
EST. GFR  (NON AFRICAN AMERICAN): 59.3 ML/MIN/1.73 M^2
EST. GFR  (NON AFRICAN AMERICAN): >60 ML/MIN/1.73 M^2
EXT ALLOSURE: 2.1
FERRITIN SERPL-MCNC: 1743 NG/ML (ref 20–300)
FINAL PATHOLOGIC DIAGNOSIS: NORMAL
FRACTIONAL SHORTENING: 24 % (ref 28–44)
GAMMA GLOB SERPL ELPH-MCNC: 0.88 G/DL (ref 0.67–1.58)
GLUCOSE SERPL-MCNC: 101 MG/DL (ref 70–110)
GLUCOSE SERPL-MCNC: 102 MG/DL (ref 70–110)
GLUCOSE SERPL-MCNC: 102 MG/DL (ref 70–110)
GLUCOSE SERPL-MCNC: 106 MG/DL (ref 70–110)
GLUCOSE SERPL-MCNC: 112 MG/DL (ref 70–110)
GLUCOSE SERPL-MCNC: 114 MG/DL (ref 70–110)
GLUCOSE SERPL-MCNC: 116 MG/DL (ref 70–110)
GLUCOSE SERPL-MCNC: 117 MG/DL (ref 70–110)
GLUCOSE SERPL-MCNC: 125 MG/DL (ref 70–110)
GLUCOSE SERPL-MCNC: 126 MG/DL (ref 70–110)
GLUCOSE SERPL-MCNC: 129 MG/DL (ref 70–110)
GLUCOSE SERPL-MCNC: 147 MG/DL (ref 70–110)
GLUCOSE SERPL-MCNC: 148 MG/DL (ref 70–110)
GLUCOSE SERPL-MCNC: 150 MG/DL (ref 70–110)
GLUCOSE SERPL-MCNC: 69 MG/DL (ref 70–110)
GLUCOSE SERPL-MCNC: 72 MG/DL (ref 70–110)
GLUCOSE SERPL-MCNC: 73 MG/DL (ref 70–110)
GLUCOSE SERPL-MCNC: 73 MG/DL (ref 70–110)
GLUCOSE SERPL-MCNC: 76 MG/DL (ref 70–110)
GLUCOSE SERPL-MCNC: 77 MG/DL (ref 70–110)
GLUCOSE SERPL-MCNC: 78 MG/DL (ref 70–110)
GLUCOSE SERPL-MCNC: 79 MG/DL (ref 70–110)
GLUCOSE SERPL-MCNC: 80 MG/DL (ref 70–110)
GLUCOSE SERPL-MCNC: 81 MG/DL (ref 70–110)
GLUCOSE SERPL-MCNC: 82 MG/DL (ref 70–110)
GLUCOSE SERPL-MCNC: 83 MG/DL (ref 70–110)
GLUCOSE SERPL-MCNC: 85 MG/DL (ref 70–110)
GLUCOSE SERPL-MCNC: 86 MG/DL (ref 70–110)
GLUCOSE SERPL-MCNC: 86 MG/DL (ref 70–110)
GLUCOSE SERPL-MCNC: 87 MG/DL (ref 70–110)
GLUCOSE SERPL-MCNC: 88 MG/DL (ref 70–110)
GLUCOSE SERPL-MCNC: 88 MG/DL (ref 70–110)
GLUCOSE SERPL-MCNC: 90 MG/DL (ref 70–110)
GLUCOSE SERPL-MCNC: 91 MG/DL (ref 70–110)
GLUCOSE SERPL-MCNC: 92 MG/DL (ref 70–110)
GLUCOSE SERPL-MCNC: 95 MG/DL (ref 70–110)
GLUCOSE SERPL-MCNC: 95 MG/DL (ref 70–110)
GLUCOSE SERPL-MCNC: 96 MG/DL (ref 70–110)
GLUCOSE SERPL-MCNC: 96 MG/DL (ref 70–110)
GLUCOSE SERPL-MCNC: 98 MG/DL (ref 70–110)
GLUCOSE UR QL STRIP: NEGATIVE
HCT VFR BLD AUTO: 26.1 % (ref 37–48.5)
HCT VFR BLD AUTO: 27.1 % (ref 37–48.5)
HCT VFR BLD AUTO: 27.3 % (ref 37–48.5)
HCT VFR BLD AUTO: 27.5 % (ref 37–48.5)
HCT VFR BLD AUTO: 27.7 % (ref 37–48.5)
HCT VFR BLD AUTO: 27.7 % (ref 37–48.5)
HCT VFR BLD AUTO: 27.8 % (ref 37–48.5)
HCT VFR BLD AUTO: 27.9 % (ref 37–48.5)
HCT VFR BLD AUTO: 28.4 % (ref 37–48.5)
HCT VFR BLD AUTO: 28.7 % (ref 37–48.5)
HCT VFR BLD AUTO: 28.8 % (ref 37–48.5)
HCT VFR BLD AUTO: 29.3 % (ref 37–48.5)
HCT VFR BLD AUTO: 29.7 % (ref 37–48.5)
HCT VFR BLD AUTO: 30.3 % (ref 37–48.5)
HCT VFR BLD AUTO: 30.6 % (ref 37–48.5)
HCT VFR BLD AUTO: 30.8 % (ref 37–48.5)
HCT VFR BLD AUTO: 30.9 % (ref 37–48.5)
HCT VFR BLD AUTO: 31.2 % (ref 37–48.5)
HCT VFR BLD AUTO: 31.6 % (ref 37–48.5)
HCT VFR BLD AUTO: 33.4 % (ref 37–48.5)
HCT VFR BLD AUTO: 33.5 % (ref 37–48.5)
HCT VFR BLD AUTO: 33.8 % (ref 37–48.5)
HCT VFR BLD AUTO: 34.1 % (ref 37–48.5)
HCT VFR BLD AUTO: 34.4 % (ref 37–48.5)
HCT VFR BLD AUTO: 34.6 % (ref 37–48.5)
HCT VFR BLD AUTO: 35.8 % (ref 37–48.5)
HCT VFR BLD AUTO: 35.9 % (ref 37–48.5)
HCT VFR BLD AUTO: 36.1 % (ref 37–48.5)
HCT VFR BLD AUTO: 36.3 % (ref 37–48.5)
HCV AB SERPL QL IA: NEGATIVE
HGB BLD-MCNC: 10 G/DL (ref 12–16)
HGB BLD-MCNC: 10.3 G/DL (ref 12–16)
HGB BLD-MCNC: 10.4 G/DL (ref 12–16)
HGB BLD-MCNC: 10.8 G/DL (ref 12–16)
HGB BLD-MCNC: 10.9 G/DL (ref 12–16)
HGB BLD-MCNC: 10.9 G/DL (ref 12–16)
HGB BLD-MCNC: 11 G/DL (ref 12–16)
HGB BLD-MCNC: 11.1 G/DL (ref 12–16)
HGB BLD-MCNC: 11.2 G/DL (ref 12–16)
HGB BLD-MCNC: 11.5 G/DL (ref 12–16)
HGB BLD-MCNC: 8.3 G/DL (ref 12–16)
HGB BLD-MCNC: 8.4 G/DL (ref 12–16)
HGB BLD-MCNC: 8.5 G/DL (ref 12–16)
HGB BLD-MCNC: 8.8 G/DL (ref 12–16)
HGB BLD-MCNC: 8.9 G/DL (ref 12–16)
HGB BLD-MCNC: 9 G/DL (ref 12–16)
HGB BLD-MCNC: 9.2 G/DL (ref 12–16)
HGB BLD-MCNC: 9.2 G/DL (ref 12–16)
HGB BLD-MCNC: 9.3 G/DL (ref 12–16)
HGB BLD-MCNC: 9.3 G/DL (ref 12–16)
HGB BLD-MCNC: 9.4 G/DL (ref 12–16)
HGB BLD-MCNC: 9.6 G/DL (ref 12–16)
HGB BLD-MCNC: 9.6 G/DL (ref 12–16)
HGB BLD-MCNC: 9.7 G/DL (ref 12–16)
HGB BLD-MCNC: 9.9 G/DL (ref 12–16)
HGB UR QL STRIP: ABNORMAL
HGB UR QL STRIP: ABNORMAL
HGB UR QL STRIP: NEGATIVE
HGB UR QL STRIP: NEGATIVE
HYALINE CASTS #/AREA URNS LPF: 0 /LPF
HYALINE CASTS UR QL AUTO: 11 /LPF
HYPOCHROMIA BLD QL SMEAR: ABNORMAL
HYPOCHROMIA BLD QL SMEAR: ABNORMAL
IMM GRANULOCYTES # BLD AUTO: 0.01 K/UL (ref 0–0.04)
IMM GRANULOCYTES # BLD AUTO: 0.02 K/UL (ref 0–0.04)
IMM GRANULOCYTES # BLD AUTO: 0.02 K/UL (ref 0–0.04)
IMM GRANULOCYTES # BLD AUTO: 0.03 K/UL (ref 0–0.04)
IMM GRANULOCYTES # BLD AUTO: 0.04 K/UL (ref 0–0.04)
IMM GRANULOCYTES # BLD AUTO: 0.05 K/UL (ref 0–0.04)
IMM GRANULOCYTES # BLD AUTO: 0.06 K/UL (ref 0–0.04)
IMM GRANULOCYTES # BLD AUTO: 0.07 K/UL (ref 0–0.04)
IMM GRANULOCYTES # BLD AUTO: 0.07 K/UL (ref 0–0.04)
IMM GRANULOCYTES # BLD AUTO: 0.08 K/UL (ref 0–0.04)
IMM GRANULOCYTES NFR BLD AUTO: 0.2 % (ref 0–0.5)
IMM GRANULOCYTES NFR BLD AUTO: 0.4 % (ref 0–0.5)
IMM GRANULOCYTES NFR BLD AUTO: 0.5 % (ref 0–0.5)
IMM GRANULOCYTES NFR BLD AUTO: 0.6 % (ref 0–0.5)
IMM GRANULOCYTES NFR BLD AUTO: 0.7 % (ref 0–0.5)
IMM GRANULOCYTES NFR BLD AUTO: 0.8 % (ref 0–0.5)
IMM GRANULOCYTES NFR BLD AUTO: 0.8 % (ref 0–0.5)
IMM GRANULOCYTES NFR BLD AUTO: 0.9 % (ref 0–0.5)
IMM GRANULOCYTES NFR BLD AUTO: 1 % (ref 0–0.5)
IMM GRANULOCYTES NFR BLD AUTO: 1.1 % (ref 0–0.5)
INTERVENTRICULAR SEPTUM: 0.74 CM (ref 0.6–1.1)
IRON SERPL-MCNC: 154 UG/DL (ref 30–160)
IVRT: 119.89 MSEC
KAPPA LC SER QL IA: 3.18 MG/DL (ref 0.33–1.94)
KAPPA LC/LAMBDA SER IA: 1.23 (ref 0.26–1.65)
KETONES UR QL STRIP: NEGATIVE
LA MAJOR: 6.28 CM
LA MINOR: 6.21 CM
LA WIDTH: 4.41 CM
LAMBDA LC SER QL IA: 2.58 MG/DL (ref 0.57–2.63)
LEFT ATRIUM SIZE: 3.61 CM
LEFT ATRIUM VOLUME INDEX MOD: 54.3 ML/M2
LEFT ATRIUM VOLUME INDEX: 52.5 ML/M2
LEFT ATRIUM VOLUME MOD: 87.49 CM3
LEFT ATRIUM VOLUME: 84.51 CM3
LEFT INTERNAL DIMENSION IN SYSTOLE: 2.91 CM (ref 2.1–4)
LEFT VENTRICLE DIASTOLIC VOLUME INDEX: 39.46 ML/M2
LEFT VENTRICLE DIASTOLIC VOLUME: 63.53 ML
LEFT VENTRICLE MASS INDEX: 55 G/M2
LEFT VENTRICLE SYSTOLIC VOLUME INDEX: 20.2 ML/M2
LEFT VENTRICLE SYSTOLIC VOLUME: 32.47 ML
LEFT VENTRICULAR INTERNAL DIMENSION IN DIASTOLE: 3.84 CM (ref 3.5–6)
LEFT VENTRICULAR MASS: 88.92 G
LEUKOCYTE ESTERASE UR QL STRIP: ABNORMAL
LEUKOCYTE ESTERASE UR QL STRIP: NEGATIVE
LIPASE SERPL-CCNC: 31 U/L (ref 4–60)
LV LATERAL E/E' RATIO: 13 M/S
LV SEPTAL E/E' RATIO: 19.5 M/S
LYMPHOCYTES # BLD AUTO: 1 K/UL (ref 1–4.8)
LYMPHOCYTES # BLD AUTO: 1.1 K/UL (ref 1–4.8)
LYMPHOCYTES # BLD AUTO: 1.1 K/UL (ref 1–4.8)
LYMPHOCYTES # BLD AUTO: 1.7 K/UL (ref 1–4.8)
LYMPHOCYTES # BLD AUTO: 1.8 K/UL (ref 1–4.8)
LYMPHOCYTES # BLD AUTO: 2 K/UL (ref 1–4.8)
LYMPHOCYTES # BLD AUTO: 2.2 K/UL (ref 1–4.8)
LYMPHOCYTES # BLD AUTO: 2.2 K/UL (ref 1–4.8)
LYMPHOCYTES # BLD AUTO: 2.3 K/UL (ref 1–4.8)
LYMPHOCYTES # BLD AUTO: 2.3 K/UL (ref 1–4.8)
LYMPHOCYTES # BLD AUTO: 2.4 K/UL (ref 1–4.8)
LYMPHOCYTES # BLD AUTO: 2.6 K/UL (ref 1–4.8)
LYMPHOCYTES # BLD AUTO: 2.7 K/UL (ref 1–4.8)
LYMPHOCYTES # BLD AUTO: 3 K/UL (ref 1–4.8)
LYMPHOCYTES # BLD AUTO: 3.1 K/UL (ref 1–4.8)
LYMPHOCYTES # BLD AUTO: 3.6 K/UL (ref 1–4.8)
LYMPHOCYTES # BLD AUTO: 3.9 K/UL (ref 1–4.8)
LYMPHOCYTES NFR BLD: 10 % (ref 18–48)
LYMPHOCYTES NFR BLD: 13.3 % (ref 18–48)
LYMPHOCYTES NFR BLD: 14.4 % (ref 18–48)
LYMPHOCYTES NFR BLD: 20.8 % (ref 18–48)
LYMPHOCYTES NFR BLD: 29.1 % (ref 18–48)
LYMPHOCYTES NFR BLD: 29.4 % (ref 18–48)
LYMPHOCYTES NFR BLD: 30.5 % (ref 18–48)
LYMPHOCYTES NFR BLD: 31.3 % (ref 18–48)
LYMPHOCYTES NFR BLD: 31.9 % (ref 18–48)
LYMPHOCYTES NFR BLD: 32.1 % (ref 18–48)
LYMPHOCYTES NFR BLD: 32.8 % (ref 18–48)
LYMPHOCYTES NFR BLD: 33.4 % (ref 18–48)
LYMPHOCYTES NFR BLD: 33.5 % (ref 18–48)
LYMPHOCYTES NFR BLD: 33.8 % (ref 18–48)
LYMPHOCYTES NFR BLD: 34 % (ref 18–48)
LYMPHOCYTES NFR BLD: 34.4 % (ref 18–48)
LYMPHOCYTES NFR BLD: 35.5 % (ref 18–48)
LYMPHOCYTES NFR BLD: 36 % (ref 18–48)
LYMPHOCYTES NFR BLD: 36.3 % (ref 18–48)
LYMPHOCYTES NFR BLD: 37 % (ref 18–48)
LYMPHOCYTES NFR BLD: 37 % (ref 18–48)
LYMPHOCYTES NFR BLD: 37.4 % (ref 18–48)
LYMPHOCYTES NFR BLD: 38.2 % (ref 18–48)
LYMPHOCYTES NFR BLD: 38.4 % (ref 18–48)
LYMPHOCYTES NFR BLD: 39 % (ref 18–48)
LYMPHOCYTES NFR BLD: 41.1 % (ref 18–48)
LYMPHOCYTES NFR BLD: 42.7 % (ref 18–48)
LYMPHOCYTES NFR BLD: 43.8 % (ref 18–48)
LYMPHOCYTES NFR BLD: 55 % (ref 18–48)
MAGNESIUM SERPL-MCNC: 1.3 MG/DL (ref 1.6–2.6)
MAGNESIUM SERPL-MCNC: 1.5 MG/DL (ref 1.6–2.6)
MAGNESIUM SERPL-MCNC: 1.6 MG/DL (ref 1.6–2.6)
MAGNESIUM SERPL-MCNC: 1.7 MG/DL (ref 1.6–2.6)
MAGNESIUM SERPL-MCNC: 1.8 MG/DL (ref 1.6–2.6)
MAGNESIUM SERPL-MCNC: 1.9 MG/DL (ref 1.6–2.6)
MAGNESIUM SERPL-MCNC: 2.1 MG/DL (ref 1.6–2.6)
MCH RBC QN AUTO: 24.3 PG (ref 27–31)
MCH RBC QN AUTO: 24.4 PG (ref 27–31)
MCH RBC QN AUTO: 24.5 PG (ref 27–31)
MCH RBC QN AUTO: 24.6 PG (ref 27–31)
MCH RBC QN AUTO: 24.6 PG (ref 27–31)
MCH RBC QN AUTO: 24.7 PG (ref 27–31)
MCH RBC QN AUTO: 24.9 PG (ref 27–31)
MCH RBC QN AUTO: 25 PG (ref 27–31)
MCH RBC QN AUTO: 25.1 PG (ref 27–31)
MCH RBC QN AUTO: 25.2 PG (ref 27–31)
MCH RBC QN AUTO: 25.2 PG (ref 27–31)
MCH RBC QN AUTO: 25.3 PG (ref 27–31)
MCH RBC QN AUTO: 25.3 PG (ref 27–31)
MCH RBC QN AUTO: 25.5 PG (ref 27–31)
MCH RBC QN AUTO: 25.6 PG (ref 27–31)
MCH RBC QN AUTO: 25.9 PG (ref 27–31)
MCH RBC QN AUTO: 26 PG (ref 27–31)
MCH RBC QN AUTO: 26 PG (ref 27–31)
MCH RBC QN AUTO: 26.1 PG (ref 27–31)
MCH RBC QN AUTO: 26.2 PG (ref 27–31)
MCH RBC QN AUTO: 26.3 PG (ref 27–31)
MCH RBC QN AUTO: 26.4 PG (ref 27–31)
MCHC RBC AUTO-ENTMCNC: 30.4 G/DL (ref 32–36)
MCHC RBC AUTO-ENTMCNC: 30.6 G/DL (ref 32–36)
MCHC RBC AUTO-ENTMCNC: 30.7 G/DL (ref 32–36)
MCHC RBC AUTO-ENTMCNC: 30.8 G/DL (ref 32–36)
MCHC RBC AUTO-ENTMCNC: 30.8 G/DL (ref 32–36)
MCHC RBC AUTO-ENTMCNC: 30.9 G/DL (ref 32–36)
MCHC RBC AUTO-ENTMCNC: 31 G/DL (ref 32–36)
MCHC RBC AUTO-ENTMCNC: 31 G/DL (ref 32–36)
MCHC RBC AUTO-ENTMCNC: 31.1 G/DL (ref 32–36)
MCHC RBC AUTO-ENTMCNC: 31.1 G/DL (ref 32–36)
MCHC RBC AUTO-ENTMCNC: 31.3 G/DL (ref 32–36)
MCHC RBC AUTO-ENTMCNC: 31.4 G/DL (ref 32–36)
MCHC RBC AUTO-ENTMCNC: 31.6 G/DL (ref 32–36)
MCHC RBC AUTO-ENTMCNC: 31.7 G/DL (ref 32–36)
MCHC RBC AUTO-ENTMCNC: 31.8 G/DL (ref 32–36)
MCHC RBC AUTO-ENTMCNC: 31.8 G/DL (ref 32–36)
MCHC RBC AUTO-ENTMCNC: 32 G/DL (ref 32–36)
MCHC RBC AUTO-ENTMCNC: 32.1 G/DL (ref 32–36)
MCHC RBC AUTO-ENTMCNC: 32.1 G/DL (ref 32–36)
MCHC RBC AUTO-ENTMCNC: 32.4 G/DL (ref 32–36)
MCHC RBC AUTO-ENTMCNC: 32.4 G/DL (ref 32–36)
MCHC RBC AUTO-ENTMCNC: 32.8 G/DL (ref 32–36)
MCHC RBC AUTO-ENTMCNC: 33.4 G/DL (ref 32–36)
MCHC RBC AUTO-ENTMCNC: 33.5 G/DL (ref 32–36)
MCHC RBC AUTO-ENTMCNC: 33.7 G/DL (ref 32–36)
MCHC RBC AUTO-ENTMCNC: 34.1 G/DL (ref 32–36)
MCHC RBC AUTO-ENTMCNC: 34.2 G/DL (ref 32–36)
MCV RBC AUTO: 73 FL (ref 82–98)
MCV RBC AUTO: 74 FL (ref 82–98)
MCV RBC AUTO: 75 FL (ref 82–98)
MCV RBC AUTO: 76 FL (ref 82–98)
MCV RBC AUTO: 76 FL (ref 82–98)
MCV RBC AUTO: 77 FL (ref 82–98)
MCV RBC AUTO: 78 FL (ref 82–98)
MCV RBC AUTO: 79 FL (ref 82–98)
MCV RBC AUTO: 80 FL (ref 82–98)
MCV RBC AUTO: 81 FL (ref 82–98)
MCV RBC AUTO: 82 FL (ref 82–98)
MCV RBC AUTO: 83 FL (ref 82–98)
MCV RBC AUTO: 84 FL (ref 82–98)
MCV RBC AUTO: 85 FL (ref 82–98)
MICROSCOPIC COMMENT: ABNORMAL
MICROSCOPIC COMMENT: ABNORMAL
MICROSCOPIC COMMENT: NORMAL
MICROSCOPIC COMMENT: NORMAL
MONOCYTES # BLD AUTO: 0.1 K/UL (ref 0.3–1)
MONOCYTES # BLD AUTO: 0.1 K/UL (ref 0.3–1)
MONOCYTES # BLD AUTO: 0.2 K/UL (ref 0.3–1)
MONOCYTES # BLD AUTO: 0.4 K/UL (ref 0.3–1)
MONOCYTES # BLD AUTO: 0.5 K/UL (ref 0.3–1)
MONOCYTES # BLD AUTO: 0.6 K/UL (ref 0.3–1)
MONOCYTES # BLD AUTO: 0.9 K/UL (ref 0.3–1)
MONOCYTES # BLD AUTO: 1 K/UL (ref 0.3–1)
MONOCYTES NFR BLD: 1.2 % (ref 4–15)
MONOCYTES NFR BLD: 1.7 % (ref 4–15)
MONOCYTES NFR BLD: 10.2 % (ref 4–15)
MONOCYTES NFR BLD: 12.2 % (ref 4–15)
MONOCYTES NFR BLD: 12.9 % (ref 4–15)
MONOCYTES NFR BLD: 13.7 % (ref 4–15)
MONOCYTES NFR BLD: 2 % (ref 4–15)
MONOCYTES NFR BLD: 4.8 % (ref 4–15)
MONOCYTES NFR BLD: 5 % (ref 4–15)
MONOCYTES NFR BLD: 5.6 % (ref 4–15)
MONOCYTES NFR BLD: 5.6 % (ref 4–15)
MONOCYTES NFR BLD: 5.8 % (ref 4–15)
MONOCYTES NFR BLD: 5.9 % (ref 4–15)
MONOCYTES NFR BLD: 5.9 % (ref 4–15)
MONOCYTES NFR BLD: 6 % (ref 4–15)
MONOCYTES NFR BLD: 6.6 % (ref 4–15)
MONOCYTES NFR BLD: 6.7 % (ref 4–15)
MONOCYTES NFR BLD: 6.9 % (ref 4–15)
MONOCYTES NFR BLD: 7.1 % (ref 4–15)
MONOCYTES NFR BLD: 7.3 % (ref 4–15)
MONOCYTES NFR BLD: 7.5 % (ref 4–15)
MONOCYTES NFR BLD: 7.7 % (ref 4–15)
MONOCYTES NFR BLD: 7.9 % (ref 4–15)
MONOCYTES NFR BLD: 8 % (ref 4–15)
MONOCYTES NFR BLD: 8.1 % (ref 4–15)
MONOCYTES NFR BLD: 8.4 % (ref 4–15)
MONOCYTES NFR BLD: 8.6 % (ref 4–15)
MV A" WAVE DURATION": 7.14 MSEC
MV PEAK A VEL: 0.66 M/S
MV PEAK E VEL: 1.17 M/S
MV STENOSIS PRESSURE HALF TIME: 41.68 MS
MV VALVE AREA P 1/2 METHOD: 5.28 CM2
NEUTROPHILS # BLD AUTO: 2 K/UL (ref 1.8–7.7)
NEUTROPHILS # BLD AUTO: 2.3 K/UL (ref 1.8–7.7)
NEUTROPHILS # BLD AUTO: 2.6 K/UL (ref 1.8–7.7)
NEUTROPHILS # BLD AUTO: 2.7 K/UL (ref 1.8–7.7)
NEUTROPHILS # BLD AUTO: 2.8 K/UL (ref 1.8–7.7)
NEUTROPHILS # BLD AUTO: 2.9 K/UL (ref 1.8–7.7)
NEUTROPHILS # BLD AUTO: 3 K/UL (ref 1.8–7.7)
NEUTROPHILS # BLD AUTO: 3.1 K/UL (ref 1.8–7.7)
NEUTROPHILS # BLD AUTO: 3.2 K/UL (ref 1.8–7.7)
NEUTROPHILS # BLD AUTO: 3.2 K/UL (ref 1.8–7.7)
NEUTROPHILS # BLD AUTO: 3.6 K/UL (ref 1.8–7.7)
NEUTROPHILS # BLD AUTO: 3.8 K/UL (ref 1.8–7.7)
NEUTROPHILS # BLD AUTO: 3.9 K/UL (ref 1.8–7.7)
NEUTROPHILS # BLD AUTO: 4 K/UL (ref 1.8–7.7)
NEUTROPHILS # BLD AUTO: 4 K/UL (ref 1.8–7.7)
NEUTROPHILS # BLD AUTO: 4.1 K/UL (ref 1.8–7.7)
NEUTROPHILS # BLD AUTO: 4.2 K/UL (ref 1.8–7.7)
NEUTROPHILS # BLD AUTO: 4.3 K/UL (ref 1.8–7.7)
NEUTROPHILS # BLD AUTO: 4.3 K/UL (ref 1.8–7.7)
NEUTROPHILS # BLD AUTO: 4.7 K/UL (ref 1.8–7.7)
NEUTROPHILS # BLD AUTO: 4.8 K/UL (ref 1.8–7.7)
NEUTROPHILS # BLD AUTO: 4.8 K/UL (ref 1.8–7.7)
NEUTROPHILS # BLD AUTO: 5.1 K/UL (ref 1.8–7.7)
NEUTROPHILS # BLD AUTO: 6.1 K/UL (ref 1.8–7.7)
NEUTROPHILS # BLD AUTO: 6.3 K/UL (ref 1.8–7.7)
NEUTROPHILS # BLD AUTO: 7 K/UL (ref 1.8–7.7)
NEUTROPHILS # BLD AUTO: 8.6 K/UL (ref 1.8–7.7)
NEUTROPHILS NFR BLD: 34.6 % (ref 38–73)
NEUTROPHILS NFR BLD: 47 % (ref 38–73)
NEUTROPHILS NFR BLD: 48.1 % (ref 38–73)
NEUTROPHILS NFR BLD: 49.1 % (ref 38–73)
NEUTROPHILS NFR BLD: 49.3 % (ref 38–73)
NEUTROPHILS NFR BLD: 50 % (ref 38–73)
NEUTROPHILS NFR BLD: 50.1 % (ref 38–73)
NEUTROPHILS NFR BLD: 50.3 % (ref 38–73)
NEUTROPHILS NFR BLD: 50.5 % (ref 38–73)
NEUTROPHILS NFR BLD: 52.1 % (ref 38–73)
NEUTROPHILS NFR BLD: 52.9 % (ref 38–73)
NEUTROPHILS NFR BLD: 53.6 % (ref 38–73)
NEUTROPHILS NFR BLD: 53.7 % (ref 38–73)
NEUTROPHILS NFR BLD: 53.7 % (ref 38–73)
NEUTROPHILS NFR BLD: 54.8 % (ref 38–73)
NEUTROPHILS NFR BLD: 55.9 % (ref 38–73)
NEUTROPHILS NFR BLD: 56.9 % (ref 38–73)
NEUTROPHILS NFR BLD: 57.4 % (ref 38–73)
NEUTROPHILS NFR BLD: 58.3 % (ref 38–73)
NEUTROPHILS NFR BLD: 58.8 % (ref 38–73)
NEUTROPHILS NFR BLD: 59.5 % (ref 38–73)
NEUTROPHILS NFR BLD: 59.8 % (ref 38–73)
NEUTROPHILS NFR BLD: 60 % (ref 38–73)
NEUTROPHILS NFR BLD: 60.6 % (ref 38–73)
NEUTROPHILS NFR BLD: 63.7 % (ref 38–73)
NEUTROPHILS NFR BLD: 71.9 % (ref 38–73)
NEUTROPHILS NFR BLD: 82.8 % (ref 38–73)
NEUTROPHILS NFR BLD: 84.5 % (ref 38–73)
NEUTROPHILS NFR BLD: 88.3 % (ref 38–73)
NITRITE UR QL STRIP: NEGATIVE
NON-SQ EPI CELLS #/AREA URNS HPF: <1 /HPF
NRBC BLD-RTO: 0 /100 WBC
OSMOLALITY SERPL: 265 MOSM/KG (ref 275–295)
OSMOLALITY UR: 158 MOSM/KG (ref 50–1200)
OSMOLALITY UR: 608 MOSM/KG (ref 50–1200)
OVALOCYTES BLD QL SMEAR: ABNORMAL
PATHOLOGIST INTERPRETATION SPE: NORMAL
PH UR STRIP: 5 [PH] (ref 5–8)
PH UR STRIP: 6 [PH] (ref 5–8)
PH UR STRIP: 6 [PH] (ref 5–8)
PH UR STRIP: 7 [PH] (ref 5–8)
PHOSPHATE SERPL-MCNC: 2.1 MG/DL (ref 2.7–4.5)
PHOSPHATE SERPL-MCNC: 2.3 MG/DL (ref 2.7–4.5)
PHOSPHATE SERPL-MCNC: 2.5 MG/DL (ref 2.7–4.5)
PHOSPHATE SERPL-MCNC: 2.5 MG/DL (ref 2.7–4.5)
PHOSPHATE SERPL-MCNC: 2.6 MG/DL (ref 2.7–4.5)
PHOSPHATE SERPL-MCNC: 2.7 MG/DL (ref 2.7–4.5)
PHOSPHATE SERPL-MCNC: 2.8 MG/DL (ref 2.7–4.5)
PHOSPHATE SERPL-MCNC: 2.9 MG/DL (ref 2.7–4.5)
PHOSPHATE SERPL-MCNC: 3 MG/DL (ref 2.7–4.5)
PHOSPHATE SERPL-MCNC: 3.2 MG/DL (ref 2.7–4.5)
PHOSPHATE SERPL-MCNC: 3.2 MG/DL (ref 2.7–4.5)
PHOSPHATE SERPL-MCNC: 3.3 MG/DL (ref 2.7–4.5)
PHOSPHATE SERPL-MCNC: 3.3 MG/DL (ref 2.7–4.5)
PHOSPHATE SERPL-MCNC: 3.4 MG/DL (ref 2.7–4.5)
PHOSPHATE SERPL-MCNC: 3.5 MG/DL (ref 2.7–4.5)
PHOSPHATE SERPL-MCNC: 3.8 MG/DL (ref 2.7–4.5)
PHOSPHATE SERPL-MCNC: 4 MG/DL (ref 2.7–4.5)
PISA TR MAX VEL: 2.92 M/S
PLATELET # BLD AUTO: 104 K/UL (ref 150–450)
PLATELET # BLD AUTO: 104 K/UL (ref 150–450)
PLATELET # BLD AUTO: 106 K/UL (ref 150–450)
PLATELET # BLD AUTO: 107 K/UL (ref 150–450)
PLATELET # BLD AUTO: 109 K/UL (ref 150–350)
PLATELET # BLD AUTO: 112 K/UL (ref 150–450)
PLATELET # BLD AUTO: 112 K/UL (ref 150–450)
PLATELET # BLD AUTO: 113 K/UL (ref 150–450)
PLATELET # BLD AUTO: 114 K/UL (ref 150–450)
PLATELET # BLD AUTO: 117 K/UL (ref 150–350)
PLATELET # BLD AUTO: 118 K/UL (ref 150–450)
PLATELET # BLD AUTO: 119 K/UL (ref 150–350)
PLATELET # BLD AUTO: 119 K/UL (ref 150–450)
PLATELET # BLD AUTO: 121 K/UL (ref 150–450)
PLATELET # BLD AUTO: 122 K/UL (ref 150–450)
PLATELET # BLD AUTO: 124 K/UL (ref 150–450)
PLATELET # BLD AUTO: 127 K/UL (ref 150–450)
PLATELET # BLD AUTO: 128 K/UL (ref 150–350)
PLATELET # BLD AUTO: 129 K/UL (ref 150–450)
PLATELET # BLD AUTO: 129 K/UL (ref 150–450)
PLATELET # BLD AUTO: 132 K/UL (ref 150–450)
PLATELET # BLD AUTO: 137 K/UL (ref 150–450)
PLATELET # BLD AUTO: 140 K/UL (ref 150–450)
PLATELET # BLD AUTO: 145 K/UL (ref 150–450)
PLATELET # BLD AUTO: 146 K/UL (ref 150–450)
PLATELET # BLD AUTO: 153 K/UL (ref 150–450)
PLATELET # BLD AUTO: 157 K/UL (ref 150–450)
PLATELET # BLD AUTO: 180 K/UL (ref 150–450)
PLATELET # BLD AUTO: 204 K/UL (ref 150–450)
PLATELET BLD QL SMEAR: ABNORMAL
PMV BLD AUTO: 10.9 FL (ref 9.2–12.9)
PMV BLD AUTO: 11.1 FL (ref 9.2–12.9)
PMV BLD AUTO: 11.8 FL (ref 9.2–12.9)
PMV BLD AUTO: 11.8 FL (ref 9.2–12.9)
PMV BLD AUTO: 11.9 FL (ref 9.2–12.9)
PMV BLD AUTO: 12.2 FL (ref 9.2–12.9)
PMV BLD AUTO: 12.3 FL (ref 9.2–12.9)
PMV BLD AUTO: 13.3 FL (ref 9.2–12.9)
PMV BLD AUTO: 13.4 FL (ref 9.2–12.9)
PMV BLD AUTO: 13.5 FL (ref 9.2–12.9)
PMV BLD AUTO: 13.7 FL (ref 9.2–12.9)
PMV BLD AUTO: 14.1 FL (ref 9.2–12.9)
PMV BLD AUTO: ABNORMAL FL (ref 9.2–12.9)
POCT GLUCOSE: 138 MG/DL (ref 70–110)
POIKILOCYTOSIS BLD QL SMEAR: SLIGHT
POLYCHROMASIA BLD QL SMEAR: ABNORMAL
POTASSIUM SERPL-SCNC: 3.8 MMOL/L (ref 3.5–5.1)
POTASSIUM SERPL-SCNC: 3.9 MMOL/L (ref 3.5–5.1)
POTASSIUM SERPL-SCNC: 4 MMOL/L (ref 3.5–5.1)
POTASSIUM SERPL-SCNC: 4.1 MMOL/L (ref 3.5–5.1)
POTASSIUM SERPL-SCNC: 4.2 MMOL/L (ref 3.5–5.1)
POTASSIUM SERPL-SCNC: 4.3 MMOL/L (ref 3.5–5.1)
POTASSIUM SERPL-SCNC: 4.4 MMOL/L (ref 3.5–5.1)
POTASSIUM SERPL-SCNC: 4.5 MMOL/L (ref 3.5–5.1)
POTASSIUM SERPL-SCNC: 4.6 MMOL/L (ref 3.5–5.1)
POTASSIUM SERPL-SCNC: 4.8 MMOL/L (ref 3.5–5.1)
POTASSIUM SERPL-SCNC: 4.9 MMOL/L (ref 3.5–5.1)
POTASSIUM SERPL-SCNC: 5 MMOL/L (ref 3.5–5.1)
POTASSIUM SERPL-SCNC: 5.1 MMOL/L (ref 3.5–5.1)
POTASSIUM SERPL-SCNC: 5.1 MMOL/L (ref 3.5–5.1)
POTASSIUM SERPL-SCNC: 5.2 MMOL/L (ref 3.5–5.1)
POTASSIUM SERPL-SCNC: 5.4 MMOL/L (ref 3.5–5.1)
POTASSIUM SERPL-SCNC: 5.7 MMOL/L (ref 3.5–5.1)
POTASSIUM SERPL-SCNC: 5.7 MMOL/L (ref 3.5–5.1)
PROT 24H UR-MRATE: 1702 MG/SPEC (ref 0–100)
PROT SERPL-MCNC: 5.4 G/DL (ref 6–8.4)
PROT SERPL-MCNC: 5.6 G/DL (ref 6–8.4)
PROT SERPL-MCNC: 5.7 G/DL (ref 6–8.4)
PROT SERPL-MCNC: 5.8 G/DL (ref 6–8.4)
PROT SERPL-MCNC: 6.1 G/DL (ref 6–8.4)
PROT SERPL-MCNC: 6.2 G/DL (ref 6–8.4)
PROT SERPL-MCNC: 6.3 G/DL (ref 6–8.4)
PROT SERPL-MCNC: 6.7 G/DL (ref 6–8.4)
PROT SERPL-MCNC: 6.7 G/DL (ref 6–8.4)
PROT SERPL-MCNC: 7.3 G/DL (ref 6–8.4)
PROT UR QL STRIP: ABNORMAL
PROT UR-MCNC: 125 MG/DL (ref 0–15)
PROT UR-MCNC: 165 MG/DL (ref 0–15)
PROT UR-MCNC: 50 MG/DL (ref 0–15)
PROT UR-MCNC: 82 MG/DL (ref 0–15)
PROT/CREAT UR: 1.79 MG/G{CREAT} (ref 0–0.2)
PROT/CREAT UR: 1.85 MG/G{CREAT} (ref 0–0.2)
PROT/CREAT UR: 4.63 MG/G{CREAT} (ref 0–0.2)
PTH-INTACT SERPL-MCNC: 142 PG/ML (ref 9–77)
PTH-INTACT SERPL-MCNC: 186 PG/ML (ref 9–77)
PULM VEIN S/D RATIO: 1.45
PV PEAK D VEL: 0.42 M/S
PV PEAK S VEL: 0.61 M/S
RA MAJOR: 5.16 CM
RA PRESSURE: 3 MMHG
RA WIDTH: 3.53 CM
RBC # BLD AUTO: 3.37 M/UL (ref 4–5.4)
RBC # BLD AUTO: 3.38 M/UL (ref 4–5.4)
RBC # BLD AUTO: 3.46 M/UL (ref 4–5.4)
RBC # BLD AUTO: 3.51 M/UL (ref 4–5.4)
RBC # BLD AUTO: 3.52 M/UL (ref 4–5.4)
RBC # BLD AUTO: 3.6 M/UL (ref 4–5.4)
RBC # BLD AUTO: 3.65 M/UL (ref 4–5.4)
RBC # BLD AUTO: 3.67 M/UL (ref 4–5.4)
RBC # BLD AUTO: 3.69 M/UL (ref 4–5.4)
RBC # BLD AUTO: 3.75 M/UL (ref 4–5.4)
RBC # BLD AUTO: 3.76 M/UL (ref 4–5.4)
RBC # BLD AUTO: 3.76 M/UL (ref 4–5.4)
RBC # BLD AUTO: 3.8 M/UL (ref 4–5.4)
RBC # BLD AUTO: 3.8 M/UL (ref 4–5.4)
RBC # BLD AUTO: 3.85 M/UL (ref 4–5.4)
RBC # BLD AUTO: 3.92 M/UL (ref 4–5.4)
RBC # BLD AUTO: 3.94 M/UL (ref 4–5.4)
RBC # BLD AUTO: 3.96 M/UL (ref 4–5.4)
RBC # BLD AUTO: 3.99 M/UL (ref 4–5.4)
RBC # BLD AUTO: 4 M/UL (ref 4–5.4)
RBC # BLD AUTO: 4.05 M/UL (ref 4–5.4)
RBC # BLD AUTO: 4.13 M/UL (ref 4–5.4)
RBC # BLD AUTO: 4.16 M/UL (ref 4–5.4)
RBC # BLD AUTO: 4.16 M/UL (ref 4–5.4)
RBC # BLD AUTO: 4.2 M/UL (ref 4–5.4)
RBC # BLD AUTO: 4.28 M/UL (ref 4–5.4)
RBC # BLD AUTO: 4.37 M/UL (ref 4–5.4)
RBC # BLD AUTO: 4.38 M/UL (ref 4–5.4)
RBC # BLD AUTO: 4.55 M/UL (ref 4–5.4)
RBC #/AREA URNS AUTO: 0 /HPF (ref 0–4)
RBC #/AREA URNS HPF: 1 /HPF (ref 0–4)
RBC #/AREA URNS HPF: 3 /HPF (ref 0–4)
RBC #/AREA URNS HPF: 5 /HPF (ref 0–4)
RETICS/RBC NFR AUTO: 1.7 % (ref 0.5–2.5)
RIGHT VENTRICULAR END-DIASTOLIC DIMENSION: 3.32 CM
RV TISSUE DOPPLER FREE WALL SYSTOLIC VELOCITY 1 (APICAL 4 CHAMBER VIEW): 13.69 CM/S
SARS-COV-2 RDRP RESP QL NAA+PROBE: NEGATIVE
SARS-COV-2 RDRP RESP QL NAA+PROBE: NEGATIVE
SATURATED IRON: 73 % (ref 20–50)
SCHISTOCYTES BLD QL SMEAR: ABNORMAL
SINUS: 3.09 CM
SODIUM SERPL-SCNC: 116 MMOL/L (ref 136–145)
SODIUM SERPL-SCNC: 117 MMOL/L (ref 136–145)
SODIUM SERPL-SCNC: 118 MMOL/L (ref 136–145)
SODIUM SERPL-SCNC: 119 MMOL/L (ref 136–145)
SODIUM SERPL-SCNC: 119 MMOL/L (ref 136–145)
SODIUM SERPL-SCNC: 120 MMOL/L (ref 136–145)
SODIUM SERPL-SCNC: 120 MMOL/L (ref 136–145)
SODIUM SERPL-SCNC: 121 MMOL/L (ref 136–145)
SODIUM SERPL-SCNC: 121 MMOL/L (ref 136–145)
SODIUM SERPL-SCNC: 123 MMOL/L (ref 136–145)
SODIUM SERPL-SCNC: 124 MMOL/L (ref 136–145)
SODIUM SERPL-SCNC: 124 MMOL/L (ref 136–145)
SODIUM SERPL-SCNC: 127 MMOL/L (ref 136–145)
SODIUM SERPL-SCNC: 127 MMOL/L (ref 136–145)
SODIUM SERPL-SCNC: 128 MMOL/L (ref 136–145)
SODIUM SERPL-SCNC: 130 MMOL/L (ref 136–145)
SODIUM SERPL-SCNC: 131 MMOL/L (ref 136–145)
SODIUM SERPL-SCNC: 132 MMOL/L (ref 136–145)
SODIUM SERPL-SCNC: 132 MMOL/L (ref 136–145)
SODIUM SERPL-SCNC: 133 MMOL/L (ref 136–145)
SODIUM SERPL-SCNC: 133 MMOL/L (ref 136–145)
SODIUM SERPL-SCNC: 135 MMOL/L (ref 136–145)
SODIUM SERPL-SCNC: 136 MMOL/L (ref 136–145)
SODIUM SERPL-SCNC: 137 MMOL/L (ref 136–145)
SODIUM SERPL-SCNC: 138 MMOL/L (ref 136–145)
SODIUM SERPL-SCNC: 139 MMOL/L (ref 136–145)
SODIUM SERPL-SCNC: 140 MMOL/L (ref 136–145)
SODIUM UR-SCNC: 50 MMOL/L (ref 20–250)
SP GR UR STRIP: 1.02 (ref 1–1.03)
SP GR UR STRIP: 1.02 (ref 1–1.03)
SP GR UR STRIP: <=1.005 (ref 1–1.03)
SP GR UR STRIP: >=1.03 (ref 1–1.03)
SPHEROCYTES BLD QL SMEAR: ABNORMAL
SQUAMOUS #/AREA URNS AUTO: 0 /HPF
SQUAMOUS #/AREA URNS HPF: 1 /HPF
SQUAMOUS #/AREA URNS HPF: 1 /HPF
SQUAMOUS #/AREA URNS HPF: 3 /HPF
STJ: 2.56 CM
TACROLIMUS BLD-MCNC: 10.1 NG/ML (ref 5–15)
TACROLIMUS BLD-MCNC: 10.3 NG/ML (ref 5–15)
TACROLIMUS BLD-MCNC: 10.6 NG/ML (ref 5–15)
TACROLIMUS BLD-MCNC: 10.7 NG/ML (ref 5–15)
TACROLIMUS BLD-MCNC: 11.2 NG/ML (ref 5–15)
TACROLIMUS BLD-MCNC: 11.3 NG/ML (ref 5–15)
TACROLIMUS BLD-MCNC: 4.5 NG/ML (ref 5–15)
TACROLIMUS BLD-MCNC: 5.5 NG/ML (ref 5–15)
TACROLIMUS BLD-MCNC: 6.7 NG/ML (ref 5–15)
TACROLIMUS BLD-MCNC: 6.7 NG/ML (ref 5–15)
TACROLIMUS BLD-MCNC: 7.4 NG/ML (ref 5–15)
TACROLIMUS BLD-MCNC: 7.5 NG/ML (ref 5–15)
TACROLIMUS BLD-MCNC: 7.7 NG/ML (ref 5–15)
TACROLIMUS BLD-MCNC: 8.2 NG/ML (ref 5–15)
TACROLIMUS BLD-MCNC: 8.3 NG/ML (ref 5–15)
TACROLIMUS BLD-MCNC: 8.6 NG/ML (ref 5–15)
TACROLIMUS BLD-MCNC: 8.7 NG/ML (ref 5–15)
TACROLIMUS BLD-MCNC: 8.8 NG/ML (ref 5–15)
TACROLIMUS BLD-MCNC: 8.8 NG/ML (ref 5–15)
TACROLIMUS BLD-MCNC: 9 NG/ML (ref 5–15)
TACROLIMUS BLD-MCNC: 9.1 NG/ML (ref 5–15)
TACROLIMUS BLD-MCNC: 9.2 NG/ML (ref 5–15)
TACROLIMUS BLD-MCNC: 9.3 NG/ML (ref 5–15)
TACROLIMUS BLD-MCNC: 9.4 NG/ML (ref 5–15)
TACROLIMUS BLD-MCNC: 9.7 NG/ML (ref 5–15)
TACROLIMUS BLD-MCNC: 9.8 NG/ML (ref 5–15)
TACROLIMUS, NORMALIZED: 6.6 NG/ML (ref 5–15)
TACROLIMUS, NORMALIZED: 6.7 NG/ML (ref 5–15)
TACROLIMUS, NORMALIZED: 7.3 NG/ML (ref 5–15)
TACROLIMUS, NORMALIZED: 7.6 NG/ML (ref 5–15)
TACROLIMUS, NORMALIZED: 7.8 NG/ML (ref 5–15)
TACROLIMUS, NORMALIZED: 7.9 NG/ML (ref 5–15)
TACROLIMUS, NORMALIZED: 8.1 NG/ML (ref 5–15)
TACROLIMUS, NORMALIZED: 8.1 NG/ML (ref 5–15)
TACROLIMUS, NORMALIZED: 8.3 NG/ML (ref 5–15)
TACROLIMUS, NORMALIZED: 8.6 NG/ML (ref 5–15)
TARGETS BLD QL SMEAR: ABNORMAL
TDI LATERAL: 0.09 M/S
TDI SEPTAL: 0.06 M/S
TDI: 0.08 M/S
TOTAL IRON BINDING CAPACITY: 210 UG/DL (ref 250–450)
TR MAX PG: 34 MMHG
TRANSFERRIN SERPL-MCNC: 142 MG/DL (ref 200–375)
TRICUSPID ANNULAR PLANE SYSTOLIC EXCURSION: 2.21 CM
TROPONIN I SERPL DL<=0.01 NG/ML-MCNC: 0.04 NG/ML (ref 0–0.03)
TROPONIN I SERPL DL<=0.01 NG/ML-MCNC: 0.09 NG/ML (ref 0–0.03)
TROPONIN I SERPL DL<=0.01 NG/ML-MCNC: 0.12 NG/ML (ref 0–0.03)
TSH SERPL DL<=0.005 MIU/L-ACNC: 1.15 UIU/ML (ref 0.4–4)
TV REST PULMONARY ARTERY PRESSURE: 37 MMHG
URINE COLLECTION DURATION: 24 HR
URINE VOLUME: 2075 ML
URN SPEC COLLECT METH UR: ABNORMAL
WBC # BLD AUTO: 4.04 K/UL (ref 3.9–12.7)
WBC # BLD AUTO: 5.02 K/UL (ref 3.9–12.7)
WBC # BLD AUTO: 5.16 K/UL (ref 3.9–12.7)
WBC # BLD AUTO: 5.4 K/UL (ref 3.9–12.7)
WBC # BLD AUTO: 5.42 K/UL (ref 3.9–12.7)
WBC # BLD AUTO: 5.86 K/UL (ref 3.9–12.7)
WBC # BLD AUTO: 5.91 K/UL (ref 3.9–12.7)
WBC # BLD AUTO: 6.21 K/UL (ref 3.9–12.7)
WBC # BLD AUTO: 6.42 K/UL (ref 3.9–12.7)
WBC # BLD AUTO: 6.56 K/UL (ref 3.9–12.7)
WBC # BLD AUTO: 6.71 K/UL (ref 3.9–12.7)
WBC # BLD AUTO: 6.77 K/UL (ref 3.9–12.7)
WBC # BLD AUTO: 6.94 K/UL (ref 3.9–12.7)
WBC # BLD AUTO: 7.02 K/UL (ref 3.9–12.7)
WBC # BLD AUTO: 7.16 K/UL (ref 3.9–12.7)
WBC # BLD AUTO: 7.29 K/UL (ref 3.9–12.7)
WBC # BLD AUTO: 7.31 K/UL (ref 3.9–12.7)
WBC # BLD AUTO: 7.43 K/UL (ref 3.9–12.7)
WBC # BLD AUTO: 7.58 K/UL (ref 3.9–12.7)
WBC # BLD AUTO: 7.75 K/UL (ref 3.9–12.7)
WBC # BLD AUTO: 7.76 K/UL (ref 3.9–12.7)
WBC # BLD AUTO: 7.9 K/UL (ref 3.9–12.7)
WBC # BLD AUTO: 7.99 K/UL (ref 3.9–12.7)
WBC # BLD AUTO: 8.05 K/UL (ref 3.9–12.7)
WBC # BLD AUTO: 8.16 K/UL (ref 3.9–12.7)
WBC # BLD AUTO: 8.27 K/UL (ref 3.9–12.7)
WBC # BLD AUTO: 8.52 K/UL (ref 3.9–12.7)
WBC # BLD AUTO: 8.78 K/UL (ref 3.9–12.7)
WBC # BLD AUTO: 9.72 K/UL (ref 3.9–12.7)
WBC #/AREA URNS AUTO: 2 /HPF (ref 0–5)
WBC #/AREA URNS HPF: 2 /HPF (ref 0–5)
WBC #/AREA URNS HPF: 3 /HPF (ref 0–5)
WBC #/AREA URNS HPF: 8 /HPF (ref 0–5)

## 2021-01-01 PROCEDURE — 77012 CT SCAN FOR NEEDLE BIOPSY: CPT | Mod: TC

## 2021-01-01 PROCEDURE — 63600175 PHARM REV CODE 636 W HCPCS: Performed by: PATHOLOGY

## 2021-01-01 PROCEDURE — 80053 COMPREHEN METABOLIC PANEL: CPT | Performed by: INTERNAL MEDICINE

## 2021-01-01 PROCEDURE — 3288F PR FALLS RISK ASSESSMENT DOCUMENTED: ICD-10-PCS | Mod: CPTII,S$GLB,, | Performed by: INTERNAL MEDICINE

## 2021-01-01 PROCEDURE — 63600175 PHARM REV CODE 636 W HCPCS: Mod: JG | Performed by: INTERNAL MEDICINE

## 2021-01-01 PROCEDURE — 80069 RENAL FUNCTION PANEL: CPT

## 2021-01-01 PROCEDURE — 1157F PR ADVANCE CARE PLAN OR EQUIV PRESENT IN MEDICAL RECORD: ICD-10-PCS | Mod: S$GLB,,, | Performed by: STUDENT IN AN ORGANIZED HEALTH CARE EDUCATION/TRAINING PROGRAM

## 2021-01-01 PROCEDURE — 83735 ASSAY OF MAGNESIUM: CPT | Performed by: INTERNAL MEDICINE

## 2021-01-01 PROCEDURE — 63600175 PHARM REV CODE 636 W HCPCS: Performed by: STUDENT IN AN ORGANIZED HEALTH CARE EDUCATION/TRAINING PROGRAM

## 2021-01-01 PROCEDURE — 85025 COMPLETE CBC W/AUTO DIFF WBC: CPT | Mod: 91 | Performed by: INTERNAL MEDICINE

## 2021-01-01 PROCEDURE — 25000003 PHARM REV CODE 250: Performed by: STUDENT IN AN ORGANIZED HEALTH CARE EDUCATION/TRAINING PROGRAM

## 2021-01-01 PROCEDURE — 99232 SBSQ HOSP IP/OBS MODERATE 35: CPT | Mod: ,,, | Performed by: INTERNAL MEDICINE

## 2021-01-01 PROCEDURE — 3008F BODY MASS INDEX DOCD: CPT | Mod: CPTII,S$GLB,, | Performed by: NURSE PRACTITIONER

## 2021-01-01 PROCEDURE — 1159F PR MEDICATION LIST DOCUMENTED IN MEDICAL RECORD: ICD-10-PCS | Mod: S$GLB,,, | Performed by: INTERNAL MEDICINE

## 2021-01-01 PROCEDURE — 36415 COLL VENOUS BLD VENIPUNCTURE: CPT | Performed by: INTERNAL MEDICINE

## 2021-01-01 PROCEDURE — 83735 ASSAY OF MAGNESIUM: CPT

## 2021-01-01 PROCEDURE — 1101F PR PT FALLS ASSESS DOC 0-1 FALLS W/OUT INJ PAST YR: ICD-10-PCS | Mod: CPTII,S$GLB,, | Performed by: NURSE PRACTITIONER

## 2021-01-01 PROCEDURE — 25500020 PHARM REV CODE 255: Performed by: STUDENT IN AN ORGANIZED HEALTH CARE EDUCATION/TRAINING PROGRAM

## 2021-01-01 PROCEDURE — 36415 COLL VENOUS BLD VENIPUNCTURE: CPT | Performed by: STUDENT IN AN ORGANIZED HEALTH CARE EDUCATION/TRAINING PROGRAM

## 2021-01-01 PROCEDURE — 80069 RENAL FUNCTION PANEL: CPT | Performed by: INTERNAL MEDICINE

## 2021-01-01 PROCEDURE — 1126F AMNT PAIN NOTED NONE PRSNT: CPT | Mod: S$GLB,,, | Performed by: NURSE PRACTITIONER

## 2021-01-01 PROCEDURE — 3008F BODY MASS INDEX DOCD: CPT | Mod: CPTII,S$GLB,, | Performed by: STUDENT IN AN ORGANIZED HEALTH CARE EDUCATION/TRAINING PROGRAM

## 2021-01-01 PROCEDURE — U0002 COVID-19 LAB TEST NON-CDC: HCPCS | Performed by: STUDENT IN AN ORGANIZED HEALTH CARE EDUCATION/TRAINING PROGRAM

## 2021-01-01 PROCEDURE — 99223 PR INITIAL HOSPITAL CARE,LEVL III: ICD-10-PCS | Mod: ,,, | Performed by: INTERNAL MEDICINE

## 2021-01-01 PROCEDURE — 83970 ASSAY OF PARATHORMONE: CPT

## 2021-01-01 PROCEDURE — 1126F PR PAIN SEVERITY QUANTIFIED, NO PAIN PRESENT: ICD-10-PCS | Mod: S$GLB,,, | Performed by: INTERNAL MEDICINE

## 2021-01-01 PROCEDURE — 83735 ASSAY OF MAGNESIUM: CPT | Performed by: STUDENT IN AN ORGANIZED HEALTH CARE EDUCATION/TRAINING PROGRAM

## 2021-01-01 PROCEDURE — 84100 ASSAY OF PHOSPHORUS: CPT | Performed by: STUDENT IN AN ORGANIZED HEALTH CARE EDUCATION/TRAINING PROGRAM

## 2021-01-01 PROCEDURE — 20600001 HC STEP DOWN PRIVATE ROOM

## 2021-01-01 PROCEDURE — 80197 ASSAY OF TACROLIMUS: CPT | Performed by: INTERNAL MEDICINE

## 2021-01-01 PROCEDURE — 99999 PR PBB SHADOW E&M-EST. PATIENT-LVL III: ICD-10-PCS | Mod: PBBFAC,,, | Performed by: NURSE PRACTITIONER

## 2021-01-01 PROCEDURE — 96361 HYDRATE IV INFUSION ADD-ON: CPT

## 2021-01-01 PROCEDURE — 1159F PR MEDICATION LIST DOCUMENTED IN MEDICAL RECORD: ICD-10-PCS | Mod: S$GLB,,, | Performed by: STUDENT IN AN ORGANIZED HEALTH CARE EDUCATION/TRAINING PROGRAM

## 2021-01-01 PROCEDURE — D9220A PRA ANESTHESIA: Mod: ANES,,, | Performed by: ANESTHESIOLOGY

## 2021-01-01 PROCEDURE — 93005 ELECTROCARDIOGRAM TRACING: CPT | Mod: S$GLB,,, | Performed by: STUDENT IN AN ORGANIZED HEALTH CARE EDUCATION/TRAINING PROGRAM

## 2021-01-01 PROCEDURE — 80076 HEPATIC FUNCTION PANEL: CPT | Performed by: STUDENT IN AN ORGANIZED HEALTH CARE EDUCATION/TRAINING PROGRAM

## 2021-01-01 PROCEDURE — 25000003 PHARM REV CODE 250: Performed by: PATHOLOGY

## 2021-01-01 PROCEDURE — 99215 OFFICE O/P EST HI 40 MIN: CPT | Mod: 25,S$GLB,, | Performed by: NURSE PRACTITIONER

## 2021-01-01 PROCEDURE — 3008F PR BODY MASS INDEX (BMI) DOCUMENTED: ICD-10-PCS | Mod: CPTII,S$GLB,, | Performed by: NURSE PRACTITIONER

## 2021-01-01 PROCEDURE — 63600175 PHARM REV CODE 636 W HCPCS: Performed by: INTERNAL MEDICINE

## 2021-01-01 PROCEDURE — 99291 PR CRITICAL CARE, E/M 30-74 MINUTES: ICD-10-PCS | Mod: CS,,, | Performed by: EMERGENCY MEDICINE

## 2021-01-01 PROCEDURE — 93005 ELECTROCARDIOGRAM TRACING: CPT

## 2021-01-01 PROCEDURE — 96372 THER/PROPH/DIAG INJ SC/IM: CPT | Mod: S$GLB,,, | Performed by: NURSE PRACTITIONER

## 2021-01-01 PROCEDURE — 99213 PR OFFICE/OUTPT VISIT, EST, LEVL III, 20-29 MIN: ICD-10-PCS | Mod: S$GLB,,, | Performed by: STUDENT IN AN ORGANIZED HEALTH CARE EDUCATION/TRAINING PROGRAM

## 2021-01-01 PROCEDURE — 80048 BASIC METABOLIC PNL TOTAL CA: CPT | Mod: 91 | Performed by: STUDENT IN AN ORGANIZED HEALTH CARE EDUCATION/TRAINING PROGRAM

## 2021-01-01 PROCEDURE — 99999 PR PBB SHADOW E&M-EST. PATIENT-LVL IV: ICD-10-PCS | Mod: PBBFAC,,,

## 2021-01-01 PROCEDURE — 80197 ASSAY OF TACROLIMUS: CPT | Performed by: NURSE PRACTITIONER

## 2021-01-01 PROCEDURE — 83935 ASSAY OF URINE OSMOLALITY: CPT | Performed by: INTERNAL MEDICINE

## 2021-01-01 PROCEDURE — 86140 C-REACTIVE PROTEIN: CPT | Performed by: INTERNAL MEDICINE

## 2021-01-01 PROCEDURE — 83880 ASSAY OF NATRIURETIC PEPTIDE: CPT | Performed by: STUDENT IN AN ORGANIZED HEALTH CARE EDUCATION/TRAINING PROGRAM

## 2021-01-01 PROCEDURE — 36514 APHERESIS PLASMA: CPT

## 2021-01-01 PROCEDURE — 86225 DNA ANTIBODY NATIVE: CPT | Performed by: INTERNAL MEDICINE

## 2021-01-01 PROCEDURE — 85025 COMPLETE CBC W/AUTO DIFF WBC: CPT | Performed by: INTERNAL MEDICINE

## 2021-01-01 PROCEDURE — 99999 PR PBB SHADOW E&M-EST. PATIENT-LVL III: CPT | Mod: PBBFAC,,, | Performed by: STUDENT IN AN ORGANIZED HEALTH CARE EDUCATION/TRAINING PROGRAM

## 2021-01-01 PROCEDURE — 96374 THER/PROPH/DIAG INJ IV PUSH: CPT

## 2021-01-01 PROCEDURE — 85652 RBC SED RATE AUTOMATED: CPT | Performed by: INTERNAL MEDICINE

## 2021-01-01 PROCEDURE — 80048 BASIC METABOLIC PNL TOTAL CA: CPT | Performed by: STUDENT IN AN ORGANIZED HEALTH CARE EDUCATION/TRAINING PROGRAM

## 2021-01-01 PROCEDURE — 99999 PR PBB SHADOW E&M-EST. PATIENT-LVL IV: CPT | Mod: PBBFAC,,, | Performed by: NURSE PRACTITIONER

## 2021-01-01 PROCEDURE — 3008F BODY MASS INDEX DOCD: CPT | Mod: CPTII,S$GLB,, | Performed by: INTERNAL MEDICINE

## 2021-01-01 PROCEDURE — 99233 PR SUBSEQUENT HOSPITAL CARE,LEVL III: ICD-10-PCS | Mod: ,,, | Performed by: INTERNAL MEDICINE

## 2021-01-01 PROCEDURE — 1126F AMNT PAIN NOTED NONE PRSNT: CPT | Mod: S$GLB,,, | Performed by: INTERNAL MEDICINE

## 2021-01-01 PROCEDURE — 99233 PR SUBSEQUENT HOSPITAL CARE,LEVL III: ICD-10-PCS | Mod: 95,,, | Performed by: INTERNAL MEDICINE

## 2021-01-01 PROCEDURE — 1126F AMNT PAIN NOTED NONE PRSNT: CPT | Mod: S$GLB,,, | Performed by: STUDENT IN AN ORGANIZED HEALTH CARE EDUCATION/TRAINING PROGRAM

## 2021-01-01 PROCEDURE — 36514 PR THER APHERESIS,PLASMA PHERESIS: ICD-10-PCS | Mod: ,,, | Performed by: PATHOLOGY

## 2021-01-01 PROCEDURE — 3288F PR FALLS RISK ASSESSMENT DOCUMENTED: ICD-10-PCS | Mod: CPTII,S$GLB,, | Performed by: NURSE PRACTITIONER

## 2021-01-01 PROCEDURE — 93010 EKG 12-LEAD: ICD-10-PCS | Mod: ,,, | Performed by: INTERNAL MEDICINE

## 2021-01-01 PROCEDURE — 99233 SBSQ HOSP IP/OBS HIGH 50: CPT | Mod: ,,, | Performed by: INTERNAL MEDICINE

## 2021-01-01 PROCEDURE — 3077F PR MOST RECENT SYSTOLIC BLOOD PRESSURE >= 140 MM HG: ICD-10-PCS | Mod: CPTII,S$GLB,, | Performed by: NURSE PRACTITIONER

## 2021-01-01 PROCEDURE — 36514 APHERESIS PLASMA: CPT | Mod: ,,, | Performed by: PATHOLOGY

## 2021-01-01 PROCEDURE — 81000 URINALYSIS NONAUTO W/SCOPE: CPT | Performed by: INTERNAL MEDICINE

## 2021-01-01 PROCEDURE — 96375 TX/PRO/DX INJ NEW DRUG ADDON: CPT

## 2021-01-01 PROCEDURE — 3078F DIAST BP <80 MM HG: CPT | Mod: CPTII,S$GLB,, | Performed by: NURSE PRACTITIONER

## 2021-01-01 PROCEDURE — 63600175 PHARM REV CODE 636 W HCPCS: Performed by: EMERGENCY MEDICINE

## 2021-01-01 PROCEDURE — 1126F PR PAIN SEVERITY QUANTIFIED, NO PAIN PRESENT: ICD-10-PCS | Mod: S$GLB,,, | Performed by: NURSE PRACTITIONER

## 2021-01-01 PROCEDURE — 1126F PR PAIN SEVERITY QUANTIFIED, NO PAIN PRESENT: ICD-10-PCS | Mod: S$GLB,,, | Performed by: STUDENT IN AN ORGANIZED HEALTH CARE EDUCATION/TRAINING PROGRAM

## 2021-01-01 PROCEDURE — 99213 OFFICE O/P EST LOW 20 MIN: CPT | Mod: S$GLB,,, | Performed by: STUDENT IN AN ORGANIZED HEALTH CARE EDUCATION/TRAINING PROGRAM

## 2021-01-01 PROCEDURE — 1101F PR PT FALLS ASSESS DOC 0-1 FALLS W/OUT INJ PAST YR: ICD-10-PCS | Mod: CPTII,S$GLB,, | Performed by: INTERNAL MEDICINE

## 2021-01-01 PROCEDURE — 96366 THER/PROPH/DIAG IV INF ADDON: CPT

## 2021-01-01 PROCEDURE — 93005 RHYTHM STRIP: ICD-10-PCS | Mod: S$GLB,,, | Performed by: STUDENT IN AN ORGANIZED HEALTH CARE EDUCATION/TRAINING PROGRAM

## 2021-01-01 PROCEDURE — 80197 ASSAY OF TACROLIMUS: CPT | Performed by: STUDENT IN AN ORGANIZED HEALTH CARE EDUCATION/TRAINING PROGRAM

## 2021-01-01 PROCEDURE — 84484 ASSAY OF TROPONIN QUANT: CPT | Performed by: STUDENT IN AN ORGANIZED HEALTH CARE EDUCATION/TRAINING PROGRAM

## 2021-01-01 PROCEDURE — 85025 COMPLETE CBC W/AUTO DIFF WBC: CPT

## 2021-01-01 PROCEDURE — 85025 COMPLETE CBC W/AUTO DIFF WBC: CPT | Performed by: STUDENT IN AN ORGANIZED HEALTH CARE EDUCATION/TRAINING PROGRAM

## 2021-01-01 PROCEDURE — 99223 1ST HOSP IP/OBS HIGH 75: CPT | Mod: ,,, | Performed by: INTERNAL MEDICINE

## 2021-01-01 PROCEDURE — 99999 PR PBB SHADOW E&M-EST. PATIENT-LVL III: ICD-10-PCS | Mod: PBBFAC,,, | Performed by: INTERNAL MEDICINE

## 2021-01-01 PROCEDURE — 99233 SBSQ HOSP IP/OBS HIGH 50: CPT | Mod: ,,, | Performed by: STUDENT IN AN ORGANIZED HEALTH CARE EDUCATION/TRAINING PROGRAM

## 2021-01-01 PROCEDURE — 1101F PT FALLS ASSESS-DOCD LE1/YR: CPT | Mod: CPTII,S$GLB,, | Performed by: NURSE PRACTITIONER

## 2021-01-01 PROCEDURE — 1159F MED LIST DOCD IN RCRD: CPT | Mod: S$GLB,,, | Performed by: STUDENT IN AN ORGANIZED HEALTH CARE EDUCATION/TRAINING PROGRAM

## 2021-01-01 PROCEDURE — 82306 VITAMIN D 25 HYDROXY: CPT

## 2021-01-01 PROCEDURE — 3288F FALL RISK ASSESSMENT DOCD: CPT | Mod: CPTII,S$GLB,, | Performed by: INTERNAL MEDICINE

## 2021-01-01 PROCEDURE — 93010 ELECTROCARDIOGRAM REPORT: CPT | Mod: ,,, | Performed by: INTERNAL MEDICINE

## 2021-01-01 PROCEDURE — 25000003 PHARM REV CODE 250: Performed by: INTERNAL MEDICINE

## 2021-01-01 PROCEDURE — 92960 PR CARDIOVERSION, ELECTIVE;EXTERN: ICD-10-PCS | Mod: ,,, | Performed by: STUDENT IN AN ORGANIZED HEALTH CARE EDUCATION/TRAINING PROGRAM

## 2021-01-01 PROCEDURE — 99999 PR PBB SHADOW E&M-EST. PATIENT-LVL III: CPT | Mod: PBBFAC,,, | Performed by: INTERNAL MEDICINE

## 2021-01-01 PROCEDURE — P9045 ALBUMIN (HUMAN), 5%, 250 ML: HCPCS | Mod: JG | Performed by: PATHOLOGY

## 2021-01-01 PROCEDURE — 36415 COLL VENOUS BLD VENIPUNCTURE: CPT

## 2021-01-01 PROCEDURE — 80048 BASIC METABOLIC PNL TOTAL CA: CPT | Performed by: INTERNAL MEDICINE

## 2021-01-01 PROCEDURE — 99215 PR OFFICE/OUTPT VISIT, EST, LEVL V, 40-54 MIN: ICD-10-PCS | Mod: S$GLB,,, | Performed by: INTERNAL MEDICINE

## 2021-01-01 PROCEDURE — 63600175 PHARM REV CODE 636 W HCPCS: Performed by: RADIOLOGY

## 2021-01-01 PROCEDURE — 84484 ASSAY OF TROPONIN QUANT: CPT | Performed by: NURSE PRACTITIONER

## 2021-01-01 PROCEDURE — 3074F PR MOST RECENT SYSTOLIC BLOOD PRESSURE < 130 MM HG: ICD-10-PCS | Mod: CPTII,S$GLB,, | Performed by: NURSE PRACTITIONER

## 2021-01-01 PROCEDURE — 87798 DETECT AGENT NOS DNA AMP: CPT | Performed by: INTERNAL MEDICINE

## 2021-01-01 PROCEDURE — 96372 PR INJECTION,THERAP/PROPH/DIAG2ST, IM OR SUBCUT: ICD-10-PCS | Mod: S$GLB,,, | Performed by: NURSE PRACTITIONER

## 2021-01-01 PROCEDURE — 99203 PR OFFICE/OUTPT VISIT, NEW, LEVL III, 30-44 MIN: ICD-10-PCS | Mod: S$GLB,,, | Performed by: STUDENT IN AN ORGANIZED HEALTH CARE EDUCATION/TRAINING PROGRAM

## 2021-01-01 PROCEDURE — 80076 HEPATIC FUNCTION PANEL: CPT | Performed by: INTERNAL MEDICINE

## 2021-01-01 PROCEDURE — 99999 PR PBB SHADOW E&M-EST. PATIENT-LVL IV: ICD-10-PCS | Mod: PBBFAC,,, | Performed by: INTERNAL MEDICINE

## 2021-01-01 PROCEDURE — D9220A PRA ANESTHESIA: Mod: CRNA,,, | Performed by: NURSE ANESTHETIST, CERTIFIED REGISTERED

## 2021-01-01 PROCEDURE — 84300 ASSAY OF URINE SODIUM: CPT | Performed by: INTERNAL MEDICINE

## 2021-01-01 PROCEDURE — 1159F PR MEDICATION LIST DOCUMENTED IN MEDICAL RECORD: ICD-10-PCS | Mod: S$GLB,,, | Performed by: NURSE PRACTITIONER

## 2021-01-01 PROCEDURE — 84165 PROTEIN E-PHORESIS SERUM: CPT

## 2021-01-01 PROCEDURE — 84165 PATHOLOGIST INTERPRETATION SPE: ICD-10-PCS | Mod: 26,,, | Performed by: PATHOLOGY

## 2021-01-01 PROCEDURE — 83930 ASSAY OF BLOOD OSMOLALITY: CPT | Performed by: NURSE PRACTITIONER

## 2021-01-01 PROCEDURE — 1101F PT FALLS ASSESS-DOCD LE1/YR: CPT | Mod: CPTII,S$GLB,, | Performed by: INTERNAL MEDICINE

## 2021-01-01 PROCEDURE — 83690 ASSAY OF LIPASE: CPT | Performed by: NURSE PRACTITIONER

## 2021-01-01 PROCEDURE — 99233 PR SUBSEQUENT HOSPITAL CARE,LEVL III: ICD-10-PCS | Mod: ,,, | Performed by: STUDENT IN AN ORGANIZED HEALTH CARE EDUCATION/TRAINING PROGRAM

## 2021-01-01 PROCEDURE — 82570 ASSAY OF URINE CREATININE: CPT

## 2021-01-01 PROCEDURE — D9220A PRA ANESTHESIA: ICD-10-PCS | Mod: CRNA,,, | Performed by: NURSE ANESTHETIST, CERTIFIED REGISTERED

## 2021-01-01 PROCEDURE — 99999 PR PBB SHADOW E&M-EST. PATIENT-LVL IV: ICD-10-PCS | Mod: PBBFAC,,, | Performed by: STUDENT IN AN ORGANIZED HEALTH CARE EDUCATION/TRAINING PROGRAM

## 2021-01-01 PROCEDURE — 99999 PR PBB SHADOW E&M-EST. PATIENT-LVL V: CPT | Mod: PBBFAC,,, | Performed by: NURSE PRACTITIONER

## 2021-01-01 PROCEDURE — 1157F ADVNC CARE PLAN IN RCRD: CPT | Mod: S$GLB,,, | Performed by: STUDENT IN AN ORGANIZED HEALTH CARE EDUCATION/TRAINING PROGRAM

## 2021-01-01 PROCEDURE — 3078F PR MOST RECENT DIASTOLIC BLOOD PRESSURE < 80 MM HG: ICD-10-PCS | Mod: CPTII,S$GLB,, | Performed by: NURSE PRACTITIONER

## 2021-01-01 PROCEDURE — 25000003 PHARM REV CODE 250: Performed by: HOSPITALIST

## 2021-01-01 PROCEDURE — 99999 PR PBB SHADOW E&M-EST. PATIENT-LVL IV: CPT | Mod: PBBFAC,,, | Performed by: STUDENT IN AN ORGANIZED HEALTH CARE EDUCATION/TRAINING PROGRAM

## 2021-01-01 PROCEDURE — 99239 PR HOSPITAL DISCHARGE DAY,>30 MIN: ICD-10-PCS | Mod: 95,,, | Performed by: INTERNAL MEDICINE

## 2021-01-01 PROCEDURE — 3288F FALL RISK ASSESSMENT DOCD: CPT | Mod: CPTII,S$GLB,, | Performed by: NURSE PRACTITIONER

## 2021-01-01 PROCEDURE — 1157F ADVNC CARE PLAN IN RCRD: CPT | Mod: S$GLB,,, | Performed by: INTERNAL MEDICINE

## 2021-01-01 PROCEDURE — 80069 RENAL FUNCTION PANEL: CPT | Performed by: EMERGENCY MEDICINE

## 2021-01-01 PROCEDURE — 96365 THER/PROPH/DIAG IV INF INIT: CPT

## 2021-01-01 PROCEDURE — 82728 ASSAY OF FERRITIN: CPT | Performed by: INTERNAL MEDICINE

## 2021-01-01 PROCEDURE — 63600175 PHARM REV CODE 636 W HCPCS: Performed by: NURSE ANESTHETIST, CERTIFIED REGISTERED

## 2021-01-01 PROCEDURE — 99281 EMR DPT VST MAYX REQ PHY/QHP: CPT | Mod: 25

## 2021-01-01 PROCEDURE — 99214 OFFICE O/P EST MOD 30 MIN: CPT | Mod: S$GLB,,, | Performed by: INTERNAL MEDICINE

## 2021-01-01 PROCEDURE — 1101F PT FALLS ASSESS-DOCD LE1/YR: CPT | Mod: CPTII,S$GLB,, | Performed by: STUDENT IN AN ORGANIZED HEALTH CARE EDUCATION/TRAINING PROGRAM

## 2021-01-01 PROCEDURE — 1157F PR ADVANCE CARE PLAN OR EQUIV PRESENT IN MEDICAL RECORD: ICD-10-PCS | Mod: S$GLB,,, | Performed by: INTERNAL MEDICINE

## 2021-01-01 PROCEDURE — D9220A PRA ANESTHESIA: ICD-10-PCS | Mod: ANES,,, | Performed by: ANESTHESIOLOGY

## 2021-01-01 PROCEDURE — 99233 SBSQ HOSP IP/OBS HIGH 50: CPT | Mod: 95,,, | Performed by: INTERNAL MEDICINE

## 2021-01-01 PROCEDURE — 99999 PR PBB SHADOW E&M-EST. PATIENT-LVL III: CPT | Mod: PBBFAC,,, | Performed by: NURSE PRACTITIONER

## 2021-01-01 PROCEDURE — 3077F SYST BP >= 140 MM HG: CPT | Mod: CPTII,S$GLB,, | Performed by: NURSE PRACTITIONER

## 2021-01-01 PROCEDURE — 1157F PR ADVANCE CARE PLAN OR EQUIV PRESENT IN MEDICAL RECORD: ICD-10-PCS | Mod: S$GLB,,, | Performed by: NURSE PRACTITIONER

## 2021-01-01 PROCEDURE — 25000003 PHARM REV CODE 250: Performed by: EMERGENCY MEDICINE

## 2021-01-01 PROCEDURE — 99239 HOSP IP/OBS DSCHRG MGMT >30: CPT | Mod: 95,,, | Performed by: INTERNAL MEDICINE

## 2021-01-01 PROCEDURE — 80048 BASIC METABOLIC PNL TOTAL CA: CPT | Mod: 91 | Performed by: INTERNAL MEDICINE

## 2021-01-01 PROCEDURE — 86160 COMPLEMENT ANTIGEN: CPT | Performed by: INTERNAL MEDICINE

## 2021-01-01 PROCEDURE — 82306 VITAMIN D 25 HYDROXY: CPT | Performed by: INTERNAL MEDICINE

## 2021-01-01 PROCEDURE — 81001 URINALYSIS AUTO W/SCOPE: CPT | Performed by: NURSE PRACTITIONER

## 2021-01-01 PROCEDURE — 86160 COMPLEMENT ANTIGEN: CPT | Mod: 59 | Performed by: INTERNAL MEDICINE

## 2021-01-01 PROCEDURE — 86160 COMPLEMENT ANTIGEN: CPT

## 2021-01-01 PROCEDURE — 99215 OFFICE O/P EST HI 40 MIN: CPT | Mod: S$GLB,,, | Performed by: STUDENT IN AN ORGANIZED HEALTH CARE EDUCATION/TRAINING PROGRAM

## 2021-01-01 PROCEDURE — 3008F PR BODY MASS INDEX (BMI) DOCUMENTED: ICD-10-PCS | Mod: CPTII,S$GLB,, | Performed by: STUDENT IN AN ORGANIZED HEALTH CARE EDUCATION/TRAINING PROGRAM

## 2021-01-01 PROCEDURE — 3288F FALL RISK ASSESSMENT DOCD: CPT | Mod: CPTII,S$GLB,, | Performed by: STUDENT IN AN ORGANIZED HEALTH CARE EDUCATION/TRAINING PROGRAM

## 2021-01-01 PROCEDURE — 99999 PR PBB SHADOW E&M-EST. PATIENT-LVL IV: CPT | Mod: PBBFAC,,, | Performed by: INTERNAL MEDICINE

## 2021-01-01 PROCEDURE — 99215 PR OFFICE/OUTPT VISIT, EST, LEVL V, 40-54 MIN: ICD-10-PCS | Mod: 25,S$GLB,, | Performed by: NURSE PRACTITIONER

## 2021-01-01 PROCEDURE — 99214 PR OFFICE/OUTPT VISIT, EST, LEVL IV, 30-39 MIN: ICD-10-PCS | Mod: S$GLB,,, | Performed by: INTERNAL MEDICINE

## 2021-01-01 PROCEDURE — 82570 ASSAY OF URINE CREATININE: CPT | Performed by: INTERNAL MEDICINE

## 2021-01-01 PROCEDURE — 99232 PR SUBSEQUENT HOSPITAL CARE,LEVL II: ICD-10-PCS | Mod: ,,, | Performed by: INTERNAL MEDICINE

## 2021-01-01 PROCEDURE — 86225 DNA ANTIBODY NATIVE: CPT

## 2021-01-01 PROCEDURE — 92960 CARDIOVERSION ELECTRIC EXT: CPT | Mod: ,,, | Performed by: STUDENT IN AN ORGANIZED HEALTH CARE EDUCATION/TRAINING PROGRAM

## 2021-01-01 PROCEDURE — 3008F PR BODY MASS INDEX (BMI) DOCUMENTED: ICD-10-PCS | Mod: CPTII,S$GLB,, | Performed by: INTERNAL MEDICINE

## 2021-01-01 PROCEDURE — 99291 CRITICAL CARE FIRST HOUR: CPT | Mod: 25

## 2021-01-01 PROCEDURE — 1159F MED LIST DOCD IN RCRD: CPT | Mod: S$GLB,,, | Performed by: NURSE PRACTITIONER

## 2021-01-01 PROCEDURE — 83540 ASSAY OF IRON: CPT | Performed by: INTERNAL MEDICINE

## 2021-01-01 PROCEDURE — 1157F ADVNC CARE PLAN IN RCRD: CPT | Mod: S$GLB,,, | Performed by: NURSE PRACTITIONER

## 2021-01-01 PROCEDURE — 99999 PR PBB SHADOW E&M-EST. PATIENT-LVL IV: CPT | Mod: PBBFAC,,,

## 2021-01-01 PROCEDURE — 86160 COMPLEMENT ANTIGEN: CPT | Mod: 59

## 2021-01-01 PROCEDURE — 85025 COMPLETE CBC W/AUTO DIFF WBC: CPT | Performed by: NURSE PRACTITIONER

## 2021-01-01 PROCEDURE — 80053 COMPREHEN METABOLIC PANEL: CPT | Performed by: EMERGENCY MEDICINE

## 2021-01-01 PROCEDURE — 83935 ASSAY OF URINE OSMOLALITY: CPT | Performed by: STUDENT IN AN ORGANIZED HEALTH CARE EDUCATION/TRAINING PROGRAM

## 2021-01-01 PROCEDURE — 80197 ASSAY OF TACROLIMUS: CPT

## 2021-01-01 PROCEDURE — 99999 PR PBB SHADOW E&M-EST. PATIENT-LVL III: ICD-10-PCS | Mod: PBBFAC,,, | Performed by: STUDENT IN AN ORGANIZED HEALTH CARE EDUCATION/TRAINING PROGRAM

## 2021-01-01 PROCEDURE — 99203 OFFICE O/P NEW LOW 30 MIN: CPT | Mod: S$GLB,,, | Performed by: STUDENT IN AN ORGANIZED HEALTH CARE EDUCATION/TRAINING PROGRAM

## 2021-01-01 PROCEDURE — 99999 PR PBB SHADOW E&M-EST. PATIENT-LVL V: ICD-10-PCS | Mod: PBBFAC,,, | Performed by: NURSE PRACTITIONER

## 2021-01-01 PROCEDURE — 83970 ASSAY OF PARATHORMONE: CPT | Performed by: INTERNAL MEDICINE

## 2021-01-01 PROCEDURE — 87798 DETECT AGENT NOS DNA AMP: CPT | Mod: 59 | Performed by: INTERNAL MEDICINE

## 2021-01-01 PROCEDURE — 1159F MED LIST DOCD IN RCRD: CPT | Mod: S$GLB,,, | Performed by: INTERNAL MEDICINE

## 2021-01-01 PROCEDURE — 92960 CARDIOVERSION ELECTRIC EXT: CPT | Performed by: STUDENT IN AN ORGANIZED HEALTH CARE EDUCATION/TRAINING PROGRAM

## 2021-01-01 PROCEDURE — 83520 IMMUNOASSAY QUANT NOS NONAB: CPT

## 2021-01-01 PROCEDURE — 25000003 PHARM REV CODE 250: Performed by: FAMILY MEDICINE

## 2021-01-01 PROCEDURE — 99999 PR PBB SHADOW E&M-EST. PATIENT-LVL II: ICD-10-PCS | Mod: PBBFAC,,, | Performed by: STUDENT IN AN ORGANIZED HEALTH CARE EDUCATION/TRAINING PROGRAM

## 2021-01-01 PROCEDURE — 86803 HEPATITIS C AB TEST: CPT | Performed by: EMERGENCY MEDICINE

## 2021-01-01 PROCEDURE — 99999 PR PBB SHADOW E&M-EST. PATIENT-LVL II: CPT | Mod: PBBFAC,,, | Performed by: STUDENT IN AN ORGANIZED HEALTH CARE EDUCATION/TRAINING PROGRAM

## 2021-01-01 PROCEDURE — 80197 ASSAY OF TACROLIMUS: CPT | Mod: 91 | Performed by: INTERNAL MEDICINE

## 2021-01-01 PROCEDURE — 99215 PR OFFICE/OUTPT VISIT, EST, LEVL V, 40-54 MIN: ICD-10-PCS | Mod: S$GLB,,, | Performed by: STUDENT IN AN ORGANIZED HEALTH CARE EDUCATION/TRAINING PROGRAM

## 2021-01-01 PROCEDURE — 99999 PR PBB SHADOW E&M-EST. PATIENT-LVL IV: ICD-10-PCS | Mod: PBBFAC,,, | Performed by: NURSE PRACTITIONER

## 2021-01-01 PROCEDURE — 3074F SYST BP LT 130 MM HG: CPT | Mod: CPTII,S$GLB,, | Performed by: NURSE PRACTITIONER

## 2021-01-01 PROCEDURE — 25000003 PHARM REV CODE 250: Performed by: NURSE ANESTHETIST, CERTIFIED REGISTERED

## 2021-01-01 PROCEDURE — 84443 ASSAY THYROID STIM HORMONE: CPT | Performed by: STUDENT IN AN ORGANIZED HEALTH CARE EDUCATION/TRAINING PROGRAM

## 2021-01-01 PROCEDURE — 1101F PR PT FALLS ASSESS DOC 0-1 FALLS W/OUT INJ PAST YR: ICD-10-PCS | Mod: CPTII,S$GLB,, | Performed by: STUDENT IN AN ORGANIZED HEALTH CARE EDUCATION/TRAINING PROGRAM

## 2021-01-01 PROCEDURE — 99215 OFFICE O/P EST HI 40 MIN: CPT | Mod: S$GLB,,, | Performed by: INTERNAL MEDICINE

## 2021-01-01 PROCEDURE — 37000008 HC ANESTHESIA 1ST 15 MINUTES: Performed by: STUDENT IN AN ORGANIZED HEALTH CARE EDUCATION/TRAINING PROGRAM

## 2021-01-01 PROCEDURE — A4215 STERILE NEEDLE: HCPCS

## 2021-01-01 PROCEDURE — 84156 ASSAY OF PROTEIN URINE: CPT

## 2021-01-01 PROCEDURE — 37000009 HC ANESTHESIA EA ADD 15 MINS: Performed by: STUDENT IN AN ORGANIZED HEALTH CARE EDUCATION/TRAINING PROGRAM

## 2021-01-01 PROCEDURE — 84165 PROTEIN E-PHORESIS SERUM: CPT | Mod: 26,,, | Performed by: PATHOLOGY

## 2021-01-01 PROCEDURE — 99291 CRITICAL CARE FIRST HOUR: CPT | Mod: CS,,, | Performed by: EMERGENCY MEDICINE

## 2021-01-01 PROCEDURE — 85045 AUTOMATED RETICULOCYTE COUNT: CPT | Performed by: INTERNAL MEDICINE

## 2021-01-01 PROCEDURE — 80053 COMPREHEN METABOLIC PANEL: CPT | Performed by: NURSE PRACTITIONER

## 2021-01-01 PROCEDURE — 3288F PR FALLS RISK ASSESSMENT DOCUMENTED: ICD-10-PCS | Mod: CPTII,S$GLB,, | Performed by: STUDENT IN AN ORGANIZED HEALTH CARE EDUCATION/TRAINING PROGRAM

## 2021-01-01 PROCEDURE — 93010 RHYTHM STRIP: ICD-10-PCS | Mod: S$GLB,,, | Performed by: INTERNAL MEDICINE

## 2021-01-01 PROCEDURE — 93010 ELECTROCARDIOGRAM REPORT: CPT | Mod: S$GLB,,, | Performed by: INTERNAL MEDICINE

## 2021-01-01 RX ORDER — HYDROQUINONE 40 MG/G
CREAM TOPICAL NIGHTLY
Qty: 30 G | Refills: 0 | Status: SHIPPED | OUTPATIENT
Start: 2021-01-01 | End: 2021-01-01

## 2021-01-01 RX ORDER — OLMESARTAN MEDOXOMIL 5 MG/1
10 TABLET ORAL DAILY
Qty: 60 TABLET | Refills: 9 | Status: SHIPPED | OUTPATIENT
Start: 2021-01-01 | End: 2021-01-01 | Stop reason: SDUPTHER

## 2021-01-01 RX ORDER — TACROLIMUS 0.5 MG/1
CAPSULE ORAL
Qty: 210 CAPSULE | Refills: 11 | Status: SHIPPED | OUTPATIENT
Start: 2021-01-01 | End: 2021-01-01

## 2021-01-01 RX ORDER — ALBUMIN HUMAN 50 G/1000ML
125 SOLUTION INTRAVENOUS ONCE
Status: COMPLETED | OUTPATIENT
Start: 2021-01-01 | End: 2021-01-01

## 2021-01-01 RX ORDER — VALGANCICLOVIR 450 MG/1
450 TABLET, FILM COATED ORAL DAILY
Qty: 30 TABLET | Refills: 5 | Status: SHIPPED | OUTPATIENT
Start: 2021-01-01 | End: 2021-09-11

## 2021-01-01 RX ORDER — MYCOPHENOLATE MOFETIL 250 MG/1
500 CAPSULE ORAL 2 TIMES DAILY
Qty: 120 CAPSULE | Refills: 11
Start: 2021-01-01

## 2021-01-01 RX ORDER — TACROLIMUS 0.5 MG/1
1.5 CAPSULE ORAL EVERY 12 HOURS
Qty: 180 CAPSULE | Refills: 11 | Status: SHIPPED | OUTPATIENT
Start: 2021-01-01 | End: 2021-01-01 | Stop reason: DRUGHIGH

## 2021-01-01 RX ORDER — ONDANSETRON 2 MG/ML
4 INJECTION INTRAMUSCULAR; INTRAVENOUS
Status: COMPLETED | OUTPATIENT
Start: 2021-01-01 | End: 2021-01-01

## 2021-01-01 RX ORDER — LIDOCAINE AND PRILOCAINE 25; 25 MG/G; MG/G
CREAM TOPICAL
Refills: 0 | Status: CANCELLED | OUTPATIENT
Start: 2021-01-01

## 2021-01-01 RX ORDER — NYSTATIN 100000 [USP'U]/ML
5 SUSPENSION ORAL 3 TIMES DAILY
Qty: 473 ML | Refills: 0 | Status: SHIPPED | OUTPATIENT
Start: 2021-01-01 | End: 2021-01-01

## 2021-01-01 RX ORDER — NIFEDIPINE 30 MG/1
30 TABLET, EXTENDED RELEASE ORAL DAILY
Qty: 30 TABLET | Refills: 11 | Status: SHIPPED | OUTPATIENT
Start: 2021-01-01

## 2021-01-01 RX ORDER — FENTANYL CITRATE 50 UG/ML
INJECTION, SOLUTION INTRAMUSCULAR; INTRAVENOUS CODE/TRAUMA/SEDATION MEDICATION
Status: DISCONTINUED | OUTPATIENT
Start: 2021-01-01 | End: 2021-01-01 | Stop reason: HOSPADM

## 2021-01-01 RX ORDER — HYDROXYCHLOROQUINE SULFATE 200 MG/1
600 TABLET, FILM COATED ORAL DAILY
Status: CANCELLED | OUTPATIENT
Start: 2021-01-01

## 2021-01-01 RX ORDER — CLOBETASOL PROPIONATE 0.5 MG/G
OINTMENT TOPICAL 2 TIMES DAILY
Qty: 60 G | Refills: 1 | Status: SHIPPED | OUTPATIENT
Start: 2021-01-01

## 2021-01-01 RX ORDER — ALPRAZOLAM 0.5 MG/1
0.5 TABLET ORAL 3 TIMES DAILY PRN
Status: DISCONTINUED | OUTPATIENT
Start: 2021-01-01 | End: 2021-01-01 | Stop reason: HOSPADM

## 2021-01-01 RX ORDER — METOPROLOL TARTRATE 25 MG/1
25 TABLET, FILM COATED ORAL 4 TIMES DAILY
Status: DISCONTINUED | OUTPATIENT
Start: 2021-01-01 | End: 2021-01-01 | Stop reason: HOSPADM

## 2021-01-01 RX ORDER — MYCOPHENOLATE MOFETIL 250 MG/1
500 CAPSULE ORAL 2 TIMES DAILY
Status: DISCONTINUED | OUTPATIENT
Start: 2021-01-01 | End: 2021-01-01 | Stop reason: HOSPADM

## 2021-01-01 RX ORDER — DIPHENHYDRAMINE HCL 25 MG
25 CAPSULE ORAL
Status: CANCELLED | OUTPATIENT
Start: 2021-01-01

## 2021-01-01 RX ORDER — SODIUM CHLORIDE 0.9 % (FLUSH) 0.9 %
10 SYRINGE (ML) INJECTION
Status: CANCELLED | OUTPATIENT
Start: 2021-01-01

## 2021-01-01 RX ORDER — ATOVAQUONE 750 MG/5ML
1500 SUSPENSION ORAL DAILY
Qty: 380 ML | Refills: 0 | Status: SHIPPED | OUTPATIENT
Start: 2021-01-01 | End: 2021-01-01 | Stop reason: ALTCHOICE

## 2021-01-01 RX ORDER — ACETAMINOPHEN 500 MG
500 TABLET ORAL
Status: CANCELLED | OUTPATIENT
Start: 2021-01-01

## 2021-01-01 RX ORDER — PREDNISONE 5 MG/1
TABLET ORAL
Qty: 120 TABLET | Refills: 11 | Status: SHIPPED | OUTPATIENT
Start: 2021-01-01

## 2021-01-01 RX ORDER — DAPSONE 100 MG/1
100 TABLET ORAL DAILY
Qty: 30 TABLET | Refills: 2 | Status: SHIPPED | OUTPATIENT
Start: 2021-01-01 | End: 2021-01-01 | Stop reason: ALTCHOICE

## 2021-01-01 RX ORDER — HYDRALAZINE HYDROCHLORIDE 20 MG/ML
10 INJECTION INTRAMUSCULAR; INTRAVENOUS EVERY 8 HOURS PRN
Status: DISCONTINUED | OUTPATIENT
Start: 2021-01-01 | End: 2021-01-01 | Stop reason: HOSPADM

## 2021-01-01 RX ORDER — HYDROXYCHLOROQUINE SULFATE 200 MG/1
200 TABLET, FILM COATED ORAL DAILY
Qty: 30 TABLET | Refills: 0 | Status: SHIPPED | OUTPATIENT
Start: 2021-01-01 | End: 2021-01-01

## 2021-01-01 RX ORDER — NIFEDIPINE 30 MG/1
30 TABLET, EXTENDED RELEASE ORAL DAILY
Qty: 30 TABLET | Refills: 1 | Status: SHIPPED | OUTPATIENT
Start: 2021-01-01 | End: 2021-01-01 | Stop reason: SDUPTHER

## 2021-01-01 RX ORDER — SODIUM CHLORIDE 0.9 % (FLUSH) 0.9 %
10 SYRINGE (ML) INJECTION
Status: DISCONTINUED | OUTPATIENT
Start: 2021-01-01 | End: 2021-01-01 | Stop reason: HOSPADM

## 2021-01-01 RX ORDER — DROPERIDOL 2.5 MG/ML
1.25 INJECTION, SOLUTION INTRAMUSCULAR; INTRAVENOUS
Status: COMPLETED | OUTPATIENT
Start: 2021-01-01 | End: 2021-01-01

## 2021-01-01 RX ORDER — ONDANSETRON 4 MG/1
4 TABLET, FILM COATED ORAL EVERY 8 HOURS PRN
Qty: 30 TABLET | Refills: 6 | Status: SHIPPED | OUTPATIENT
Start: 2021-01-01

## 2021-01-01 RX ORDER — SODIUM BICARBONATE 650 MG/1
1300 TABLET ORAL 2 TIMES DAILY
Status: DISCONTINUED | OUTPATIENT
Start: 2021-01-01 | End: 2021-01-01

## 2021-01-01 RX ORDER — TACROLIMUS 1 MG/1
1 CAPSULE ORAL EVERY EVENING
Status: DISCONTINUED | OUTPATIENT
Start: 2021-01-01 | End: 2021-01-01 | Stop reason: HOSPADM

## 2021-01-01 RX ORDER — TACROLIMUS 1 MG/1
1 CAPSULE ORAL NIGHTLY
Qty: 30 CAPSULE | Refills: 11 | Status: SHIPPED | OUTPATIENT
Start: 2021-01-01 | End: 2021-01-01 | Stop reason: DRUGHIGH

## 2021-01-01 RX ORDER — BISACODYL 10 MG
10 SUPPOSITORY, RECTAL RECTAL DAILY PRN
Status: DISCONTINUED | OUTPATIENT
Start: 2021-01-01 | End: 2021-01-01 | Stop reason: HOSPADM

## 2021-01-01 RX ORDER — ENOXAPARIN SODIUM 100 MG/ML
1 INJECTION SUBCUTANEOUS ONCE
Status: COMPLETED | OUTPATIENT
Start: 2021-01-01 | End: 2021-01-01

## 2021-01-01 RX ORDER — CALCIUM CARBONATE 200(500)MG
500 TABLET,CHEWABLE ORAL 2 TIMES DAILY PRN
Status: DISCONTINUED | OUTPATIENT
Start: 2021-01-01 | End: 2021-01-01 | Stop reason: HOSPADM

## 2021-01-01 RX ORDER — DIPHENHYDRAMINE HCL 25 MG
25 CAPSULE ORAL
Status: DISCONTINUED | OUTPATIENT
Start: 2021-01-01 | End: 2021-01-01 | Stop reason: HOSPADM

## 2021-01-01 RX ORDER — AMOXICILLIN 250 MG
1 CAPSULE ORAL 2 TIMES DAILY PRN
Status: DISCONTINUED | OUTPATIENT
Start: 2021-01-01 | End: 2021-01-01

## 2021-01-01 RX ORDER — MIDAZOLAM HYDROCHLORIDE 1 MG/ML
INJECTION INTRAMUSCULAR; INTRAVENOUS CODE/TRAUMA/SEDATION MEDICATION
Status: DISCONTINUED | OUTPATIENT
Start: 2021-01-01 | End: 2021-01-01 | Stop reason: HOSPADM

## 2021-01-01 RX ORDER — PREDNISONE 5 MG/1
TABLET ORAL
Qty: 120 TABLET | Refills: 11 | Status: SHIPPED | OUTPATIENT
Start: 2021-01-01 | End: 2021-01-01

## 2021-01-01 RX ORDER — MYCOPHENOLATE MOFETIL 250 MG/1
1000 CAPSULE ORAL 2 TIMES DAILY
Status: DISCONTINUED | OUTPATIENT
Start: 2021-01-01 | End: 2021-01-01

## 2021-01-01 RX ORDER — TRAMADOL HYDROCHLORIDE 50 MG/1
50 TABLET ORAL EVERY 6 HOURS PRN
COMMUNITY
Start: 2020-01-01 | End: 2021-12-24

## 2021-01-01 RX ORDER — MAGNESIUM SULFATE HEPTAHYDRATE 40 MG/ML
2 INJECTION, SOLUTION INTRAVENOUS ONCE
Status: COMPLETED | OUTPATIENT
Start: 2021-01-01 | End: 2021-01-01

## 2021-01-01 RX ORDER — CALCIUM CARBONATE 200(500)MG
1000 TABLET,CHEWABLE ORAL 2 TIMES DAILY
Status: COMPLETED | OUTPATIENT
Start: 2021-01-01 | End: 2021-01-01

## 2021-01-01 RX ORDER — ACETAMINOPHEN 500 MG
500 TABLET ORAL
Status: DISCONTINUED | OUTPATIENT
Start: 2021-01-01 | End: 2021-01-01 | Stop reason: HOSPADM

## 2021-01-01 RX ORDER — SODIUM BICARBONATE 650 MG/1
1300 TABLET ORAL 3 TIMES DAILY
Qty: 180 TABLET | Refills: 11 | Status: SHIPPED | OUTPATIENT
Start: 2021-01-01

## 2021-01-01 RX ORDER — NICOTINE POLACRILEX 2 MG
1000 GUM BUCCAL
Start: 2021-01-01

## 2021-01-01 RX ORDER — FUROSEMIDE 20 MG/1
20 TABLET ORAL DAILY
COMMUNITY
Start: 2021-01-01

## 2021-01-01 RX ORDER — FUROSEMIDE 10 MG/ML
40 INJECTION INTRAMUSCULAR; INTRAVENOUS ONCE
Status: COMPLETED | OUTPATIENT
Start: 2021-01-01 | End: 2021-01-01

## 2021-01-01 RX ORDER — HALOPERIDOL 5 MG/ML
0.5 INJECTION INTRAMUSCULAR EVERY 10 MIN PRN
Status: DISCONTINUED | OUTPATIENT
Start: 2021-01-01 | End: 2021-01-01 | Stop reason: HOSPADM

## 2021-01-01 RX ORDER — ONDANSETRON 8 MG/1
8 TABLET, ORALLY DISINTEGRATING ORAL EVERY 8 HOURS PRN
Status: DISCONTINUED | OUTPATIENT
Start: 2021-01-01 | End: 2021-01-01 | Stop reason: HOSPADM

## 2021-01-01 RX ORDER — METOPROLOL TARTRATE 25 MG/1
25 TABLET, FILM COATED ORAL 2 TIMES DAILY
Status: DISCONTINUED | OUTPATIENT
Start: 2021-01-01 | End: 2021-01-01

## 2021-01-01 RX ORDER — METOPROLOL TARTRATE 25 MG/1
12.5 TABLET, FILM COATED ORAL 2 TIMES DAILY
Qty: 60 TABLET | Refills: 1 | Status: SHIPPED | OUTPATIENT
Start: 2021-01-01 | End: 2021-01-01

## 2021-01-01 RX ORDER — SILVER SULFADIAZINE 10 G/1000G
CREAM TOPICAL
Status: DISCONTINUED | OUTPATIENT
Start: 2021-01-01 | End: 2021-01-01 | Stop reason: HOSPADM

## 2021-01-01 RX ORDER — MELOXICAM 15 MG/1
TABLET ORAL
COMMUNITY
Start: 2021-01-01 | End: 2021-01-01

## 2021-01-01 RX ORDER — TACROLIMUS 0.5 MG/1
0.5 CAPSULE ORAL NIGHTLY
Qty: 30 CAPSULE | Refills: 11 | Status: SHIPPED | OUTPATIENT
Start: 2021-01-01

## 2021-01-01 RX ORDER — AMOXICILLIN 250 MG
1 CAPSULE ORAL 2 TIMES DAILY
Status: DISCONTINUED | OUTPATIENT
Start: 2021-01-01 | End: 2021-01-01 | Stop reason: HOSPADM

## 2021-01-01 RX ORDER — DAPSONE 100 MG/1
100 TABLET ORAL DAILY
Status: DISCONTINUED | OUTPATIENT
Start: 2021-01-01 | End: 2021-01-01 | Stop reason: HOSPADM

## 2021-01-01 RX ORDER — TACROLIMUS 1 MG/1
2 CAPSULE ORAL EVERY 12 HOURS
Qty: 60 CAPSULE | Refills: 11 | Status: SHIPPED | OUTPATIENT
Start: 2021-01-01 | End: 2021-01-01 | Stop reason: DRUGHIGH

## 2021-01-01 RX ORDER — TACROLIMUS 1 MG/1
2 CAPSULE ORAL EVERY MORNING
Status: DISCONTINUED | OUTPATIENT
Start: 2021-01-01 | End: 2021-01-01

## 2021-01-01 RX ORDER — LOSARTAN POTASSIUM 50 MG/1
50 TABLET ORAL DAILY
Refills: 3 | Status: DISCONTINUED | OUTPATIENT
Start: 2021-01-01 | End: 2021-01-01 | Stop reason: HOSPADM

## 2021-01-01 RX ORDER — HYDROMORPHONE HYDROCHLORIDE 1 MG/ML
0.2 INJECTION, SOLUTION INTRAMUSCULAR; INTRAVENOUS; SUBCUTANEOUS EVERY 5 MIN PRN
Status: DISCONTINUED | OUTPATIENT
Start: 2021-01-01 | End: 2021-01-01 | Stop reason: HOSPADM

## 2021-01-01 RX ORDER — LIDOCAINE AND PRILOCAINE 25; 25 MG/G; MG/G
CREAM TOPICAL
Qty: 25 G | Refills: 1 | Status: SHIPPED | OUTPATIENT
Start: 2021-01-01

## 2021-01-01 RX ORDER — SULFAMETHOXAZOLE AND TRIMETHOPRIM 400; 80 MG/1; MG/1
1 TABLET ORAL DAILY
Qty: 30 TABLET | Refills: 2 | Status: SHIPPED | OUTPATIENT
Start: 2021-01-01 | End: 2021-01-01 | Stop reason: SINTOL

## 2021-01-01 RX ORDER — NYSTATIN 100000 [USP'U]/ML
5 SUSPENSION ORAL 3 TIMES DAILY
Status: DISCONTINUED | OUTPATIENT
Start: 2021-01-01 | End: 2021-01-01 | Stop reason: HOSPADM

## 2021-01-01 RX ORDER — ENOXAPARIN SODIUM 100 MG/ML
40 INJECTION SUBCUTANEOUS EVERY 24 HOURS
Status: DISCONTINUED | OUTPATIENT
Start: 2021-01-01 | End: 2021-01-01

## 2021-01-01 RX ORDER — OLMESARTAN MEDOXOMIL 5 MG/1
10 TABLET ORAL DAILY
Qty: 60 TABLET | Refills: 11 | Status: SHIPPED | OUTPATIENT
Start: 2021-01-01 | End: 2021-01-01 | Stop reason: DRUGHIGH

## 2021-01-01 RX ORDER — ETOMIDATE 2 MG/ML
INJECTION INTRAVENOUS
Status: DISCONTINUED | OUTPATIENT
Start: 2021-01-01 | End: 2021-01-01

## 2021-01-01 RX ORDER — VALGANCICLOVIR 450 MG/1
450 TABLET, FILM COATED ORAL DAILY
Status: DISCONTINUED | OUTPATIENT
Start: 2021-01-01 | End: 2021-01-01 | Stop reason: HOSPADM

## 2021-01-01 RX ORDER — SODIUM BICARBONATE 650 MG/1
1300 TABLET ORAL 2 TIMES DAILY
Status: DISCONTINUED | OUTPATIENT
Start: 2021-01-01 | End: 2021-01-01 | Stop reason: HOSPADM

## 2021-01-01 RX ORDER — TACROLIMUS 1 MG/1
CAPSULE ORAL
Qty: 60 CAPSULE | Refills: 11 | Status: ON HOLD | OUTPATIENT
Start: 2021-01-01 | End: 2021-01-01 | Stop reason: HOSPADM

## 2021-01-01 RX ORDER — TACROLIMUS 0.5 MG/1
1.5 CAPSULE ORAL EVERY MORNING
Qty: 90 CAPSULE | Refills: 11 | Status: SHIPPED | OUTPATIENT
Start: 2021-01-01 | End: 2021-01-01 | Stop reason: DRUGHIGH

## 2021-01-01 RX ORDER — PREDNISONE 5 MG/1
5 TABLET ORAL DAILY
Status: DISCONTINUED | OUTPATIENT
Start: 2021-01-01 | End: 2021-01-01

## 2021-01-01 RX ORDER — METOPROLOL TARTRATE 25 MG/1
25 TABLET, FILM COATED ORAL 2 TIMES DAILY
Qty: 60 TABLET | Refills: 1 | Status: SHIPPED | OUTPATIENT
Start: 2021-01-01 | End: 2021-01-01 | Stop reason: DRUGHIGH

## 2021-01-01 RX ORDER — HYDROXYCHLOROQUINE SULFATE 200 MG/1
200 TABLET, FILM COATED ORAL DAILY
Qty: 90 TABLET | Refills: 0 | Status: SHIPPED | OUTPATIENT
Start: 2021-01-01 | End: 2021-01-01

## 2021-01-01 RX ORDER — SODIUM BICARBONATE 650 MG/1
1300 TABLET ORAL 2 TIMES DAILY
Qty: 120 TABLET | Refills: 11 | Status: SHIPPED | OUTPATIENT
Start: 2021-01-01 | End: 2021-01-01

## 2021-01-01 RX ORDER — LANOLIN ALCOHOL/MO/W.PET/CERES
400 CREAM (GRAM) TOPICAL DAILY
Status: DISCONTINUED | OUTPATIENT
Start: 2021-01-01 | End: 2021-01-01 | Stop reason: HOSPADM

## 2021-01-01 RX ORDER — ENOXAPARIN SODIUM 100 MG/ML
1 INJECTION SUBCUTANEOUS
Status: DISCONTINUED | OUTPATIENT
Start: 2021-01-01 | End: 2021-01-01

## 2021-01-01 RX ORDER — OLMESARTAN MEDOXOMIL 5 MG/1
10 TABLET ORAL DAILY
Qty: 60 TABLET | Refills: 1 | Status: SHIPPED | OUTPATIENT
Start: 2021-01-01 | End: 2021-01-01 | Stop reason: SDUPTHER

## 2021-01-01 RX ORDER — PROPOFOL 10 MG/ML
VIAL (ML) INTRAVENOUS
Status: DISCONTINUED | OUTPATIENT
Start: 2021-01-01 | End: 2021-01-01

## 2021-01-01 RX ORDER — TACROLIMUS 1 MG/1
2 CAPSULE ORAL EVERY 12 HOURS
Qty: 120 CAPSULE | Refills: 9 | Status: SHIPPED | OUTPATIENT
Start: 2021-01-01 | End: 2021-01-01 | Stop reason: SDUPTHER

## 2021-01-01 RX ORDER — OLMESARTAN MEDOXOMIL 20 MG/1
20 TABLET ORAL DAILY
Qty: 90 TABLET | Refills: 3 | Status: SHIPPED | OUTPATIENT
Start: 2021-01-01 | End: 2022-03-12

## 2021-01-01 RX ORDER — HYDROXYCHLOROQUINE SULFATE 200 MG/1
200 TABLET, FILM COATED ORAL DAILY
COMMUNITY
End: 2021-01-01 | Stop reason: SDUPTHER

## 2021-01-01 RX ORDER — MYCOPHENOLATE MOFETIL 250 MG/1
1000 CAPSULE ORAL 2 TIMES DAILY
Qty: 120 CAPSULE | Refills: 11 | Status: ON HOLD | OUTPATIENT
Start: 2021-01-01 | End: 2021-01-01 | Stop reason: SDUPTHER

## 2021-01-01 RX ORDER — NIFEDIPINE 30 MG/1
30 TABLET, EXTENDED RELEASE ORAL DAILY
Status: DISCONTINUED | OUTPATIENT
Start: 2021-01-01 | End: 2021-01-01 | Stop reason: HOSPADM

## 2021-01-01 RX ORDER — PREDNISONE 20 MG/1
20 TABLET ORAL DAILY
Status: DISCONTINUED | OUTPATIENT
Start: 2021-01-01 | End: 2021-01-01 | Stop reason: HOSPADM

## 2021-01-01 RX ORDER — ONDANSETRON 2 MG/ML
4 INJECTION INTRAMUSCULAR; INTRAVENOUS
Status: DISCONTINUED | OUTPATIENT
Start: 2021-01-01 | End: 2021-01-01 | Stop reason: HOSPADM

## 2021-01-01 RX ORDER — DAPSONE 100 MG/1
TABLET ORAL
COMMUNITY
Start: 2021-01-01

## 2021-01-01 RX ORDER — TACROLIMUS 1 MG/1
CAPSULE ORAL
Qty: 90 CAPSULE | Refills: 11 | Status: SHIPPED | OUTPATIENT
Start: 2021-01-01

## 2021-01-01 RX ADMIN — NYSTATIN 500000 UNITS: 100000 SUSPENSION ORAL at 10:04

## 2021-01-01 RX ADMIN — NYSTATIN 500000 UNITS: 100000 SUSPENSION ORAL at 05:04

## 2021-01-01 RX ADMIN — NIFEDIPINE 30 MG: 30 TABLET, FILM COATED, EXTENDED RELEASE ORAL at 09:04

## 2021-01-01 RX ADMIN — SODIUM CHLORIDE 1000 ML: 0.9 INJECTION, SOLUTION INTRAVENOUS at 11:04

## 2021-01-01 RX ADMIN — SODIUM BICARBONATE 650 MG TABLET 1300 MG: at 08:04

## 2021-01-01 RX ADMIN — DIBASIC SODIUM PHOSPHATE, MONOBASIC POTASSIUM PHOSPHATE AND MONOBASIC SODIUM PHOSPHATE 2 TABLET: 852; 155; 130 TABLET ORAL at 08:04

## 2021-01-01 RX ADMIN — DAPSONE 100 MG: 100 TABLET ORAL at 08:04

## 2021-01-01 RX ADMIN — SODIUM BICARBONATE 650 MG TABLET 1300 MG: at 09:04

## 2021-01-01 RX ADMIN — TACROLIMUS 1 MG: 1 CAPSULE ORAL at 05:04

## 2021-01-01 RX ADMIN — SODIUM CHLORIDE 500 ML: 0.9 INJECTION, SOLUTION INTRAVENOUS at 08:04

## 2021-01-01 RX ADMIN — VALGANCICLOVIR 450 MG: 450 TABLET, FILM COATED ORAL at 09:04

## 2021-01-01 RX ADMIN — METOPROLOL TARTRATE 25 MG: 25 TABLET, FILM COATED ORAL at 05:04

## 2021-01-01 RX ADMIN — LOSARTAN POTASSIUM 50 MG: 50 TABLET, FILM COATED ORAL at 08:04

## 2021-01-01 RX ADMIN — MAGNESIUM OXIDE TAB 400 MG (241.3 MG ELEMENTAL MG) 400 MG: 400 (241.3 MG) TAB at 09:04

## 2021-01-01 RX ADMIN — NYSTATIN 500000 UNITS: 100000 SUSPENSION ORAL at 08:04

## 2021-01-01 RX ADMIN — CALCIUM GLUCONATE 2000 MG: 94 INJECTION, SOLUTION INTRAVENOUS at 10:03

## 2021-01-01 RX ADMIN — HUMAN ALBUMIN MICROSPHERES AND PERFLUTREN 0.66 MG: 10; .22 INJECTION, SOLUTION INTRAVENOUS at 08:04

## 2021-01-01 RX ADMIN — VALGANCICLOVIR 450 MG: 450 TABLET, FILM COATED ORAL at 08:04

## 2021-01-01 RX ADMIN — MAGNESIUM OXIDE TAB 400 MG (241.3 MG ELEMENTAL MG) 400 MG: 400 (241.3 MG) TAB at 08:04

## 2021-01-01 RX ADMIN — LOSARTAN POTASSIUM 50 MG: 50 TABLET, FILM COATED ORAL at 09:04

## 2021-01-01 RX ADMIN — DROPERIDOL 1.25 MG: 2.5 INJECTION, SOLUTION INTRAMUSCULAR; INTRAVENOUS at 09:04

## 2021-01-01 RX ADMIN — NYSTATIN 500000 UNITS: 100000 SUSPENSION ORAL at 09:04

## 2021-01-01 RX ADMIN — CALCIUM CARBONATE (ANTACID) CHEW TAB 500 MG 1000 MG: 500 CHEW TAB at 08:04

## 2021-01-01 RX ADMIN — MYCOPHENOLATE MOFETIL 500 MG: 250 CAPSULE ORAL at 09:04

## 2021-01-01 RX ADMIN — ALBUMIN (HUMAN) 125 G: 12.5 SOLUTION INTRAVENOUS at 10:03

## 2021-01-01 RX ADMIN — METOPROLOL TARTRATE 25 MG: 25 TABLET, FILM COATED ORAL at 09:04

## 2021-01-01 RX ADMIN — Medication 1 G: at 11:03

## 2021-01-01 RX ADMIN — DOCUSATE SODIUM 50MG AND SENNOSIDES 8.6MG 1 TABLET: 8.6; 5 TABLET, FILM COATED ORAL at 08:04

## 2021-01-01 RX ADMIN — DIPHENHYDRAMINE HYDROCHLORIDE 25 MG: 25 CAPSULE ORAL at 09:03

## 2021-01-01 RX ADMIN — HYDRALAZINE HYDROCHLORIDE 10 MG: 20 INJECTION INTRAMUSCULAR; INTRAVENOUS at 01:04

## 2021-01-01 RX ADMIN — SODIUM CHLORIDE 1000 ML: 0.9 INJECTION, SOLUTION INTRAVENOUS at 09:04

## 2021-01-01 RX ADMIN — METOPROLOL TARTRATE 25 MG: 25 TABLET, FILM COATED ORAL at 01:04

## 2021-01-01 RX ADMIN — CALCIUM CARBONATE (ANTACID) CHEW TAB 500 MG 1000 MG: 500 CHEW TAB at 07:04

## 2021-01-01 RX ADMIN — CALCIUM GLUCONATE 2000 MG: 98 INJECTION, SOLUTION INTRAVENOUS at 10:03

## 2021-01-01 RX ADMIN — MIDAZOLAM HYDROCHLORIDE 1 MG: 1 INJECTION INTRAMUSCULAR; INTRAVENOUS at 08:03

## 2021-01-01 RX ADMIN — TACROLIMUS 1 MG: 1 CAPSULE ORAL at 06:04

## 2021-01-01 RX ADMIN — PREDNISONE 20 MG: 20 TABLET ORAL at 08:04

## 2021-01-01 RX ADMIN — TACROLIMUS 1.5 MG: 1 CAPSULE ORAL at 09:04

## 2021-01-01 RX ADMIN — DAPSONE 100 MG: 100 TABLET ORAL at 09:04

## 2021-01-01 RX ADMIN — NYSTATIN 500000 UNITS: 100000 SUSPENSION ORAL at 02:04

## 2021-01-01 RX ADMIN — MYCOPHENOLATE MOFETIL 1000 MG: 250 CAPSULE ORAL at 01:04

## 2021-01-01 RX ADMIN — NIFEDIPINE 30 MG: 30 TABLET, FILM COATED, EXTENDED RELEASE ORAL at 08:04

## 2021-01-01 RX ADMIN — DEXTROSE: 50 INJECTION, SOLUTION INTRAVENOUS at 01:03

## 2021-01-01 RX ADMIN — MYCOPHENOLATE MOFETIL 500 MG: 250 CAPSULE ORAL at 10:04

## 2021-01-01 RX ADMIN — HUMAN IMMUNOGLOBULIN G 60 G: 40 LIQUID INTRAVENOUS at 09:03

## 2021-01-01 RX ADMIN — CALCIUM CARBONATE (ANTACID) CHEW TAB 500 MG 500 MG: 500 CHEW TAB at 05:04

## 2021-01-01 RX ADMIN — NYSTATIN 500000 UNITS: 100000 SUSPENSION ORAL at 04:04

## 2021-01-01 RX ADMIN — ENOXAPARIN SODIUM 60 MG: 100 INJECTION SUBCUTANEOUS at 03:04

## 2021-01-01 RX ADMIN — MYCOPHENOLATE MOFETIL 1000 MG: 250 CAPSULE ORAL at 09:04

## 2021-01-01 RX ADMIN — DOCUSATE SODIUM 50MG AND SENNOSIDES 8.6MG 1 TABLET: 8.6; 5 TABLET, FILM COATED ORAL at 09:04

## 2021-01-01 RX ADMIN — TACROLIMUS 2 MG: 1 CAPSULE ORAL at 08:04

## 2021-01-01 RX ADMIN — MYCOPHENOLATE MOFETIL 500 MG: 250 CAPSULE ORAL at 08:04

## 2021-01-01 RX ADMIN — NYSTATIN 500000 UNITS: 100000 SUSPENSION ORAL at 11:04

## 2021-01-01 RX ADMIN — NYSTATIN 500000 UNITS: 100000 SUSPENSION ORAL at 12:04

## 2021-01-01 RX ADMIN — ACETAMINOPHEN 500 MG: 500 TABLET ORAL at 09:03

## 2021-01-01 RX ADMIN — PREDNISONE 20 MG: 20 TABLET ORAL at 09:04

## 2021-01-01 RX ADMIN — PROPOFOL 40 MG: 10 INJECTION, EMULSION INTRAVENOUS at 04:04

## 2021-01-01 RX ADMIN — ENOXAPARIN SODIUM 60 MG: 100 INJECTION SUBCUTANEOUS at 04:04

## 2021-01-01 RX ADMIN — FENTANYL CITRATE 50 MCG: 50 INJECTION, SOLUTION INTRAMUSCULAR; INTRAVENOUS at 08:03

## 2021-01-01 RX ADMIN — ETOMIDATE 6 MCG: 2 INJECTION INTRAVENOUS at 04:04

## 2021-01-01 RX ADMIN — ENOXAPARIN SODIUM 60 MG: 100 INJECTION SUBCUTANEOUS at 05:04

## 2021-01-01 RX ADMIN — FUROSEMIDE 40 MG: 10 INJECTION, SOLUTION INTRAMUSCULAR; INTRAVENOUS at 06:04

## 2021-01-01 RX ADMIN — SODIUM PHOSPHATE, MONOBASIC, MONOHYDRATE 30 MMOL: 276; 142 INJECTION, SOLUTION INTRAVENOUS at 09:04

## 2021-01-01 RX ADMIN — ENOXAPARIN SODIUM 60 MG: 100 INJECTION SUBCUTANEOUS at 01:04

## 2021-01-01 RX ADMIN — ONDANSETRON HYDROCHLORIDE 4 MG: 2 SOLUTION INTRAMUSCULAR; INTRAVENOUS at 02:03

## 2021-01-01 RX ADMIN — NIFEDIPINE 30 MG: 30 TABLET, FILM COATED, EXTENDED RELEASE ORAL at 11:04

## 2021-01-01 RX ADMIN — ENOXAPARIN SODIUM 40 MG: 40 INJECTION SUBCUTANEOUS at 04:04

## 2021-01-01 RX ADMIN — TACROLIMUS 1 MG: 1 CAPSULE ORAL at 07:04

## 2021-01-01 RX ADMIN — DEXTROSE 300 MG: 50 INJECTION, SOLUTION INTRAVENOUS at 02:04

## 2021-01-01 RX ADMIN — METOPROLOL TARTRATE 25 MG: 25 TABLET, FILM COATED ORAL at 08:04

## 2021-01-01 RX ADMIN — DOCUSATE SODIUM 50MG AND SENNOSIDES 8.6MG 1 TABLET: 8.6; 5 TABLET, FILM COATED ORAL at 11:04

## 2021-01-01 RX ADMIN — MAGNESIUM SULFATE 2 G: 2 INJECTION INTRAVENOUS at 08:04

## 2021-01-01 RX ADMIN — TACROLIMUS 2 MG: 1 CAPSULE ORAL at 09:04

## 2021-01-01 RX ADMIN — METOPROLOL TARTRATE 25 MG: 25 TABLET, FILM COATED ORAL at 12:04

## 2021-01-01 RX ADMIN — TACROLIMUS 1.5 MG: 1 CAPSULE ORAL at 08:04

## 2021-01-01 RX ADMIN — Medication 1 APPLICATOR: at 12:03

## 2021-01-01 RX ADMIN — ALPRAZOLAM 0.5 MG: 0.5 TABLET ORAL at 04:04

## 2021-01-01 RX ADMIN — DEXTROSE: 50 INJECTION, SOLUTION INTRAVENOUS at 02:03

## 2021-01-01 RX ADMIN — APIXABAN 5 MG: 5 TABLET, FILM COATED ORAL at 09:04

## 2021-01-01 RX ADMIN — ONDANSETRON 8 MG: 8 TABLET, ORALLY DISINTEGRATING ORAL at 02:04

## 2021-01-01 RX ADMIN — ENOXAPARIN SODIUM 40 MG: 40 INJECTION SUBCUTANEOUS at 05:04

## 2021-01-01 RX ADMIN — PREDNISONE 5 MG: 2.5 TABLET ORAL at 08:04

## 2021-01-01 RX ADMIN — ALPRAZOLAM 0.5 MG: 0.5 TABLET ORAL at 12:04

## 2021-03-15 PROBLEM — T86.11 ANTIBODY MEDIATED REJECTION OF KIDNEY TRANSPLANT: Status: ACTIVE | Noted: 2021-01-01

## 2021-04-01 PROBLEM — E87.1 HYPONATREMIA: Status: ACTIVE | Noted: 2021-01-01

## 2021-04-02 PROBLEM — E87.20 NORMAL ANION GAP METABOLIC ACIDOSIS: Status: ACTIVE | Noted: 2021-01-01

## 2021-04-02 PROBLEM — R11.2 INTRACTABLE VOMITING WITH NAUSEA: Status: ACTIVE | Noted: 2021-01-01

## 2021-04-02 PROBLEM — R11.10 INTRACTABLE VOMITING: Status: ACTIVE | Noted: 2021-01-01

## 2021-04-02 PROBLEM — K52.9 GASTROENTERITIS: Status: ACTIVE | Noted: 2021-01-01

## 2021-04-05 PROBLEM — I48.92 ATRIAL FLUTTER: Status: ACTIVE | Noted: 2021-01-01

## 2021-04-07 PROBLEM — R11.2 INTRACTABLE VOMITING WITH NAUSEA: Status: RESOLVED | Noted: 2021-01-01 | Resolved: 2021-01-01

## 2021-04-07 PROBLEM — E87.20 NORMAL ANION GAP METABOLIC ACIDOSIS: Status: RESOLVED | Noted: 2021-01-01 | Resolved: 2021-01-01

## 2021-04-07 PROBLEM — I51.7 LAE (LEFT ATRIAL ENLARGEMENT): Status: ACTIVE | Noted: 2021-01-01

## 2021-08-26 ENCOUNTER — PATIENT MESSAGE (OUTPATIENT)
Dept: TRANSPLANT | Facility: CLINIC | Age: 66
End: 2021-08-26

## 2021-09-15 ENCOUNTER — POST MORTEM DOCUMENTATION (OUTPATIENT)
Dept: TRANSPLANT | Facility: CLINIC | Age: 66
End: 2021-09-15

## 2021-09-15 ENCOUNTER — TELEPHONE (OUTPATIENT)
Dept: TRANSPLANT | Facility: CLINIC | Age: 66
End: 2021-09-15

## 2021-09-15 ENCOUNTER — PATIENT MESSAGE (OUTPATIENT)
Dept: TRANSPLANT | Facility: CLINIC | Age: 66
End: 2021-09-15

## 2023-09-30 NOTE — PATIENT INSTRUCTIONS
History & Physical Reviewed:   I have reviewed the History and Physical dated:  26-Jul-2023   History and Physical reviewed and relevant findings noted. Patient examined to review pertinent physical  findings.: No significant changes   Home Medications Reviewed: no changes noted   Allergies Reviewed: no changes noted       ERAS (Enhanced Recovery After Surgery):  ·  ERAS Patient: no     Consent:   COVID-19 Consent:  ·  COVID-19 Risk Consent Surgeon has reviewed key risks related to the risk of [place] COVID-19 and if they contract COVID-19 what the risks are.       Electronic Signatures:  Mc Boo ()  (Signed 26-Jul-2023 07:01)   Authored: History & Physical Reviewed, ERAS, Consent,  Note Completion      Last Updated: 26-Jul-2023 07:01 by Mc Boo ()    Avoid NSAID pain medications such as advil, aleve, motrin, ibuprofen, naprosyn, meloxicam, diclofenac, mobic.

## (undated) DEVICE — PAD DEFIB CADENCE ADULT R2